# Patient Record
Sex: FEMALE | Race: OTHER | HISPANIC OR LATINO | Employment: UNEMPLOYED | URBAN - METROPOLITAN AREA
[De-identification: names, ages, dates, MRNs, and addresses within clinical notes are randomized per-mention and may not be internally consistent; named-entity substitution may affect disease eponyms.]

---

## 2017-01-23 ENCOUNTER — HOSPITAL ENCOUNTER (OUTPATIENT)
Dept: RADIOLOGY | Facility: MEDICAL CENTER | Age: 52
End: 2017-01-23
Attending: FAMILY MEDICINE
Payer: COMMERCIAL

## 2017-01-23 DIAGNOSIS — Q38.3: ICD-10-CM

## 2017-01-23 DIAGNOSIS — R51.9 NONINTRACTABLE HEADACHE, UNSPECIFIED CHRONICITY PATTERN, UNSPECIFIED HEADACHE TYPE: ICD-10-CM

## 2017-01-23 DIAGNOSIS — R43.9 DISTURBANCES OF SENSATION OF SMELL AND TASTE: ICD-10-CM

## 2017-01-23 PROCEDURE — 70553 MRI BRAIN STEM W/O & W/DYE: CPT

## 2017-01-23 PROCEDURE — A9579 GAD-BASE MR CONTRAST NOS,1ML: HCPCS | Performed by: FAMILY MEDICINE

## 2017-01-23 PROCEDURE — 700117 HCHG RX CONTRAST REV CODE 255: Performed by: FAMILY MEDICINE

## 2017-01-23 RX ADMIN — GADODIAMIDE 20 ML: 287 INJECTION INTRAVENOUS at 14:08

## 2017-02-15 ENCOUNTER — APPOINTMENT (OUTPATIENT)
Dept: RADIOLOGY | Facility: MEDICAL CENTER | Age: 52
End: 2017-02-15
Attending: ANESTHESIOLOGY
Payer: COMMERCIAL

## 2017-02-15 DIAGNOSIS — M25.441 SWELLING OF JOINT OF RIGHT HAND: ICD-10-CM

## 2017-02-15 PROCEDURE — 72148 MRI LUMBAR SPINE W/O DYE: CPT

## 2017-02-15 PROCEDURE — 72141 MRI NECK SPINE W/O DYE: CPT

## 2017-05-08 ENCOUNTER — APPOINTMENT (OUTPATIENT)
Dept: RADIOLOGY | Facility: MEDICAL CENTER | Age: 52
End: 2017-05-08
Attending: EMERGENCY MEDICINE
Payer: COMMERCIAL

## 2017-05-08 ENCOUNTER — HOSPITAL ENCOUNTER (EMERGENCY)
Facility: MEDICAL CENTER | Age: 52
End: 2017-05-08
Attending: EMERGENCY MEDICINE
Payer: COMMERCIAL

## 2017-05-08 VITALS
WEIGHT: 253.53 LBS | HEIGHT: 61 IN | HEART RATE: 64 BPM | DIASTOLIC BLOOD PRESSURE: 71 MMHG | OXYGEN SATURATION: 93 % | RESPIRATION RATE: 16 BRPM | BODY MASS INDEX: 47.87 KG/M2 | SYSTOLIC BLOOD PRESSURE: 133 MMHG | TEMPERATURE: 98.4 F

## 2017-05-08 DIAGNOSIS — M25.551 HIP PAIN, CHRONIC, RIGHT: ICD-10-CM

## 2017-05-08 DIAGNOSIS — M16.11 OSTEOARTHRITIS OF RIGHT HIP, UNSPECIFIED OSTEOARTHRITIS TYPE: ICD-10-CM

## 2017-05-08 DIAGNOSIS — G89.29 HIP PAIN, CHRONIC, RIGHT: ICD-10-CM

## 2017-05-08 PROCEDURE — 700111 HCHG RX REV CODE 636 W/ 250 OVERRIDE (IP): Performed by: EMERGENCY MEDICINE

## 2017-05-08 PROCEDURE — 73502 X-RAY EXAM HIP UNI 2-3 VIEWS: CPT | Mod: RT

## 2017-05-08 PROCEDURE — 96372 THER/PROPH/DIAG INJ SC/IM: CPT

## 2017-05-08 PROCEDURE — 99284 EMERGENCY DEPT VISIT MOD MDM: CPT

## 2017-05-08 RX ORDER — KETOROLAC TROMETHAMINE 30 MG/ML
30 INJECTION, SOLUTION INTRAMUSCULAR; INTRAVENOUS ONCE
Status: COMPLETED | OUTPATIENT
Start: 2017-05-08 | End: 2017-05-08

## 2017-05-08 RX ORDER — ONDANSETRON 2 MG/ML
4 INJECTION INTRAMUSCULAR; INTRAVENOUS ONCE
Status: COMPLETED | OUTPATIENT
Start: 2017-05-08 | End: 2017-05-08

## 2017-05-08 RX ADMIN — KETOROLAC TROMETHAMINE 30 MG: 30 INJECTION, SOLUTION INTRAMUSCULAR at 17:00

## 2017-05-08 RX ADMIN — HYDROMORPHONE HYDROCHLORIDE 1 MG: 1 INJECTION, SOLUTION INTRAMUSCULAR; INTRAVENOUS; SUBCUTANEOUS at 16:00

## 2017-05-08 RX ADMIN — ONDANSETRON 4 MG: 2 INJECTION INTRAMUSCULAR; INTRAVENOUS at 16:00

## 2017-05-08 ASSESSMENT — LIFESTYLE VARIABLES: DO YOU DRINK ALCOHOL: NO

## 2017-05-08 NOTE — ED PROVIDER NOTES
"ED Provider Note    CHIEF COMPLAINT  Chief Complaint   Patient presents with   • Hip Pain     had hip xray one month ago.  right       HPI  Smita Faith is a 51 y.o. female who presents for evaluation of hip pain. She's been having right hip pain for months. The patient sees pain management and is on chronic narcotic therapy and has a narcotic prescription score of 501. She is being seen by a spine doctor and they did x-rays one month ago and told her that she probably needed to see a hip surgeon and have an MRI done. The patient denies any specific trauma. She states the pain has gotten worse primarily today. She's had no fevers. She has no numbness or motor weakness.    REVIEW OF SYSTEMS  See HPI for further details. All other systems are negative.     PAST MEDICAL HISTORY  Past Medical History   Diagnosis Date   • Bipolar 2 disorder ([facility])    • Depression    • Anxiety disorder    • Pneumonia    • Bronchitis    • Backpain      Lumbar chronic   • Unspecified disorder of thyroid [id number]     no longer an issue   • Alcoholism    • Heart burn    • Indigestion    • Arthritis      back, hands, arm   • Gynecological disorder      mass   • Bowel habit changes      constipation, irreg diarrhea   • ASTHMA      States trigered by allergy reaction, PRN inhaler        FAMILY HISTORY  No family history on file.    SOCIAL HISTORY  Social History     Social History   • Marital Status:      Spouse Name: N/A   • Number of Children: N/A   • Years of Education: N/A     Social History Main Topics   • Smoking status: Former Smoker -- 0.25 packs/day for 4 years     Types: Cigarettes     Quit date: 01/01/2015   • Smokeless tobacco: Never Used      Comment: 2009   • Alcohol Use: No      Comment: smoked off and on.   • Drug Use: No      Comment: marijuana, crank, \"did everything.\" last used 04/2015   • Sexual Activity: Not on file     Other Topics Concern   • Not on file     Social History Narrative       SURGICAL " "HISTORY  Past Surgical History   Procedure Laterality Date   • Gastric bypass laparoscopic     • Other abdominal surgery       gastric bypass 5/07,lap jenniffer 1988   • Tubal ligation     • Pr removal of tonsils,<13 y/o     • Pr cholecystostomy,percut     • Lumbar fusion anterior  11/10/2010     Performed by CHERYL LORD at SURGERY [facility]   • Breast biopsy       Right- benign   • Pr reduction of large breast  2010   • Hysterectomy robotic xi N/A 4/7/2016     Procedure: HYSTERECTOMY ROBOTIC XI ;  Surgeon: Song Goodson M.D.;  Location: SURGERY [facility];  Service:        CURRENT MEDICATIONS  Home Medications     **Home medications have not yet been reviewed for this encounter**          ALLERGIES  Allergies   Allergen Reactions   • Ciprofloxacin Anaphylaxis     SOB   • Codeine      Upset stomach    • Erythromycin Vomiting   • Nsaids      Does not take due to gastric bypass   • Other Food Anaphylaxis     pj pj       PHYSICAL EXAM  VITAL SIGNS: /71 mmHg  Pulse 73  Temp(Src) 36.9 °C (98.4 °F)  Resp 16  Ht 1.549 m (5' 1\")  Wt 115 kg (253 lb 8.5 oz)  BMI 47.93 kg/m2  SpO2 96%    Constitutional: Well developed, Well nourished, Non-toxic appearance.   HENT: Normocephalic, Atraumatic.   Cardiovascular: Normal heart rate.   Thorax & Lungs: No respiratory distress.  Skin: Warm, Dry.   Musculoskeletal: Right lower extremity shows no obvious deformity. Distally she is neurovascularly intact. She does have discomfort with range of motion of the hip.  Neurologic: Awake alert .    RADIOLOGY/PROCEDURES  DX-HIP-COMPLETE - UNILATERAL 2+ RIGHT   Final Result      Moderate to severe right hip osteoarthritis without evidence of fracture or dislocation.            COURSE & MEDICAL DECISION MAKING  Pertinent Labs & Imaging studies reviewed. (See chart for details)  Is a 51-year-old here for evaluation of right hip pain. She has chronic hip pain. She is followed by pain management and is on narcotics daily. She " denies any trauma. X-ray of the right hip shows severe right hip osteoarthritis without evidence of fracture or dislocation. The patient is treated with Dilaudid here with some improvement. I discussed results of the x-ray with her. I've explained to her that she needs orthopedic follow-up. She is requesting a shot of Toradol which she will be given. She has pain medications at home. I will refer her to Dr. Hanna of orthopedics for follow-up. She should contact his office tomorrow to make arrangements. She is given a discharge instruction sheet on osteoarthritis.    FINAL IMPRESSION  1. Chronic right hip pain  2. Severe right hip osteoarthritis  3.         Electronically signed by: Jossue Bear, 5/8/2017 3:49 PM

## 2017-05-08 NOTE — ED AVS SNAPSHOT
Cybits Access Code: Activation code not generated  Current Cybits Status: Active    [employer]  A secure, online tool to manage your health information     [employer]’s Cybits® is a secure, online tool that connects you to your personalized health information from the privacy of your home -- day or night - making it very easy for you to manage your healthcare. Once the activation process is completed, you can even access your medical information using the Cybits adal, which is available for free in the Apple Adal store or Google Play store.     Cybits provides the following levels of access (as shown below):   My Chart Features   [facility] Primary Care Doctor [facility]  Specialists [facility]  Urgent  Care Non-[facility]  Primary Care  Doctor   Email your healthcare team securely and privately 24/7 X X X X   Manage appointments: schedule your next appointment; view details of past/upcoming appointments X      Request prescription refills. X      View recent personal medical records, including lab and immunizations X X X X   View health record, including health history, allergies, medications X X X X   Read reports about your outpatient visits, procedures, consult and ER notes X X X X   See your discharge summary, which is a recap of your hospital and/or ER visit that includes your diagnosis, lab results, and care plan. X X       How to register for Cybits:  1. Go to  [email or web address].  2. Click on the Sign Up Now box, which takes you to the New Member Sign Up page. You will need to provide the following information:  a. Enter your Cybits Access Code exactly as it appears at the top of this page. (You will not need to use this code after you’ve completed the sign-up process. If you do not sign up before the expiration date, you must request a new code.)   b. Enter your date of birth.   c. Enter your home email address.   d. Click Submit, and follow the next screen’s instructions.  3. Create a Cybits ID. This will  be your [employer] login ID and cannot be changed, so think of one that is secure and easy to remember.  4. Create a [employer] password. You can change your password at any time.  5. Enter your Password Reset Question and Answer. This can be used at a later time if you forget your password.   6. Enter your e-mail address. This allows you to receive e-mail notifications when new information is available in [employer].  7. Click Sign Up. You can now view your health information.    For assistance activating your [employer] account, call [phone]

## 2017-05-08 NOTE — ED AVS SNAPSHOT
Home Care Instructions                                                                                                                Smita Faith   MRN: [id number]    Department:  [facility], Emergency Dept   Date of Visit:  5/8/2017            [facility], Emergency Dept    [address]    [place] [address]    Phone:  [phone]      You were seen by     Jossue Bear M.D.      Your Diagnosis Was     Hip pain, chronic, right     M25.551, G89.29       These are the medications you received during your hospitalization from 05/08/2017 1448 to 05/08/2017 1724     Date/Time Order Dose Route Action    05/08/2017 1600 HYDROmorphone (DILAUDID) injection 1 mg 1 mg Intramuscular Given    05/08/2017 1600 ondansetron (ZOFRAN) syringe/vial injection 4 mg 4 mg Intramuscular Given    05/08/2017 1700 ketorolac (TORADOL) 30 mg injection 30 mg Intramuscular Given      Follow-up Information     1. Schedule an appointment as soon as possible for a visit with Vijay Hanna M.D..    Specialty:  Orthopaedics    Contact information    [address]  [address]  [place] [facility] [address] [phone]        Medication Information     Review all of your home medications and newly ordered medications with your primary doctor and/or pharmacist as soon as possible. Follow medication instructions as directed by your doctor and/or pharmacist.     Please keep your complete medication list with you and share with your physician. Update the information when medications are discontinued, doses are changed, or new medications (including over-the-counter products) are added; and carry medication information at all times in the event of emergency situations.               Medication List      ASK your doctor about these medications        Instructions    Morning Afternoon Evening Bedtime    ALBUTEROL INH        Inhale  by mouth.                        Calcium 7463-6220 MG-UNIT Chew        Take 1 Each  by mouth every day.   Dose:  1 Each                        cetirizine 10 MG Tabs   Commonly known as:  ZYRTEC        Take 10 mg by mouth every day.   Dose:  10 mg                        COQ-10 PO        Take  by mouth every day.                        gabapentin 300 MG Caps   Commonly known as:  NEURONTIN        Take 300 mg by mouth 3 times a day.   Dose:  300 mg                        MULTIVITAMIN & MINERAL PO        Take 1 Tab by mouth every day.   Dose:  1 Tab                        ondansetron 4 MG Tabs tablet   Commonly known as:  ZOFRAN        Take 1 Tab by mouth every four hours as needed for Nausea/Vomiting.   Dose:  4 mg                        * oxycodone-acetaminophen 5-325 MG Tabs   Commonly known as:  PERCOCET        Take 1-2 Tabs by mouth every four hours as needed.   Dose:  1-2 Tab                        * oxycodone-acetaminophen 7.5-325 MG per tablet   Commonly known as:  PERCOCET        Take 1-2 Tabs by mouth every 6 hours as needed (pain).   Dose:  1-2 Tab                        sertraline 100 MG Tabs   Commonly known as:  ZOLOFT        Take 100 mg by mouth every day.   Dose:  100 mg                        Vitamin D 1000 UNIT Caps        Take 1,000-2,000 Units by mouth every day.   Dose:  9349-9353 Units                        * Notice:  This list has 2 medication(s) that are the same as other medications prescribed for you. Read the directions carefully, and ask your doctor or other care provider to review them with you.            Procedures and tests performed during your visit     DX-HIP-COMPLETE - UNILATERAL 2+ RIGHT        Discharge Instructions       Arthritis, Nonspecific  Arthritis is inflammation of a joint. This usually means pain, redness, warmth or swelling are present. One or more joints may be involved. There are a number of types of arthritis. Your caregiver may not be able to tell what type of arthritis you have right away.  CAUSES   The most common cause of arthritis is the wear  and tear on the joint (osteoarthritis). This causes damage to the cartilage, which can break down over time. The knees, hips, back and neck are most often affected by this type of arthritis.  Other types of arthritis and common causes of joint pain include:  · Sprains and other injuries near the joint. Sometimes minor sprains and injuries cause pain and swelling that develop hours later.  · Rheumatoid arthritis. This affects hands, feet and knees. It usually affects both sides of your body at the same time. It is often associated with chronic ailments, fever, weight loss and general weakness.  · Crystal arthritis. Gout and pseudo gout can cause occasional acute severe pain, redness and swelling in the foot, ankle, or knee.  · Infectious arthritis. Bacteria can get into a joint through a break in overlying skin. This can cause infection of the joint. Bacteria and viruses can also spread through the blood and affect your joints.  · Drug, infectious and allergy reactions. Sometimes joints can become mildly painful and slightly swollen with these types of illnesses.  SYMPTOMS   · Pain is the main symptom.  · Your joint or joints can also be red, swollen and warm or hot to the touch.  · You may have a fever with certain types of arthritis, or even feel overall ill.  · The joint with arthritis will hurt with movement. Stiffness is present with some types of arthritis.  DIAGNOSIS   Your caregiver will suspect arthritis based on your description of your symptoms and on your exam. Testing may be needed to find the type of arthritis:  · Blood and sometimes urine tests.  · X-ray tests and sometimes CT or MRI scans.  · Removal of fluid from the joint (arthrocentesis) is done to check for bacteria, crystals or other causes. Your caregiver (or a specialist) will numb the area over the joint with a local anesthetic, and use a needle to remove joint fluid for examination. This procedure is only minimally uncomfortable.  · Even with  these tests, your caregiver may not be able to tell what kind of arthritis you have. Consultation with a specialist (rheumatologist) may be helpful.  TREATMENT   Your caregiver will discuss with you treatment specific to your type of arthritis. If the specific type cannot be determined, then the following general recommendations may apply.  Treatment of severe joint pain includes:  · Rest.  · Elevation.  · Anti-inflammatory medication (for example, ibuprofen) may be prescribed. Avoiding activities that cause increased pain.  · Only take over-the-counter or prescription medicines for pain and discomfort as recommended by your caregiver.  · Cold packs over an inflamed joint may be used for 10 to 15 minutes every hour. Hot packs sometimes feel better, but do not use overnight. Do not use hot packs if you are diabetic without your caregiver's permission.  · A cortisone shot into arthritic joints may help reduce pain and swelling.  · Any acute arthritis that gets worse over the next 1 to 2 days needs to be looked at to be sure there is no joint infection.  Long-term arthritis treatment involves modifying activities and lifestyle to reduce joint stress jarring. This can include weight loss. Also, exercise is needed to nourish the joint cartilage and remove waste. This helps keep the muscles around the joint strong.  HOME CARE INSTRUCTIONS   · Do not take aspirin to relieve pain if gout is suspected. This elevates uric acid levels.  · Only take over-the-counter or prescription medicines for pain, discomfort or fever as directed by your caregiver.  · Rest the joint as much as possible.  · If your joint is swollen, keep it elevated.  · Use crutches if the painful joint is in your leg.  · Drinking plenty of fluids may help for certain types of arthritis.  · Follow your caregiver's dietary instructions.  · Try low-impact exercise such as:  ¨ Swimming.  ¨ Water aerobics.  ¨ Biking.  ¨ Walking.  · Morning stiffness is often  relieved by a warm shower.  · Put your joints through regular range-of-motion.  SEEK MEDICAL CARE IF:   · You do not feel better in 24 hours or are getting worse.  · You have side effects to medications, or are not getting better with treatment.  SEEK IMMEDIATE MEDICAL CARE IF:   · You have a fever.  · You develop severe joint pain, swelling or redness.  · Many joints are involved and become painful and swollen.  · There is severe back pain and/or leg weakness.  · You have loss of bowel or bladder control.     This information is not intended to replace advice given to you by your health care provider. Make sure you discuss any questions you have with your health care provider.     Document Released: 01/25/2006 Document Revised: 01/08/2016 Document Reviewed: 03/14/2016  [employer] Interactive Patient Education ©2016 [employer]            Patient Information     Patient Information    Following emergency treatment: all patient requiring follow-up care must return either to a private physician or a clinic if your condition worsens before you are able to obtain further medical attention, please return to the emergency room.     Billing Information    At [facility], we work to make the billing process streamlined for our patients.  Our Representatives are here to answer any questions you may have regarding your hospital bill.  If you have insurance coverage and have supplied your insurance information to us, we will submit a claim to your insurer on your behalf.  Should you have any questions regarding your bill, we can be reached online or by phone as follows:  Online: You are able pay your bills online or live chat with our representatives about any billing questions you may have. We are here to help Monday - Friday from 8:00am to 7:30pm and 9:00am - 12:00pm on Saturdays.  Please visit [email or web address]  for more information.   Phone:  [phone] or [phone]    Please  note that your emergency physician, surgeon, pathologist, radiologist, anesthesiologist, and other specialists are not employed by [facility] and will therefore bill separately for their services.  Please contact them directly for any questions concerning their bills at the numbers below:     Emergency Physician Services:  [phone]  [facility]:  [phone]  [facility]:  [phone]  [facility]:  [phone]    1. Your final bill may vary from the amount quoted upon discharge if all procedures are not complete at that time, or if your doctor has additional procedures of which we are not aware. You will receive an additional bill if you return to the Emergency Department at [facility] for suture removal regardless of the facility of which the sutures were placed.     2. Please arrange for settlement of this account at the emergency registration.    3. All self-pay accounts are due in full at the time of treatment.  If you are unable to meet this obligation then payment is expected within 4-5 days.     4. If you have had radiology studies (CT, X-ray, Ultrasound, MRI), you have received a preliminary result during your emergency department visit. Please contact the radiology department [phone] to receive a copy of your final result. Please discuss the Final result with your primary physician or with the follow up physician provided.     Crisis Hotline:  [place] Crisis Hotline:  2-554-RMDJTGV or 1-694.513.8214  [place] Crisis Hotline:    1-643.610.1821 or 336-321-9973         ED Discharge Follow Up Questions    1. In order to provide you with very good care, we would like to follow up with a phone call in the next few days.  May we have your permission to contact you?     YES /  NO    2. What is the best phone number to call you? (       )_____-__________    3. What is the best time to call you?      Morning  /  Afternoon  /  Evening                    Patient Signature:  ____________________________________________________________    Date:  ____________________________________________________________

## 2017-05-08 NOTE — ED AVS SNAPSHOT
5/8/2017    Smita Faith  [address]  [place] [address]    Dear Smita Mejia:    Critical [facility] hospital wants to ensure your discharge home is safe and you or your loved ones have had all of your questions answered regarding your care after you leave the hospital.    Below is a list of resources and contact information should you have any questions regarding your hospital stay, follow-up instructions, or active medical symptoms.    Questions or Concerns Regarding… Contact   Medical Questions Related to Your Discharge  (7 days a week, 8am-5pm) Contact a Nurse Care Coordinator   [phone]   Medical Questions Not Related to Your Discharge  (24 hours a day / 7 days a week)  Contact the Nurse Health Line   [phone]    Medications or Discharge Instructions Refer to your discharge packet   or contact your [facility] Primary Care Provider   [phone]   Follow-up Appointment(s) Schedule your appointment via [employer]   or contact Scheduling [phone]   Billing Review your statement via [employer]  or contact Billing [phone]   Medical Records Review your records via [employer]   or contact Medical Records [phone]     You may receive a telephone call within two days of discharge. This call is to make certain you understand your discharge instructions and have the opportunity to have any questions answered. You can also easily access your medical information, test results and upcoming appointments via the [employer] free online health management tool. You can learn more and sign up at [employer]. For assistance setting up your [employer] account, please call [phone].    Once again, we want to ensure your discharge home is safe and that you have a clear understanding of any next steps in your care. If you have any questions or concerns, please do not hesitate to contact us, we are here for you. Thank you for choosing [facility] for your healthcare needs.    Sincerely,    Your [facility] Healthcare Team

## 2017-05-08 NOTE — DISCHARGE INSTRUCTIONS
Arthritis, Nonspecific  Arthritis is inflammation of a joint. This usually means pain, redness, warmth or swelling are present. One or more joints may be involved. There are a number of types of arthritis. Your caregiver may not be able to tell what type of arthritis you have right away.  CAUSES   The most common cause of arthritis is the wear and tear on the joint (osteoarthritis). This causes damage to the cartilage, which can break down over time. The knees, hips, back and neck are most often affected by this type of arthritis.  Other types of arthritis and common causes of joint pain include:  · Sprains and other injuries near the joint. Sometimes minor sprains and injuries cause pain and swelling that develop hours later.  · Rheumatoid arthritis. This affects hands, feet and knees. It usually affects both sides of your body at the same time. It is often associated with chronic ailments, fever, weight loss and general weakness.  · Crystal arthritis. Gout and pseudo gout can cause occasional acute severe pain, redness and swelling in the foot, ankle, or knee.  · Infectious arthritis. Bacteria can get into a joint through a break in overlying skin. This can cause infection of the joint. Bacteria and viruses can also spread through the blood and affect your joints.  · Drug, infectious and allergy reactions. Sometimes joints can become mildly painful and slightly swollen with these types of illnesses.  SYMPTOMS   · Pain is the main symptom.  · Your joint or joints can also be red, swollen and warm or hot to the touch.  · You may have a fever with certain types of arthritis, or even feel overall ill.  · The joint with arthritis will hurt with movement. Stiffness is present with some types of arthritis.  DIAGNOSIS   Your caregiver will suspect arthritis based on your description of your symptoms and on your exam. Testing may be needed to find the type of arthritis:  · Blood and sometimes urine tests.  · X-ray tests  and sometimes CT or MRI scans.  · Removal of fluid from the joint (arthrocentesis) is done to check for bacteria, crystals or other causes. Your caregiver (or a specialist) will numb the area over the joint with a local anesthetic, and use a needle to remove joint fluid for examination. This procedure is only minimally uncomfortable.  · Even with these tests, your caregiver may not be able to tell what kind of arthritis you have. Consultation with a specialist (rheumatologist) may be helpful.  TREATMENT   Your caregiver will discuss with you treatment specific to your type of arthritis. If the specific type cannot be determined, then the following general recommendations may apply.  Treatment of severe joint pain includes:  · Rest.  · Elevation.  · Anti-inflammatory medication (for example, ibuprofen) may be prescribed. Avoiding activities that cause increased pain.  · Only take over-the-counter or prescription medicines for pain and discomfort as recommended by your caregiver.  · Cold packs over an inflamed joint may be used for 10 to 15 minutes every hour. Hot packs sometimes feel better, but do not use overnight. Do not use hot packs if you are diabetic without your caregiver's permission.  · A cortisone shot into arthritic joints may help reduce pain and swelling.  · Any acute arthritis that gets worse over the next 1 to 2 days needs to be looked at to be sure there is no joint infection.  Long-term arthritis treatment involves modifying activities and lifestyle to reduce joint stress jarring. This can include weight loss. Also, exercise is needed to nourish the joint cartilage and remove waste. This helps keep the muscles around the joint strong.  HOME CARE INSTRUCTIONS   · Do not take aspirin to relieve pain if gout is suspected. This elevates uric acid levels.  · Only take over-the-counter or prescription medicines for pain, discomfort or fever as directed by your caregiver.  · Rest the joint as much as  possible.  · If your joint is swollen, keep it elevated.  · Use crutches if the painful joint is in your leg.  · Drinking plenty of fluids may help for certain types of arthritis.  · Follow your caregiver's dietary instructions.  · Try low-impact exercise such as:  ¨ Swimming.  ¨ Water aerobics.  ¨ Biking.  ¨ Walking.  · Morning stiffness is often relieved by a warm shower.  · Put your joints through regular range-of-motion.  SEEK MEDICAL CARE IF:   · You do not feel better in 24 hours or are getting worse.  · You have side effects to medications, or are not getting better with treatment.  SEEK IMMEDIATE MEDICAL CARE IF:   · You have a fever.  · You develop severe joint pain, swelling or redness.  · Many joints are involved and become painful and swollen.  · There is severe back pain and/or leg weakness.  · You have loss of bowel or bladder control.     This information is not intended to replace advice given to you by your health care provider. Make sure you discuss any questions you have with your health care provider.     Document Released: 01/25/2006 Document Revised: 01/08/2016 Document Reviewed: 03/14/2016  [employer] Interactive Patient Education ©2016 [employer]

## 2017-05-08 NOTE — ED NOTES
52 y/o female ambulate to triage   Chief Complaint   Patient presents with   • Hip Pain     had hip xray one month ago.  right     Pt states she was told the bone in her hip is thin, and needs an MRI.  Pt states no relief with home pain meds

## 2017-05-08 NOTE — ED NOTES
Pt to [facility] via w/c.  Agree with triage note.  Pt reports initial xray completed by spine doctors.  Pt reports pain from right knee to hip.  Pt has been taking percocet 10 at home with no relief.  ERP to see.

## 2017-05-09 ENCOUNTER — HOSPITAL ENCOUNTER (EMERGENCY)
Facility: MEDICAL CENTER | Age: 52
End: 2017-05-09
Attending: EMERGENCY MEDICINE
Payer: COMMERCIAL

## 2017-05-09 VITALS
RESPIRATION RATE: 16 BRPM | BODY MASS INDEX: 48.35 KG/M2 | DIASTOLIC BLOOD PRESSURE: 73 MMHG | HEART RATE: 61 BPM | SYSTOLIC BLOOD PRESSURE: 113 MMHG | TEMPERATURE: 98.4 F | OXYGEN SATURATION: 94 % | WEIGHT: 255.73 LBS

## 2017-05-09 DIAGNOSIS — M25.512 ACUTE PAIN OF LEFT SHOULDER: ICD-10-CM

## 2017-05-09 DIAGNOSIS — F11.90 OPIATE USE: ICD-10-CM

## 2017-05-09 DIAGNOSIS — G89.29 OTHER CHRONIC PAIN: ICD-10-CM

## 2017-05-09 DIAGNOSIS — R51.9 NONINTRACTABLE EPISODIC HEADACHE, UNSPECIFIED HEADACHE TYPE: ICD-10-CM

## 2017-05-09 DIAGNOSIS — M54.2 NECK PAIN ON LEFT SIDE: ICD-10-CM

## 2017-05-09 PROCEDURE — 700111 HCHG RX REV CODE 636 W/ 250 OVERRIDE (IP): Performed by: EMERGENCY MEDICINE

## 2017-05-09 PROCEDURE — 700102 HCHG RX REV CODE 250 W/ 637 OVERRIDE(OP): Performed by: EMERGENCY MEDICINE

## 2017-05-09 PROCEDURE — 99284 EMERGENCY DEPT VISIT MOD MDM: CPT

## 2017-05-09 PROCEDURE — A9270 NON-COVERED ITEM OR SERVICE: HCPCS | Performed by: EMERGENCY MEDICINE

## 2017-05-09 PROCEDURE — 96374 THER/PROPH/DIAG INJ IV PUSH: CPT

## 2017-05-09 PROCEDURE — 96375 TX/PRO/DX INJ NEW DRUG ADDON: CPT

## 2017-05-09 RX ORDER — DIPHENHYDRAMINE HYDROCHLORIDE 50 MG/ML
25 INJECTION INTRAMUSCULAR; INTRAVENOUS ONCE
Status: COMPLETED | OUTPATIENT
Start: 2017-05-09 | End: 2017-05-09

## 2017-05-09 RX ORDER — METOCLOPRAMIDE HYDROCHLORIDE 5 MG/ML
10 INJECTION INTRAMUSCULAR; INTRAVENOUS ONCE
Status: COMPLETED | OUTPATIENT
Start: 2017-05-09 | End: 2017-05-09

## 2017-05-09 RX ORDER — KETOROLAC TROMETHAMINE 30 MG/ML
15 INJECTION, SOLUTION INTRAMUSCULAR; INTRAVENOUS ONCE
Status: COMPLETED | OUTPATIENT
Start: 2017-05-09 | End: 2017-05-09

## 2017-05-09 RX ORDER — ACETAMINOPHEN 325 MG/1
650 TABLET ORAL ONCE
Status: COMPLETED | OUTPATIENT
Start: 2017-05-09 | End: 2017-05-09

## 2017-05-09 RX ORDER — METHOCARBAMOL 500 MG/1
1000 TABLET, FILM COATED ORAL 3 TIMES DAILY PRN
Qty: 20 TAB | Refills: 0 | Status: SHIPPED | OUTPATIENT
Start: 2017-05-09 | End: 2017-08-03

## 2017-05-09 RX ADMIN — DIPHENHYDRAMINE HYDROCHLORIDE 25 MG: 50 INJECTION, SOLUTION INTRAMUSCULAR; INTRAVENOUS at 19:47

## 2017-05-09 RX ADMIN — KETOROLAC TROMETHAMINE 15 MG: 30 INJECTION, SOLUTION INTRAMUSCULAR at 19:46

## 2017-05-09 RX ADMIN — ACETAMINOPHEN 650 MG: 325 TABLET, FILM COATED ORAL at 20:39

## 2017-05-09 RX ADMIN — METOCLOPRAMIDE 10 MG: 5 INJECTION, SOLUTION INTRAMUSCULAR; INTRAVENOUS at 19:46

## 2017-05-09 ASSESSMENT — LIFESTYLE VARIABLES: DO YOU DRINK ALCOHOL: NO

## 2017-05-09 ASSESSMENT — PAIN SCALES - GENERAL: PAINLEVEL_OUTOF10: 8

## 2017-05-09 NOTE — ED AVS SNAPSHOT
Baydin Access Code: Activation code not generated  Current Baydin Status: Active    Message[employer]er[employer]  A secure, online tool to manage your health information     [employer]’s Baydin® is a secure, online tool that connects you to your personalized health information from the privacy of your home -- day or night - making it very easy for you to manage your healthcare. Once the activation process is completed, you can even access your medical information using the Baydin adal, which is available for free in the Apple Adal store or Google Play store.     Baydin provides the following levels of access (as shown below):   My Chart Features   [facility] Primary Care Doctor [facility]  Specialists [facility]  Urgent  Care Non-[facility]  Primary Care  Doctor   Email your healthcare team securely and privately 24/7 X X X X   Manage appointments: schedule your next appointment; view details of past/upcoming appointments X      Request prescription refills. X      View recent personal medical records, including lab and immunizations X X X X   View health record, including health history, allergies, medications X X X X   Read reports about your outpatient visits, procedures, consult and ER notes X X X X   See your discharge summary, which is a recap of your hospital and/or ER visit that includes your diagnosis, lab results, and care plan. X X       How to register for Baydin:  1. Go to  [email or web address].  2. Click on the Sign Up Now box, which takes you to the New Member Sign Up page. You will need to provide the following information:  a. Enter your Baydin Access Code exactly as it appears at the top of this page. (You will not need to use this code after you’ve completed the sign-up process. If you do not sign up before the expiration date, you must request a new code.)   b. Enter your date of birth.   c. Enter your home email address.   d. Click Submit, and follow the next screen’s instructions.  3. Create a Baydin ID. This will  be your [employer] login ID and cannot be changed, so think of one that is secure and easy to remember.  4. Create a Staaff password. You can change your password at any time.  5. Enter your Password Reset Question and Answer. This can be used at a later time if you forget your password.   6. Enter your e-mail address. This allows you to receive e-mail notifications when new information is available in Staaff.  7. Click Sign Up. You can now view your health information.    For assistance activating your Sta[employer] account, call [phone]

## 2017-05-09 NOTE — ED AVS SNAPSHOT
5/9/2017    Smita Faith  [address]  [place] [address]    Dear Smita Mejia:    [facility] wants to ensure your discharge home is safe and you or your loved ones have had all of your questions answered regarding your care after you leave the hospital.    Below is a list of resources and contact information should you have any questions regarding your hospital stay, follow-up instructions, or active medical symptoms.    Questions or Concerns Regarding… Contact   Medical Questions Related to Your Discharge  (7 days a week, 8am-5pm) Contact a Nurse Care Coordinator   [phone]   Medical Questions Not Related to Your Discharge  (24 hours a day / 7 days a week)  Contact the Nurse Health Line   [phone]    Medications or Discharge Instructions Refer to your discharge packet   or contact your [facility] Primary Care Provider   [phone]   Follow-up Appointment(s) Schedule your appointment via SensibleSelf   or contact Scheduling [phone]   Billing Review your statement via SensibleSelf  or contact Billing [phone]   Medical Records Review your records via SensibleSelf   or contact Medical Records [phone]     You may receive a telephone call within two days of discharge. This call is to make certain you understand your discharge instructions and have the opportunity to have any questions answered. You can also easily access your medical information, test results and upcoming appointments via the SensibleSelf free online health management tool. You can learn more and sign up at [employer]/SensibleSelf. For assistance setting up your SensibleSelf account, please call [phone].    Once again, we want to ensure your discharge home is safe and that you have a clear understanding of any next steps in your care. If you have any questions or concerns, please do not hesitate to contact us, we are here for you. Thank you for choosing [facility] for your healthcare needs.    Sincerely,    Your [facility] Healthcare Team

## 2017-05-09 NOTE — ED AVS SNAPSHOT
Home Care Instructions                                                                                                                Smita Faith   MRN: [id number]    Department:  [facility], Emergency Dept   Date of Visit:  5/9/2017            [facility], Emergency Dept    [address] [facility] [address]    Phone:  [phone]      You were seen by     Cheyenne Quevedo M.D.      Your Diagnosis Was     Nonintractable episodic headache, unspecified headache type     R51       These are the medications you received during your hospitalization from 05/09/2017 1806 to 05/09/2017 2128     Date/Time Order Dose Route Action    05/09/2017 1946 metoclopramide (REGLAN) injection 10 mg 10 mg Intravenous Given    05/09/2017 1947 diphenhydrAMINE (BENADRYL) injection 25 mg 25 mg Intravenous Given    05/09/2017 1946 ketorolac (TORADOL) injection 15 mg Intravenous Given    05/09/2017 2039 acetaminophen (TYLENOL) tablet 650 mg 650 mg Oral Given      Follow-up Information     1. Follow up with [facility]. Go in 1 day.    Why:  For complete recheck    Contact information    [address] [place] [address] [phone]        2. Go to [facility], Emergency Dept.    Specialty:  Emergency Medicine    Why:  If symptoms worsen or do not continue to imrpove, and are not improved in 12-24 hours.    Contact information    [address] [place] [id number] [phone]      Medication Information     Review all of your home medications and newly ordered medications with your primary doctor and/or pharmacist as soon as possible. Follow medication instructions as directed by your doctor and/or pharmacist.     Please keep your complete medication list with you and share with your physician. Update the information when medications are discontinued, doses are changed, or new medications (including over-the-counter products) are added; and carry  medication information at all times in the event of emergency situations.               Medication List      START taking these medications        Instructions    Morning Afternoon Evening Bedtime    methocarbamol 500 MG Tabs   Commonly known as:  ROBAXIN        Take 2 Tabs by mouth 3 times a day as needed.   Dose:  1000 mg                          ASK your doctor about these medications        Instructions    Morning Afternoon Evening Bedtime    ALBUTEROL INH        Inhale  by mouth.                        Calcium 8558-2273 MG-UNIT Chew        Take 1 Each by mouth every day.   Dose:  1 Each                        cetirizine 10 MG Tabs   Commonly known as:  ZYRTEC        Take 10 mg by mouth every day.   Dose:  10 mg                        COQ-10 PO        Take  by mouth every day.                        gabapentin 300 MG Caps   Commonly known as:  NEURONTIN        Take 300 mg by mouth 3 times a day.   Dose:  300 mg                        MULTIVITAMIN & MINERAL PO        Take 1 Tab by mouth every day.   Dose:  1 Tab                        ondansetron 4 MG Tabs tablet   Commonly known as:  ZOFRAN        Take 1 Tab by mouth every four hours as needed for Nausea/Vomiting.   Dose:  4 mg                        * oxycodone-acetaminophen 5-325 MG Tabs   Commonly known as:  PERCOCET        Take 1-2 Tabs by mouth every four hours as needed.   Dose:  1-2 Tab                        * oxycodone-acetaminophen 7.5-325 MG per tablet   Commonly known as:  PERCOCET        Take 1-2 Tabs by mouth every 6 hours as needed (pain).   Dose:  1-2 Tab                        sertraline 100 MG Tabs   Commonly known as:  ZOLOFT        Take 100 mg by mouth every day.   Dose:  100 mg                        Vitamin D 1000 UNIT Caps        Take 1,000-2,000 Units by mouth every day.   Dose:  2408-7025 Units                        * Notice:  This list has 2 medication(s) that are the same as other medications prescribed for you. Read the directions  "carefully, and ask your doctor or other care provider to review them with you.         Where to Get Your Medications      You can get these medications from any pharmacy     Bring a paper prescription for each of these medications    - methocarbamol 500 MG Tabs              Discharge Instructions       Please call your primary care physician tomorrow for complete recheck. Return to the emergency department if your neck pain or shoulder pain are not improving, if you develop any weakness to the left side, any chest pain, shortness of breath, or any new or worsening symptoms.    Headaches, Frequently Asked Questions  MIGRAINE HEADACHES  Q: What is migraine? What causes it? How can I treat it?  A: Generally, migraine headaches begin as a dull ache. Then they develop into a constant, throbbing, and pulsating pain. You may experience pain at the temples. You may experience pain at the front or back of one or both sides of the head. The pain is usually accompanied by a combination of:  · Nausea.   · Vomiting.   · Sensitivity to light and noise.   Some people (about 15%) experience an aura (see below) before an attack. The cause of migraine is believed to be chemical reactions in the brain. Treatment for migraine may include over-the-counter or prescription medications. It may also include self-help techniques. These include relaxation training and biofeedback.   Q: What is an aura?  A: About 15% of people with migraine get an \"aura\". This is a sign of neurological symptoms that occur before a migraine headache. You may see wavy or jagged lines, dots, or flashing lights. You might experience tunnel vision or blind spots in one or both eyes. The aura can include visual or auditory hallucinations (something imagined). It may include disruptions in smell (such as strange odors), taste or touch. Other symptoms include:  · Numbness.   · A \"pins and needles\" sensation.   · Difficulty in recalling or speaking the correct word.   " "  These neurological events may last as long as 60 minutes. These symptoms will fade as the headache begins.  Q: What is a trigger?  A: Certain physical or environmental factors can lead to or \"trigger\" a migraine. These include:  · Foods.   · Hormonal changes.   · Weather.   · Stress.   It is important to remember that triggers are different for everyone. To help prevent migraine attacks, you need to figure out which triggers affect you. Keep a headache diary. This is a good way to track triggers. The diary will help you talk to your healthcare professional about your condition.  Q: Does weather affect migraines?  A: Bright sunshine, hot, humid conditions, and drastic changes in barometric pressure may lead to, or \"trigger,\" a migraine attack in some people. But studies have shown that weather does not act as a trigger for everyone with migraines.  Q: What is the link between migraine and hormones?  A: Hormones start and regulate many of your body's functions. Hormones keep your body in balance within a constantly changing environment. The levels of hormones in your body are unbalanced at times. Examples are during menstruation, pregnancy, or menopause. That can lead to a migraine attack. In fact, about three quarters of all women with migraine report that their attacks are related to the menstrual cycle.   Q: Is there an increased risk of stroke for migraine sufferers?  A: The likelihood of a migraine attack causing a stroke is very remote. That is not to say that migraine sufferers cannot have a stroke associated with their migraines. In persons under age 40, the most common associated factor for stroke is migraine headache. But over the course of a person's normal life span, the occurrence of migraine headache may actually be associated with a reduced risk of dying from cerebrovascular disease due to stroke.   Q: What are acute medications for migraine?  A: Acute medications are used to treat the pain of the " "headache after it has started. Examples over-the-counter medications, NSAIDs, ergots, and triptans.   Q: What are the triptans?  A: Triptans are the newest class of abortive medications. They are specifically targeted to treat migraine. Triptans are vasoconstrictors. They moderate some chemical reactions in the brain. The triptans work on receptors in your brain. Triptans help to restore the balance of a neurotransmitter called serotonin. Fluctuations in levels of serotonin are thought to be a main cause of migraine.   Q: Are over-the-counter medications for migraine effective?  A: Over-the-counter, or \"OTC,\" medications may be effective in relieving mild to moderate pain and associated symptoms of migraine. But you should see your caregiver before beginning any treatment regimen for migraine.   Q: What are preventive medications for migraine?  A: Preventive medications for migraine are sometimes referred to as \"prophylactic\" treatments. They are used to reduce the frequency, severity, and length of migraine attacks. Examples of preventive medications include antiepileptic medications, antidepressants, beta-blockers, calcium channel blockers, and NSAIDs (nonsteroidal anti-inflammatory drugs).  Q: Why are anticonvulsants used to treat migraine?  A: During the past few years, there has been an increased interest in antiepileptic drugs for the prevention of migraine. They are sometimes referred to as \"anticonvulsants\". Both epilepsy and migraine may be caused by similar reactions in the brain.   Q: Why are antidepressants used to treat migraine?  A: Antidepressants are typically used to treat people with depression. They may reduce migraine frequency by regulating chemical levels, such as serotonin, in the brain.   Q: What alternative therapies are used to treat migraine?  A: The term \"alternative therapies\" is often used to describe treatments considered outside the scope of conventional Western medicine. Examples of " "alternative therapy include acupuncture, acupressure, and yoga. Another common alternative treatment is herbal therapy. Some herbs are believed to relieve headache pain. Always discuss alternative therapies with your caregiver before proceeding. Some herbal products contain arsenic and other toxins.  TENSION HEADACHES  Q: What is a tension-type headache? What causes it? How can I treat it?  A: Tension-type headaches occur randomly. They are often the result of temporary stress, anxiety, fatigue, or anger. Symptoms include soreness in your temples, a tightening band-like sensation around your head (a \"vice-like\" ache). Symptoms can also include a pulling feeling, pressure sensations, and chantel head and neck muscles. The headache begins in your forehead, temples, or the back of your head and neck. Treatment for tension-type headache may include over-the-counter or prescription medications. Treatment may also include self-help techniques such as relaxation training and biofeedback.  CLUSTER HEADACHES  Q: What is a cluster headache? What causes it? How can I treat it?  A: Cluster headache gets its name because the attacks come in groups. The pain arrives with little, if any, warning. It is usually on one side of the head. A tearing or bloodshot eye and a runny nose on the same side of the headache may also accompany the pain. Cluster headaches are believed to be caused by chemical reactions in the brain. They have been described as the most severe and intense of any headache type. Treatment for cluster headache includes prescription medication and oxygen.  SINUS HEADACHES  Q: What is a sinus headache? What causes it? How can I treat it?  A: When a cavity in the bones of the face and skull (a sinus) becomes inflamed, the inflammation will cause localized pain. This condition is usually the result of an allergic reaction, a tumor, or an infection. If your headache is caused by a sinus blockage, such as an infection, " "you will probably have a fever. An x-ray will confirm a sinus blockage. Your caregiver's treatment might include antibiotics for the infection, as well as antihistamines or decongestants.   REBOUND HEADACHES  Q: What is a rebound headache? What causes it? How can I treat it?  A: A pattern of taking acute headache medications too often can lead to a condition known as \"rebound headache.\" A pattern of taking too much headache medication includes taking it more than 2 days per week or in excessive amounts. That means more than the label or a caregiver advises. With rebound headaches, your medications not only stop relieving pain, they actually begin to cause headaches. Doctors treat rebound headache by tapering the medication that is being overused. Sometimes your caregiver will gradually substitute a different type of treatment or medication. Stopping may be a challenge. Regularly overusing a medication increases the potential for serious side effects. Consult a caregiver if you regularly use headache medications more than 2 days per week or more than the label advises.  ADDITIONAL QUESTIONS AND ANSWERS  Q: What is biofeedback?  A: Biofeedback is a self-help treatment. Biofeedback uses special equipment to monitor your body's involuntary physical responses. Biofeedback monitors:  · Breathing.   · Pulse.   · Heart rate.   · Temperature.   · Muscle tension.   · Brain activity.   Biofeedback helps you refine and perfect your relaxation exercises. You learn to control the physical responses that are related to stress. Once the technique has been mastered, you do not need the equipment any more.  Q: Are headaches hereditary?  A: Four out of five (80%) of people that suffer report a family history of migraine. Scientists are not sure if this is genetic or a family predisposition. Despite the uncertainty, a child has a 50% chance of having migraine if one parent suffers. The child has a 75% chance if both parents suffer.   "   Q: Can children get headaches?  A: By the time they reach high school, most young people have experienced some type of headache. Many safe and effective approaches or medications can prevent a headache from occurring or stop it after it has begun.   Q: What type of doctor should I see to diagnose and treat my headache?  A: Start with your primary caregiver. Discuss his or her experience and approach to headaches. Discuss methods of classification, diagnosis, and treatment. Your caregiver may decide to recommend you to a headache specialist, depending upon your symptoms or other physical conditions. Having diabetes, allergies, etc., may require a more comprehensive and inclusive approach to your headache. The [employer] will provide, upon request, a list of NHF physician members in your state.  Document Released: 03/09/2005 Document Revised: 03/11/2013 Document Reviewed: 08/17/2009  [employer]® Patient Information ©2013 [employer].          Patient Information     Patient Information    Following emergency treatment: all patient requiring follow-up care must return either to a private physician or a clinic if your condition worsens before you are able to obtain further medical attention, please return to the emergency room.     Billing Information    At [facility], we work to make the billing process streamlined for our patients.  Our Representatives are here to answer any questions you may have regarding your hospital bill.  If you have insurance coverage and have supplied your insurance information to us, we will submit a claim to your insurer on your behalf.  Should you have any questions regarding your bill, we can be reached online or by phone as follows:  Online: You are able pay your bills online or live chat with our representatives about any billing questions you may have. We are here to help Monday - Friday from 8:00am to 7:30pm and 9:00am - 12:00pm on Saturdays.  Please visit  [email or web address]  for more information.   Phone:  [phone] or [phone]    Please note that your emergency physician, surgeon, pathologist, radiologist, anesthesiologist, and other specialists are not employed by [facility] and will therefore bill separately for their services.  Please contact them directly for any questions concerning their bills at the numbers below:     Emergency Physician Services:  [phone]  [facility]:  [phone]  [facility]:  [phone]  [facility]:  [phone]    1. Your final bill may vary from the amount quoted upon discharge if all procedures are not complete at that time, or if your doctor has additional procedures of which we are not aware. You will receive an additional bill if you return to the Emergency Department at [facility] for suture removal regardless of the facility of which the sutures were placed.     2. Please arrange for settlement of this account at the emergency registration.    3. All self-pay accounts are due in full at the time of treatment.  If you are unable to meet this obligation then payment is expected within 4-5 days.     4. If you have had radiology studies (CT, X-ray, Ultrasound, MRI), you have received a preliminary result during your emergency department visit. Please contact the radiology department [phone] to receive a copy of your final result. Please discuss the Final result with your primary physician or with the follow up physician provided.     Crisis Hotline:  [place] Crisis Hotline:  7-828-GWJZCLJ or 1-498.252.9862  [place] Crisis Hotline:    1-824.796.4459 or 334-914-3198         ED Discharge Follow Up Questions    1. In order to provide you with very good care, we would like to follow up with a phone call in the next few days.  May we have your permission to contact you?     YES /  NO    2. What is the best phone number to call  you? (       )_____-__________    3. What is the best time to call you?      Morning  /  Afternoon  /  Evening                   Patient Signature:  ____________________________________________________________    Date:  ____________________________________________________________

## 2017-05-09 NOTE — ED NOTES
Discharge instructions given.  All questions answered.  Pt to follow-up with Ortho.  Pt verbalized understanding.  All belongings with pt.  Pt ambulated to lobby.

## 2017-05-10 NOTE — DISCHARGE INSTRUCTIONS
"Please call your primary care physician tomorrow for complete recheck. Return to the emergency department if your neck pain or shoulder pain are not improving, if you develop any weakness to the left side, any chest pain, shortness of breath, or any new or worsening symptoms.    Headaches, Frequently Asked Questions  MIGRAINE HEADACHES  Q: What is migraine? What causes it? How can I treat it?  A: Generally, migraine headaches begin as a dull ache. Then they develop into a constant, throbbing, and pulsating pain. You may experience pain at the temples. You may experience pain at the front or back of one or both sides of the head. The pain is usually accompanied by a combination of:  · Nausea.   · Vomiting.   · Sensitivity to light and noise.   Some people (about 15%) experience an aura (see below) before an attack. The cause of migraine is believed to be chemical reactions in the brain. Treatment for migraine may include over-the-counter or prescription medications. It may also include self-help techniques. These include relaxation training and biofeedback.   Q: What is an aura?  A: About 15% of people with migraine get an \"aura\". This is a sign of neurological symptoms that occur before a migraine headache. You may see wavy or jagged lines, dots, or flashing lights. You might experience tunnel vision or blind spots in one or both eyes. The aura can include visual or auditory hallucinations (something imagined). It may include disruptions in smell (such as strange odors), taste or touch. Other symptoms include:  · Numbness.   · A \"pins and needles\" sensation.   · Difficulty in recalling or speaking the correct word.   These neurological events may last as long as 60 minutes. These symptoms will fade as the headache begins.  Q: What is a trigger?  A: Certain physical or environmental factors can lead to or \"trigger\" a migraine. These include:  · Foods.   · Hormonal changes.   · Weather.   · Stress.   It is important " "to remember that triggers are different for everyone. To help prevent migraine attacks, you need to figure out which triggers affect you. Keep a headache diary. This is a good way to track triggers. The diary will help you talk to your healthcare professional about your condition.  Q: Does weather affect migraines?  A: Bright sunshine, hot, humid conditions, and drastic changes in barometric pressure may lead to, or \"trigger,\" a migraine attack in some people. But studies have shown that weather does not act as a trigger for everyone with migraines.  Q: What is the link between migraine and hormones?  A: Hormones start and regulate many of your body's functions. Hormones keep your body in balance within a constantly changing environment. The levels of hormones in your body are unbalanced at times. Examples are during menstruation, pregnancy, or menopause. That can lead to a migraine attack. In fact, about three quarters of all women with migraine report that their attacks are related to the menstrual cycle.   Q: Is there an increased risk of stroke for migraine sufferers?  A: The likelihood of a migraine attack causing a stroke is very remote. That is not to say that migraine sufferers cannot have a stroke associated with their migraines. In persons under age 40, the most common associated factor for stroke is migraine headache. But over the course of a person's normal life span, the occurrence of migraine headache may actually be associated with a reduced risk of dying from cerebrovascular disease due to stroke.   Q: What are acute medications for migraine?  A: Acute medications are used to treat the pain of the headache after it has started. Examples over-the-counter medications, NSAIDs, ergots, and triptans.   Q: What are the triptans?  A: Triptans are the newest class of abortive medications. They are specifically targeted to treat migraine. Triptans are vasoconstrictors. They moderate some chemical reactions in " "the brain. The triptans work on receptors in your brain. Triptans help to restore the balance of a neurotransmitter called serotonin. Fluctuations in levels of serotonin are thought to be a main cause of migraine.   Q: Are over-the-counter medications for migraine effective?  A: Over-the-counter, or \"OTC,\" medications may be effective in relieving mild to moderate pain and associated symptoms of migraine. But you should see your caregiver before beginning any treatment regimen for migraine.   Q: What are preventive medications for migraine?  A: Preventive medications for migraine are sometimes referred to as \"prophylactic\" treatments. They are used to reduce the frequency, severity, and length of migraine attacks. Examples of preventive medications include antiepileptic medications, antidepressants, beta-blockers, calcium channel blockers, and NSAIDs (nonsteroidal anti-inflammatory drugs).  Q: Why are anticonvulsants used to treat migraine?  A: During the past few years, there has been an increased interest in antiepileptic drugs for the prevention of migraine. They are sometimes referred to as \"anticonvulsants\". Both epilepsy and migraine may be caused by similar reactions in the brain.   Q: Why are antidepressants used to treat migraine?  A: Antidepressants are typically used to treat people with depression. They may reduce migraine frequency by regulating chemical levels, such as serotonin, in the brain.   Q: What alternative therapies are used to treat migraine?  A: The term \"alternative therapies\" is often used to describe treatments considered outside the scope of conventional Western medicine. Examples of alternative therapy include acupuncture, acupressure, and yoga. Another common alternative treatment is herbal therapy. Some herbs are believed to relieve headache pain. Always discuss alternative therapies with your caregiver before proceeding. Some herbal products contain arsenic and other toxins.  TENSION " "HEADACHES  Q: What is a tension-type headache? What causes it? How can I treat it?  A: Tension-type headaches occur randomly. They are often the result of temporary stress, anxiety, fatigue, or anger. Symptoms include soreness in your temples, a tightening band-like sensation around your head (a \"vice-like\" ache). Symptoms can also include a pulling feeling, pressure sensations, and chantel head and neck muscles. The headache begins in your forehead, temples, or the back of your head and neck. Treatment for tension-type headache may include over-the-counter or prescription medications. Treatment may also include self-help techniques such as relaxation training and biofeedback.  CLUSTER HEADACHES  Q: What is a cluster headache? What causes it? How can I treat it?  A: Cluster headache gets its name because the attacks come in groups. The pain arrives with little, if any, warning. It is usually on one side of the head. A tearing or bloodshot eye and a runny nose on the same side of the headache may also accompany the pain. Cluster headaches are believed to be caused by chemical reactions in the brain. They have been described as the most severe and intense of any headache type. Treatment for cluster headache includes prescription medication and oxygen.  SINUS HEADACHES  Q: What is a sinus headache? What causes it? How can I treat it?  A: When a cavity in the bones of the face and skull (a sinus) becomes inflamed, the inflammation will cause localized pain. This condition is usually the result of an allergic reaction, a tumor, or an infection. If your headache is caused by a sinus blockage, such as an infection, you will probably have a fever. An x-ray will confirm a sinus blockage. Your caregiver's treatment might include antibiotics for the infection, as well as antihistamines or decongestants.   REBOUND HEADACHES  Q: What is a rebound headache? What causes it? How can I treat it?  A: A pattern of taking acute " "headache medications too often can lead to a condition known as \"rebound headache.\" A pattern of taking too much headache medication includes taking it more than 2 days per week or in excessive amounts. That means more than the label or a caregiver advises. With rebound headaches, your medications not only stop relieving pain, they actually begin to cause headaches. Doctors treat rebound headache by tapering the medication that is being overused. Sometimes your caregiver will gradually substitute a different type of treatment or medication. Stopping may be a challenge. Regularly overusing a medication increases the potential for serious side effects. Consult a caregiver if you regularly use headache medications more than 2 days per week or more than the label advises.  ADDITIONAL QUESTIONS AND ANSWERS  Q: What is biofeedback?  A: Biofeedback is a self-help treatment. Biofeedback uses special equipment to monitor your body's involuntary physical responses. Biofeedback monitors:  · Breathing.   · Pulse.   · Heart rate.   · Temperature.   · Muscle tension.   · Brain activity.   Biofeedback helps you refine and perfect your relaxation exercises. You learn to control the physical responses that are related to stress. Once the technique has been mastered, you do not need the equipment any more.  Q: Are headaches hereditary?  A: Four out of five (80%) of people that suffer report a family history of migraine. Scientists are not sure if this is genetic or a family predisposition. Despite the uncertainty, a child has a 50% chance of having migraine if one parent suffers. The child has a 75% chance if both parents suffer.   Q: Can children get headaches?  A: By the time they reach high school, most young people have experienced some type of headache. Many safe and effective approaches or medications can prevent a headache from occurring or stop it after it has begun.   Q: What type of doctor should I see to diagnose and treat " my headache?  A: Start with your primary caregiver. Discuss his or her experience and approach to headaches. Discuss methods of classification, diagnosis, and treatment. Your caregiver may decide to recommend you to a headache specialist, depending upon your symptoms or other physical conditions. Having diabetes, allergies, etc., may require a more comprehensive and inclusive approach to your headache. The [employer] will provide, upon request, a list of NHF physician members in your state.  Document Released: 03/09/2005 Document Revised: 03/11/2013 Document Reviewed: 08/17/2009  [employer]® Patient Information ©2013 [employer], [employer].

## 2017-05-10 NOTE — ED PROVIDER NOTES
ED Provider Note    Chief Complaint:   Neck Pain    HPI:  Smita Faith is a 51 y.o. female who presents with left sided neck pain with associated headache and a sensation of numbness and tingling to the patient. Symptom onset was at 4:00 this morning, approximately 15 hours ago. Initially was described as mild pain and aching localized to the left neck. This progressed to left-sided headache and left-sided shoulder pain with an associated sensation of tingling. Headache was gradual onset, patient has had headaches of similar severity in the past. Patient states that he she has gotten tingling sensations throughout her face and her tongue several times previously, but they usually resolve spontaneously and today symptoms have persisted.     Pain has gotten progressively worse, now localized to the left neck and left posterior shoulder, it is reproduced with neck movement and left shoulder movement. Patient denies any associated motor weakness or motor deficit, states she has had no difficulty picking up objects though arm range of motion is limited by pain, has not noticed any slurred speech, has had no left lower extremity weakness, numbness nor tingling.     She has had no associated nausea or vomiting, denies lightheadedness, she does endorse chronic hip pain that is unchanged today. She is not on any anticoagulation or antiplatelet medications. She denies any rashes or lesions, no abdominal pain, no shortness of breath, no chest pain.    Review of Systems:  See HPI for pertinent positives and negatives.    Past Medical History:   has a past medical history of Bipolar 2 disorder (CMS-HCC); Depression; Anxiety disorder; Pneumonia; Bronchitis; Backpain; Unspecified disorder of thyroid (9708-0346); Alcoholism (CMS-HCC); Heart burn; Indigestion; Arthritis; Gynecological disorder; Bowel habit changes; and ASTHMA.    Social History:  Social History     Social History Main Topics   • Smoking status: Former Smoker --  "0.25 packs/day for 4 years     Types: Cigarettes     Quit date: 01/01/2015   • Smokeless tobacco: Never Used      Comment: 2009   • Alcohol Use: No      Comment: smoked off and on.   • Drug Use: No      Comment: marijuana, crank, \"did everything.\" last used 04/2015   • Sexual Activity: Not on file       Surgical History:   has past surgical history that includes gastric bypass laparoscopic; other abdominal surgery; tubal ligation; removal of tonsils,<11 y/o; cholecystostomy,percut; lumbar fusion anterior (11/10/2010); breast biopsy; reduction of large breast (2010); and hysterectomy robotic xi (N/A, 4/7/2016).    Current Medications:  Home Medications     Reviewed by Cheyenne Snyder R.N. (Registered Nurse) on 05/09/17 at 2445  Med List Status: Complete    Medication Last Dose Status    ALBUTEROL INH  Active    Calcium 9308-5774 MG-UNIT CHEW 4/5/2016 Active    cetirizine (ZYRTEC) 10 MG Tab 4/6/2016 Active    Cholecalciferol (VITAMIN D) 1000 UNIT Cap 4/6/2016 Active    Coenzyme Q10 (COQ-10 PO) 4/6/2016 Active    gabapentin (NEURONTIN) 300 MG Cap 5/9/2017 Active    Multiple Vitamins-Minerals (MULTIVITAMIN & MINERAL PO) 4/5/2016 Active    ondansetron (ZOFRAN) 4 MG Tab tablet  Active    oxycodone-acetaminophen (PERCOCET) 5-325 MG Tab 4/7/2016 Active    oxycodone-acetaminophen (PERCOCET) 7.5-325 MG per tablet  Active    sertraline (ZOLOFT) 100 MG Tab 5/9/2017 Active                Allergies:  Allergies   Allergen Reactions   • Ciprofloxacin Anaphylaxis     SOB   • Codeine      Upset stomach    • Erythromycin Vomiting   • Nsaids      Does not take due to gastric bypass   • Other Food Anaphylaxis     pj oliva       Physical Exam:  Vital Signs: /72 mmHg  Pulse 73  Temp(Src) 36.9 °C (98.4 °F) (Temporal)  Resp 18  Wt 116 kg (255 lb 11.7 oz)  SpO2 97%  LMP 03/02/2016  Constitutional: Alert, no acute distress  HENT: Normocephalic, atraumatic, moist mucus membranes  Eyes: Pupils equal and reactive, normal " conjunctiva, non-icteric  Neck: Tender to palpation along left paraspinous musculature, no point midline bony tenderness, nontender to palpation immediately overlying IJ/carotid vessels, no overlying skin changes  Cardiovascular: Normal peripheral perfusion, 2+ radial pulses bilaterally  Pulmonary: No respiratory distress, normal work of breathing  Skin: Warm, dry, no rashes or lesions  Back: No pain with active range of motion, muscular tenderness to palpation overlying left scapular area and left trapezius that entirely reproduces pain  Musculoskeletal: Left upper extremity abduction partially reproduces pain  Neurologic: CN II-XII intact, speech normal, muscle strength 5/5 in all four extremities with left biceps strength limited by pain, normal  strength bilaterally, sensation grossly intact, normal finger-to-nose, 2+ brachial tendon reflexes on left.  Psychiatric: Normal and appropriate mood and affect    Medical record review for continuity of care: Patient seen and evaluated in this emergency department yesterday, 5/8/17, for evaluation of hip pain. Complaint was right hip pain for several months. She is followed by pain management is on narcotics daily. Pain was treated with Dilaudid in the emergency department with some improvement. Plain film demonstrates moderate to severe right hip osteoarthritis without evidence of fracture or dislocation. She did request a Toradol injection which was given prior to discharge. Referred to Dr. Hanna of orthopedics for follow-up. Instructed to contact his office this morning for follow-up.    Discharge summary reviewed, patient did undergo L5-S1 anterior lumbar fusion performed 11/10/10 by Dr. Herrera and Dr. Polanco.      MDM:  Patient presents with left-sided headache neck and back pain. She does have a normal neurologic exam with no motor weakness, normal  strength, normal reflexes, doubt CVA. Headache is gradual onset, not most severe patient has ever had,  doubt subarachnoid. No fevers, no evidence of infectious etiology. Pain is entirely reproduced with movement and palpation, no chest pain, no shortness of breath, doubt cardiac etiology.    Patient treated in the emergency department with Benadryl, Reglan and Toradol. She reports only taking gabapentin and oxycodone prior to arrival, rebound headache is in my differential. On reassessment she states that her headache is improved, still has mild neck pain though this is improved as well. Neck pain was treated with Tylenol with significant improvement.    Plan at this time is for discharge home. Patient instructed to follow-up with her primary care physician for complete recheck in the morning. She is discharged on Robaxin for pain. She is also instructed to take Tylenol with this. She cannot take chronic NSAIDs due to history of gastric bypass. If her pain is not improving, or if she develops any new or worsening symptoms including chest pain, shortness of breath, worsening shoulder pain, any weakness in the upper or lower extremity, facial droop or slurred speech she will return immediately to the emergency department.    8:19 PM Recheck: Patient re-evaluated at beside. Patient reports that her headache has improved but still complains of mild neck pain. I will treat with 650mg of Tylenol for her pain.     8:54 PM Recheck: Patient re-evaluated at beside. Patient reports her neck pain has improved and she would like to go home.     The patient will return for new or worsening symptoms and is stable at the time of discharge.        DISPOSITION:  Patient will be discharged home in stable condition.    FOLLOW UP:  [address] [address]  [phone]  Go in 1 day  For complete recheck    [facility], Emergency Dept  [address] [id number] [phone]  Go to  If symptoms worsen or do not continue to imrpove, and are not improved in 12-24  hours.      OUTPATIENT MEDICATIONS:  New Prescriptions    METHOCARBAMOL (ROBAXIN) 500 MG TAB    Take 2 Tabs by mouth 3 times a day as needed.           Electronically signed by: Cheyenne Quevedo, 5/9/2017 7:20 PM

## 2017-05-10 NOTE — ED NOTES
"52 y/o female ambulatory to triage with c/o left sided headache, neck and arm pain. Pt states the pain radiates up to her head and face from her neck and down her left arm. Pt states she feels \"tingly\" on the left side of her face and in her left arm. Pt states she has a history of migraine headaches many years ago and has a known neck problem that she needs to see a surgeon for. Symptoms began at aprox 4am today.  "

## 2017-05-10 NOTE — ED NOTES
All lines and monitors d/c'd. Discharge paperwork and medications reviewed. Pt states she understands and had no questions. Pt encouraged to follow-up with PCP and come back to ER with worsening symptoms. Pt verbalizes understanding. Pt ambulated out of ED with steady gait.

## 2017-07-04 ENCOUNTER — HOSPITAL ENCOUNTER (EMERGENCY)
Facility: MEDICAL CENTER | Age: 52
End: 2017-07-04
Attending: EMERGENCY MEDICINE
Payer: COMMERCIAL

## 2017-07-04 VITALS
TEMPERATURE: 98.2 F | BODY MASS INDEX: 48.28 KG/M2 | HEIGHT: 61 IN | SYSTOLIC BLOOD PRESSURE: 122 MMHG | HEART RATE: 84 BPM | OXYGEN SATURATION: 96 % | DIASTOLIC BLOOD PRESSURE: 70 MMHG | RESPIRATION RATE: 16 BRPM | WEIGHT: 255.73 LBS

## 2017-07-04 DIAGNOSIS — F31.81 BIPOLAR 2 DISORDER (HCC): ICD-10-CM

## 2017-07-04 DIAGNOSIS — G89.4 CHRONIC PAIN SYNDROME: ICD-10-CM

## 2017-07-04 PROCEDURE — 99283 EMERGENCY DEPT VISIT LOW MDM: CPT

## 2017-07-04 PROCEDURE — 700111 HCHG RX REV CODE 636 W/ 250 OVERRIDE (IP): Performed by: EMERGENCY MEDICINE

## 2017-07-04 PROCEDURE — 96372 THER/PROPH/DIAG INJ SC/IM: CPT

## 2017-07-04 RX ORDER — KETOROLAC TROMETHAMINE 30 MG/ML
60 INJECTION, SOLUTION INTRAMUSCULAR; INTRAVENOUS ONCE
Status: COMPLETED | OUTPATIENT
Start: 2017-07-04 | End: 2017-07-04

## 2017-07-04 RX ADMIN — KETOROLAC TROMETHAMINE 60 MG: 30 INJECTION, SOLUTION INTRAMUSCULAR at 10:20

## 2017-07-04 ASSESSMENT — PAIN SCALES - GENERAL: PAINLEVEL_OUTOF10: 8

## 2017-07-04 NOTE — ED AVS SNAPSHOT
Home Care Instructions                                                                                                                Smita Faith   MRN: [id number]    Department:  [facility], Emergency Dept   Date of Visit:  7/4/2017            [facility], Emergency Dept    [address]    [place] [address]    Phone:  [phone]      You were seen by     Kunal Escobar M.D.      Your Diagnosis Was     Chronic pain syndrome     G89.4       These are the medications you received during your hospitalization from 07/04/2017 0901 to 07/04/2017 1025     Date/Time Order Dose Route Action    07/04/2017 1020 ketorolac (TORADOL) injection 60 mg 60 mg Intramuscular Given      Follow-up Information     1. Follow up with Dallas Bolivar PA-C.    Specialty:  Family Medicine    Contact information    [address]  [place] [address] [phone]        Medication Information     Review all of your home medications and newly ordered medications with your primary doctor and/or pharmacist as soon as possible. Follow medication instructions as directed by your doctor and/or pharmacist.     Please keep your complete medication list with you and share with your physician. Update the information when medications are discontinued, doses are changed, or new medications (including over-the-counter products) are added; and carry medication information at all times in the event of emergency situations.               Medication List      ASK your doctor about these medications        Instructions    Morning Afternoon Evening Bedtime    ALBUTEROL INH        Inhale  by mouth.                        Calcium 7623-1226 MG-UNIT Chew        Take 1 Each by mouth every day.   Dose:  1 Each                        cetirizine 10 MG Tabs   Commonly known as:  ZYRTEC        Take 10 mg by mouth every day.   Dose:  10 mg                        COQ-10 PO        Take  by mouth every day.                           gabapentin 300 MG Caps   Commonly known as:  NEURONTIN        Take 300 mg by mouth 3 times a day.   Dose:  300 mg                        methocarbamol 500 MG Tabs   Commonly known as:  ROBAXIN        Take 2 Tabs by mouth 3 times a day as needed.   Dose:  1000 mg                        MULTIVITAMIN & MINERAL PO        Take 1 Tab by mouth every day.   Dose:  1 Tab                        ondansetron 4 MG Tabs tablet   Commonly known as:  ZOFRAN        Take 1 Tab by mouth every four hours as needed for Nausea/Vomiting.   Dose:  4 mg                        * oxycodone-acetaminophen 5-325 MG Tabs   Commonly known as:  PERCOCET        Take 1-2 Tabs by mouth every four hours as needed.   Dose:  1-2 Tab                        * oxycodone-acetaminophen 7.5-325 MG per tablet   Commonly known as:  PERCOCET        Take 1-2 Tabs by mouth every 6 hours as needed (pain).   Dose:  1-2 Tab                        sertraline 100 MG Tabs   Commonly known as:  ZOLOFT        Take 100 mg by mouth every day.   Dose:  100 mg                        Vitamin D 1000 UNIT Caps        Take 1,000-2,000 Units by mouth every day.   Dose:  6629-2699 Units                        * Notice:  This list has 2 medication(s) that are the same as other medications prescribed for you. Read the directions carefully, and ask your doctor or other care provider to review them with you.              Discharge Instructions       Chronic Pain  Chronic pain can be defined as pain that is off and on and lasts for 3-6 months or longer. Many things cause chronic pain, which can make it difficult to make a diagnosis. There are many treatment options available for chronic pain. However, finding a treatment that works well for you may require trying various approaches until the right one is found. Many people benefit from a combination of two or more types of treatment to control their pain.  SYMPTOMS   Chronic pain can occur anywhere in the body and can range from mild  to very severe. Some types of chronic pain include:  · Headache.  · Low back pain.  · Cancer pain.  · Arthritis pain.  · Neurogenic pain. This is pain resulting from damage to nerves.   People with chronic pain may also have other symptoms such as:  · Depression.  · Anger.  · Insomnia.  · Anxiety.  DIAGNOSIS   Your health care provider will help diagnose your condition over time. In many cases, the initial focus will be on excluding possible conditions that could be causing the pain. Depending on your symptoms, your health care provider may order tests to diagnose your condition. Some of these tests may include:   · Blood tests.    · CT scan.    · MRI.    · X-rays.    · Ultrasounds.    · Nerve conduction studies.    You may need to see a specialist.   TREATMENT   Finding treatment that works well may take time. You may be referred to a pain specialist. He or she may prescribe medicine or therapies, such as:   · Mindful meditation or yoga.  · Shots (injections) of numbing or pain-relieving medicines into the spine or area of pain.  · Local electrical stimulation.  · Acupuncture.    · Massage therapy.    · Aroma, color, light, or sound therapy.    · Biofeedback.    · Working with a physical therapist to keep from getting stiff.    · Regular, gentle exercise.    · Cognitive or behavioral therapy.    · Group support.    Sometimes, surgery may be recommended.   HOME CARE INSTRUCTIONS   · Take all medicines as directed by your health care provider.    · Lessen stress in your life by relaxing and doing things such as listening to calming music.    · Exercise or be active as directed by your health care provider.    · Eat a healthy diet and include things such as vegetables, fruits, fish, and lean meats in your diet.    · Keep all follow-up appointments with your health care provider.    · Attend a support group with others suffering from chronic pain.  SEEK MEDICAL CARE IF:   · Your pain gets worse.    · You develop a new  pain that was not there before.    · You cannot tolerate medicines given to you by your health care provider.    · You have new symptoms since your last visit with your health care provider.    SEEK IMMEDIATE MEDICAL CARE IF:   · You feel weak.    · You have decreased sensation or numbness.    · You lose control of bowel or bladder function.    · Your pain suddenly gets much worse.    · You develop shaking.  · You develop chills.  · You develop confusion.  · You develop chest pain.  · You develop shortness of breath.    MAKE SURE YOU:  · Understand these instructions.  · Will watch your condition.  · Will get help right away if you are not doing well or get worse.     This information is not intended to replace advice given to you by your health care provider. Make sure you discuss any questions you have with your health care provider.     Document Released: 09/09/2003 Document Revised: 08/20/2014 Document Reviewed: 06/13/2014  [employer] Interactive Patient Education ©2016 [employer]            Patient Information     Patient Information    Following emergency treatment: all patient requiring follow-up care must return either to a private physician or a clinic if your condition worsens before you are able to obtain further medical attention, please return to the emergency room.     Billing Information    At [facility], we work to make the billing process streamlined for our patients.  Our Representatives are here to answer any questions you may have regarding your hospital bill.  If you have insurance coverage and have supplied your insurance information to us, we will submit a claim to your insurer on your behalf.  Should you have any questions regarding your bill, we can be reached online or by phone as follows:  Online: You are able pay your bills online or live chat with our representatives about any billing questions you may have. We are here to help Monday - Friday from 8:00am to 7:30pm and 9:00am -  12:00pm on Saturdays.  Please visit [email or web address]  for more information.   Phone:  [phone] or [phone]    Please note that your emergency physician, surgeon, pathologist, radiologist, anesthesiologist, and other specialists are not employed by [facility] and will therefore bill separately for their services.  Please contact them directly for any questions concerning their bills at the numbers below:     Emergency Physician Services:  [phone]  [facility]:  [phone]  [facility]:  [phone]  [facility]:  [phone]    1. Your final bill may vary from the amount quoted upon discharge if all procedures are not complete at that time, or if your doctor has additional procedures of which we are not aware. You will receive an additional bill if you return to the Emergency Department at [facility] for suture removal regardless of the facility of which the sutures were placed.     2. Please arrange for settlement of this account at the emergency registration.    3. All self-pay accounts are due in full at the time of treatment.  If you are unable to meet this obligation then payment is expected within 4-5 days.     4. If you have had radiology studies (CT, X-ray, Ultrasound, MRI), you have received a preliminary result during your emergency department visit. Please contact the radiology department [phone] to receive a copy of your final result. Please discuss the Final result with your primary physician or with the follow up physician provided.     Crisis Hotline:  [place] Crisis Hotline:  9-277-YDBCWWP or 1-701.610.6432  [place] Crisis Hotline:    1-421.260.5148 or 272-749-2794         ED Discharge Follow Up Questions    1. In order to provide you with very good care, we would like to follow up with a phone call in the next few days.  May we have your permission to contact you?     YES /  NO    2. What is  the best phone number to call you? (       )_____-__________    3. What is the best time to call you?      Morning  /  Afternoon  /  Evening                   Patient Signature:  ____________________________________________________________    Date:  ____________________________________________________________

## 2017-07-04 NOTE — ED AVS SNAPSHOT
7/4/2017    Smita Faith  [address]  [place] [address]    Dear Smita Mejia:    [facility] wants to ensure your discharge home is safe and you or your loved ones have had all of your questions answered regarding your care after you leave the hospital.    Below is a list of resources and contact information should you have any questions regarding your hospital stay, follow-up instructions, or active medical symptoms.    Questions or Concerns Regarding… Contact   Medical Questions Related to Your Discharge  (7 days a week, 8am-5pm) Contact a Nurse Care Coordinator   [phone]   Medical Questions Not Related to Your Discharge  (24 hours a day / 7 days a week)  Contact the Nurse Health Line   [phone]    Medications or Discharge Instructions Refer to your discharge packet   or contact your [facility] Primary Care Provider   [phone]   Follow-up Appointment(s) Schedule your appointment via [employer]   or contact Scheduling [phone]   Billing Review your statement via [employer]  or contact Billing [phone]   Medical Records Review your records via [employer]   or contact Medical Records [phone]     You may receive a telephone call within two days of discharge. This call is to make certain you understand your discharge instructions and have the opportunity to have any questions answered. You can also easily access your medical information, test results and upcoming appointments via the [employer] free online health management tool. You can learn more and sign up at [employer]. For assistance setting up your [employer] account, please call [phone].    Once again, we want to ensure your discharge home is safe and that you have a clear understanding of any next steps in your care. If you have any questions or concerns, please do not hesitate to contact us, we are here for you. Thank you for choosing [facility] for your healthcare needs.    Sincerely,    Your [facility] Healthcare Team

## 2017-07-04 NOTE — ED NOTES
Agree with triage note.  Pt states neck pain worse after physical therapy and states she is out of her pain medication, oxycodone.  Will continue to monitor. Chart up for ERP.

## 2017-07-04 NOTE — DISCHARGE INSTRUCTIONS
Chronic Pain  Chronic pain can be defined as pain that is off and on and lasts for 3-6 months or longer. Many things cause chronic pain, which can make it difficult to make a diagnosis. There are many treatment options available for chronic pain. However, finding a treatment that works well for you may require trying various approaches until the right one is found. Many people benefit from a combination of two or more types of treatment to control their pain.  SYMPTOMS   Chronic pain can occur anywhere in the body and can range from mild to very severe. Some types of chronic pain include:  · Headache.  · Low back pain.  · Cancer pain.  · Arthritis pain.  · Neurogenic pain. This is pain resulting from damage to nerves.   People with chronic pain may also have other symptoms such as:  · Depression.  · Anger.  · Insomnia.  · Anxiety.  DIAGNOSIS   Your health care provider will help diagnose your condition over time. In many cases, the initial focus will be on excluding possible conditions that could be causing the pain. Depending on your symptoms, your health care provider may order tests to diagnose your condition. Some of these tests may include:   · Blood tests.    · CT scan.    · MRI.    · X-rays.    · Ultrasounds.    · Nerve conduction studies.    You may need to see a specialist.   TREATMENT   Finding treatment that works well may take time. You may be referred to a pain specialist. He or she may prescribe medicine or therapies, such as:   · Mindful meditation or yoga.  · Shots (injections) of numbing or pain-relieving medicines into the spine or area of pain.  · Local electrical stimulation.  · Acupuncture.    · Massage therapy.    · Aroma, color, light, or sound therapy.    · Biofeedback.    · Working with a physical therapist to keep from getting stiff.    · Regular, gentle exercise.    · Cognitive or behavioral therapy.    · Group support.    Sometimes, surgery may be recommended.   HOME CARE INSTRUCTIONS    · Take all medicines as directed by your health care provider.    · Lessen stress in your life by relaxing and doing things such as listening to calming music.    · Exercise or be active as directed by your health care provider.    · Eat a healthy diet and include things such as vegetables, fruits, fish, and lean meats in your diet.    · Keep all follow-up appointments with your health care provider.    · Attend a support group with others suffering from chronic pain.  SEEK MEDICAL CARE IF:   · Your pain gets worse.    · You develop a new pain that was not there before.    · You cannot tolerate medicines given to you by your health care provider.    · You have new symptoms since your last visit with your health care provider.    SEEK IMMEDIATE MEDICAL CARE IF:   · You feel weak.    · You have decreased sensation or numbness.    · You lose control of bowel or bladder function.    · Your pain suddenly gets much worse.    · You develop shaking.  · You develop chills.  · You develop confusion.  · You develop chest pain.  · You develop shortness of breath.    MAKE SURE YOU:  · Understand these instructions.  · Will watch your condition.  · Will get help right away if you are not doing well or get worse.     This information is not intended to replace advice given to you by your health care provider. Make sure you discuss any questions you have with your health care provider.     Document Released: 09/09/2003 Document Revised: 08/20/2014 Document Reviewed: 06/13/2014  [employer] Interactive Patient Education ©2016 [employer]

## 2017-07-04 NOTE — ED NOTES
".  Chief Complaint   Patient presents with   • Neck Pain     8/10, hx of chronic neck pain, supposed to be having cervical spine surgery, worsening neck pain since PT last week, taking Oxycondone for pain at home, ran out   • Tingling     To bilateral arms     ./70 mmHg  Pulse 84  Temp(Src) 36.8 °C (98.2 °F)  Resp 16  Ht 1.549 m (5' 0.98\")  Wt 116 kg (255 lb 11.7 oz)  BMI 48.35 kg/m2  SpO2 96%  LMP 03/02/2016    Ambulatory to triage with above complaints, educated on triage process, placed in lobby, told to inform staff of any changes in condition.    "

## 2017-07-04 NOTE — ED NOTES
Discharge instructions provided to pt.  Pt states understanding.  Pt states all questions have been answered.  Copy of discharge provided to pt.  Signed copy in chart.  Prescriptions provided to pt, copy in chart. Pt states that all personal belongings are in possession. Pt escorted off unit without incident.

## 2017-07-04 NOTE — ED AVS SNAPSHOT
Brozengo Access Code: Activation code not generated  Current Brozengo Status: Active    [employer]  A secure, online tool to manage your health information     [employer]’s Brozengo® is a secure, online tool that connects you to your personalized health information from the privacy of your home -- day or night - making it very easy for you to manage your healthcare. Once the activation process is completed, you can even access your medical information using the Brozengo adal, which is available for free in the Apple Adal store or Google Play store.     Brozengo provides the following levels of access (as shown below):   My Chart Features   [facility] Primary Care Doctor [facility]  Specialists [facility]  Urgent  Care Non-[facility]  Primary Care  Doctor   Email your healthcare team securely and privately 24/7 X X X X   Manage appointments: schedule your next appointment; view details of past/upcoming appointments X      Request prescription refills. X      View recent personal medical records, including lab and immunizations X X X X   View health record, including health history, allergies, medications X X X X   Read reports about your outpatient visits, procedures, consult and ER notes X X X X   See your discharge summary, which is a recap of your hospital and/or ER visit that includes your diagnosis, lab results, and care plan. X X       How to register for Brozengo:  1. Go to  [email or web address].  2. Click on the Sign Up Now box, which takes you to the New Member Sign Up page. You will need to provide the following information:  a. Enter your Brozengo Access Code exactly as it appears at the top of this page. (You will not need to use this code after you’ve completed the sign-up process. If you do not sign up before the expiration date, you must request a new code.)   b. Enter your date of birth.   c. Enter your home email address.   d. Click Submit, and follow the next screen’s instructions.  3. Create a Brozengo ID. This will  be your [employer] login ID and cannot be changed, so think of one that is secure and easy to remember.  4. Create a [employer] password. You can change your password at any time.  5. Enter your Password Reset Question and Answer. This can be used at a later time if you forget your password.   6. Enter your e-mail address. This allows you to receive e-mail notifications when new information is available in [employer].  7. Click Sign Up. You can now view your health information.    For assistance activating your [employer] account, call [phone]

## 2017-07-04 NOTE — ED PROVIDER NOTES
"  CHIEF COMPLAINT  Chief Complaint   Patient presents with   • Neck Pain     8/10, hx of chronic neck pain, supposed to be having cervical spine surgery, worsening neck pain since PT last week, taking Oxycondone for pain at home, ran out   • Tingling     To bilateral arms       [facility] in [place]  Smita Faith is a 51 y.o. female who presents with complaints of bilateral neck pain, R>L, with associated numbness tingling to bilateral UE. She reports a history of chronic neck pain and states she has had recent imaging of her neck that showed \"significant stenosis and bulging of discs.\" She is reportedly scheduled for a c-spine surgery in the next month or so. Currently, pain is an 8/10 and is aggravated with movement. Pain was well managed up until 1 week ago after patient had physical therapy, since then she states her pain has significantly worsened. The patient also states she ran out of her pain medication 3 days ago and states she has been increasing her dosing frequency due to worsening pain. Patient also reports headache beginning along occiput and radiating to the top of her head. She denies vision changes, slurred speech, facial droop and/or unilateral neurological signs or symptoms.     REVIEW OF SYSTEMS  See HPI for further details. All other systems are negative.     PAST MEDICAL HISTORY  Past Medical History   Diagnosis Date   • Bipolar 2 disorder (CMS-Former[facility])    • Depression    • Anxiety disorder    • Pneumonia    • Bronchitis    • Backpain      Lumbar chronic   • Unspecified disorder of thyroid [id number]     no longer an issue   • Alcoholism (CMS-Formerly [facility])    • Heart burn    • Indigestion    • Arthritis      back, hands, arm   • Gynecological disorder      mass   • Bowel habit changes      constipation, irreg diarrhea   • ASTHMA      States trigered by allergy reaction, PRN inhaler        FAMILY HISTORY  No family history on file.    SOCIAL HISTORY  Social History   Substance Use Topics   • Smoking status: Former Smoker -- " "0.25 packs/day for 4 years     Types: Cigarettes     Quit date: 01/01/2015   • Smokeless tobacco: Never Used      Comment: 2009   • Alcohol Use: No      Comment: smoked off and on.       SURGICAL HISTORY  Past Surgical History   Procedure Laterality Date   • Gastric bypass laparoscopic     • Other abdominal surgery       gastric bypass 5/07,lap jenniffer 1988   • Tubal ligation     • Pr removal of tonsils,<13 y/o     • Pr cholecystostomy,percut     • Lumbar fusion anterior  11/10/2010     Performed by CHERYL LORD at SURGERY [facility]   • Breast biopsy       Right- benign   • Pr reduction of large breast  2010   • Hysterectomy robotic xi N/A 4/7/2016     Procedure: HYSTERECTOMY ROBOTIC XI ;  Surgeon: Song Goodson M.D.;  Location: SURGERY [facility];  Service:        ALLERGIES  Allergies   Allergen Reactions   • Ciprofloxacin Anaphylaxis     SOB   • Codeine      Upset stomach    • Erythromycin Vomiting   • Nsaids      Does not take due to gastric bypass   • Other Food Anaphylaxis     pj pj       PHYSICAL EXAM  VITAL SIGNS: /70 mmHg  Pulse 84  Temp(Src) 36.8 °C (98.2 °F)  Resp 16  Ht 1.549 m (5' 0.98\")  Wt 116 kg (255 lb 11.7 oz)  BMI 48.35 kg/m2  SpO2 96%  LMP 03/02/2016  Constitutional: Well developed, Well nourished, No acute distress, Non-toxic appearance.   HENT:  Atraumatic, Normocephalic. Bilateral external ears normal, moist mucus membranes, pharynx normal,   Eyes: No discharge, no scleral icterus.   Neck: no JVD, tender to palpation along occiput   Cardiovascular: Normal heart rate, Normal rhythm.  Symmetric peripheral pulses.   Thorax & Lungs: No respiratory distress, No wheezing, No rales, No rhonchi, No chest tenderness.   Abdomen: Bowel sounds normal, soft, non-distended, nontender, no masses.  Skin: No rash.   Back: No tenderness, No CVA tenderness.   Extremities: No clubbing, cyanosis, edema, no Homans or cords   Neurologic: Grossly normal cranial nerves, symmetric motor and " sensory. Good , biceps, triceps, deltoid. Steady gait.  Psychiatric: Anxious          COURSE & MEDICAL DECISION MAKING  Pertinent Labs & Imaging studies reviewed. (See chart for details)  Patient presents to the ER with chronic neck pain. She's been taking extra oxycodone. She is not due for refill until this Friday. She has no acute symptoms. She's not had trauma. She is neurologically intact. She doesn't have a fever. There is no suggestion of acute spinal cord impingement requiring surgery or an infectious process. I discussed with her the fact that she will need to receive her opiate pain medications from her provider. She understood that she had been taking her pain pills and appropriately, but stated her pain was that bad that she needed extra. She will need to discuss this with her pain provider. She was given Toradol IM in the emergency department. She will call her pain management provider tomorrow to discuss. She will should return to the ER for any acute symptoms or concern.    FINAL IMPRESSION  1. Chronic pain syndrome  2. Opiate dependence  3. Chronic neck pain       This dictation was created using voice recognition software. The accuracy of the dictation is limited to the abilities of the software. I expect there may be some errors of grammar and possibly content. The nursing notes were reviewed and certain aspects of this information were incorporated into this note.    Electronically signed by: Antoinette Cannon, 7/4/2017 9:57 AM

## 2017-08-03 ENCOUNTER — HOSPITAL ENCOUNTER (INPATIENT)
Facility: MEDICAL CENTER | Age: 52
LOS: 8 days | DRG: 392 | End: 2017-08-13
Attending: EMERGENCY MEDICINE | Admitting: INTERNAL MEDICINE
Payer: COMMERCIAL

## 2017-08-03 ENCOUNTER — APPOINTMENT (OUTPATIENT)
Dept: RADIOLOGY | Facility: MEDICAL CENTER | Age: 52
DRG: 392 | End: 2017-08-03
Payer: COMMERCIAL

## 2017-08-03 ENCOUNTER — APPOINTMENT (OUTPATIENT)
Dept: RADIOLOGY | Facility: MEDICAL CENTER | Age: 52
DRG: 392 | End: 2017-08-03
Attending: EMERGENCY MEDICINE
Payer: COMMERCIAL

## 2017-08-03 ENCOUNTER — RESOLUTE PROFESSIONAL BILLING HOSPITAL PROF FEE (OUTPATIENT)
Dept: HOSPITALIST | Facility: MEDICAL CENTER | Age: 52
End: 2017-08-03
Payer: COMMERCIAL

## 2017-08-03 DIAGNOSIS — R22.1 NECK SWELLING: ICD-10-CM

## 2017-08-03 PROBLEM — E03.9 HYPOTHYROID: Status: ACTIVE | Noted: 2017-08-03

## 2017-08-03 PROBLEM — R13.10 DYSPHAGIA: Status: ACTIVE | Noted: 2017-08-03

## 2017-08-03 PROBLEM — Z98.1 STATUS POST CERVICAL SPINAL FUSION: Status: ACTIVE | Noted: 2017-08-03

## 2017-08-03 LAB
ALBUMIN SERPL BCP-MCNC: 3.9 G/DL (ref 3.2–4.9)
ALBUMIN/GLOB SERPL: 1.1 G/DL
ALP SERPL-CCNC: 102 U/L (ref 30–99)
ALT SERPL-CCNC: 11 U/L (ref 2–50)
ANION GAP SERPL CALC-SCNC: 10 MMOL/L (ref 0–11.9)
ANISOCYTOSIS BLD QL SMEAR: ABNORMAL
AST SERPL-CCNC: 13 U/L (ref 12–45)
BASOPHILS # BLD AUTO: 0.4 % (ref 0–1.8)
BASOPHILS # BLD: 0.05 K/UL (ref 0–0.12)
BILIRUB SERPL-MCNC: 0.4 MG/DL (ref 0.1–1.5)
BNP SERPL-MCNC: 67 PG/ML (ref 0–100)
BUN SERPL-MCNC: 12 MG/DL (ref 8–22)
CALCIUM SERPL-MCNC: 9.5 MG/DL (ref 8.5–10.5)
CHLORIDE SERPL-SCNC: 104 MMOL/L (ref 96–112)
CO2 SERPL-SCNC: 23 MMOL/L (ref 20–33)
COMMENT 1642: NORMAL
CREAT SERPL-MCNC: 0.58 MG/DL (ref 0.5–1.4)
EKG IMPRESSION: NORMAL
EOSINOPHIL # BLD AUTO: 0.01 K/UL (ref 0–0.51)
EOSINOPHIL NFR BLD: 0.1 % (ref 0–6.9)
ERYTHROCYTE [DISTWIDTH] IN BLOOD BY AUTOMATED COUNT: 46.2 FL (ref 35.9–50)
GFR SERPL CREATININE-BSD FRML MDRD: >60 ML/MIN/1.73 M 2
GLOBULIN SER CALC-MCNC: 3.7 G/DL (ref 1.9–3.5)
GLUCOSE SERPL-MCNC: 92 MG/DL (ref 65–99)
HCT VFR BLD AUTO: 33.8 % (ref 37–47)
HGB BLD-MCNC: 9.9 G/DL (ref 12–16)
HYPOCHROMIA BLD QL SMEAR: ABNORMAL
IMM GRANULOCYTES # BLD AUTO: 0.04 K/UL (ref 0–0.11)
IMM GRANULOCYTES NFR BLD AUTO: 0.3 % (ref 0–0.9)
LYMPHOCYTES # BLD AUTO: 2.91 K/UL (ref 1–4.8)
LYMPHOCYTES NFR BLD: 21.9 % (ref 22–41)
MCH RBC QN AUTO: 21.5 PG (ref 27–33)
MCHC RBC AUTO-ENTMCNC: 29.3 G/DL (ref 33.6–35)
MCV RBC AUTO: 73.3 FL (ref 81.4–97.8)
MICROCYTES BLD QL SMEAR: ABNORMAL
MONOCYTES # BLD AUTO: 1.06 K/UL (ref 0–0.85)
MONOCYTES NFR BLD AUTO: 8 % (ref 0–13.4)
MORPHOLOGY BLD-IMP: NORMAL
NEUTROPHILS # BLD AUTO: 9.21 K/UL (ref 2–7.15)
NEUTROPHILS NFR BLD: 69.3 % (ref 44–72)
NRBC # BLD AUTO: 0 K/UL
NRBC BLD AUTO-RTO: 0 /100 WBC
OVALOCYTES BLD QL SMEAR: NORMAL
PLATELET # BLD AUTO: 538 K/UL (ref 164–446)
PLATELET BLD QL SMEAR: NORMAL
PMV BLD AUTO: 9.1 FL (ref 9–12.9)
POIKILOCYTOSIS BLD QL SMEAR: NORMAL
POTASSIUM SERPL-SCNC: 3.3 MMOL/L (ref 3.6–5.5)
PROT SERPL-MCNC: 7.6 G/DL (ref 6–8.2)
RBC # BLD AUTO: 4.61 M/UL (ref 4.2–5.4)
RBC BLD AUTO: PRESENT
SCHISTOCYTES BLD QL SMEAR: NORMAL
SODIUM SERPL-SCNC: 137 MMOL/L (ref 135–145)
WBC # BLD AUTO: 13.3 K/UL (ref 4.8–10.8)

## 2017-08-03 PROCEDURE — G0378 HOSPITAL OBSERVATION PER HR: HCPCS

## 2017-08-03 PROCEDURE — 700111 HCHG RX REV CODE 636 W/ 250 OVERRIDE (IP): Performed by: EMERGENCY MEDICINE

## 2017-08-03 PROCEDURE — 700117 HCHG RX CONTRAST REV CODE 255: Performed by: EMERGENCY MEDICINE

## 2017-08-03 PROCEDURE — 87040 BLOOD CULTURE FOR BACTERIA: CPT | Mod: 91

## 2017-08-03 PROCEDURE — 71010 DX-CHEST-LIMITED (1 VIEW): CPT

## 2017-08-03 PROCEDURE — 99220 PR INITIAL OBSERVATION CARE,LEVL III: CPT | Performed by: INTERNAL MEDICINE

## 2017-08-03 PROCEDURE — 96361 HYDRATE IV INFUSION ADD-ON: CPT

## 2017-08-03 PROCEDURE — 70491 CT SOFT TISSUE NECK W/DYE: CPT

## 2017-08-03 PROCEDURE — 93005 ELECTROCARDIOGRAM TRACING: CPT

## 2017-08-03 PROCEDURE — 85025 COMPLETE CBC W/AUTO DIFF WBC: CPT

## 2017-08-03 PROCEDURE — 96376 TX/PRO/DX INJ SAME DRUG ADON: CPT

## 2017-08-03 PROCEDURE — 700111 HCHG RX REV CODE 636 W/ 250 OVERRIDE (IP): Performed by: INTERNAL MEDICINE

## 2017-08-03 PROCEDURE — 96374 THER/PROPH/DIAG INJ IV PUSH: CPT

## 2017-08-03 PROCEDURE — 96375 TX/PRO/DX INJ NEW DRUG ADDON: CPT

## 2017-08-03 PROCEDURE — 83880 ASSAY OF NATRIURETIC PEPTIDE: CPT

## 2017-08-03 PROCEDURE — 94760 N-INVAS EAR/PLS OXIMETRY 1: CPT

## 2017-08-03 PROCEDURE — 700105 HCHG RX REV CODE 258: Performed by: EMERGENCY MEDICINE

## 2017-08-03 PROCEDURE — 99285 EMERGENCY DEPT VISIT HI MDM: CPT

## 2017-08-03 PROCEDURE — 80053 COMPREHEN METABOLIC PANEL: CPT

## 2017-08-03 RX ORDER — PROMETHAZINE HYDROCHLORIDE 25 MG/1
12.5-25 TABLET ORAL EVERY 4 HOURS PRN
Status: DISCONTINUED | OUTPATIENT
Start: 2017-08-03 | End: 2017-08-13 | Stop reason: HOSPADM

## 2017-08-03 RX ORDER — ALBUTEROL SULFATE 90 UG/1
2 AEROSOL, METERED RESPIRATORY (INHALATION) EVERY 6 HOURS PRN
Status: DISCONTINUED | OUTPATIENT
Start: 2017-08-03 | End: 2017-08-13 | Stop reason: HOSPADM

## 2017-08-03 RX ORDER — DEXTROSE MONOHYDRATE 25 G/50ML
25 INJECTION, SOLUTION INTRAVENOUS
Status: DISCONTINUED | OUTPATIENT
Start: 2017-08-03 | End: 2017-08-13 | Stop reason: HOSPADM

## 2017-08-03 RX ORDER — LEVOTHYROXINE SODIUM 0.03 MG/1
25 TABLET ORAL
COMMUNITY
End: 2017-08-03

## 2017-08-03 RX ORDER — BISACODYL 10 MG
10 SUPPOSITORY, RECTAL RECTAL
Status: DISCONTINUED | OUTPATIENT
Start: 2017-08-03 | End: 2017-08-13 | Stop reason: HOSPADM

## 2017-08-03 RX ORDER — IPRATROPIUM BROMIDE AND ALBUTEROL SULFATE 2.5; .5 MG/3ML; MG/3ML
3 SOLUTION RESPIRATORY (INHALATION)
Status: DISCONTINUED | OUTPATIENT
Start: 2017-08-03 | End: 2017-08-13 | Stop reason: HOSPADM

## 2017-08-03 RX ORDER — ONDANSETRON 2 MG/ML
4 INJECTION INTRAMUSCULAR; INTRAVENOUS EVERY 4 HOURS PRN
Status: DISCONTINUED | OUTPATIENT
Start: 2017-08-03 | End: 2017-08-13 | Stop reason: HOSPADM

## 2017-08-03 RX ORDER — METHOCARBAMOL 750 MG/1
750 TABLET, FILM COATED ORAL 4 TIMES DAILY
COMMUNITY
End: 2018-06-11

## 2017-08-03 RX ORDER — OXYCODONE HYDROCHLORIDE 5 MG/1
5 TABLET ORAL
Status: DISCONTINUED | OUTPATIENT
Start: 2017-08-03 | End: 2017-08-13 | Stop reason: HOSPADM

## 2017-08-03 RX ORDER — DEXAMETHASONE SODIUM PHOSPHATE 4 MG/ML
4 INJECTION, SOLUTION INTRA-ARTICULAR; INTRALESIONAL; INTRAMUSCULAR; INTRAVENOUS; SOFT TISSUE EVERY 6 HOURS
Status: DISCONTINUED | OUTPATIENT
Start: 2017-08-04 | End: 2017-08-10

## 2017-08-03 RX ORDER — SODIUM CHLORIDE AND POTASSIUM CHLORIDE 150; 900 MG/100ML; MG/100ML
INJECTION, SOLUTION INTRAVENOUS CONTINUOUS
Status: DISCONTINUED | OUTPATIENT
Start: 2017-08-03 | End: 2017-08-10

## 2017-08-03 RX ORDER — AMOXICILLIN 250 MG
2 CAPSULE ORAL 2 TIMES DAILY
Status: DISCONTINUED | OUTPATIENT
Start: 2017-08-03 | End: 2017-08-13 | Stop reason: HOSPADM

## 2017-08-03 RX ORDER — MORPHINE SULFATE 4 MG/ML
4 INJECTION, SOLUTION INTRAMUSCULAR; INTRAVENOUS
Status: DISCONTINUED | OUTPATIENT
Start: 2017-08-03 | End: 2017-08-13 | Stop reason: HOSPADM

## 2017-08-03 RX ORDER — ALBUTEROL SULFATE 90 UG/1
2 AEROSOL, METERED RESPIRATORY (INHALATION) EVERY 6 HOURS PRN
COMMUNITY
End: 2024-02-15

## 2017-08-03 RX ORDER — OXYCODONE HYDROCHLORIDE 10 MG/1
10 TABLET ORAL
Status: DISCONTINUED | OUTPATIENT
Start: 2017-08-03 | End: 2017-08-13 | Stop reason: HOSPADM

## 2017-08-03 RX ORDER — OXYCODONE HYDROCHLORIDE 20 MG/1
20 TABLET ORAL EVERY 6 HOURS PRN
COMMUNITY
End: 2018-06-11

## 2017-08-03 RX ORDER — LEVOTHYROXINE SODIUM 0.1 MG/1
100 TABLET ORAL
COMMUNITY
End: 2017-08-03

## 2017-08-03 RX ORDER — ONDANSETRON 4 MG/1
4 TABLET, ORALLY DISINTEGRATING ORAL EVERY 4 HOURS PRN
Status: DISCONTINUED | OUTPATIENT
Start: 2017-08-03 | End: 2017-08-13 | Stop reason: HOSPADM

## 2017-08-03 RX ORDER — DEXAMETHASONE SODIUM PHOSPHATE 4 MG/ML
12 INJECTION, SOLUTION INTRA-ARTICULAR; INTRALESIONAL; INTRAMUSCULAR; INTRAVENOUS; SOFT TISSUE ONCE
Status: COMPLETED | OUTPATIENT
Start: 2017-08-03 | End: 2017-08-03

## 2017-08-03 RX ORDER — POLYETHYLENE GLYCOL 3350 17 G/17G
1 POWDER, FOR SOLUTION ORAL
Status: DISCONTINUED | OUTPATIENT
Start: 2017-08-03 | End: 2017-08-13 | Stop reason: HOSPADM

## 2017-08-03 RX ORDER — SODIUM CHLORIDE 9 MG/ML
INJECTION, SOLUTION INTRAVENOUS ONCE
Status: COMPLETED | OUTPATIENT
Start: 2017-08-03 | End: 2017-08-03

## 2017-08-03 RX ORDER — PROMETHAZINE HYDROCHLORIDE 25 MG/1
12.5-25 SUPPOSITORY RECTAL EVERY 4 HOURS PRN
Status: DISCONTINUED | OUTPATIENT
Start: 2017-08-03 | End: 2017-08-13 | Stop reason: HOSPADM

## 2017-08-03 RX ORDER — MORPHINE SULFATE 10 MG/ML
10 INJECTION, SOLUTION INTRAMUSCULAR; INTRAVENOUS ONCE
Status: DISCONTINUED | OUTPATIENT
Start: 2017-08-03 | End: 2017-08-03

## 2017-08-03 RX ORDER — ONDANSETRON 2 MG/ML
4 INJECTION INTRAMUSCULAR; INTRAVENOUS ONCE
Status: COMPLETED | OUTPATIENT
Start: 2017-08-03 | End: 2017-08-03

## 2017-08-03 RX ORDER — MORPHINE SULFATE 4 MG/ML
4 INJECTION, SOLUTION INTRAMUSCULAR; INTRAVENOUS ONCE
Status: COMPLETED | OUTPATIENT
Start: 2017-08-03 | End: 2017-08-03

## 2017-08-03 RX ADMIN — DEXAMETHASONE SODIUM PHOSPHATE 12 MG: 4 INJECTION, SOLUTION INTRAMUSCULAR; INTRAVENOUS at 20:11

## 2017-08-03 RX ADMIN — ONDANSETRON 4 MG: 2 INJECTION INTRAMUSCULAR; INTRAVENOUS at 18:18

## 2017-08-03 RX ADMIN — MORPHINE SULFATE 4 MG: 4 INJECTION INTRAVENOUS at 18:24

## 2017-08-03 RX ADMIN — MORPHINE SULFATE 4 MG: 4 INJECTION INTRAVENOUS at 22:17

## 2017-08-03 RX ADMIN — SODIUM CHLORIDE: 9 INJECTION, SOLUTION INTRAVENOUS at 18:18

## 2017-08-03 RX ADMIN — IOHEXOL 100 ML: 350 INJECTION, SOLUTION INTRAVENOUS at 19:14

## 2017-08-03 ASSESSMENT — COPD QUESTIONNAIRES
DO YOU EVER COUGH UP ANY MUCUS OR PHLEGM?: NO/ONLY WITH OCCASIONAL COLDS OR INFECTIONS
HAVE YOU SMOKED AT LEAST 100 CIGARETTES IN YOUR ENTIRE LIFE: YES
COPD SCREENING SCORE: 3
DURING THE PAST 4 WEEKS HOW MUCH DID YOU FEEL SHORT OF BREATH: NONE/LITTLE OF THE TIME

## 2017-08-03 ASSESSMENT — ENCOUNTER SYMPTOMS
WHEEZING: 0
NECK PAIN: 1
BLURRED VISION: 0
CHILLS: 0
STRIDOR: 0
FEVER: 0
SHORTNESS OF BREATH: 0
HEADACHES: 0

## 2017-08-03 ASSESSMENT — LIFESTYLE VARIABLES
EVER_SMOKED: YES
DO YOU DRINK ALCOHOL: NO

## 2017-08-03 ASSESSMENT — PAIN SCALES - GENERAL
PAINLEVEL_OUTOF10: 9
PAINLEVEL_OUTOF10: 9

## 2017-08-03 NOTE — ED NOTES
Chief Complaint   Patient presents with   • Difficulty Breathing   • Difficulty Swallowing   • Post-Op Complications     yesterday anterior neck fusion 3-5     Pt reports CP and above in triage. Pt having difficulty managing oral secretions. Pt using emesis bag to spit thick mucus out. Pt speaking in full sentences with [place] J c-collar in place. EKG completed. Pt took 20 mg yaw PTA and was able to swallow medication. Blood pressure 139/78, pulse 87, temperature 37 °C (98.6 °F), resp. rate 14, height 1.524 m (5'), weight 113.399 kg (250 lb), last menstrual period 03/02/2016, SpO2 96 %.

## 2017-08-03 NOTE — IP AVS SNAPSHOT
[employer] Access Code: Activation code not generated  Current [employer] Status: Active    BlueKai[employer]  A secure, online tool to manage your health information     [employer]’s [employer]® is a secure, online tool that connects you to your personalized health information from the privacy of your home -- day or night - making it very easy for you to manage your healthcare. Once the activation process is completed, you can even access your medical information using the [employer] adal, which is available for free in the Apple Adal store or Google Play store.     [employer] provides the following levels of access (as shown below):   My Chart Features   [facility] Primary Care Doctor [facility]  Specialists [facility]  Urgent  Care Non-[facility]  Primary Care  Doctor   Email your healthcare team securely and privately 24/7 X X X X   Manage appointments: schedule your next appointment; view details of past/upcoming appointments X      Request prescription refills. X      View recent personal medical records, including lab and immunizations X X X X   View health record, including health history, allergies, medications X X X X   Read reports about your outpatient visits, procedures, consult and ER notes X X X X   See your discharge summary, which is a recap of your hospital and/or ER visit that includes your diagnosis, lab results, and care plan. X X       How to register for [employer]:  1. Go to  [email or web address].  2. Click on the Sign Up Now box, which takes you to the New Member Sign Up page. You will need to provide the following information:  a. Enter your [employer] Access Code exactly as it appears at the top of this page. (You will not need to use this code after you’ve completed the sign-up process. If you do not sign up before the expiration date, you must request a new code.)   b. Enter your date of birth.   c. Enter your home email address.   d. Click Submit, and follow the next screen’s instructions.  3. Create a [employer] ID. This will  be your [email or web address] login ID and cannot be changed, so think of one that is secure and easy to remember.  4. Create a [email or web address] password. You can change your password at any time.  5. Enter your Password Reset Question and Answer. This can be used at a later time if you forget your password.   6. Enter your e-mail address. This allows you to receive e-mail notifications when new information is available in [email or web address].  7. Click Sign Up. You can now view your health information.    For assistance activating your [email or web address] account, call [phone]

## 2017-08-03 NOTE — IP AVS SNAPSHOT
Home Care Instructions                                                                                                                  Name:Smita Faith  Medical Record Number:[id number]  CSN: [id number]    YOB: 1965   Age: 51 y.o.  Sex: female  HT:1.524 m (5') WT: 114.9 kg (253 lb 4.9 oz)          Admit Date: 8/3/2017     Discharge Date:   Today's Date: 8/13/2017  Attending Doctor:  Kyleigh Tafoya D.O.                  Allergies:  Bloodless; Ciprofloxacin; Codeine; Erythromycin; Nsaids; and Other food            Discharge Instructions       Dysphagia  Swallowing problems (dysphagia) occur when solids and liquids seem to stick in your throat on the way down to your stomach, or the food takes longer to get to the stomach. Other symptoms include regurgitating food, noises coming from the throat, chest discomfort with swallowing, and a feeling of fullness or the feeling of something being stuck in your throat when swallowing. When blockage in your throat is complete, it may be associated with drooling.  CAUSES   Problems with swallowing may occur because of problems with the muscles. The food cannot be propelled in the usual manner into your stomach. You may have ulcers, scar tissue, or inflammation in the tube down which food travels from your mouth to your stomach (esophagus), which blocks food from passing normally into the stomach. Causes of inflammation include:  · Acid reflux from your stomach into your esophagus.  · Infection.  · Radiation treatment for cancer.  · Medicines taken without enough fluids to wash them down into your stomach.  You may have nerve problems that prevent signals from being sent to the muscles of your esophagus to contract and move your food down to your stomach. Globus pharyngeus is a relatively common problem in which there is a sense of an obstruction or difficulty in swallowing, without any physical abnormalities of the swallowing passages being found. This problem  usually improves over time with reassurance and testing to rule out other causes.  DIAGNOSIS  Dysphagia can be diagnosed and its cause can be determined by tests in which you swallow a white substance that helps illuminate the inside of your throat (contrast medium) while X-rays are taken. Sometimes a flexible telescope that is inserted down your throat (endoscopy) to look at your esophagus and stomach is used.  TREATMENT   · If the dysphagia is caused by acid reflux or infection, medicines may be used.  · If the dysphagia is caused by problems with your swallowing muscles, swallowing therapy may be used to help you strengthen your swallowing muscles.  · If the dysphagia is caused by a blockage or mass, procedures to remove the blockage may be done.  HOME CARE INSTRUCTIONS  · Try to eat soft food that is easier to swallow and check your weight on a daily basis to be sure that it is not decreasing.  · Be sure to drink liquids when sitting upright (not lying down).  SEEK MEDICAL CARE IF:  · You are losing weight because you are unable to swallow.  · You are coughing when you drink liquids (aspiration).  · You are coughing up partially digested food.  SEEK IMMEDIATE MEDICAL CARE IF:  · You are unable to swallow your own saliva .  · You are having shortness of breath or a fever, or both.  · You have a hoarse voice along with difficulty swallowing.  MAKE SURE YOU:  · Understand these instructions.  · Will watch your condition.  · Will get help right away if you are not doing well or get worse.     This information is not intended to replace advice given to you by your health care provider. Make sure you discuss any questions you have with your health care provider.     Document Released: 12/15/2001 Document Revised: 01/08/2016 Document Reviewed: 06/06/2014  [employer] Interactive Patient Education ©2016 [employer]      Discharge Instructions    Discharged to home by {TRANSPORTATION:345637} with {WIITH:797943}.  Discharged via {DISCHARGED VIA:723437}, hospital escort: {HOSPITAL ESCORT:662696}.  Special equipment needed: {SPECIAL EQUIPMENT:919411}    Be sure to schedule a follow-up appointment with your primary care doctor or any specialists as instructed.     Discharge Plan:   Influenza Vaccine Indication: Indicated: Not available from distributor/    I understand that a diet low in cholesterol, fat, and sodium is recommended for good health. Unless I have been given specific instructions below for another diet, I accept this instruction as my diet prescription.   Other diet: nectar thick    Special Instructions: None    · Is patient discharged on Warfarin / Coumadin?   No     · Is patient Post Blood Transfusion?  No    Depression / Suicide Risk    As you are discharged from this [facility] facility, it is important to learn how to keep safe from harming yourself.    Recognize the warning signs:  · Abrupt changes in personality, positive or negative- including increase in energy   · Giving away possessions  · Change in eating patterns- significant weight changes-  positive or negative  · Change in sleeping patterns- unable to sleep or sleeping all the time   · Unwillingness or inability to communicate  · Depression  · Unusual sadness, discouragement and loneliness  · Talk of wanting to die  · Neglect of personal appearance   · Rebelliousness- reckless behavior  · Withdrawal from people/activities they love  · Confusion- inability to concentrate     If you or a loved one observes any of these behaviors or has concerns about self-harm, here's what you can do:  · Talk about it- your feelings and reasons for harming yourself  · Remove any means that you might use to hurt yourself (examples: pills, rope, extension cords, firearm)  · Get professional help from the community (Mental Health, Substance Abuse, psychological counseling)  · Do not be alone:Call your Safe Contact- someone whom you trust who will be there  for you.  · Call your local CRISIS HOTLINE 109-9627 or 365-919-3165  · Call your local Children's Mobile Crisis Response Team [place] (813) 946-2750 or www.[employer]ogas  · Call the toll free National Suicide Prevention Hotlines   · National Suicide Prevention Lifeline 153-492-SOUV (9871)  · National Hope Line Network 800-SUICIDE (923-1598)        Follow-up Information     1. Follow up with [facility], Emergency Dept.    Specialty:  Emergency Medicine    Why:  If symptoms worsen    Contact information    [address] [facility] [address] [phone]        2. Follow up with Dallas Bolivar PA-C In 1 week.    Specialty:  Family Medicine    Contact information    [address]  [facility] [address] [phone]          3. Schedule an appointment as soon as possible for a visit with Micha Herrera M.D..    Specialty:  Neurosurgery    Contact information    [address]  [facility] [address] [phone]           Discharge Medication Instructions:    Below are the medications your physician expects you to take upon discharge:    Review all your home medications and newly ordered medications with your doctor and/or pharmacist. Follow medication instructions as directed by your doctor and/or pharmacist.    Please keep your medication list with you and share with your physician.               Medication List      START taking these medications        Instructions    Morning Afternoon Evening Bedtime    * dexamethasone 0.5 MG Tabs   Commonly known as:  DECADRON        Take 8 Tabs by mouth every day for 3 days.   Dose:  4 mg                        * dexamethasone 0.5 MG Tabs   Start taking on:  8/16/2017   Commonly known as:  DECADRON        Take 4 Tabs by mouth every day at 6 PM for 5 days.   Dose:  2 mg                        * Notice:  This list has 2 medication(s) that are the same as other medications prescribed for you. Read the directions carefully, and ask your doctor or  other care provider to review them with you.      CONTINUE taking these medications        Instructions    Morning Afternoon Evening Bedtime    albuterol 108 (90 BASE) MCG/ACT Aers inhalation aerosol        Inhale 2 Puffs by mouth every 6 hours as needed for Shortness of Breath.   Dose:  2 Puff                        Calcium 0494-8189 MG-UNIT Chew        Take 1 Each by mouth every day.   Dose:  1 Each                        COQ-10 PO        Take 1 Tab by mouth every day.   Dose:  1 Tab                        gabapentin 300 MG Caps   Commonly known as:  NEURONTIN        Take 300 mg by mouth 3 times a day.   Dose:  300 mg                        levothyroxine 25 MCG Tabs   Last time this was given:  25 mcg on 8/13/2017  5:03 AM   Commonly known as:  SYNTHROID        Take 25 mcg by mouth Every morning on an empty stomach.   Dose:  25 mcg                        methocarbamol 750 MG Tabs   Commonly known as:  ROBAXIN        Take 750 mg by mouth 4 times a day.   Dose:  750 mg                        MULTIVITAMIN & MINERAL PO        Take 1 Tab by mouth every day.   Dose:  1 Tab                        Oxycodone HCl 20 MG Tabs   Last time this was given:  10 mg on 8/13/2017  8:31 AM        Take 20 mg by mouth every 6 hours as needed.   Dose:  20 mg                        sertraline 100 MG Tabs   Commonly known as:  ZOLOFT        Take 150 mg by mouth every day.   Dose:  150 mg                        Vitamin D 1000 UNIT Caps        Take 1,000 Units by mouth every day.   Dose:  1000 Units                             Where to Get Your Medications      These medications were sent to [id number] [place] [facility] - [address]  [address] [facility], [place] [address]     Phone:  [phone]    - dexamethasone 0.5 MG Tabs  - dexamethasone 0.5 MG Tabs            Orders for after discharge     REFERRAL TO HOME HEALTH    Complete by:  As directed    Home health will create and establish a plan of care   Speech therapy for  swallow evaluation. Currently, on nectar thick diet and can be advanced once clear by Speech.             Instructions           Diet / Nutrition:    Follow any diet instructions given to you by your doctor or the dietician, including how much salt (sodium) you are allowed each day.    If you are overweight, talk to your doctor about a weight reduction plan.    Activity:    Remain physically active following your doctor's instructions about exercise and activity.    Rest often.     Any time you become even a little tired or short of breath, SIT DOWN and rest.    Worsening Symptoms:    Report any of the following signs and symptoms to the doctor's office immediately:    *Pain of jaw, arm, or neck  *Chest pain not relieved by medication                               *Dizziness or loss of consciousness  *Difficulty breathing even when at rest   *More tired than usual                                       *Bleeding drainage or swelling of surgical site  *Swelling of feet, ankles, legs or stomach                 *Fever (>100ºF)  *Pink or blood tinged sputum  *Weight gain (3lbs/day or 5lbs /week)           *Shock from internal defibrillator (if applicable)  *Palpitations or irregular heartbeats                *Cool and/or numb extremities    Stroke Awareness    Common Risk Factors for Stroke include:    Age  Atrial Fibrillation  Carotid Artery Stenosis  Diabetes Mellitus  Excessive alcohol consumption  High blood pressure  Overweight   Physical inactivity  Smoking    Warning signs and symptoms of a stroke include:    *Sudden numbness or weakness of the face, arm or leg (especially on one side of the body).  *Sudden confusion, trouble speaking or understanding.  *Sudden trouble seeing in one or both eyes.  *Sudden trouble walking, dizziness, loss of balance or coordination.Sudden severe headache with no known cause.    It is very important to get treatment quickly when a stroke occurs. If you experience any of the above  warning signs, call 291 immediately.                   Disclaimer         Quit Smoking / Tobacco Use:    I understand the use of any tobacco products increases my chance of suffering from future heart disease or stroke and could cause other illnesses which may shorten my life. Quitting the use of tobacco products is the single most important thing I can do to improve my health. For further information on smoking / tobacco cessation call a Toll Free Quit Line at 1-460.950.8848 (*[employer]) or 1-623.158.5734 (American Lung Association) or you can access the web based program at www.lungusa.org.    [place] Tobacco Users Help Line:  (217) 311-6711       Toll Free: 1-549.218.7804  Quit Tobacco Program [facility] (147)984-8381    Crisis Hotline:    [employer] Crisis Hotline:  1-384-MALRJGY or 1-484.577.5039    [place] Crisis Hotline:    1-912.355.2739 or 361-327-2707    Discharge Survey:   Thank you for choosing [facility]. We hope we did everything we could to make your hospital stay a pleasant one. You may be receiving a phone survey and we would appreciate your time and participation in answering the questions. Your input is very valuable to us in our efforts to improve our service to our patients and their families.        My signature on this form indicates that:    1. I have reviewed and understand the above information.  2. My questions regarding this information have been answered to my satisfaction.  3. I have formulated a plan with my discharge nurse to obtain my prescribed medications for home.                  Disclaimer         __________________________________                     __________       ________                       Patient Signature                                                 Date                    Time

## 2017-08-03 NOTE — ED NOTES
Pt placed in lobby with male friend to wait for room assignment. Family member instructed to alert staff of any changes or concerns during lobby wait.

## 2017-08-03 NOTE — IP AVS SNAPSHOT
8/13/2017    Smita Faith  [address]  [place] [address]    Dear Smita Mejia:    [facility] wants to ensure your discharge home is safe and you or your loved ones have had all of your questions answered regarding your care after you leave the hospital.    Below is a list of resources and contact information should you have any questions regarding your hospital stay, follow-up instructions, or active medical symptoms.    Questions or Concerns Regarding… Contact   Medical Questions Related to Your Discharge  (7 days a week, 8am-5pm) Contact a Nurse Care Coordinator   [phone]   Medical Questions Not Related to Your Discharge  (24 hours a day / 7 days a week)  Contact the Nurse Health Line   [phone]    Medications or Discharge Instructions Refer to your discharge packet   or contact your [facility] Primary Care Provider   [phone]   Follow-up Appointment(s) Schedule your appointment via Risk I/O   or contact Scheduling [phone]   Billing Review your statement via Risk I/O  or contact Billing [phone]   Medical Records Review your records via Risk I/O   or contact Medical Records [phone]     You may receive a telephone call within two days of discharge. This call is to make certain you understand your discharge instructions and have the opportunity to have any questions answered. You can also easily access your medical information, test results and upcoming appointments via the Risk I/O free online health management tool. You can learn more and sign up at Foruforever/Risk I/O. For assistance setting up your Risk I/O account, please call [phone].    Once again, we want to ensure your discharge home is safe and that you have a clear understanding of any next steps in your care. If you have any questions or concerns, please do not hesitate to contact us, we are here for you. Thank you for choosing [facility] for your healthcare needs.    Sincerely,    Your [facility] Healthcare Team

## 2017-08-03 NOTE — IP AVS SNAPSHOT
" <p align=\"LEFT\"><IMG SRC=\"//[email or web address]$/[email or web address]\" alt=\"Image\" WIDTH=\"50%\" HEIGHT=\"200\" BORDER=\"\"></p>                   Name:Smita Faith  Medical Record Number:[id number]  CSN: [id number]    YOB: 1965   Age: 51 y.o.  Sex: female  HT:1.524 m (5') WT: 114.9 kg (253 lb 4.9 oz)          Admit Date: 8/3/2017     Discharge Date:   Today's Date: 8/13/2017  Attending Doctor:  Kyleigh Tafoya D.O.                  Allergies:  Bloodless; Ciprofloxacin; Codeine; Erythromycin; Nsaids; and Other food          Follow-up Information     1. Follow up with [facility], Emergency Dept.    Specialty:  Emergency Medicine    Why:  If symptoms worsen    Contact information    [address] [facility] [address] [phone]        2. Follow up with Dallas Bolivar PA-C In 1 week.    Specialty:  Family Medicine    Contact information    [address] [address] [phone]          3. Schedule an appointment as soon as possible for a visit with Micha Herrera M.D..    Specialty:  Neurosurgery    Contact information    [address]  [facility] [address] [phone]           Medication List      Take these Medications        Instructions    albuterol 108 (90 BASE) MCG/ACT Aers inhalation aerosol    Inhale 2 Puffs by mouth every 6 hours as needed for Shortness of Breath.   Dose:  2 Puff       Calcium 3734-9740 MG-UNIT Chew    Take 1 Each by mouth every day.   Dose:  1 Each       COQ-10 PO    Take 1 Tab by mouth every day.   Dose:  1 Tab       * dexamethasone 0.5 MG Tabs   Commonly known as:  DECADRON    Take 8 Tabs by mouth every day for 3 days.   Dose:  4 mg       * dexamethasone 0.5 MG Tabs   Start taking on:  8/16/2017   Commonly known as:  DECADRON    Take 4 Tabs by mouth every day at 6 PM for 5 days.   Dose:  2 mg       gabapentin 300 MG Caps   Commonly known as:  NEURONTIN    Take 300 mg by mouth 3 times a day.   Dose:  300 mg       levothyroxine 25 MCG Tabs   Commonly " known as:  SYNTHROID    Take 25 mcg by mouth Every morning on an empty stomach.   Dose:  25 mcg       methocarbamol 750 MG Tabs   Commonly known as:  ROBAXIN    Take 750 mg by mouth 4 times a day.   Dose:  750 mg       MULTIVITAMIN & MINERAL PO    Take 1 Tab by mouth every day.   Dose:  1 Tab       Oxycodone HCl 20 MG Tabs    Take 20 mg by mouth every 6 hours as needed.   Dose:  20 mg       sertraline 100 MG Tabs   Commonly known as:  ZOLOFT    Take 150 mg by mouth every day.   Dose:  150 mg       Vitamin D 1000 UNIT Caps    Take 1,000 Units by mouth every day.   Dose:  1000 Units       * Notice:  This list has 2 medication(s) that are the same as other medications prescribed for you. Read the directions carefully, and ask your doctor or other care provider to review them with you.

## 2017-08-04 PROBLEM — E87.6 HYPOKALEMIA: Status: ACTIVE | Noted: 2017-08-04

## 2017-08-04 LAB
ANION GAP SERPL CALC-SCNC: 9 MMOL/L (ref 0–11.9)
ANISOCYTOSIS BLD QL SMEAR: ABNORMAL
BASOPHILS # BLD AUTO: 0.1 % (ref 0–1.8)
BASOPHILS # BLD: 0.01 K/UL (ref 0–0.12)
BUN SERPL-MCNC: 12 MG/DL (ref 8–22)
CALCIUM SERPL-MCNC: 9 MG/DL (ref 8.5–10.5)
CHLORIDE SERPL-SCNC: 107 MMOL/L (ref 96–112)
CO2 SERPL-SCNC: 23 MMOL/L (ref 20–33)
COMMENT 1642: NORMAL
CREAT SERPL-MCNC: 0.42 MG/DL (ref 0.5–1.4)
EOSINOPHIL # BLD AUTO: 0 K/UL (ref 0–0.51)
EOSINOPHIL NFR BLD: 0 % (ref 0–6.9)
ERYTHROCYTE [DISTWIDTH] IN BLOOD BY AUTOMATED COUNT: 47.9 FL (ref 35.9–50)
GFR SERPL CREATININE-BSD FRML MDRD: >60 ML/MIN/1.73 M 2
GLUCOSE SERPL-MCNC: 110 MG/DL (ref 65–99)
HCT VFR BLD AUTO: 29.4 % (ref 37–47)
HGB BLD-MCNC: 8.7 G/DL (ref 12–16)
HYPOCHROMIA BLD QL SMEAR: ABNORMAL
IMM GRANULOCYTES # BLD AUTO: 0.05 K/UL (ref 0–0.11)
IMM GRANULOCYTES NFR BLD AUTO: 0.4 % (ref 0–0.9)
LYMPHOCYTES # BLD AUTO: 0.95 K/UL (ref 1–4.8)
LYMPHOCYTES NFR BLD: 7.8 % (ref 22–41)
MCH RBC QN AUTO: 22.2 PG (ref 27–33)
MCHC RBC AUTO-ENTMCNC: 29.6 G/DL (ref 33.6–35)
MCV RBC AUTO: 75 FL (ref 81.4–97.8)
MICROCYTES BLD QL SMEAR: ABNORMAL
MONOCYTES # BLD AUTO: 0.67 K/UL (ref 0–0.85)
MONOCYTES NFR BLD AUTO: 5.5 % (ref 0–13.4)
MORPHOLOGY BLD-IMP: NORMAL
NEUTROPHILS # BLD AUTO: 10.49 K/UL (ref 2–7.15)
NEUTROPHILS NFR BLD: 86.2 % (ref 44–72)
NRBC # BLD AUTO: 0 K/UL
NRBC BLD AUTO-RTO: 0 /100 WBC
OVALOCYTES BLD QL SMEAR: NORMAL
PLATELET # BLD AUTO: 449 K/UL (ref 164–446)
PLATELET BLD QL SMEAR: NORMAL
PMV BLD AUTO: 9 FL (ref 9–12.9)
POIKILOCYTOSIS BLD QL SMEAR: NORMAL
POTASSIUM SERPL-SCNC: 3.7 MMOL/L (ref 3.6–5.5)
RBC # BLD AUTO: 3.92 M/UL (ref 4.2–5.4)
RBC BLD AUTO: PRESENT
SODIUM SERPL-SCNC: 139 MMOL/L (ref 135–145)
TOXIC GRANULES BLD QL SMEAR: SLIGHT
WBC # BLD AUTO: 12.2 K/UL (ref 4.8–10.8)

## 2017-08-04 PROCEDURE — G0378 HOSPITAL OBSERVATION PER HR: HCPCS

## 2017-08-04 PROCEDURE — 97161 PT EVAL LOW COMPLEX 20 MIN: CPT

## 2017-08-04 PROCEDURE — 700101 HCHG RX REV CODE 250: Performed by: INTERNAL MEDICINE

## 2017-08-04 PROCEDURE — 85025 COMPLETE CBC W/AUTO DIFF WBC: CPT

## 2017-08-04 PROCEDURE — G8979 MOBILITY GOAL STATUS: HCPCS | Mod: CI

## 2017-08-04 PROCEDURE — G8997 SWALLOW GOAL STATUS: HCPCS | Mod: CI

## 2017-08-04 PROCEDURE — 80048 BASIC METABOLIC PNL TOTAL CA: CPT

## 2017-08-04 PROCEDURE — G8980 MOBILITY D/C STATUS: HCPCS | Mod: CI

## 2017-08-04 PROCEDURE — G8978 MOBILITY CURRENT STATUS: HCPCS | Mod: CI

## 2017-08-04 PROCEDURE — 36415 COLL VENOUS BLD VENIPUNCTURE: CPT

## 2017-08-04 PROCEDURE — 92610 EVALUATE SWALLOWING FUNCTION: CPT

## 2017-08-04 PROCEDURE — 700111 HCHG RX REV CODE 636 W/ 250 OVERRIDE (IP): Performed by: INTERNAL MEDICINE

## 2017-08-04 PROCEDURE — G8996 SWALLOW CURRENT STATUS: HCPCS | Mod: CL

## 2017-08-04 PROCEDURE — 99226 PR SUBSEQUENT OBSERVATION CARE,LEVEL III: CPT | Performed by: HOSPITALIST

## 2017-08-04 RX ORDER — LEVOTHYROXINE SODIUM 0.03 MG/1
25 TABLET ORAL
Status: DISCONTINUED | OUTPATIENT
Start: 2017-08-04 | End: 2017-08-13 | Stop reason: HOSPADM

## 2017-08-04 RX ORDER — LEVOTHYROXINE SODIUM 0.03 MG/1
25 TABLET ORAL
Status: ON HOLD | COMMUNITY
End: 2017-08-25

## 2017-08-04 RX ORDER — OMEPRAZOLE 20 MG/1
20 CAPSULE, DELAYED RELEASE ORAL DAILY
Status: DISCONTINUED | OUTPATIENT
Start: 2017-08-04 | End: 2017-08-08

## 2017-08-04 RX ADMIN — DEXAMETHASONE SODIUM PHOSPHATE 4 MG: 4 INJECTION, SOLUTION INTRAMUSCULAR; INTRAVENOUS at 00:09

## 2017-08-04 RX ADMIN — MORPHINE SULFATE 4 MG: 4 INJECTION INTRAVENOUS at 02:11

## 2017-08-04 RX ADMIN — DEXAMETHASONE SODIUM PHOSPHATE 4 MG: 4 INJECTION, SOLUTION INTRAMUSCULAR; INTRAVENOUS at 12:27

## 2017-08-04 RX ADMIN — MORPHINE SULFATE 4 MG: 4 INJECTION INTRAVENOUS at 08:43

## 2017-08-04 RX ADMIN — MORPHINE SULFATE 4 MG: 4 INJECTION INTRAVENOUS at 12:27

## 2017-08-04 RX ADMIN — POTASSIUM CHLORIDE AND SODIUM CHLORIDE: 900; 150 INJECTION, SOLUTION INTRAVENOUS at 00:09

## 2017-08-04 RX ADMIN — DEXAMETHASONE SODIUM PHOSPHATE 4 MG: 4 INJECTION, SOLUTION INTRAMUSCULAR; INTRAVENOUS at 05:33

## 2017-08-04 RX ADMIN — POTASSIUM CHLORIDE AND SODIUM CHLORIDE: 900; 150 INJECTION, SOLUTION INTRAVENOUS at 12:28

## 2017-08-04 RX ADMIN — MORPHINE SULFATE 4 MG: 4 INJECTION INTRAVENOUS at 16:47

## 2017-08-04 RX ADMIN — DEXAMETHASONE SODIUM PHOSPHATE 4 MG: 4 INJECTION, SOLUTION INTRAMUSCULAR; INTRAVENOUS at 16:54

## 2017-08-04 RX ADMIN — MORPHINE SULFATE 4 MG: 4 INJECTION INTRAVENOUS at 22:09

## 2017-08-04 ASSESSMENT — ENCOUNTER SYMPTOMS
DIZZINESS: 0
COUGH: 0
CONSTITUTIONAL NEGATIVE: 1
ABDOMINAL PAIN: 0
HEADACHES: 0
HEARTBURN: 0
NERVOUS/ANXIOUS: 0
NEUROLOGICAL NEGATIVE: 1
DIAPHORESIS: 0
DIARRHEA: 0
PALPITATIONS: 0
LOSS OF CONSCIOUSNESS: 0
PSYCHIATRIC NEGATIVE: 1
VOMITING: 0
GASTROINTESTINAL NEGATIVE: 1
CARDIOVASCULAR NEGATIVE: 1
NAUSEA: 0
NECK PAIN: 1
CONSTIPATION: 0
FEVER: 0
BRUISES/BLEEDS EASILY: 0
WHEEZING: 0
DOUBLE VISION: 0
CHILLS: 0
BLOOD IN STOOL: 0
SEIZURES: 0
RESPIRATORY NEGATIVE: 1
FOCAL WEAKNESS: 0
EYES NEGATIVE: 1
HEMOPTYSIS: 0

## 2017-08-04 ASSESSMENT — PATIENT HEALTH QUESTIONNAIRE - PHQ9
7. TROUBLE CONCENTRATING ON THINGS, SUCH AS READING THE NEWSPAPER OR WATCHING TELEVISION: SEVERAL DAYS
4. FEELING TIRED OR HAVING LITTLE ENERGY: SEVERAL DAYS
2. FEELING DOWN, DEPRESSED, IRRITABLE, OR HOPELESS: SEVERAL DAYS
4. FEELING TIRED OR HAVING LITTLE ENERGY: SEVERAL DAYS
5. POOR APPETITE OR OVEREATING: SEVERAL DAYS
9. THOUGHTS THAT YOU WOULD BE BETTER OFF DEAD, OR OF HURTING YOURSELF: SEVERAL DAYS
7. TROUBLE CONCENTRATING ON THINGS, SUCH AS READING THE NEWSPAPER OR WATCHING TELEVISION: SEVERAL DAYS
6. FEELING BAD ABOUT YOURSELF - OR THAT YOU ARE A FAILURE OR HAVE LET YOURSELF OR YOUR FAMILY DOWN: SEVERAL DAYS
8. MOVING OR SPEAKING SO SLOWLY THAT OTHER PEOPLE COULD HAVE NOTICED. OR THE OPPOSITE, BEING SO FIGETY OR RESTLESS THAT YOU HAVE BEEN MOVING AROUND A LOT MORE THAN USUAL: NOT AT ALL
SUM OF ALL RESPONSES TO PHQ QUESTIONS 1-9: 8
SUM OF ALL RESPONSES TO PHQ9 QUESTIONS 1 AND 2: 2
3. TROUBLE FALLING OR STAYING ASLEEP OR SLEEPING TOO MUCH: SEVERAL DAYS
3. TROUBLE FALLING OR STAYING ASLEEP OR SLEEPING TOO MUCH: SEVERAL DAYS
SUM OF ALL RESPONSES TO PHQ QUESTIONS 1-9: 8
1. LITTLE INTEREST OR PLEASURE IN DOING THINGS: SEVERAL DAYS
1. LITTLE INTEREST OR PLEASURE IN DOING THINGS: SEVERAL DAYS
9. THOUGHTS THAT YOU WOULD BE BETTER OFF DEAD, OR OF HURTING YOURSELF: SEVERAL DAYS
SUM OF ALL RESPONSES TO PHQ9 QUESTIONS 1 AND 2: 2
8. MOVING OR SPEAKING SO SLOWLY THAT OTHER PEOPLE COULD HAVE NOTICED. OR THE OPPOSITE, BEING SO FIGETY OR RESTLESS THAT YOU HAVE BEEN MOVING AROUND A LOT MORE THAN USUAL: NOT AT ALL
5. POOR APPETITE OR OVEREATING: SEVERAL DAYS
6. FEELING BAD ABOUT YOURSELF - OR THAT YOU ARE A FAILURE OR HAVE LET YOURSELF OR YOUR FAMILY DOWN: SEVERAL DAYS
2. FEELING DOWN, DEPRESSED, IRRITABLE, OR HOPELESS: SEVERAL DAYS

## 2017-08-04 ASSESSMENT — COGNITIVE AND FUNCTIONAL STATUS - GENERAL
SUGGESTED CMS G CODE MODIFIER MOBILITY: CJ
TURNING FROM BACK TO SIDE WHILE IN FLAT BAD: A LITTLE
MOBILITY SCORE: 22
CLIMB 3 TO 5 STEPS WITH RAILING: A LITTLE

## 2017-08-04 ASSESSMENT — PAIN SCALES - GENERAL
PAINLEVEL_OUTOF10: 2
PAINLEVEL_OUTOF10: 3
PAINLEVEL_OUTOF10: 8
PAINLEVEL_OUTOF10: 6
PAINLEVEL_OUTOF10: 8
PAINLEVEL_OUTOF10: 1
PAINLEVEL_OUTOF10: 2
PAINLEVEL_OUTOF10: 8
PAINLEVEL_OUTOF10: 5

## 2017-08-04 ASSESSMENT — GAIT ASSESSMENTS
DISTANCE (FEET): 350
GAIT LEVEL OF ASSIST: STAND BY ASSIST
DEVIATION: OTHER (COMMENT)

## 2017-08-04 ASSESSMENT — COPD QUESTIONNAIRES
COPD SCREENING SCORE: 3
HAVE YOU SMOKED AT LEAST 100 CIGARETTES IN YOUR ENTIRE LIFE: YES
DO YOU EVER COUGH UP ANY MUCUS OR PHLEGM?: NO/ONLY WITH OCCASIONAL COLDS OR INFECTIONS
DURING THE PAST 4 WEEKS HOW MUCH DID YOU FEEL SHORT OF BREATH: NONE/LITTLE OF THE TIME

## 2017-08-04 ASSESSMENT — LIFESTYLE VARIABLES: DO YOU DRINK ALCOHOL: NO

## 2017-08-04 NOTE — CARE PLAN
Problem: Communication  Goal: The ability to communicate needs accurately and effectively will improve  Outcome: PROGRESSING AS EXPECTED  Patient able to communicate needs effectively and uses call light appropriately. RN hourly rounding in place.     Problem: Safety  Goal: Will remain free from injury  Outcome: PROGRESSING AS EXPECTED  Patient remains free from injury. Patient uses call light when necessary. Nursing staff assists with ambulation. Patient educated on injury prevention.

## 2017-08-04 NOTE — ED NOTES
To room, agree w triage note, patient provided w meghanuer suction ,is in rigid collar w anterior c spine  dry gauze dressing under tegaderm,  suctioning  small amt of white secretions, o2 sat 97% on ra

## 2017-08-04 NOTE — ASSESSMENT & PLAN NOTE
Neck swelling and neck pain have improved.  Remains in a cervical collar.  Remains on decadron for post-op swelling. Tapering the steroids.  Continue with pain management.  Continue with physical therapy and occupational therapy.  Continue with Speech therapy.

## 2017-08-04 NOTE — PROGRESS NOTES
Speech therapy indicates the patient is to remain strict NPO.  She will be assessed again tomorrow.

## 2017-08-04 NOTE — PROGRESS NOTES
Paged hospitalist regarding patient request for bloodless program. Sammi Moreira, APRN, returned page. Harish notified.

## 2017-08-04 NOTE — PROGRESS NOTES
[facility] Hospitalist Progress Note    Date of Service: 8/4/2017    Chief Complaint  51 y.o. female admitted 8/3/2017 with inability to swallow.    Interval Problem Update  Mrs. Faith is a 51-year-old female who recently had cervical surgery on 8/2/2017. The patient's surgery was performed with Dr. Herrera. After being discharged home the patient suddenly developed worsening neck swelling neck discomfort and then she was unable to swallow at all. Patient thus was admitted to the neurology floor for evaluation. Speech was able to evaluate the patient today and they are's recommending strict nothing by mouth with core tract placement. Patient this point we'll continue on IV Decadron and has been implemented with tube feed protocol.    Consultants/Specialty  Neurosurgery    Disposition  To be determined        Review of Systems   Constitutional: Negative.  Negative for fever, chills and diaphoresis.   Eyes: Negative.  Negative for double vision.   Respiratory: Negative.  Negative for cough, hemoptysis and wheezing.    Cardiovascular: Negative.  Negative for chest pain, palpitations and leg swelling.   Gastrointestinal: Negative.  Negative for heartburn, nausea, vomiting, abdominal pain, diarrhea, constipation and blood in stool.        Patient reports difficulty with swallowing and she says she is unable to swallow anything. Prior to surgery she was swallowing with normal consistency.   Genitourinary: Negative.  Negative for urgency, frequency and hematuria.   Musculoskeletal: Positive for neck pain. Negative for joint pain.   Skin: Negative.  Negative for itching and rash.   Neurological: Negative.  Negative for dizziness, focal weakness, seizures, loss of consciousness and headaches.   Endo/Heme/Allergies: Negative.  Does not bruise/bleed easily.   Psychiatric/Behavioral: Negative.  Negative for suicidal ideas. The patient is not nervous/anxious.       Physical Exam  Laboratory/Imaging   Hemodynamics  Temp (24hrs),  Av.3 °C (97.4 °F), Min:36 °C (96.8 °F), Max:37 °C (98.6 °F)   Temperature: 36 °C (96.8 °F)  Pulse  Av.3  Min: 56  Max: 89 Heart Rate (Monitored): 81  Blood Pressure: 115/55 mmHg, NIBP: 131/73 mmHg      Respiratory      Respiration: 18, Pulse Oximetry: 98 %, O2 Daily Delivery Respiratory : Room Air with O2 Available     Work Of Breathing / Effort: Mild  RUL Breath Sounds: Clear, RML Breath Sounds: Clear, RLL Breath Sounds: Clear, BRIAN Breath Sounds: Clear, LLL Breath Sounds: Clear    Fluids    Intake/Output Summary (Last 24 hours) at 17 1406  Last data filed at 17 0600   Gross per 24 hour   Intake    700 ml   Output      0 ml   Net    700 ml       Nutrition  Orders Placed This Encounter   Procedures   • DIET NPO     Standing Status: Standing      Number of Occurrences: 1      Standing Expiration Date:      Order Specific Question:  Restrict to:     Answer:  Strict [1]     Physical Exam   Constitutional: She is oriented to person, place, and time. She appears well-developed and well-nourished. Cervical collar in place.   HENT:   Head: Normocephalic and atraumatic.   Right Ear: External ear normal.   Left Ear: External ear normal.   Nose: Nose normal.   Mouth/Throat: Oropharynx is clear and moist.   Eyes: Conjunctivae and EOM are normal. Pupils are equal, round, and reactive to light.   Neck: Normal range of motion. Neck supple. No JVD present. No thyromegaly present.   Cardiovascular: Normal rate and regular rhythm.    No murmur heard.  Pulmonary/Chest: She has no wheezes. She has no rales. She exhibits no tenderness.   Abdominal: She exhibits no distension and no mass. There is no tenderness. There is no rebound and no guarding.   Genitourinary:   Gloria catheter   Musculoskeletal: Normal range of motion. She exhibits no edema or tenderness.   Lymphadenopathy:     She has no cervical adenopathy.   Neurological: She is alert and oriented to person, place, and time. She has normal reflexes. She  displays no atrophy. No cranial nerve deficit or sensory deficit. She exhibits normal muscle tone. Coordination and gait normal. GCS eye subscore is 4. GCS verbal subscore is 5. GCS motor subscore is 6.   Skin: Skin is warm and dry. No rash noted. No erythema.   Psychiatric: Her behavior is normal. Judgment and thought content normal. Her mood appears anxious.   Nursing note and vitals reviewed.      Recent Labs      08/03/17   1626   WBC  13.3*   RBC  4.61   HEMOGLOBIN  9.9*   HEMATOCRIT  33.8*   MCV  73.3*   MCH  21.5*   MCHC  29.3*   RDW  46.2   PLATELETCT  538*   MPV  9.1     Recent Labs      08/03/17   1626   SODIUM  137   POTASSIUM  3.3*   CHLORIDE  104   CO2  23   GLUCOSE  92   BUN  12   CREATININE  0.58   CALCIUM  9.5         Recent Labs      08/03/17   1626   BNPBTYPENAT  67              Assessment/Plan     Status post cervical spinal fusion (present on admission)  Assessment & Plan  Continue with pain management. Patient is currently in a cervical collar. This will be in place until neurosurgery recommends.  Patient will be of value by physical therapy and occupational therapy.    Dysphagia (present on admission)  Assessment & Plan  Severe, speech has seen the patient and the patient at this point is completely unable to swallow thus the recommendation is to keep her nothing by mouth and place a core tract. I'm going to start her on tube feeds.  Speech will continue to follow her and work with her on a regular basis.  Neurosurgery was contacted.    Hypothyroid (present on admission)  Assessment & Plan  -supportive measures with synthroid supplementation at 25 mcg per day, no change  -latest TSH is unknown thus laboratory draw will be requested.     Hypokalemia (present on admission)  Assessment & Plan  Potassium on admission is 3.3 patient was placed on potassium supplementation. We will get repeat a.m. level.      EKG reviewed, Radiology images reviewed, Labs reviewed and Medications  reviewed            Ulcer prophylaxis: Yes    Assessed for rehab: Patient was assess for and/or received rehabilitation services during this hospitalization         patient neuro specialist for assistance in doing things inability to

## 2017-08-04 NOTE — PROGRESS NOTES
Progress Note               Author: Adriana Hays Date & Time created: 2017  10:28 AM     Interval History:  Patient s/p C3-6 ACDF  with Dr. Herrera  Developed dysphagia, dysphonia, and difficulty breathing/clearing secretions; presented to ED  CT neck demonstrated no significant seroma/hematoma, diffuse prevertebral soft tissue swelling  Started on IV decadron in ER  Reports some improvement today in clearing secretions    Review of Systems:  ROS    Physical Exam:  Physical Exam   Neurological:   Anterior cervical incision c/d/i  No palpable seroma/hematoma  Motor 5/5 except right tri 4+/5  Sensation intact  Difficulty clearing secretions during conversation  Dysphonia       Labs:        Invalid input(s): [id number]  Recent Labs      17   1626   BNPBTYPENAT  67     Recent Labs      17   1626   SODIUM  137   POTASSIUM  3.3*   CHLORIDE  104   CO2  23   BUN  12   CREATININE  0.58   CALCIUM  9.5     Recent Labs      17   1626   ALTSGPT  11   ASTSGOT  13   ALKPHOSPHAT  102*   TBILIRUBIN  0.4   GLUCOSE  92     Recent Labs      17   1626   RBC  4.61   HEMOGLOBIN  9.9*   HEMATOCRIT  33.8*   PLATELETCT  538*     Recent Labs      17   1626   WBC  13.3*   NEUTSPOLYS  69.30   LYMPHOCYTES  21.90*   MONOCYTES  8.00   EOSINOPHILS  0.10   BASOPHILS  0.40   ASTSGOT  13   ALTSGPT  11   ALKPHOSPHAT  102*   TBILIRUBIN  0.4     Hemodynamics:  Temp (24hrs), Av.4 °C (97.5 °F), Min:36 °C (96.8 °F), Max:37 °C (98.6 °F)  Temperature: 36 °C (96.8 °F)  Pulse  Av.5  Min: 56  Max: 89Heart Rate (Monitored): 81  Blood Pressure: 132/73 mmHg, NIBP: 131/73 mmHg     Respiratory:    Respiration: 18, Pulse Oximetry: 99 %, O2 Daily Delivery Respiratory : Room Air with O2 Available     Work Of Breathing / Effort: Mild  RUL Breath Sounds: Clear, RML Breath Sounds: Clear, RLL Breath Sounds: Clear, BRIAN Breath Sounds: Clear, LLL Breath Sounds: Clear  Fluids:    Intake/Output Summary (Last 24 hours) at  08/04/17 1028  Last data filed at 08/04/17 0600   Gross per 24 hour   Intake    700 ml   Output      0 ml   Net    700 ml     Weight: 115.9 kg (255 lb 8.2 oz)  GI/Nutrition:  Orders Placed This Encounter   Procedures   • DIET NPO     Standing Status: Standing      Number of Occurrences: 1      Standing Expiration Date:      Order Specific Question:  Restrict to:     Answer:  Strict [1]     Medical Decision Making, by Problem:  Active Hospital Problems    Diagnosis   • Hypokalemia [E87.6]   • Status post cervical spinal fusion [Z98.1]   • Dysphagia [R13.10]   • Hypothyroid [E03.9]       Plan:  Continuous pulse oximetry  SLP eval and treat  Continue IV decadron  GI prophy  PT/OT  Will follow  Appreciate hospitalist care    Core Measures

## 2017-08-04 NOTE — H&P
Hospital Medicine History and Physical    Date of Service  8/3/2017    Chief Complaint  Chief Complaint   Patient presents with   • Difficulty Breathing   • Difficulty Swallowing   • Post-Op Complications     yesterday anterior neck fusion 3-5       History of Presenting Illness  51 y.o. female who presented 8/3/2017 with difficulty swallowing that started this afternoon. Patient went and anterior neck fusion of C3 C5 yesterday by Dr. Herrera and was discharged home in stable condition. Today at 1 PM the patient suddenly noted difficulty swallowing and felt chest heaviness with difficulty breathing. Patient reports increased secretions and pain with coughing. She continues to complain of neck pain after her surgery which she has been taking oxycodone. Of note the patient took Keflex and Robaxin today. She states that she has tolerated Keflex in the past without any allergic reactions. In the ER the patient's vitals were stable, there is no evidence of stridor on examination and a CT neck revealed diffuse prevertebral soft tissue swelling, without focal fluid collection or hematoma and patent airways. Neurosurgery Dr. Chavez is consulted by the ER physician and recommended IV Solu-Medrol and admission for clinical monitoring.    Primary Care Physician  Dallas Bolivar PA-C    Consultants  Neurosurgery     Code Status  Full Code    Review of Systems  Review of Systems   Constitutional: Negative for fever and chills.   HENT:        Hoarseness  Dysphagia   Eyes: Negative for blurred vision.   Respiratory: Negative for shortness of breath, wheezing and stridor.    Cardiovascular: Positive for chest pain.   Musculoskeletal: Positive for neck pain.   Neurological: Negative for headaches.   All other systems reviewed and are negative.         Past Medical History  Past Medical History   Diagnosis Date   • Bipolar 2 disorder (CMS-HCC)    • Depression    • Anxiety disorder    • Pneumonia    • Bronchitis    •  Backpain      Lumbar chronic   • Unspecified disorder of thyroid 3227-2211     no longer an issue   • Alcoholism (CMS-[facility])    • Heart burn    • Indigestion    • Arthritis      back, hands, arm   • Gynecological disorder      mass   • Bowel habit changes      constipation, irreg diarrhea   • ASTHMA      States trigered by allergy reaction, PRN inhaler        Surgical History  Past Surgical History   Procedure Laterality Date   • Gastric bypass laparoscopic     • Other abdominal surgery       gastric bypass 5/07,lap jenniffer 1988   • Tubal ligation     • Pr removal of tonsils,<11 y/o     • Pr cholecystostomy,percut     • Lumbar fusion anterior  11/10/2010     Performed by CHERYL LORD at SURGERY [facility]   • Breast biopsy       Right- benign   • Pr reduction of large breast  2010   • Hysterectomy robotic xi N/A 4/7/2016     Procedure: HYSTERECTOMY ROBOTIC XI ;  Surgeon: Song Goodson M.D.;  Location: SURGERY [facility];  Service:        Medications  No current facility-administered medications on file prior to encounter.     Current Outpatient Prescriptions on File Prior to Encounter   Medication Sig Dispense Refill   • sertraline (ZOLOFT) 100 MG Tab Take 150 mg by mouth every day.     • gabapentin (NEURONTIN) 300 MG Cap Take 300 mg by mouth 3 times a day.     • Coenzyme Q10 (COQ-10 PO) Take 1 Tab by mouth every day.     • Cholecalciferol (VITAMIN D) 1000 UNIT Cap Take 1,000 Units by mouth every day.     • Multiple Vitamins-Minerals (MULTIVITAMIN & MINERAL PO) Take 1 Tab by mouth every day.     • Calcium 4702-9490 MG-UNIT CHEW Take 1 Each by mouth every day.         Family History  History reviewed. No pertinent family history.    Social History  Social History   Substance Use Topics   • Smoking status: Former Smoker -- 0.25 packs/day for 4 years     Types: Cigarettes     Quit date: 01/01/2015   • Smokeless tobacco: Never Used      Comment: 2009   • Alcohol Use: No      Comment: smoked off and on.        Allergies  Allergies   Allergen Reactions   • Ciprofloxacin Anaphylaxis     SOB   • Codeine      Upset stomach    • Erythromycin Vomiting   • Nsaids      Does not take due to gastric bypass   • Other Food Anaphylaxis     pj oliva        Physical Exam  Laboratory   Hemodynamics  Temp (24hrs), Av °C (98.6 °F), Min:37 °C (98.6 °F), Max:37 °C (98.6 °F)   Temperature: 37 °C (98.6 °F)  Pulse  Av.4  Min: 79  Max: 87 Heart Rate (Monitored): 84  Blood Pressure: 139/78 mmHg, NIBP: 146/61 mmHg      Respiratory      Respiration: 14, Pulse Oximetry: 95 %             Physical Exam   Constitutional: She is oriented to person, place, and time. She appears well-developed and well-nourished. No distress.   HENT:   Head: Normocephalic and atraumatic.   Mouth/Throat: Oropharynx is clear and moist.   Mallampati Class III   Eyes: Conjunctivae are normal.   Neck:   Anterior neck dressing in place which appears dry and intact   Cardiovascular: Normal rate and regular rhythm.    Pulmonary/Chest: Effort normal and breath sounds normal. No stridor. No respiratory distress. She has no wheezes. She has no rales.   Abdominal: Soft. Bowel sounds are normal. She exhibits no distension. There is no tenderness. There is no rebound.   Musculoskeletal: Normal range of motion. She exhibits no edema or tenderness.   Neurological: She is alert and oriented to person, place, and time. No cranial nerve deficit. Coordination normal.   Skin: Skin is warm and dry.   Psychiatric: She has a normal mood and affect. Her behavior is normal.   Nursing note and vitals reviewed.      Recent Labs      17   1626   WBC  13.3*   RBC  4.61   HEMOGLOBIN  9.9*   HEMATOCRIT  33.8*   MCV  73.3*   MCH  21.5*   MCHC  29.3*   RDW  46.2   PLATELETCT  538*   MPV  9.1     Recent Labs      17   1626   SODIUM  137   POTASSIUM  3.3*   CHLORIDE  104   CO2  23   GLUCOSE  92   BUN  12   CREATININE  0.58   CALCIUM  9.5     Recent Labs      17   1626    ALTSGPT  11   ASTSGOT  13   ALKPHOSPHAT  102*   TBILIRUBIN  0.4   GLUCOSE  92         Recent Labs      08/03/17   1626   BNPBTYPENAT  67         No results found for: TROPONINI    Imaging  CT-SOFT TISSUE NECK WITH   Final Result      1.  Postoperative change from multilevel cervical discectomy and anterior fusion from C3 through C6.   2.  Diffuse prevertebral soft tissue swelling, without focal fluid collection or hematoma.   3.  Soft tissue edema and gas tracking in the RIGHT neck consistent with recent surgery.   4.  No apparent narrowing of the airway.      DX-CHEST-LIMITED (1 VIEW)   Final Result      Minimal linear atelectasis in each lower lobe. No lobar consolidation or pleural effusion.           Assessment/Plan     I anticipate this patient is appropriate for observation status at this time.    Status post cervical spinal fusion (present on admission)  Assessment & Plan  Patient is not in respiratory distress, there is no evidence of stridor and CT neck reveals patent airways  We will continue with every 4 hours monitoring of her vitals and respiratory status  We'll continue with IV Decadron  Neurosurgery has been consulted  We will provide pain control with oxycodone and morphine    Dysphagia (present on admission)  Assessment & Plan  Likely secondary to recent surgery  We'll get SLP evaluation in the morning    Hypokalemia (present on admission)  Assessment & Plan  Replaced per IV, follow BMP          VTE prophylaxis: SCD.

## 2017-08-04 NOTE — DIETARY
Nutrition Support Assessment - Female    Smita Faith is a 51 y.o. female with admitting DX of Dysphagia, Status post cervical spinal fusion    Pertinent History: bipolar 2 disorder, depression, anxiety disorder, pneumonia, bronchitis, back pain, unspecified disorder of thyroid, alcoholism, heart burn, indigestion, arthritis, gynecological disorder mass, asthma  Allergies: Bloodless; Ciprofloxacin; Codeine; Erythromycin; Nsaids; and Other food    Height: 152.4 cm (5')  Weight: 115.9 kg (255 lb 8.2 oz)  Ideal Body Weight: 45.36 kg (100 lb)  Percent Ideal Body Weight: 255.5  Body mass index is 49.9 kg/(m^2).    Pertinent Labs 8/3: K 3.3  Pertinent Medications: decadron, prilosec, pericolace, morphine (prn)  Pertinent Fluids: 0.9% NaCl with KCl 20 mEq infusion @ 100 ml/hr  Surgery / Procedures: None noted in chart  Skin: surgical incision cervical anterior   Last BM: 17     Estimated Needs: REE per MSJ x 1 = 1698 kcal/day  Total Calories / day: 1600 - 2000 (Calories / k - 17)  Total Grams Protein / day: 91 - 113+ (Grams Protein / kg IBW: 2 - 2.5+) (Grams Protein / k.8 - 1)  Total Fluids ml / day: 2902.6 ml        Assessment / Evaluation:   Consult received for TF assessment. Per SLP note  - recommendations NPO. Estimated needs based on non-critically ill obese patients BMI >30.  Replete with Fiber appropriate to meet pt's estimated protein needs    Plan / Recommendation:   Start Replete with Fiber @ 25 ml/hr and advance per protocol to 80 ml/hr (goal rate) to provide 1920 kcal (17 kcal/kg), 120 grams protein (1 gm/kg, 2.6 gm/kg IBW), and 1613 ml free water per day.   Fluids per MD.   PO diet when safe/appropriate approved by SLP and MD. BREWSTER following.

## 2017-08-04 NOTE — THERAPY
"Physical Therapy Evaluation completed.   Bed Mobility:  Supine to Sit: Stand by Assist (VC for log rolling)  Transfers: Sit to Stand: Supervised  Gait: Level Of Assist: Stand by Assist with No Equipment Needed       Plan of Care: Patient with no further skilled PT needs in the acute care setting at this time  Discharge Recommendations: Equipment: No Equipment Needed. Post-acute therapy: see below.    Pt presented to PT s/p C3-5 anterior discectomy and fusion 08/02/17 at outpatient surgery center, admit 2' to problems with swallowing. Pt ambulated ~350ft without AD with SBA, distance limited 2' to R hip pain (baseline). Pt appears close to functional mobility baseline. Pt reported she has a 4WW and FWW, agreeable to use walker until return to PLOF. Pt reported she is planning on returning to home following medical d/c, pt reported no concerns with returning home. Pt may benefit from outpatient PT for R hip pain. No further acute PT needs.    See \"Rehab Therapy-Acute\" Patient Summary Report for complete documentation.     "

## 2017-08-04 NOTE — ED PROVIDER NOTES
ED Provider Note    Scribed for Luli Epperson M.D. by Raghav Palmer. 8/3/2017  6:02 PM    Primary care provider: Dallas Bolivar PA-C  Means of arrival: Walk-in  History obtained from: Patient  History limited by: None    CHIEF COMPLAINT  Chief Complaint   Patient presents with   • Difficulty Breathing   • Difficulty Swallowing   • Post-Op Complications     yesterday anterior neck fusion 3-5       HPI  Smita Faith is a 51 y.o. female who presents to the Emergency Department complaining of dysphagia onset this afternoon. The patient reports associated sore throat, voice changes, and difficulty breathing. Per her , she was fine this afternoon. Patient took her antibiotic today and believes this may have caused her symptoms. She denies blood production in her throat or extremity weakness or numbness. She had an anterior neck fusion C3-5 yesterday. Dr. Herrera performed the surgery.    REVIEW OF SYSTEMS  HEENT:  Positive for dysphagia and sore throat  EYES: no discharge, redness, or vision changes  CARDIAC: no chest pain, no palpitations    PULMONARY: Positive for difficulty breathing  GI: no vomiting, diarrhea, or abdominal pain   : no dysuria, back pain, or hematuria   Neuro: no weakness, numbness, aphasia, or headache  Musculoskeletal: no swelling, deformity, pain, or joint swelling  Endocrine: no fevers, sweating, or weight loss   SKIN: no rash, erythema, or contusions     See history of present illness. All other systems are negative    PAST MEDICAL HISTORY   has a past medical history of Bipolar 2 disorder (CMS-HCC); Depression; Anxiety disorder; Pneumonia; Bronchitis; Backpain; Unspecified disorder of thyroid (3239-0392); Alcoholism (CMS-HCC); Heart burn; Indigestion; Arthritis; Gynecological disorder; Bowel habit changes; and ASTHMA.    SURGICAL HISTORY   has past surgical history that includes gastric bypass laparoscopic; other abdominal surgery; tubal ligation; removal of tonsils,<11 y/o;  "cholecystostomy,percut; lumbar fusion anterior (11/10/2010); breast biopsy; reduction of large breast (2010); and hysterectomy robotic xi (N/A, 4/7/2016).    SOCIAL HISTORY  Social History   Substance Use Topics   • Smoking status: Former Smoker -- 0.25 packs/day for 4 years     Types: Cigarettes     Quit date: 01/01/2015   • Smokeless tobacco: Never Used      Comment: 2009   • Alcohol Use: No      Comment: smoked off and on.      History   Drug Use No     Comment: marijuana, crank, \"did everything.\" last used 04/2015       FAMILY HISTORY  History reviewed. No pertinent family history.    CURRENT MEDICATIONS  Home Medications     Reviewed by Manda Sosa (Pharmacy Tech) on 08/03/17 at 2047  Med List Status: Complete    Medication Last Dose Status    albuterol 108 (90 BASE) MCG/ACT Aero Soln inhalation aerosol 8/1/2017 Active    Calcium 6661-6442 MG-UNIT CHEW 8/1/2017 Active    Cholecalciferol (VITAMIN D) 1000 UNIT Cap 8/1/2017 Active    Coenzyme Q10 (COQ-10 PO) 8/3/2017 Active    gabapentin (NEURONTIN) 300 MG Cap 8/3/2017 Active    levothyroxine (SYNTHROID) 100 MCG Tab 8/3/2017 Active    methocarbamol (ROBAXIN) 750 MG Tab 8/3/2017 Active    Multiple Vitamins-Minerals (MULTIVITAMIN & MINERAL PO) 8/1/2017 Active    Oxycodone HCl 20 MG Tab 8/3/2017 Active    sertraline (ZOLOFT) 100 MG Tab 8/3/2017 Active                ALLERGIES  Allergies   Allergen Reactions   • Ciprofloxacin Anaphylaxis     SOB   • Codeine      Upset stomach    • Erythromycin Vomiting   • Nsaids      Does not take due to gastric bypass   • Other Food Anaphylaxis     [facility]       PHYSICAL EXAM  VITAL SIGNS: /78 mmHg  Pulse 87  Temp(Src) 37 °C (98.6 °F)  Resp 14  Ht 1.524 m (5')  Wt 113.399 kg (250 lb)  BMI 48.82 kg/m2  SpO2 96%  LMP 03/02/2016    Constitutional: Well developed, Well nourished, No acute distress, Non-toxic appearance.   HEENT: Normocephalic, Atraumatic,  external ears normal, pharynx pink,  Mucous  Membranes " moist, No rhinorrhea or mucosal edema, Speaks quietly no hoarse voice  Eyes: PERRL, EOMI, Conjunctiva normal, No discharge.   Neck: Cervical collar in place, Dressings on anterior aspect of neck, No tenderness, Supple, No stridor.   Lymphatic: No lymphadenopathy    Cardiovascular: Regular Rate and Rhythm, No murmurs,  rubs, or gallops.   Thorax & Lungs: Lungs clear to auscultation bilaterally, No respiratory distress, No wheezes, rhales or rhonchi, No chest wall tenderness.   Abdomen: Bowel sounds normal, Soft, non tender, non distended,  No pulsatile masses., no rebound guarding or peritoneal signs.   Skin: Warm, Dry, No erythema, No rash,   Back:  No CVA tenderness,  No spinal tenderness, bony crepitance, step offs, or instability.   Neurologic: Alert & oriented x 3, Normal motor function, Normal sensory function, No focal deficits noted. Normal reflexes. Normal Cranial Nerves.  Extremities: Equal, intact distal pulses, No cyanosis, clubbing or edema,  No tenderness.   Musculoskeletal: Good range of motion in all major joints. No tenderness to palpation or major deformities noted.     DIAGNOSTIC STUDIES / PROCEDURES    LABS  Results for orders placed or performed during the hospital encounter of 08/03/17   BTYPE NATRIURETIC PEPTIDE   Result Value Ref Range    B Natriuretic Peptide 67 0 - 100 pg/mL   CBC WITH DIFFERENTIAL   Result Value Ref Range    WBC 13.3 (H) 4.8 - 10.8 K/uL    RBC 4.61 4.20 - 5.40 M/uL    Hemoglobin 9.9 (L) 12.0 - 16.0 g/dL    Hematocrit 33.8 (L) 37.0 - 47.0 %    MCV 73.3 (L) 81.4 - 97.8 fL    MCH 21.5 (L) 27.0 - 33.0 pg    MCHC 29.3 (L) 33.6 - 35.0 g/dL    RDW 46.2 35.9 - 50.0 fL    Platelet Count 538 (H) 164 - 446 K/uL    MPV 9.1 9.0 - 12.9 fL    Neutrophils-Polys 69.30 44.00 - 72.00 %    Lymphocytes 21.90 (L) 22.00 - 41.00 %    Monocytes 8.00 0.00 - 13.40 %    Eosinophils 0.10 0.00 - 6.90 %    Basophils 0.40 0.00 - 1.80 %    Immature Granulocytes 0.30 0.00 - 0.90 %    Nucleated RBC 0.00 /100  WBC    Lymphs (Absolute) 2.91 1.00 - 4.80 K/uL    Monos (Absolute) 1.06 (H) 0.00 - 0.85 K/uL    Eos (Absolute) 0.01 0.00 - 0.51 K/uL    Baso (Absolute) 0.05 0.00 - 0.12 K/uL    Immature Granulocytes (abs) 0.04 0.00 - 0.11 K/uL    NRBC (Absolute) 0.00 K/uL    Neutrophils (Absolute) 9.21 (H) 2.00 - 7.15 K/uL    Hypochromia 1+     Anisocytosis 1+     Microcytosis 1+    COMP METABOLIC PANEL   Result Value Ref Range    Sodium 137 135 - 145 mmol/L    Potassium 3.3 (L) 3.6 - 5.5 mmol/L    Chloride 104 96 - 112 mmol/L    Co2 23 20 - 33 mmol/L    Anion Gap 10.0 0.0 - 11.9    Glucose 92 65 - 99 mg/dL    Bun 12 8 - 22 mg/dL    Creatinine 0.58 0.50 - 1.40 mg/dL    Calcium 9.5 8.5 - 10.5 mg/dL    AST(SGOT) 13 12 - 45 U/L    ALT(SGPT) 11 2 - 50 U/L    Alkaline Phosphatase 102 (H) 30 - 99 U/L    Total Bilirubin 0.4 0.1 - 1.5 mg/dL    Albumin 3.9 3.2 - 4.9 g/dL    Total Protein 7.6 6.0 - 8.2 g/dL    Globulin 3.7 (H) 1.9 - 3.5 g/dL    A-G Ratio 1.1 g/dL   ESTIMATED GFR   Result Value Ref Range    GFR If African American >60 >60 mL/min/1.73 m 2    GFR If Non African American >60 >60 mL/min/1.73 m 2   MORPHOLOGY   Result Value Ref Range    RBC Morphology Present     Poikilocytosis 1+     Ovalocytes 1+     Schistocytes 1+    PERIPHERAL SMEAR REVIEW   Result Value Ref Range    Peripheral Smear Review see below    PLATELET ESTIMATE   Result Value Ref Range    Plt Estimation Increased    DIFFERENTIAL COMMENT   Result Value Ref Range    Comments-Diff see below    EKG (NOW)   Result Value Ref Range    Report       [facility] Emergency Dept.    Test Date:  2017  Pt Name:    TASH COLLIER              Department: ER  MRN:        [id number]                      Room:  Gender:     F                            Technician: [id number]  :        1965                   Requested By:ER TRIAGE PROTOCOL  Order #:    [id number]                    Reading MD:    Measurements  Intervals                                 Axis  Rate:       75                           P:          48  DC:         160                          QRS:        31  QRSD:       78                           T:          41  QT:         400  QTc:        447    Interpretive Statements  SINUS RHYTHM  EARLY PRECORDIAL R/S TRANSITION  BORDERLINE T ABNORMALITIES, ANT-LAT LEADS  Compared to ECG 04/05/2016 09:37:42  T-wave abnormality now present       All labs reviewed by me.    EKG  12 lead EKG; Interpreted by emergency department physician    Rhythm: Normal Sinus Rhythm   Rate: 75  Axis: Normal  R Wave: Normal R wave progression  Normal ST-T segments  ST Segments: No ST segment elevation   T waves: Normal  Q waves: None    Clinical Impression: No acute changes    RADIOLOGY  CT-SOFT TISSUE NECK WITH   Final Result      1.  Postoperative change from multilevel cervical discectomy and anterior fusion from C3 through C6.   2.  Diffuse prevertebral soft tissue swelling, without focal fluid collection or hematoma.   3.  Soft tissue edema and gas tracking in the RIGHT neck consistent with recent surgery.   4.  No apparent narrowing of the airway.      DX-CHEST-LIMITED (1 VIEW)   Final Result      Minimal linear atelectasis in each lower lobe. No lobar consolidation or pleural effusion.        The radiologist's interpretation of all radiological studies have been reviewed by me.    COURSE & MEDICAL DECISION MAKING  Nursing notes, VS, PMSFHx reviewed in chart.    6:02 PM - Patient seen and examined at bedside. Patient will be treated with morphine injection 10 mg, Zofran injection 4 mg, and NS infusion because she stated she was having difficulty swallowing and I would like to treat for dehydration. Ordered CT soft tissue neck with contrast, DX chest, estimated GFR, morphology, peripheral smear review, platelet estimate, differential comment, BNP, blood culture, CBC with differential, CMP, and EKG to evaluate her symptoms. The differential diagnoses include but are not  limited to: Swelling and bleeding at surgical site vs hematoma vs medication reaction    7:42 PM Paged Dr. Hrerera, Neurosurgery.    7:57 PM I discussed the patient's case and the above findings with Dr. Chavez (Neurosurgery) who would like me to order decadron 12 mg IV and admit to the hospitalist. He will follow up with the patient.    7:59 PM I discussed the patient's case and the above findings with Dr. Tobar (Hospitalist) who agrees to transfer care of the patient at this time.    8:03 PM I informed the patient of her admittance. She verbalizes understanding and agreement.      DISPOSITION:  Patient will be admitted to Dr. Tobar, Hospitalist, in guarded condition.    FINAL IMPRESSION  1. Neck swelling          Raghav IBARRA (Scribe), am scribing for, and in the presence of, Luli Epperson M.D..    Electronically signed by: Raghav Palmer (Scribe), 8/3/2017    Luli IBARRA M.D. personally performed the services described in this documentation, as scribed by Raghav Palmer in my presence, and it is both accurate and complete.    The note accurately reflects work and decisions made by me.  Luli Epperson  8/3/2017  8:49 PM

## 2017-08-04 NOTE — ASSESSMENT & PLAN NOTE
Swelling resolving  Passed swallow eval  Started on nectar thick diet and tolerating  ST exercises

## 2017-08-04 NOTE — THERAPY
"  Speech Language Therapy Clinical Swallow Evaluation completed.  Functional Status: Pt with frequent to constant oral suction for white thinnish secretions. Pt sitting up in a chair and requesting ice pack for her incision site. CNA re-filled ice pack and this was placed near her incision. Pt with fair intensity of voice. Pt stating her voice is improved from prior day. Pt commenting that her throat \"hurts when I talk to much.\" Pt stating difficulty eating yesterday with creme of wheat. Pt stating \"it was getting caught half way\" with pt pointing to her mid throat area. Pt stating pain at \"8\" with 10 designated very painful with swallowing. Cold oral swab with small amount of ice water applied to tongue. Pt with one swallow triggered. Facial grimace and with immediate use of oral suction. Pt stating \"it is hard for me to swallow.\" No further oral boluses attempted related to pt currently having difficulty managing her secretions with frequent throat clearing and suction. Pt educ regarding general anatomy and with risks for aspirating. SLP collaborated with nursing regarding STRICT NPO. S/s of severe dysphagia. Call placed to Dr. Herrera's office. Recheck Saturday as appropriate.   Recommendations - Diet:  NPO                          Strategies: to be determined                          Medication Administration:  No oral  Plan of Care: Will benefit from Speech Therapy 4 times per week  Post-Acute Therapy: to be determined depending on pt's progress. ThanksDk    See \"Rehab Therapy-Acute\" Patient Summary Report for complete documentation.   "

## 2017-08-04 NOTE — DIETARY
NUTRITION SERVICES: BMI - Pt with BMI >40 (=49.9). Weight loss counseling not appropriate in acute care setting. RECOMMEND - Referral to outpatient nutrition services for weight management after D/C.

## 2017-08-04 NOTE — ASSESSMENT & PLAN NOTE
-supportive measures with synthroid supplementation at 25 mcg per day, no change  -latest TSH is 0.670. Continue with low dose Synthroid supplementation.  -Give the Synthroid through the core tract

## 2017-08-04 NOTE — PROGRESS NOTES
Patient arrived safely from ED by transport, no S/S of distress, AA&Ox4. Denies SOB, chest pain, dizziness. Bed alarm on, call light within reach,  pt calls appropriately and does not get out of bed. Bed in lowest position, bed locked, RN and CNA numbers provided, no further needs at this time. No changes from EPIC. Hourly rounding in place.

## 2017-08-05 LAB — TSH SERPL DL<=0.005 MIU/L-ACNC: 0.67 UIU/ML (ref 0.3–3.7)

## 2017-08-05 PROCEDURE — 36415 COLL VENOUS BLD VENIPUNCTURE: CPT

## 2017-08-05 PROCEDURE — A9270 NON-COVERED ITEM OR SERVICE: HCPCS | Performed by: HOSPITALIST

## 2017-08-05 PROCEDURE — A9270 NON-COVERED ITEM OR SERVICE: HCPCS | Performed by: INTERNAL MEDICINE

## 2017-08-05 PROCEDURE — G8988 SELF CARE GOAL STATUS: HCPCS | Mod: CI

## 2017-08-05 PROCEDURE — 700102 HCHG RX REV CODE 250 W/ 637 OVERRIDE(OP): Performed by: INTERNAL MEDICINE

## 2017-08-05 PROCEDURE — 770001 HCHG ROOM/CARE - MED/SURG/GYN PRIV*

## 2017-08-05 PROCEDURE — G8989 SELF CARE D/C STATUS: HCPCS | Mod: CI

## 2017-08-05 PROCEDURE — 84443 ASSAY THYROID STIM HORMONE: CPT

## 2017-08-05 PROCEDURE — 700102 HCHG RX REV CODE 250 W/ 637 OVERRIDE(OP): Performed by: HOSPITALIST

## 2017-08-05 PROCEDURE — 92526 ORAL FUNCTION THERAPY: CPT

## 2017-08-05 PROCEDURE — G8987 SELF CARE CURRENT STATUS: HCPCS | Mod: CI

## 2017-08-05 PROCEDURE — 97165 OT EVAL LOW COMPLEX 30 MIN: CPT

## 2017-08-05 PROCEDURE — 700111 HCHG RX REV CODE 636 W/ 250 OVERRIDE (IP): Performed by: INTERNAL MEDICINE

## 2017-08-05 PROCEDURE — 700101 HCHG RX REV CODE 250: Performed by: INTERNAL MEDICINE

## 2017-08-05 PROCEDURE — 99232 SBSQ HOSP IP/OBS MODERATE 35: CPT | Performed by: HOSPITALIST

## 2017-08-05 RX ADMIN — MORPHINE SULFATE 4 MG: 4 INJECTION INTRAVENOUS at 03:54

## 2017-08-05 RX ADMIN — MORPHINE SULFATE 4 MG: 4 INJECTION INTRAVENOUS at 08:37

## 2017-08-05 RX ADMIN — LEVOTHYROXINE SODIUM 25 MCG: 25 TABLET ORAL at 07:00

## 2017-08-05 RX ADMIN — OXYCODONE HYDROCHLORIDE 10 MG: 10 TABLET ORAL at 12:23

## 2017-08-05 RX ADMIN — DEXAMETHASONE SODIUM PHOSPHATE 4 MG: 4 INJECTION, SOLUTION INTRAMUSCULAR; INTRAVENOUS at 12:04

## 2017-08-05 RX ADMIN — DEXAMETHASONE SODIUM PHOSPHATE 4 MG: 4 INJECTION, SOLUTION INTRAMUSCULAR; INTRAVENOUS at 18:25

## 2017-08-05 RX ADMIN — OXYCODONE HYDROCHLORIDE 10 MG: 10 TABLET ORAL at 18:34

## 2017-08-05 RX ADMIN — POTASSIUM CHLORIDE AND SODIUM CHLORIDE: 900; 150 INJECTION, SOLUTION INTRAVENOUS at 05:24

## 2017-08-05 RX ADMIN — MORPHINE SULFATE 4 MG: 4 INJECTION INTRAVENOUS at 23:06

## 2017-08-05 RX ADMIN — OXYCODONE HYDROCHLORIDE 10 MG: 10 TABLET ORAL at 15:28

## 2017-08-05 RX ADMIN — OXYCODONE HYDROCHLORIDE 10 MG: 10 TABLET ORAL at 21:20

## 2017-08-05 RX ADMIN — DEXAMETHASONE SODIUM PHOSPHATE 4 MG: 4 INJECTION, SOLUTION INTRAMUSCULAR; INTRAVENOUS at 00:08

## 2017-08-05 RX ADMIN — STANDARDIZED SENNA CONCENTRATE AND DOCUSATE SODIUM 2 TABLET: 8.6; 5 TABLET, FILM COATED ORAL at 21:20

## 2017-08-05 RX ADMIN — ONDANSETRON 4 MG: 2 INJECTION INTRAMUSCULAR; INTRAVENOUS at 05:25

## 2017-08-05 RX ADMIN — POTASSIUM CHLORIDE AND SODIUM CHLORIDE: 900; 150 INJECTION, SOLUTION INTRAVENOUS at 15:04

## 2017-08-05 RX ADMIN — DEXAMETHASONE SODIUM PHOSPHATE 4 MG: 4 INJECTION, SOLUTION INTRAMUSCULAR; INTRAVENOUS at 05:25

## 2017-08-05 ASSESSMENT — COGNITIVE AND FUNCTIONAL STATUS - GENERAL
DRESSING REGULAR LOWER BODY CLOTHING: A LITTLE
SUGGESTED CMS G CODE MODIFIER DAILY ACTIVITY: CJ
DAILY ACTIVITIY SCORE: 22
HELP NEEDED FOR BATHING: A LITTLE

## 2017-08-05 ASSESSMENT — ENCOUNTER SYMPTOMS
BLOOD IN STOOL: 0
MYALGIAS: 0
FALLS: 0
PALPITATIONS: 0
GASTROINTESTINAL NEGATIVE: 1
DIARRHEA: 0
DIAPHORESIS: 0
FLANK PAIN: 0
COUGH: 0
HEARTBURN: 0
ABDOMINAL PAIN: 0
CLAUDICATION: 0
DEPRESSION: 0
TREMORS: 0
NECK PAIN: 1
WEAKNESS: 0
BRUISES/BLEEDS EASILY: 0
SORE THROAT: 0
EYE PAIN: 0
TINGLING: 0
DIZZINESS: 0
NAUSEA: 0
EYES NEGATIVE: 1
CONSTIPATION: 0
SORE THROAT: 1
CARDIOVASCULAR NEGATIVE: 1
SPUTUM PRODUCTION: 0
VOMITING: 0
PHOTOPHOBIA: 0
PSYCHIATRIC NEGATIVE: 1
ORTHOPNEA: 0
BACK PAIN: 0
SHORTNESS OF BREATH: 0
CONSTITUTIONAL NEGATIVE: 1
STRIDOR: 0
NEUROLOGICAL NEGATIVE: 1

## 2017-08-05 ASSESSMENT — PAIN SCALES - GENERAL
PAINLEVEL_OUTOF10: 5
PAINLEVEL_OUTOF10: 8
PAINLEVEL_OUTOF10: 7
PAINLEVEL_OUTOF10: 5
PAINLEVEL_OUTOF10: 7
PAINLEVEL_OUTOF10: 5
PAINLEVEL_OUTOF10: 9
PAINLEVEL_OUTOF10: 9
PAINLEVEL_OUTOF10: 5

## 2017-08-05 ASSESSMENT — ACTIVITIES OF DAILY LIVING (ADL): TOILETING: INDEPENDENT

## 2017-08-05 ASSESSMENT — LIFESTYLE VARIABLES: SUBSTANCE_ABUSE: 0

## 2017-08-05 NOTE — PROGRESS NOTES
Patient at this point is staying more in into midnights as the patient is unable to take anything by mouth all medications are being given IV speech has worked with her and recommended nothing by mouth status today they are allowing her to get small snacks but she will not be able to advance her diet for another 2-3 days. I have converted her from observation to inpatient admission as the patient will need to be here for several days.

## 2017-08-05 NOTE — PROGRESS NOTES
Patient resting quietly in bed, no S/S of distress, AA&Ox4. Denies SOB, chest pain, dizziness. Call light within reach,  pt calls appropriately, but may sometimes get out of bed without letting staff know. Bed in lowest position, bed locked, RN and CNA numbers provided, no further needs at this time. No changes from EPIC. Hourly rounding in place.

## 2017-08-05 NOTE — PROGRESS NOTES
Received report and assumed pt care at 0715.  A&Ox4 in bed.  No s/s of pain/discomfort.  Refused bed alarm.  Personal belongings and call light within reach.

## 2017-08-05 NOTE — THERAPY
"Speech Language Therapy dysphagia treatment completed.     Functional Status:  Pt was AAOx4, and able to manage secretions using a throat clear/swallow strategy once sitting up in bed at 90 degrees.  Pt reports feeling better, with only minimal pain with swallowing.  PO trials of ice chips resulted in delayed throat clearing in 2/6 trials, however pt's voice remained clear.  Approximately 2 oz of Nectars and 1 oz of puree was consumed without any overt s/sx of aspiration using a double swallow strategy.  Pt reported intermittent globus sensation with nectars and puree, however improved as compared to yesterday.  Subsequent trials of nectars and puree resulted in intermittent throat clearing, which can be concerning for possible penetration or aspiration on residue, however pt's voice again remained clear.  Pt remains at high risk for aspiration on residue, and is unlikely to be able to consume an entire meal at this time.  However, she appears at the level to start small nectar thick full liquid snacks (one item per meal).  She continues to decline cortrak and furthermore has good awareness of her deficits and verbalized good understanding of s/sx of aspiration, risks of aspiration and SLP recs.  MD aware of SLP recs and is in agreement.       Recommendations: Nectar Thick Full Liquid Snacks (one item only per tray), OK for up to 5 SINGLE ice chips per hour     Plan of Care: Will benefit from Speech Therapy 3 times per week    Post-Acute Therapy: Discharge to home with outpatient or home health for additional skilled therapy services.    See \"Rehab Therapy-Acute\" Patient Summary Report for complete documentation.     "

## 2017-08-05 NOTE — CARE PLAN
Problem: Communication  Goal: The ability to communicate needs accurately and effectively will improve  Outcome: PROGRESSING AS EXPECTED  Patient able to communicate needs effectively and uses call light appropriately. RN hourly rounding in place.     Problem: Safety  Goal: Will remain free from injury  Outcome: PROGRESSING AS EXPECTED  Patient remains free from injury. Patient uses call light when necessary. Patient educated on injury prevention.

## 2017-08-05 NOTE — PROGRESS NOTES
Progress Note               Author: Gabo Bardales Date & Time created: 2017  11:11 AM     Interval History:  Patient s/p C3-6 ACDF  with Dr. Herrera  Doing much better today.  Tolerating full liquid diet now.  Voice fine.  Pain controlled.    Review of Systems:  Review of Systems   HENT: Positive for sore throat.    Musculoskeletal: Positive for neck pain.       Physical Exam:  Physical Exam   Neurological:   Anterior cervical incision c/d/i  No palpable seroma/hematoma  Motor 5/5 except right tri 4+/5  Sensation intact       Labs:        Invalid input(s): [id number]  Recent Labs      17   162   BNPBTYPENAT  67     Recent Labs      17   16217   1607   SODIUM  137  139   POTASSIUM  3.3*  3.7   CHLORIDE  104  107   CO2  23  23   BUN  12  12   CREATININE  0.58  0.42*   CALCIUM  9.5  9.0     Recent Labs      17   16217   1607   ALTSGPT  11   --    ASTSGOT  13   --    ALKPHOSPHAT  102*   --    TBILIRUBIN  0.4   --    GLUCOSE  92  110*     Recent Labs      17   16217   1607   RBC  4.61  3.92*   HEMOGLOBIN  9.9*  8.7*   HEMATOCRIT  33.8*  29.4*   PLATELETCT  538*  449*     Recent Labs      17   1607   WBC  13.3*  12.2*   NEUTSPOLYS  69.30  86.20*   LYMPHOCYTES  21.90*  7.80*   MONOCYTES  8.00  5.50   EOSINOPHILS  0.10  0.00   BASOPHILS  0.40  0.10   ASTSGOT  13   --    ALTSGPT  11   --    ALKPHOSPHAT  102*   --    TBILIRUBIN  0.4   --      Hemodynamics:  Temp (24hrs), Av.3 °C (97.4 °F), Min:36 °C (96.8 °F), Max:36.6 °C (97.9 °F)  Temperature: 36.1 °C (96.9 °F)  Pulse  Av.2  Min: 56  Max: 89   Blood Pressure: 129/77 mmHg     Respiratory:    Respiration: 18, Pulse Oximetry: 94 %        RUL Breath Sounds: Clear, RML Breath Sounds: Clear, RLL Breath Sounds: Clear, BRIAN Breath Sounds: Clear, LLL Breath Sounds: Clear  Fluids:    Intake/Output Summary (Last 24 hours) at 17 1028  Last data filed at 17 0600   Gross per 24 hour    Intake    700 ml   Output      0 ml   Net    700 ml        GI/Nutrition:  Orders Placed This Encounter   Procedures   • DIET ORDER     Standing Status: Standing      Number of Occurrences: 1      Standing Expiration Date:      Order Specific Question:  Diet:     Answer:  Full Liquid [11]     Order Specific Question:  Consistency/Fluid modifications:     Answer:  Nectar Thick [2]     Order Specific Question:  Miscellaneous modifications:     Answer:  SLP - One Item Per Meal [23]     Medical Decision Making, by Problem:  Active Hospital Problems    Diagnosis   • Hypokalemia [E87.6]   • Status post cervical spinal fusion [Z98.1]   • Dysphagia [R13.10]   • Hypothyroid [E03.9]       Plan:  Continue speech therapy for diet recommendations  Continue steroids.  May be ready for home tomorrow or next depending on diet tolerance.   PT/OT  Will follow  Appreciate hospitalist care    Core Measures

## 2017-08-05 NOTE — PROGRESS NOTES
[facility] Hospitalist Progress Note    Date of Service: 8/5/2017    Chief Complaint  51 y.o. female admitted 8/3/2017 with inability to swallow.    Interval Problem Update  Mrs. Faith is a 51-year-old female who recently had cervical surgery on 8/2/2017. The patient's surgery was performed with Dr. Herrera. Yesterday swallow evaluation revealed that the patient was unable to swallow anything. Patient was kept nothing by mouth overnight. The plan was to place a cor tract. Patient however refused. Though she has not eaten anything in the past 24 hours. Patient feels better this morning and feels her neck swelling has improved. She remains in a cervical collar. Speech will need to reevaluate her at this morning to see if she is able to swallow. She has no stridor on exam and her pain has improved. Patient is otherwise up and ambulating going to the bathroom and moving about independently.    Consultants/Specialty  Neurosurgery    Disposition  To be determined        Review of Systems   Constitutional: Negative.  Negative for diaphoresis.   HENT: Negative for congestion and sore throat.    Eyes: Negative.  Negative for photophobia and pain.   Respiratory: Negative for cough, sputum production, shortness of breath and stridor.    Cardiovascular: Negative.  Negative for chest pain, palpitations, orthopnea and claudication.   Gastrointestinal: Negative.  Negative for heartburn, nausea, vomiting, abdominal pain, diarrhea, constipation, blood in stool and melena.        Patient reports difficulty with swallowing and she says she is unable to swallow anything. Prior to surgery she was swallowing with normal consistency.   Genitourinary: Negative.  Negative for dysuria and flank pain.   Musculoskeletal: Positive for neck pain. Negative for myalgias, back pain and falls.   Skin: Negative.    Neurological: Negative.  Negative for dizziness, tingling, tremors and weakness.   Endo/Heme/Allergies: Negative.  Does not bruise/bleed easily.    Psychiatric/Behavioral: Negative.  Negative for depression, suicidal ideas and substance abuse.      Physical Exam  Laboratory/Imaging   Hemodynamics  Temp (24hrs), Av.3 °C (97.4 °F), Min:36 °C (96.8 °F), Max:36.6 °C (97.9 °F)   Temperature: 36.6 °C (97.8 °F)  Pulse  Av  Min: 56  Max: 89    Blood Pressure: 134/64 mmHg      Respiratory      Respiration: 16, Pulse Oximetry: 95 %     Work Of Breathing / Effort: Mild  RUL Breath Sounds: Clear, RML Breath Sounds: Clear, RLL Breath Sounds: Clear, BRIAN Breath Sounds: Clear, LLL Breath Sounds: Clear    Fluids    Intake/Output Summary (Last 24 hours) at 17 0716  Last data filed at 17 0600   Gross per 24 hour   Intake   1050 ml   Output      0 ml   Net   1050 ml       Nutrition  Orders Placed This Encounter   Procedures   • DIET NPO     Standing Status: Standing      Number of Occurrences: 1      Standing Expiration Date:      Order Specific Question:  Restrict to:     Answer:  Strict [1]     Physical Exam   Constitutional: She is oriented to person, place, and time. She appears well-developed and well-nourished. No distress. Cervical collar in place.   HENT:   Head: Normocephalic and atraumatic.   Right Ear: External ear normal.   Left Ear: External ear normal.   Eyes: Conjunctivae and EOM are normal. Pupils are equal, round, and reactive to light. Right eye exhibits no discharge. Left eye exhibits no discharge.   Neck: Normal range of motion. Neck supple. No tracheal deviation present.   Cardiovascular: Normal rate and regular rhythm.  Exam reveals no gallop and no friction rub.    Pulmonary/Chest: No stridor. No respiratory distress. She has no wheezes. She has no rales.   Abdominal: She exhibits no distension. There is no tenderness. There is no rebound.   Genitourinary:   Gloria catheter   Musculoskeletal: Normal range of motion.   Neurological: She is alert and oriented to person, place, and time. She has normal reflexes. She displays no atrophy and  normal reflexes. No sensory deficit. She exhibits normal muscle tone. Gait normal. GCS eye subscore is 4. GCS verbal subscore is 5. GCS motor subscore is 6.   Skin: Skin is warm and dry. No rash noted. She is not diaphoretic. No erythema. No pallor.   Psychiatric: Her behavior is normal. Judgment and thought content normal. Her mood appears anxious.   Nursing note and vitals reviewed.      Recent Labs      08/03/17   1626  08/04/17   1607   WBC  13.3*  12.2*   RBC  4.61  3.92*   HEMOGLOBIN  9.9*  8.7*   HEMATOCRIT  33.8*  29.4*   MCV  73.3*  75.0*   MCH  21.5*  22.2*   MCHC  29.3*  29.6*   RDW  46.2  47.9   PLATELETCT  538*  449*   MPV  9.1  9.0     Recent Labs      08/03/17   1626  08/04/17   1607   SODIUM  137  139   POTASSIUM  3.3*  3.7   CHLORIDE  104  107   CO2  23  23   GLUCOSE  92  110*   BUN  12  12   CREATININE  0.58  0.42*   CALCIUM  9.5  9.0         Recent Labs      08/03/17   1626   BNPBTYPENAT  67              Assessment/Plan     Status post cervical spinal fusion (present on admission)  Assessment & Plan  This morning patient remains in a cervical collar.  She has no stridor.  Patient has improved with her snack swelling.  She has less pain and feels a lot better.  She will need reevaluation and continued pain management as well as IV Decadron.    Dysphagia (present on admission)  Assessment & Plan  Patient will be continued to be followed by speech therapy. So far she has been nothing by mouth, she has refused a core track. Patient at this point has not been eating for over 24 hours. Patient will need reevaluation into her swallowing to ensure that she is eating.    Hypothyroid (present on admission)  Assessment & Plan  -supportive measures with synthroid supplementation at 25 mcg per day, no change  -latest TSH is 0.670. Continue with low dose Synthroid supplementation.      Hypokalemia (present on admission)  Assessment & Plan  Initial potassium was low at 3.3 this has been supplemented and  corrected she is up to 3.7.        EKG reviewed, Radiology images reviewed, Labs reviewed and Medications reviewed            Ulcer prophylaxis: Yes    Assessed for rehab: Patient was assess for and/or received rehabilitation services during this hospitalization         patient neuro specialist for assistance in doing things inability to

## 2017-08-05 NOTE — CARE PLAN
Problem: Venous Thromboembolism (VTW)/Deep Vein Thrombosis (DVT) Prevention:  Goal: Patient will participate in Venous Thrombosis (VTE)/Deep Vein Thrombosis (DVT)Prevention Measures  Intervention: Ensure patient wears graduated elastic stockings (IDA hose) and/or SCDs, if ordered, when in bed or chair (Remove at least once per shift for skin check)  Education regarding DVTs and prevention.  Ambulation in halls.      Problem: Mobility  Goal: Risk for activity intolerance will decrease  Intervention: Encourage patient to increase activity level in collaboration with Interdisciplinary Team  Encourage ambulating in the halls as much as tolerated.

## 2017-08-05 NOTE — THERAPY
"Occupational Therapy Evaluation completed.   Functional Status:  Pt s/p C3-6 ACDF 8/2 outpatient bases and developed swallowing difficulties post op. Pt educated and trained on spinal precautions and c-collar management during ADLs; able to demonstrate back adequately. Also, was educated on importance of oob bed mobility and positioning during self-feeding tasks; stated understanding. Pt does not present the need for further acute skilled services at this time and appears to be near her baseline functionally.  Plan of Care: Patient with no further skilled OT needs in the acute care setting at this time  Discharge Recommendations:  Equipment: No Equipment Needed. Post-acute therapy Discharge to home with outpatient or home health for additional skilled therapy services.    See \"Rehab Therapy-Acute\" Patient Summary Report for complete documentation.    "

## 2017-08-06 LAB
ERYTHROCYTE [DISTWIDTH] IN BLOOD BY AUTOMATED COUNT: 46 FL (ref 35.9–50)
HCT VFR BLD AUTO: 32.5 % (ref 37–47)
HGB BLD-MCNC: 9.8 G/DL (ref 12–16)
MCH RBC QN AUTO: 22.3 PG (ref 27–33)
MCHC RBC AUTO-ENTMCNC: 30.2 G/DL (ref 33.6–35)
MCV RBC AUTO: 74 FL (ref 81.4–97.8)
PLATELET # BLD AUTO: 567 K/UL (ref 164–446)
PMV BLD AUTO: 9.4 FL (ref 9–12.9)
RBC # BLD AUTO: 4.39 M/UL (ref 4.2–5.4)
WBC # BLD AUTO: 9.4 K/UL (ref 4.8–10.8)

## 2017-08-06 PROCEDURE — A9270 NON-COVERED ITEM OR SERVICE: HCPCS | Performed by: PHYSICIAN ASSISTANT

## 2017-08-06 PROCEDURE — 700102 HCHG RX REV CODE 250 W/ 637 OVERRIDE(OP): Performed by: PHYSICIAN ASSISTANT

## 2017-08-06 PROCEDURE — 700111 HCHG RX REV CODE 636 W/ 250 OVERRIDE (IP): Performed by: INTERNAL MEDICINE

## 2017-08-06 PROCEDURE — 700102 HCHG RX REV CODE 250 W/ 637 OVERRIDE(OP): Performed by: INTERNAL MEDICINE

## 2017-08-06 PROCEDURE — 770001 HCHG ROOM/CARE - MED/SURG/GYN PRIV*

## 2017-08-06 PROCEDURE — 99232 SBSQ HOSP IP/OBS MODERATE 35: CPT | Performed by: HOSPITALIST

## 2017-08-06 PROCEDURE — 700102 HCHG RX REV CODE 250 W/ 637 OVERRIDE(OP): Performed by: HOSPITALIST

## 2017-08-06 PROCEDURE — 36415 COLL VENOUS BLD VENIPUNCTURE: CPT

## 2017-08-06 PROCEDURE — 85027 COMPLETE CBC AUTOMATED: CPT

## 2017-08-06 PROCEDURE — A9270 NON-COVERED ITEM OR SERVICE: HCPCS | Performed by: INTERNAL MEDICINE

## 2017-08-06 PROCEDURE — 700101 HCHG RX REV CODE 250: Performed by: INTERNAL MEDICINE

## 2017-08-06 PROCEDURE — A9270 NON-COVERED ITEM OR SERVICE: HCPCS | Performed by: HOSPITALIST

## 2017-08-06 RX ADMIN — POTASSIUM CHLORIDE AND SODIUM CHLORIDE: 900; 150 INJECTION, SOLUTION INTRAVENOUS at 21:35

## 2017-08-06 RX ADMIN — OXYCODONE HYDROCHLORIDE 10 MG: 10 TABLET ORAL at 16:25

## 2017-08-06 RX ADMIN — OXYCODONE HYDROCHLORIDE 10 MG: 10 TABLET ORAL at 05:27

## 2017-08-06 RX ADMIN — STANDARDIZED SENNA CONCENTRATE AND DOCUSATE SODIUM 2 TABLET: 8.6; 5 TABLET, FILM COATED ORAL at 08:11

## 2017-08-06 RX ADMIN — OXYCODONE HYDROCHLORIDE 10 MG: 10 TABLET ORAL at 22:00

## 2017-08-06 RX ADMIN — OXYCODONE HYDROCHLORIDE 10 MG: 10 TABLET ORAL at 11:28

## 2017-08-06 RX ADMIN — OXYCODONE HYDROCHLORIDE 10 MG: 10 TABLET ORAL at 08:25

## 2017-08-06 RX ADMIN — OMEPRAZOLE 20 MG: 20 CAPSULE, DELAYED RELEASE ORAL at 08:11

## 2017-08-06 RX ADMIN — LEVOTHYROXINE SODIUM 25 MCG: 25 TABLET ORAL at 05:27

## 2017-08-06 RX ADMIN — DEXAMETHASONE SODIUM PHOSPHATE 4 MG: 4 INJECTION, SOLUTION INTRAMUSCULAR; INTRAVENOUS at 00:08

## 2017-08-06 RX ADMIN — DEXAMETHASONE SODIUM PHOSPHATE 4 MG: 4 INJECTION, SOLUTION INTRAMUSCULAR; INTRAVENOUS at 11:11

## 2017-08-06 RX ADMIN — POTASSIUM CHLORIDE AND SODIUM CHLORIDE: 900; 150 INJECTION, SOLUTION INTRAVENOUS at 01:59

## 2017-08-06 RX ADMIN — STANDARDIZED SENNA CONCENTRATE AND DOCUSATE SODIUM 2 TABLET: 8.6; 5 TABLET, FILM COATED ORAL at 19:55

## 2017-08-06 RX ADMIN — DEXAMETHASONE SODIUM PHOSPHATE 4 MG: 4 INJECTION, SOLUTION INTRAMUSCULAR; INTRAVENOUS at 05:28

## 2017-08-06 RX ADMIN — OXYCODONE HYDROCHLORIDE 10 MG: 10 TABLET ORAL at 01:59

## 2017-08-06 RX ADMIN — DEXAMETHASONE SODIUM PHOSPHATE 4 MG: 4 INJECTION, SOLUTION INTRAMUSCULAR; INTRAVENOUS at 17:41

## 2017-08-06 RX ADMIN — MORPHINE SULFATE 4 MG: 4 INJECTION INTRAVENOUS at 19:55

## 2017-08-06 ASSESSMENT — ENCOUNTER SYMPTOMS
CONSTITUTIONAL NEGATIVE: 1
BRUISES/BLEEDS EASILY: 0
NEUROLOGICAL NEGATIVE: 1
GASTROINTESTINAL NEGATIVE: 1
SEIZURES: 0
WEIGHT LOSS: 0
ABDOMINAL PAIN: 0
EYES NEGATIVE: 1
CHILLS: 0
CARDIOVASCULAR NEGATIVE: 1
POLYDIPSIA: 0
BACK PAIN: 0
FOCAL WEAKNESS: 0
HEMOPTYSIS: 0
EYE REDNESS: 0
SORE THROAT: 1
VOMITING: 0
INSOMNIA: 0
SPUTUM PRODUCTION: 0
STRIDOR: 0
BLURRED VISION: 0
DEPRESSION: 0
DOUBLE VISION: 0
FEVER: 0
NECK PAIN: 1
NAUSEA: 0
FALLS: 0
SHORTNESS OF BREATH: 0
PSYCHIATRIC NEGATIVE: 1
PALPITATIONS: 0

## 2017-08-06 ASSESSMENT — LIFESTYLE VARIABLES: SUBSTANCE_ABUSE: 0

## 2017-08-06 ASSESSMENT — PAIN SCALES - GENERAL
PAINLEVEL_OUTOF10: 7
PAINLEVEL_OUTOF10: 7
PAINLEVEL_OUTOF10: 8
PAINLEVEL_OUTOF10: 6
PAINLEVEL_OUTOF10: 8
PAINLEVEL_OUTOF10: 9
PAINLEVEL_OUTOF10: 6
PAINLEVEL_OUTOF10: 7
PAINLEVEL_OUTOF10: 7
PAINLEVEL_OUTOF10: 8
PAINLEVEL_OUTOF10: 6

## 2017-08-06 NOTE — PROGRESS NOTES
Patient resting quietly in bed, no S/S of distress, AA&Ox4. Denies SOB, chest pain, dizziness. Bed alarm on, call light within reach,  pt calls appropriately and does not get out of bed. Bed in lowest position, bed locked, RN and CNA numbers provided, no further needs at this time. No changes from EPIC. Hourly rounding in place.

## 2017-08-06 NOTE — CARE PLAN
Problem: Pain Management  Goal: Pain level will decrease to patient’s comfort goal  C/o pain to R hip and neck, see MAR, pt states pain controlled at this time, will continue to monitor.    Problem: Discharge Barriers/Planning  Goal: Patient’s continuum of care needs will be met  Pending speech eval tomorrow

## 2017-08-06 NOTE — PROGRESS NOTES
Progress Note               Author: Gabo Bardales Date & Time created: 2017  9:54 AM     Interval History:  Patient s/p C3-6 ACDF  with Dr. Herrera  Doing much better today.  Advancing diet and tolerating fine.  Voice fine.  Pain controlled.    Review of Systems:  Review of Systems   HENT: Positive for sore throat.    Musculoskeletal: Positive for neck pain.       Physical Exam:  Physical Exam   Neurological:   Anterior cervical incision c/d/i  No palpable seroma/hematoma  Motor 5/5 except right tri 4+/5  Sensation intact       Labs:        Invalid input(s): [id number]  Recent Labs      17   162   BNPBTYPENAT  67     Recent Labs      17   16217   1607   SODIUM  137  139   POTASSIUM  3.3*  3.7   CHLORIDE  104  107   CO2  23  23   BUN  12  12   CREATININE  0.58  0.42*   CALCIUM  9.5  9.0     Recent Labs      17   16217   1607   ALTSGPT  11   --    ASTSGOT  13   --    ALKPHOSPHAT  102*   --    TBILIRUBIN  0.4   --    GLUCOSE  92  110*     Recent Labs      17   16217   1607   RBC  4.61  3.92*   HEMOGLOBIN  9.9*  8.7*   HEMATOCRIT  33.8*  29.4*   PLATELETCT  538*  449*     Recent Labs      17   1607   WBC  13.3*  12.2*   NEUTSPOLYS  69.30  86.20*   LYMPHOCYTES  21.90*  7.80*   MONOCYTES  8.00  5.50   EOSINOPHILS  0.10  0.00   BASOPHILS  0.40  0.10   ASTSGOT  13   --    ALTSGPT  11   --    ALKPHOSPHAT  102*   --    TBILIRUBIN  0.4   --      Hemodynamics:  Temp (24hrs), Av.4 °C (97.5 °F), Min:36.3 °C (97.3 °F), Max:36.4 °C (97.6 °F)  Temperature: 36.4 °C (97.6 °F)  Pulse  Av.8  Min: 54  Max: 89   Blood Pressure: 124/56 mmHg     Respiratory:    Respiration: 16, Pulse Oximetry: 97 %     Work Of Breathing / Effort: Mild  RUL Breath Sounds: Clear, RML Breath Sounds: Clear, RLL Breath Sounds: Clear, BRIAN Breath Sounds: Clear, LLL Breath Sounds: Clear  Fluids:    Intake/Output Summary (Last 24 hours) at 17 1028  Last data filed at  08/04/17 0600   Gross per 24 hour   Intake    700 ml   Output      0 ml   Net    700 ml     Weight: 114.9 kg (253 lb 4.9 oz)  GI/Nutrition:  Orders Placed This Encounter   Procedures   • DIET ORDER     Standing Status: Standing      Number of Occurrences: 1      Standing Expiration Date:      Order Specific Question:  Diet:     Answer:  Full Liquid [11]      Comments:  one time full meal per Dr. Cruz     Order Specific Question:  Consistency/Fluid modifications:     Answer:  Nectar Thick [2]     Order Specific Question:  Miscellaneous modifications:     Answer:  SLP - One Item Per Meal [23]     Medical Decision Making, by Problem:  Active Hospital Problems    Diagnosis   • Hypokalemia [E87.6]   • Status post cervical spinal fusion [Z98.1]   • Dysphagia [R13.10]   • Hypothyroid [E03.9]       Plan:  Continue speech therapy for diet recommendations  Ok to start tapering steroids, may need Medrol Dosepak for home.  Ok for shower today, does not need to wear brace while showering.  May be ready for home tomorrow depending on diet tolerance.   PT/OT   Will follow  Appreciate hospitalist care    Core Measures

## 2017-08-06 NOTE — PROGRESS NOTES
REcd report, assume care. Pt A&O*4, c/o pain, see MAR. VSS. Assessment complete. C spine collar in place, dressing to anterior neck CDI. All needs met. No family at bedside. Fall precautions in place, bed alarm refused despite education, call light in reach, white board updated, hourly rounding in place. Will continue to monitor.

## 2017-08-06 NOTE — PROGRESS NOTES
[facility]    Date of Service: 2017    Chief Complaint  51 y.o. female admitted 8/3/2017 with inability to swallow.    Interval Problem Update  Mrs. Faith is a 51-year-old female who recently had cervical surgery on 2017. The patient's surgery was performed with Dr. Herrera. I spoke with speech therapy regarding patient's swallowing, she is making good improvements, today she can be advanced to a full tray with liquid diet with nectar's consistency. The patient at this point we will be reevaluate by speech again tomorrow. We can at this point taper her Decadron repeat a.m. labs continue with pain management.    Consultants/Specialty  Neurosurgery    Disposition  To be determined        Review of Systems   Constitutional: Negative.  Negative for fever, chills and weight loss.   HENT: Negative for congestion and tinnitus.    Eyes: Negative.  Negative for blurred vision, double vision and redness.   Respiratory: Negative for hemoptysis, sputum production, shortness of breath and stridor.    Cardiovascular: Negative.  Negative for chest pain, palpitations and leg swelling.   Gastrointestinal: Negative.  Negative for nausea, vomiting and abdominal pain.        Reports improved swallowing.   Genitourinary: Negative.  Negative for dysuria, urgency and frequency.   Musculoskeletal: Positive for neck pain. Negative for back pain, joint pain and falls.   Skin: Negative.  Negative for itching and rash.   Neurological: Negative.  Negative for focal weakness and seizures.   Endo/Heme/Allergies: Negative.  Negative for environmental allergies and polydipsia. Does not bruise/bleed easily.   Psychiatric/Behavioral: Negative.  Negative for depression, suicidal ideas and substance abuse. The patient does not have insomnia.       Physical Exam  Laboratory/Imaging   Hemodynamics  Temp (24hrs), Av.4 °C (97.5 °F), Min:36.3 °C (97.3 °F), Max:36.4 °C (97.6 °F)   Temperature: 36.4 °C (97.6 °F)  Pulse  Avg:  73.8  Min: 54  Max: 89    Blood Pressure: 124/56 mmHg      Respiratory      Respiration: 16, Pulse Oximetry: 97 %     Work Of Breathing / Effort: Mild  RUL Breath Sounds: Clear, RML Breath Sounds: Clear, RLL Breath Sounds: Clear, BRIAN Breath Sounds: Clear, LLL Breath Sounds: Clear    Fluids    Intake/Output Summary (Last 24 hours) at 08/06/17 0908  Last data filed at 08/06/17 0600   Gross per 24 hour   Intake   2300 ml   Output      0 ml   Net   2300 ml       Nutrition  Orders Placed This Encounter   Procedures   • DIET ORDER     Standing Status: Standing      Number of Occurrences: 1      Standing Expiration Date:      Order Specific Question:  Diet:     Answer:  Full Liquid [11]      Comments:  one time full meal per Dr. Cruz     Order Specific Question:  Consistency/Fluid modifications:     Answer:  Nectar Thick [2]     Order Specific Question:  Miscellaneous modifications:     Answer:  SLP - One Item Per Meal [23]     Physical Exam   Constitutional: She is oriented to person, place, and time. She appears well-developed and well-nourished. No distress. Cervical collar in place.   HENT:   Head: Normocephalic and atraumatic.   Right Ear: External ear normal.   Left Ear: External ear normal.   Nose: Nose normal.   Mouth/Throat: Oropharynx is clear and moist.   Eyes: Conjunctivae and EOM are normal. Pupils are equal, round, and reactive to light.   Neck: Normal range of motion. Neck supple. No JVD present.   Cardiovascular: Normal rate and regular rhythm.  Exam reveals no gallop and no friction rub.    Pulmonary/Chest: Effort normal. No stridor. No respiratory distress. She has no wheezes.   Abdominal: Soft. Bowel sounds are normal. She exhibits no distension. There is no tenderness.   Genitourinary:   Gloria catheter   Musculoskeletal: Normal range of motion. She exhibits no edema or tenderness.   Lymphadenopathy:     She has no cervical adenopathy.   Neurological: She is alert and oriented to person, place, and  time. She has normal reflexes. She displays no atrophy. No cranial nerve deficit or sensory deficit. Coordination and gait normal. GCS eye subscore is 4. GCS verbal subscore is 5. GCS motor subscore is 6.   Skin: Skin is warm and dry. No rash noted. She is not diaphoretic. No erythema. No pallor.   Psychiatric: Her behavior is normal. Judgment and thought content normal. Her mood appears anxious.   Nursing note and vitals reviewed.      Recent Labs      08/03/17   1626  08/04/17   1607   WBC  13.3*  12.2*   RBC  4.61  3.92*   HEMOGLOBIN  9.9*  8.7*   HEMATOCRIT  33.8*  29.4*   MCV  73.3*  75.0*   MCH  21.5*  22.2*   MCHC  29.3*  29.6*   RDW  46.2  47.9   PLATELETCT  538*  449*   MPV  9.1  9.0     Recent Labs      08/03/17   1626  08/04/17   1607   SODIUM  137  139   POTASSIUM  3.3*  3.7   CHLORIDE  104  107   CO2  23  23   GLUCOSE  92  110*   BUN  12  12   CREATININE  0.58  0.42*   CALCIUM  9.5  9.0         Recent Labs      08/03/17   1626   BNPBTYPENAT  67              Assessment/Plan     Status post cervical spinal fusion (present on admission)  Assessment & Plan  Patient remains in a cervical collar. Patient has improved pain and swelling. On examination of her oral cavity at this point she is a Mallampati score 2   Patient at this point will be advanced on her diet, she is continued on pain management on steroid management the steroids at this point can be weaned.  She cannot be discharged until 1st swallowing has returned back to relatively baseline and can be managed as an outpatient.    Dysphagia (present on admission)  Assessment & Plan  Advancing to a full meal instead of just snacks. She is currently on a liquid diet with nectar thick consistency.    Hypothyroid (present on admission)  Assessment & Plan  -supportive measures with synthroid supplementation at 25 mcg per day, no change  -latest TSH is 0.670. Continue with low dose Synthroid supplementation.  -She does have signs of being overweight but she  is not lethargic, she does not have dry skin there are no other signs indicating hypothyroidism.      Hypokalemia (present on admission)  Assessment & Plan  Corrected with supplementation, repeat a.m. labs.        EKG reviewed, Radiology images reviewed, Labs reviewed and Medications reviewed            Ulcer prophylaxis: Yes    Assessed for rehab: Patient was assess for and/or received rehabilitation services during this hospitalization         patient neuro specialist for assistance in doing things inability to

## 2017-08-06 NOTE — CARE PLAN
Problem: Pain Management  Goal: Pain level will decrease to patient’s comfort goal  Outcome: PROGRESSING SLOWER THAN EXPECTED  Patient continues to complain of severe pain despite attempt at pain management with medication. Pain assessments q4hr in place.     Problem: Safety  Goal: Will remain free from injury  Outcome: PROGRESSING AS EXPECTED  Patient remains free from injury. Patient uses call light when necessary. Nursing staff assists with ambulation. Patient educated on injury prevention.

## 2017-08-07 ENCOUNTER — APPOINTMENT (OUTPATIENT)
Dept: RADIOLOGY | Facility: MEDICAL CENTER | Age: 52
DRG: 392 | End: 2017-08-07
Attending: HOSPITALIST
Payer: COMMERCIAL

## 2017-08-07 LAB
ANION GAP SERPL CALC-SCNC: 7 MMOL/L (ref 0–11.9)
BUN SERPL-MCNC: 12 MG/DL (ref 8–22)
CALCIUM SERPL-MCNC: 9.2 MG/DL (ref 8.5–10.5)
CHLORIDE SERPL-SCNC: 109 MMOL/L (ref 96–112)
CO2 SERPL-SCNC: 23 MMOL/L (ref 20–33)
CREAT SERPL-MCNC: 0.48 MG/DL (ref 0.5–1.4)
CRP SERPL HS-MCNC: 1.29 MG/DL (ref 0–0.75)
GFR SERPL CREATININE-BSD FRML MDRD: >60 ML/MIN/1.73 M 2
GLUCOSE SERPL-MCNC: 106 MG/DL (ref 65–99)
POTASSIUM SERPL-SCNC: 4 MMOL/L (ref 3.6–5.5)
PREALB SERPL-MCNC: 18 MG/DL (ref 18–38)
SODIUM SERPL-SCNC: 139 MMOL/L (ref 135–145)

## 2017-08-07 PROCEDURE — A9270 NON-COVERED ITEM OR SERVICE: HCPCS | Performed by: HOSPITALIST

## 2017-08-07 PROCEDURE — 302136 NUTRITION PUMP: Performed by: HOSPITALIST

## 2017-08-07 PROCEDURE — 86140 C-REACTIVE PROTEIN: CPT

## 2017-08-07 PROCEDURE — 92612 ENDOSCOPY SWALLOW (FEES) VID: CPT

## 2017-08-07 PROCEDURE — 80048 BASIC METABOLIC PNL TOTAL CA: CPT

## 2017-08-07 PROCEDURE — 700101 HCHG RX REV CODE 250: Performed by: INTERNAL MEDICINE

## 2017-08-07 PROCEDURE — G8997 SWALLOW GOAL STATUS: HCPCS | Mod: CJ

## 2017-08-07 PROCEDURE — 92526 ORAL FUNCTION THERAPY: CPT

## 2017-08-07 PROCEDURE — 770001 HCHG ROOM/CARE - MED/SURG/GYN PRIV*

## 2017-08-07 PROCEDURE — 36415 COLL VENOUS BLD VENIPUNCTURE: CPT

## 2017-08-07 PROCEDURE — G8996 SWALLOW CURRENT STATUS: HCPCS | Mod: CM

## 2017-08-07 PROCEDURE — 700102 HCHG RX REV CODE 250 W/ 637 OVERRIDE(OP): Performed by: INTERNAL MEDICINE

## 2017-08-07 PROCEDURE — 99233 SBSQ HOSP IP/OBS HIGH 50: CPT | Performed by: HOSPITALIST

## 2017-08-07 PROCEDURE — 700102 HCHG RX REV CODE 250 W/ 637 OVERRIDE(OP): Performed by: PHYSICIAN ASSISTANT

## 2017-08-07 PROCEDURE — 700102 HCHG RX REV CODE 250 W/ 637 OVERRIDE(OP): Performed by: HOSPITALIST

## 2017-08-07 PROCEDURE — 700111 HCHG RX REV CODE 636 W/ 250 OVERRIDE (IP): Performed by: INTERNAL MEDICINE

## 2017-08-07 PROCEDURE — A9270 NON-COVERED ITEM OR SERVICE: HCPCS | Performed by: INTERNAL MEDICINE

## 2017-08-07 PROCEDURE — A9270 NON-COVERED ITEM OR SERVICE: HCPCS | Performed by: PHYSICIAN ASSISTANT

## 2017-08-07 PROCEDURE — 84134 ASSAY OF PREALBUMIN: CPT

## 2017-08-07 RX ADMIN — DEXAMETHASONE SODIUM PHOSPHATE 4 MG: 4 INJECTION, SOLUTION INTRAMUSCULAR; INTRAVENOUS at 17:08

## 2017-08-07 RX ADMIN — DEXAMETHASONE SODIUM PHOSPHATE 4 MG: 4 INJECTION, SOLUTION INTRAMUSCULAR; INTRAVENOUS at 00:10

## 2017-08-07 RX ADMIN — STANDARDIZED SENNA CONCENTRATE AND DOCUSATE SODIUM 2 TABLET: 8.6; 5 TABLET, FILM COATED ORAL at 08:34

## 2017-08-07 RX ADMIN — DEXAMETHASONE SODIUM PHOSPHATE 4 MG: 4 INJECTION, SOLUTION INTRAMUSCULAR; INTRAVENOUS at 23:42

## 2017-08-07 RX ADMIN — DEXAMETHASONE SODIUM PHOSPHATE 4 MG: 4 INJECTION, SOLUTION INTRAMUSCULAR; INTRAVENOUS at 12:50

## 2017-08-07 RX ADMIN — OXYCODONE HYDROCHLORIDE 10 MG: 10 TABLET ORAL at 05:01

## 2017-08-07 RX ADMIN — POTASSIUM CHLORIDE AND SODIUM CHLORIDE: 900; 150 INJECTION, SOLUTION INTRAVENOUS at 17:55

## 2017-08-07 RX ADMIN — OXYCODONE HYDROCHLORIDE 10 MG: 10 TABLET ORAL at 01:18

## 2017-08-07 RX ADMIN — OXYCODONE HYDROCHLORIDE 10 MG: 10 TABLET ORAL at 13:05

## 2017-08-07 RX ADMIN — MORPHINE SULFATE 4 MG: 4 INJECTION INTRAVENOUS at 20:47

## 2017-08-07 RX ADMIN — OXYCODONE HYDROCHLORIDE 10 MG: 10 TABLET ORAL at 08:34

## 2017-08-07 RX ADMIN — MORPHINE SULFATE 4 MG: 4 INJECTION INTRAVENOUS at 14:40

## 2017-08-07 RX ADMIN — OMEPRAZOLE 20 MG: 20 CAPSULE, DELAYED RELEASE ORAL at 08:34

## 2017-08-07 RX ADMIN — OXYCODONE HYDROCHLORIDE 10 MG: 10 TABLET ORAL at 23:44

## 2017-08-07 RX ADMIN — LEVOTHYROXINE SODIUM 25 MCG: 25 TABLET ORAL at 05:01

## 2017-08-07 RX ADMIN — DEXAMETHASONE SODIUM PHOSPHATE 4 MG: 4 INJECTION, SOLUTION INTRAMUSCULAR; INTRAVENOUS at 05:01

## 2017-08-07 RX ADMIN — MORPHINE SULFATE 4 MG: 4 INJECTION INTRAVENOUS at 17:48

## 2017-08-07 ASSESSMENT — ENCOUNTER SYMPTOMS
TINGLING: 0
SENSORY CHANGE: 0
DIAPHORESIS: 0
BRUISES/BLEEDS EASILY: 0
POLYDIPSIA: 0
PSYCHIATRIC NEGATIVE: 1
CHILLS: 0
COUGH: 0
EYE DISCHARGE: 0
SPEECH CHANGE: 0
HEARTBURN: 0
FLANK PAIN: 0
TREMORS: 0
CONSTITUTIONAL NEGATIVE: 1
NECK PAIN: 1
EYES NEGATIVE: 1
SORE THROAT: 0
DIARRHEA: 0
FEVER: 0
ORTHOPNEA: 0
WHEEZING: 0
NEUROLOGICAL NEGATIVE: 1
DIZZINESS: 0
MEMORY LOSS: 0
STRIDOR: 0
CONSTIPATION: 0
CARDIOVASCULAR NEGATIVE: 1
HEADACHES: 0
DEPRESSION: 0
CLAUDICATION: 0
WEAKNESS: 0
GASTROINTESTINAL NEGATIVE: 1
EYE PAIN: 0
MYALGIAS: 0
BLURRED VISION: 0

## 2017-08-07 ASSESSMENT — PAIN SCALES - GENERAL
PAINLEVEL_OUTOF10: 5
PAINLEVEL_OUTOF10: 5
PAINLEVEL_OUTOF10: 6
PAINLEVEL_OUTOF10: 8
PAINLEVEL_OUTOF10: 8
PAINLEVEL_OUTOF10: 4
PAINLEVEL_OUTOF10: 6
PAINLEVEL_OUTOF10: 5
PAINLEVEL_OUTOF10: 5

## 2017-08-07 NOTE — PROGRESS NOTES
[facility] Hospitalist Progress Note    Date of Service: 8/7/2017    Chief Complaint  51 y.o. female admitted 8/3/2017 with inability to swallow.    Interval Problem Update  Mrs. Faith is a 51-year-old female who recently had cervical surgery on 8/2/2017. Postoperatively the patient has developed swelling and tenderness in her throat. It progressed to the point where the patient was unable to swallow liquids or food. Patient was admitted and evaluated and treated with steroids. Her neck swelling and vocal cord dysfunction has improved, however today she did have an FEES study. She unfortunately failed this examination and at this point she is to be NPO, we will continue at this point with placement of a core tract. Start tube feeds and continue with steroid management to the point where her swelling resolves. For right now continue with physical therapy occupational therapy and pain management.    Consultants/Specialty  Neurosurgery    Disposition  To be determined        Review of Systems   Constitutional: Negative.  Negative for fever and diaphoresis.   HENT: Negative for sore throat.    Eyes: Negative.  Negative for blurred vision, pain and discharge.   Respiratory: Negative for cough and wheezing.    Cardiovascular: Negative.  Negative for chest pain, orthopnea and claudication.   Gastrointestinal: Negative.  Negative for heartburn, diarrhea and constipation.   Genitourinary: Negative.  Negative for dysuria, hematuria and flank pain.   Musculoskeletal: Positive for neck pain. Negative for myalgias.   Skin: Negative.  Negative for itching and rash.   Neurological: Negative.  Negative for dizziness, tingling, tremors, weakness and headaches.   Endo/Heme/Allergies: Negative.  Negative for environmental allergies and polydipsia. Does not bruise/bleed easily.   Psychiatric/Behavioral: Negative.  Negative for depression, suicidal ideas and memory loss.      Physical Exam  Laboratory/Imaging   Hemodynamics  Temp (24hrs),  Av.4 °C (97.6 °F), Min:36.2 °C (97.1 °F), Max:36.9 °C (98.4 °F)   Temperature: 36.4 °C (97.6 °F)  Pulse  Av.2  Min: 54  Max: 89    Blood Pressure: 131/72 mmHg      Respiratory      Respiration: 16, Pulse Oximetry: 98 %     Work Of Breathing / Effort: Mild  RUL Breath Sounds: Clear, RML Breath Sounds: Clear, RLL Breath Sounds: Diminished, BRIAN Breath Sounds: Clear, LLL Breath Sounds: Diminished    Fluids    Intake/Output Summary (Last 24 hours) at 17 1528  Last data filed at 17 0400   Gross per 24 hour   Intake   1560 ml   Output      0 ml   Net   1560 ml       Nutrition  Orders Placed This Encounter   Procedures   • DIET NPO     Standing Status: Standing      Number of Occurrences: 1      Standing Expiration Date:      Order Specific Question:  Restrict to:     Answer:  Strict [1]     Physical Exam   Constitutional: She is oriented to person, place, and time. She appears well-developed and well-nourished. Cervical collar in place.   HENT:   Head: Normocephalic and atraumatic.   Right Ear: External ear normal.   Left Ear: External ear normal.   Mouth/Throat: No oropharyngeal exudate.   Right side of the oropharynx remains swollen and inflamed   Eyes: Conjunctivae and EOM are normal. Pupils are equal, round, and reactive to light. Right eye exhibits no discharge. Left eye exhibits no discharge.   Neck: Normal range of motion. Neck supple. No JVD present.   Cardiovascular: Normal rate and regular rhythm.  Exam reveals no gallop and no friction rub.    Pulmonary/Chest: Effort normal. No stridor. No respiratory distress. She has no rales. She exhibits no tenderness.   Abdominal: Soft. Bowel sounds are normal. She exhibits no distension. There is no guarding.   Genitourinary:   Gloria catheter   Musculoskeletal: Normal range of motion.   Lymphadenopathy:     She has no cervical adenopathy.   Neurological: She is alert and oriented to person, place, and time. She has normal reflexes. She displays no  atrophy and normal reflexes. No sensory deficit. She exhibits normal muscle tone. Gait normal. GCS eye subscore is 4. GCS verbal subscore is 5. GCS motor subscore is 6.   Skin: Skin is warm and dry. No erythema.   Psychiatric: Her behavior is normal. Judgment and thought content normal. Her mood appears anxious.   Nursing note and vitals reviewed.      Recent Labs      08/04/17   1607  08/06/17   0942   WBC  12.2*  9.4   RBC  3.92*  4.39   HEMOGLOBIN  8.7*  9.8*   HEMATOCRIT  29.4*  32.5*   MCV  75.0*  74.0*   MCH  22.2*  22.3*   MCHC  29.6*  30.2*   RDW  47.9  46.0   PLATELETCT  449*  567*   MPV  9.0  9.4     Recent Labs      08/04/17   1607  08/07/17   0242   SODIUM  139  139   POTASSIUM  3.7  4.0   CHLORIDE  107  109   CO2  23  23   GLUCOSE  110*  106*   BUN  12  12   CREATININE  0.42*  0.48*   CALCIUM  9.0  9.2                      Assessment/Plan     Status post cervical spinal fusion (present on admission)  Assessment & Plan  Neck swelling and neck pain have improved.  Remains in a cervical collar  Continue with pain management  Continue with physical therapy and occupational therapy.    Dysphagia (present on admission)  Assessment & Plan  Patient at this point has to go backwards with her treatment, she has failed the fees and she is aspirating all consistencies.  I have discussed this with the patient in detail she agrees at this point to a core tract. We will start at this point to feeds continue at this point speech therapy daily.    Hypothyroid (present on admission)  Assessment & Plan  -supportive measures with synthroid supplementation at 25 mcg per day, no change  -latest TSH is 0.670. Continue with low dose Synthroid supplementation.  -Give the Synthroid through the core tract    Hypokalemia (present on admission)  Assessment & Plan  Corrected with supplementation, repeat a.m. labs.        EKG reviewed, Radiology images reviewed, Labs reviewed and Medications reviewed            Ulcer prophylaxis:  Yes    Assessed for rehab: Patient was assess for and/or received rehabilitation services during this hospitalization         patient neuro specialist for assistance in doing things inability to

## 2017-08-07 NOTE — CARE PLAN
Problem: Communication  Goal: The ability to communicate needs accurately and effectively will improve  Patient communicates needs appropriately to nursing staff. Patient calls appropriately.    Problem: Mobility  Goal: Risk for activity intolerance will decrease  Patient ambulates xSBA with steady gait. Non skid socks on.

## 2017-08-07 NOTE — DIETARY
Nutrition Services: Update  Pt was on a full liquid, nectar thick diet with 1 item per tray. Pt states she was testing foods and she is hungry. No cortrak in place. Per SLP Recommendations:  Pt is unsafe for any PO by mouth at this time. Anticipate swallow function to improve once swelling subsides. Pt and MD to discuss cortrak placement. Spoke with RN. RN states will be placing cortrak.   Current TF order of Replete with Fiber @ 80 ml/hr remains appropriate. TF provides, providing 1920 kcals, 120 grams protein, 1613 mL free water per day.     PLAN/RECOMMEND -   When cortrak placed and verified start Replete with Fiber @ 25 ml/hr and advance per protocol to 80 ml/hr (goal rate)  Fluids per MD.   Diet per SLP and MD.     RD following

## 2017-08-07 NOTE — PROGRESS NOTES
Cortrak Placement    Tube Team verified patient name and medical record number prior to tube placement.  Cortrak tube (43 inches, 10 Slovenian) placed at 70 cm in right nare.  Per Cortrak picture, tube appears to be in the stomach.  Nursing Instructions: Awaiting KUB to confirm placement before use for medications or feeding. Once placement confirmed, flush tube with 30 ml of water, and then remove and save stylet, in patient medication drawer.

## 2017-08-07 NOTE — PROGRESS NOTES
BS report received. Pt resitng in bed, alert and oriented x4. Pt c/o hip and neck pain 9/10, medicated per MAR. C-spine collar in place, dressing intact. Pt denies nausea, vomiting or SOB at this time. Pt tolerating well current diet. Pt on RA, sating >90%. PIV running at 100mL/hr. Pt ambulates xSBA with steady gait. POC discussed, verbalized understanding. Bed low and locked. Call light and belonging within reach and pt calls appropriately. Fall precaution in effect. Hourly rounding in place.

## 2017-08-07 NOTE — THERAPY
"Speech Language Therapy dysphagia treatment completed.   Functional Status:  Pt still frequent throat clearing, coughing and c/o globus pharyngeus with trials of ntl and purees concerning for pen/asp.    Recommendations: Pt needs a FEES to r/o aspiration and determine if safe for oral diet. Will complete asap today as pt pending DC.     Plan of Care: Will benefit from Speech Therapy 3 times per week  Post-Acute Therapy: Discharge to a transitional care facility for continued skilled therapy services.    See \"Rehab Therapy-Acute\" Patient Summary Report for complete documentation.     "

## 2017-08-07 NOTE — PROGRESS NOTES
Progress Note               Author: Malcom Gay Date & Time created: 2017  8:05 AM     Interval History:  POD#5 ACDF C3-6. Returned with transient dysphagia with Normal soft tissue swelling on scan  Feeling better.  Swallowing seems to be improved and voice is improving  Minimal neck and right shoulder pain  Patient notes arm pain is better.  Eating breakfast currently and notes no difficulty.     Home today if cleared by Speech Thearapy    Review of Systems:  Review of Systems   Constitutional: Negative for fever and chills.   HENT: Negative for sore throat.    Respiratory: Negative for cough and stridor.    Musculoskeletal: Positive for neck pain.   Neurological: Negative for tingling, sensory change and speech change.       Physical Exam:  Physical Exam    Labs:        Invalid input(s): [id number]      Recent Labs      17   0242   SODIUM  139  139   POTASSIUM  3.7  4.0   CHLORIDE  107  109   CO2  23  23   BUN  12  12   CREATININE  0.42*  0.48*   CALCIUM  9.0  9.2     Recent Labs      17   0242   PREALBUMIN   --   18.0   GLUCOSE  110*  106*     Recent Labs      08/04/17   1607  17   0942   RBC  3.92*  4.39   HEMOGLOBIN  8.7*  9.8*   HEMATOCRIT  29.4*  32.5*   PLATELETCT  449*  567*     Recent Labs      08/04/17   1607  17   0942   WBC  12.2*  9.4   NEUTSPOLYS  86.20*   --    LYMPHOCYTES  7.80*   --    MONOCYTES  5.50   --    EOSINOPHILS  0.00   --    BASOPHILS  0.10   --      Hemodynamics:  Temp (24hrs), Av.4 °C (97.6 °F), Min:36.2 °C (97.1 °F), Max:36.9 °C (98.4 °F)  Temperature: 36.2 °C (97.1 °F)  Pulse  Av.1  Min: 54  Max: 89   Blood Pressure: 158/70 mmHg     Respiratory:    Respiration: 18, Pulse Oximetry: 94 %     Work Of Breathing / Effort: Mild  RUL Breath Sounds: Clear, RML Breath Sounds: Clear, RLL Breath Sounds: Diminished, BRIAN Breath Sounds: Clear, LLL Breath Sounds: Diminished  Fluids:    Intake/Output Summary (Last 24  hours) at 08/07/17 0805  Last data filed at 08/07/17 0400   Gross per 24 hour   Intake   1560 ml   Output      0 ml   Net   1560 ml        GI/Nutrition:  Orders Placed This Encounter   Procedures   • DIET ORDER     Standing Status: Standing      Number of Occurrences: 1      Standing Expiration Date:      Order Specific Question:  Diet:     Answer:  Full Liquid [11]      Comments:  one time full meal per Dr. Cruz     Order Specific Question:  Consistency/Fluid modifications:     Answer:  Nectar Thick [2]     Order Specific Question:  Miscellaneous modifications:     Answer:  SLP - One Item Per Meal [23]     Medical Decision Making, by Problem:  Active Hospital Problems    Diagnosis   • Hypokalemia [E87.6]   • Status post cervical spinal fusion [Z98.1]   • Dysphagia [R13.10]   • Hypothyroid [E03.9]       Plan:  Home today with oral steroids if cleared by speech therapy  F/u with [facility] in 10 days   Appreciate IM care   Will continue to follow     Labs reviewed, Medications reviewed and Radiology images reviewed                    Speech therapy indicates the patient is to remain strict NPO.  She will be assessed again tomorrow.

## 2017-08-07 NOTE — PROGRESS NOTES
Bed control on and bed in locked in position. Bed alarm NOT on, patient educated regarding safety risk and continues to refuse. Call light and personal belongings within reach. Hourly rounding in place.

## 2017-08-07 NOTE — THERAPY
"  Speech Language Therapy FEES completed.  Functional Status: Pt presenting with severe pharyngeal-esophageal stage dysphagia. Bilateral pharyngeal swelling noted but R significantly more swollen than L with large bulging of right posterior lateral pharyngeal wall. She had moderate to severe amounts of pharyngeal residue R>L which would not clear with pt triggering 8-10 spontaneous swallows per bolus. Question if swelling impairing UES ability to open. The residue in the R pyriform sinus would continually spillover to her vocal folds with pt continually having to clear throat and cough however she had significant difficulty even clearing residue from laryngeal vestibule as it was seen bobbing up and down. This led to consistent trace aspiration with all textures (ice, ntl, thins, purees) as her vocal folds with all were continually recoated in blue.    Recommendations:  Pt is unsafe for any PO by mouth at this time. Anticipate swallow function to improve once swelling subsides. Pt and MD to discuss cortrak placement.  Plan of Care: Will benefit from Speech Therapy 3 times per week  Post-Acute Therapy: Discharge to home with outpatient or home health for additional skilled therapy services.    See \"Rehab Therapy-Acute\" Patient Summary Report for complete documentation.   "

## 2017-08-08 LAB
BACTERIA BLD CULT: NORMAL
BACTERIA BLD CULT: NORMAL
SIGNIFICANT IND 70042: NORMAL
SIGNIFICANT IND 70042: NORMAL
SITE SITE: NORMAL
SITE SITE: NORMAL
SOURCE SOURCE: NORMAL
SOURCE SOURCE: NORMAL

## 2017-08-08 PROCEDURE — 700102 HCHG RX REV CODE 250 W/ 637 OVERRIDE(OP): Performed by: PHYSICIAN ASSISTANT

## 2017-08-08 PROCEDURE — 700102 HCHG RX REV CODE 250 W/ 637 OVERRIDE(OP): Performed by: INTERNAL MEDICINE

## 2017-08-08 PROCEDURE — 700101 HCHG RX REV CODE 250: Performed by: INTERNAL MEDICINE

## 2017-08-08 PROCEDURE — A9270 NON-COVERED ITEM OR SERVICE: HCPCS | Performed by: INTERNAL MEDICINE

## 2017-08-08 PROCEDURE — 770001 HCHG ROOM/CARE - MED/SURG/GYN PRIV*

## 2017-08-08 PROCEDURE — 700102 HCHG RX REV CODE 250 W/ 637 OVERRIDE(OP): Performed by: HOSPITALIST

## 2017-08-08 PROCEDURE — 92526 ORAL FUNCTION THERAPY: CPT

## 2017-08-08 PROCEDURE — A9270 NON-COVERED ITEM OR SERVICE: HCPCS | Performed by: HOSPITALIST

## 2017-08-08 PROCEDURE — A9270 NON-COVERED ITEM OR SERVICE: HCPCS | Performed by: PHYSICIAN ASSISTANT

## 2017-08-08 PROCEDURE — 700111 HCHG RX REV CODE 636 W/ 250 OVERRIDE (IP): Performed by: INTERNAL MEDICINE

## 2017-08-08 PROCEDURE — 99232 SBSQ HOSP IP/OBS MODERATE 35: CPT | Performed by: INTERNAL MEDICINE

## 2017-08-08 RX ADMIN — STANDARDIZED SENNA CONCENTRATE AND DOCUSATE SODIUM 2 TABLET: 8.6; 5 TABLET, FILM COATED ORAL at 20:07

## 2017-08-08 RX ADMIN — DEXAMETHASONE SODIUM PHOSPHATE 4 MG: 4 INJECTION, SOLUTION INTRAMUSCULAR; INTRAVENOUS at 06:10

## 2017-08-08 RX ADMIN — DEXAMETHASONE SODIUM PHOSPHATE 4 MG: 4 INJECTION, SOLUTION INTRAMUSCULAR; INTRAVENOUS at 17:12

## 2017-08-08 RX ADMIN — OXYCODONE HYDROCHLORIDE 10 MG: 10 TABLET ORAL at 10:05

## 2017-08-08 RX ADMIN — POTASSIUM CHLORIDE AND SODIUM CHLORIDE: 900; 150 INJECTION, SOLUTION INTRAVENOUS at 05:04

## 2017-08-08 RX ADMIN — POTASSIUM CHLORIDE AND SODIUM CHLORIDE: 900; 150 INJECTION, SOLUTION INTRAVENOUS at 15:21

## 2017-08-08 RX ADMIN — LEVOTHYROXINE SODIUM 25 MCG: 25 TABLET ORAL at 06:16

## 2017-08-08 RX ADMIN — MORPHINE SULFATE 4 MG: 4 INJECTION INTRAVENOUS at 20:07

## 2017-08-08 RX ADMIN — STANDARDIZED SENNA CONCENTRATE AND DOCUSATE SODIUM 2 TABLET: 8.6; 5 TABLET, FILM COATED ORAL at 10:50

## 2017-08-08 RX ADMIN — OXYCODONE HYDROCHLORIDE 10 MG: 10 TABLET ORAL at 13:12

## 2017-08-08 RX ADMIN — MORPHINE SULFATE 4 MG: 4 INJECTION INTRAVENOUS at 15:20

## 2017-08-08 RX ADMIN — DEXAMETHASONE SODIUM PHOSPHATE 4 MG: 4 INJECTION, SOLUTION INTRAMUSCULAR; INTRAVENOUS at 23:57

## 2017-08-08 RX ADMIN — OXYCODONE HYDROCHLORIDE 10 MG: 10 TABLET ORAL at 23:57

## 2017-08-08 RX ADMIN — MORPHINE SULFATE 4 MG: 4 INJECTION INTRAVENOUS at 10:12

## 2017-08-08 RX ADMIN — OMEPRAZOLE 20 MG: 20 CAPSULE, DELAYED RELEASE ORAL at 12:39

## 2017-08-08 RX ADMIN — DEXAMETHASONE SODIUM PHOSPHATE 4 MG: 4 INJECTION, SOLUTION INTRAMUSCULAR; INTRAVENOUS at 12:40

## 2017-08-08 RX ADMIN — OXYCODONE HYDROCHLORIDE 10 MG: 10 TABLET ORAL at 17:12

## 2017-08-08 ASSESSMENT — ENCOUNTER SYMPTOMS
NECK PAIN: 1
WEAKNESS: 0
HEARTBURN: 0
WHEEZING: 0
CONSTITUTIONAL NEGATIVE: 1
DIAPHORESIS: 0
PSYCHIATRIC NEGATIVE: 1
SORE THROAT: 0
MEMORY LOSS: 0
MYALGIAS: 0
GASTROINTESTINAL NEGATIVE: 1
SENSORY CHANGE: 0
DEPRESSION: 0
NEUROLOGICAL NEGATIVE: 1
FLANK PAIN: 0
DIZZINESS: 0
CARDIOVASCULAR NEGATIVE: 1
FEVER: 0
CLAUDICATION: 0
TINGLING: 0
COUGH: 0
HEADACHES: 0
DIARRHEA: 0
FOCAL WEAKNESS: 0
EYE DISCHARGE: 0
ORTHOPNEA: 0
EYE PAIN: 0
BRUISES/BLEEDS EASILY: 0
ROS GI COMMENTS: DYSPHAGIA.
BLURRED VISION: 0
TREMORS: 0
CONSTIPATION: 0
POLYDIPSIA: 0
EYES NEGATIVE: 1

## 2017-08-08 ASSESSMENT — PAIN SCALES - GENERAL
PAINLEVEL_OUTOF10: 7
PAINLEVEL_OUTOF10: 8
PAINLEVEL_OUTOF10: 6
PAINLEVEL_OUTOF10: 6
PAINLEVEL_OUTOF10: 2
PAINLEVEL_OUTOF10: 6
PAINLEVEL_OUTOF10: 9

## 2017-08-08 NOTE — THERAPY
"Speech Language Therapy dysphagia treatment completed.   Functional Status: AOx4. Pt trained in dysphagia exercises.    Recommendations: Cont NPO/TF. Pt to complete dysphagia exercises multiple times throughout the day.   Plan of Care: Will benefit from Speech Therapy 5 times per week  Post-Acute Therapy: Discharge to a transitional care facility for continued skilled therapy services.    See \"Rehab Therapy-Acute\" Patient Summary Report for complete documentation.     "

## 2017-08-08 NOTE — PROGRESS NOTES
Progress Note               Author: Konstantin Meyer Date & Time created: 2017  8:38 AM     Interval History:  Failed FEES yesterday. Core track placed.   No upper extremity symptoms    Review of Systems:  Review of Systems   Gastrointestinal:        Dysphagia.   Neurological: Negative for sensory change and focal weakness.       Physical Exam:  Physical Exam   Constitutional: She is oriented to person, place, and time.   Neck: Neck supple. No tracheal deviation present.   Musculoskeletal:   Motor 5/5   Neurological: She is alert and oriented to person, place, and time.   Sensory intact.        Labs:        Invalid input(s): [id number]      Recent Labs      17   0242   SODIUM  139   POTASSIUM  4.0   CHLORIDE  109   CO2  23   BUN  12   CREATININE  0.48*   CALCIUM  9.2     Recent Labs      17   0242   PREALBUMIN  18.0   GLUCOSE  106*     Recent Labs      17   0942   RBC  4.39   HEMOGLOBIN  9.8*   HEMATOCRIT  32.5*   PLATELETCT  567*     Recent Labs      17   0942   WBC  9.4     Hemodynamics:  Temp (24hrs), Av.1 °C (96.9 °F), Min:35.9 °C (96.6 °F), Max:36.2 °C (97.1 °F)  Temperature: 36.1 °C (97 °F)  Pulse  Av.9  Min: 50  Max: 89   Blood Pressure: 152/67 mmHg     Respiratory:    Respiration: 17, Pulse Oximetry: 96 %        RUL Breath Sounds: Clear, RML Breath Sounds: Clear, RLL Breath Sounds: Diminished, BRIAN Breath Sounds: Clear, LLL Breath Sounds: Diminished  Fluids:  No intake or output data in the 24 hours ending 17 0838     GI/Nutrition:  Orders Placed This Encounter   Procedures   • DIET NPO     Standing Status: Standing      Number of Occurrences: 1      Standing Expiration Date:      Order Specific Question:  Restrict to:     Answer:  Strict [1]     Medical Decision Making, by Problem:  Active Hospital Problems    Diagnosis   • Hypokalemia [E87.6]   • Status post cervical spinal fusion [Z98.1]   • Dysphagia [R13.10]   • Hypothyroid [E03.9]       Plan:  Core tra  per medicine service.  No neurosurgical interventions planned.   Would be OK for home at any time from neurosurgery standpoint.   Follow up with Rob in two weeks. Contact neurosurgery prn    Core Measures

## 2017-08-09 LAB
ALBUMIN SERPL BCP-MCNC: 3.3 G/DL (ref 3.2–4.9)
BUN SERPL-MCNC: 15 MG/DL (ref 8–22)
CALCIUM SERPL-MCNC: 9.1 MG/DL (ref 8.5–10.5)
CHLORIDE SERPL-SCNC: 104 MMOL/L (ref 96–112)
CO2 SERPL-SCNC: 27 MMOL/L (ref 20–33)
CREAT SERPL-MCNC: 0.48 MG/DL (ref 0.5–1.4)
GFR SERPL CREATININE-BSD FRML MDRD: >60 ML/MIN/1.73 M 2
GLUCOSE SERPL-MCNC: 122 MG/DL (ref 65–99)
PHOSPHATE SERPL-MCNC: 3.2 MG/DL (ref 2.5–4.5)
POTASSIUM SERPL-SCNC: 4.1 MMOL/L (ref 3.6–5.5)
SODIUM SERPL-SCNC: 138 MMOL/L (ref 135–145)

## 2017-08-09 PROCEDURE — 700102 HCHG RX REV CODE 250 W/ 637 OVERRIDE(OP): Performed by: HOSPITALIST

## 2017-08-09 PROCEDURE — A9270 NON-COVERED ITEM OR SERVICE: HCPCS | Performed by: HOSPITALIST

## 2017-08-09 PROCEDURE — 700102 HCHG RX REV CODE 250 W/ 637 OVERRIDE(OP): Performed by: PHYSICIAN ASSISTANT

## 2017-08-09 PROCEDURE — 770001 HCHG ROOM/CARE - MED/SURG/GYN PRIV*

## 2017-08-09 PROCEDURE — 80069 RENAL FUNCTION PANEL: CPT

## 2017-08-09 PROCEDURE — 700111 HCHG RX REV CODE 636 W/ 250 OVERRIDE (IP): Performed by: INTERNAL MEDICINE

## 2017-08-09 PROCEDURE — 99232 SBSQ HOSP IP/OBS MODERATE 35: CPT | Performed by: INTERNAL MEDICINE

## 2017-08-09 PROCEDURE — 700102 HCHG RX REV CODE 250 W/ 637 OVERRIDE(OP): Performed by: INTERNAL MEDICINE

## 2017-08-09 PROCEDURE — A9270 NON-COVERED ITEM OR SERVICE: HCPCS | Performed by: INTERNAL MEDICINE

## 2017-08-09 PROCEDURE — 36415 COLL VENOUS BLD VENIPUNCTURE: CPT

## 2017-08-09 PROCEDURE — 92526 ORAL FUNCTION THERAPY: CPT

## 2017-08-09 PROCEDURE — A9270 NON-COVERED ITEM OR SERVICE: HCPCS | Performed by: PHYSICIAN ASSISTANT

## 2017-08-09 PROCEDURE — 700101 HCHG RX REV CODE 250: Performed by: INTERNAL MEDICINE

## 2017-08-09 RX ORDER — OMEPRAZOLE 20 MG/1
20 CAPSULE, DELAYED RELEASE ORAL DAILY
Status: DISCONTINUED | OUTPATIENT
Start: 2017-08-09 | End: 2017-08-09

## 2017-08-09 RX ADMIN — DEXAMETHASONE SODIUM PHOSPHATE 4 MG: 4 INJECTION, SOLUTION INTRAMUSCULAR; INTRAVENOUS at 17:26

## 2017-08-09 RX ADMIN — MORPHINE SULFATE 4 MG: 4 INJECTION INTRAVENOUS at 12:25

## 2017-08-09 RX ADMIN — OXYCODONE HYDROCHLORIDE 10 MG: 10 TABLET ORAL at 06:38

## 2017-08-09 RX ADMIN — ONDANSETRON 4 MG: 2 INJECTION INTRAMUSCULAR; INTRAVENOUS at 09:48

## 2017-08-09 RX ADMIN — DEXAMETHASONE SODIUM PHOSPHATE 4 MG: 4 INJECTION, SOLUTION INTRAMUSCULAR; INTRAVENOUS at 23:36

## 2017-08-09 RX ADMIN — MORPHINE SULFATE 4 MG: 4 INJECTION INTRAVENOUS at 23:36

## 2017-08-09 RX ADMIN — OXYCODONE HYDROCHLORIDE 10 MG: 10 TABLET ORAL at 22:23

## 2017-08-09 RX ADMIN — POTASSIUM CHLORIDE AND SODIUM CHLORIDE: 900; 150 INJECTION, SOLUTION INTRAVENOUS at 23:43

## 2017-08-09 RX ADMIN — OXYCODONE HYDROCHLORIDE 10 MG: 10 TABLET ORAL at 09:42

## 2017-08-09 RX ADMIN — MORPHINE SULFATE 4 MG: 4 INJECTION INTRAVENOUS at 03:40

## 2017-08-09 RX ADMIN — STANDARDIZED SENNA CONCENTRATE AND DOCUSATE SODIUM 2 TABLET: 8.6; 5 TABLET, FILM COATED ORAL at 09:48

## 2017-08-09 RX ADMIN — DEXAMETHASONE SODIUM PHOSPHATE 4 MG: 4 INJECTION, SOLUTION INTRAMUSCULAR; INTRAVENOUS at 06:38

## 2017-08-09 RX ADMIN — POTASSIUM CHLORIDE AND SODIUM CHLORIDE: 900; 150 INJECTION, SOLUTION INTRAVENOUS at 02:19

## 2017-08-09 RX ADMIN — LEVOTHYROXINE SODIUM 25 MCG: 25 TABLET ORAL at 06:38

## 2017-08-09 RX ADMIN — POTASSIUM CHLORIDE AND SODIUM CHLORIDE: 900; 150 INJECTION, SOLUTION INTRAVENOUS at 13:10

## 2017-08-09 RX ADMIN — ONDANSETRON 4 MG: 2 INJECTION INTRAMUSCULAR; INTRAVENOUS at 15:37

## 2017-08-09 RX ADMIN — OXYCODONE HYDROCHLORIDE 10 MG: 10 TABLET ORAL at 13:16

## 2017-08-09 RX ADMIN — DEXAMETHASONE SODIUM PHOSPHATE 4 MG: 4 INJECTION, SOLUTION INTRAMUSCULAR; INTRAVENOUS at 12:25

## 2017-08-09 RX ADMIN — MORPHINE SULFATE 4 MG: 4 INJECTION INTRAVENOUS at 20:04

## 2017-08-09 RX ADMIN — OMEPRAZOLE 20 MG: 20 CAPSULE, DELAYED RELEASE ORAL at 09:49

## 2017-08-09 RX ADMIN — MORPHINE SULFATE 4 MG: 4 INJECTION INTRAVENOUS at 15:37

## 2017-08-09 RX ADMIN — STANDARDIZED SENNA CONCENTRATE AND DOCUSATE SODIUM 2 TABLET: 8.6; 5 TABLET, FILM COATED ORAL at 20:04

## 2017-08-09 RX ADMIN — OXYCODONE HYDROCHLORIDE 10 MG: 10 TABLET ORAL at 17:26

## 2017-08-09 ASSESSMENT — PAIN SCALES - GENERAL
PAINLEVEL_OUTOF10: 6
PAINLEVEL_OUTOF10: 5
PAINLEVEL_OUTOF10: 7
PAINLEVEL_OUTOF10: 7
PAINLEVEL_OUTOF10: 8
PAINLEVEL_OUTOF10: 6
PAINLEVEL_OUTOF10: 6

## 2017-08-09 ASSESSMENT — ENCOUNTER SYMPTOMS
POLYDIPSIA: 0
FLANK PAIN: 0
COUGH: 0
BRUISES/BLEEDS EASILY: 0
DIZZINESS: 0
EYE DISCHARGE: 0
ABDOMINAL PAIN: 1
CONSTITUTIONAL NEGATIVE: 1
CARDIOVASCULAR NEGATIVE: 1
DIAPHORESIS: 0
HEADACHES: 0
NEUROLOGICAL NEGATIVE: 1
TREMORS: 0
WEAKNESS: 0
EYES NEGATIVE: 1
PSYCHIATRIC NEGATIVE: 1
FEVER: 0
WHEEZING: 0
CLAUDICATION: 0
SORE THROAT: 0
DEPRESSION: 0
BLURRED VISION: 0
MYALGIAS: 0
ORTHOPNEA: 0
EYE PAIN: 0
TINGLING: 0
DIARRHEA: 0
MEMORY LOSS: 0
HEARTBURN: 0
CONSTIPATION: 1
NECK PAIN: 1

## 2017-08-09 NOTE — THERAPY
"Speech Language Therapy dysphagia treatment completed.   Functional Status:  Dysphagia therapy completed this date. Patient alert and awake sitting up in chair. She completed lingual (base of tongue strengthening; ROM) and laryngeal/pharyngeal exercises (Naheed x10, Mendelsohn x10, falsetto x10) with 100% accuracy. Patient consumed single ice chips PO trials x 5 with no overt signs of aspiration. Laryngeal elevation complete but reduced upon palpation. Swallow reflex still delayed. Patient was able to clear bolus today with each swallow (no need to spit out or use Yankauer). Thermal tactile stimulation completed using swabs dipped in ice and thickened cranberry juice to elicit multiple swallows. Educated patient regarding daily completion of swallow exercises. Packet of exercise instructions left at bedside.   Recommendations: 1) NPO/prefeeding trials with SLP only 2) Recommend continue laryngeal and lingual exercises with prefeeding trials.  Plan of Care: Will benefit from Speech Therapy 5 times per week  Post-Acute Therapy: Discharge to a transitional care facility for continued skilled therapy services.    See \"Rehab Therapy-Acute\" Patient Summary Report for complete documentation.     "

## 2017-08-09 NOTE — CARE PLAN
Problem: Nutritional:  Goal: Nutrition support tolerated and meeting greater than 85% of estimated needs  Outcome: MET Date Met:  08/09/17  TF @ goal: Replete with Fiber @ 80 ml/hr.

## 2017-08-09 NOTE — PROGRESS NOTES
Pt is AAOx4.  Moves upper extremities 5/5 and lower extremities 4/5.  Denies numbness or tingling.  Up with standby assist, steady gait.  Pt complains of 8 pain.  Morphine and oxycodone given PRN with positive results.  Pt denies nausea/vomiting.  Tolerating tube feeding diet with rate increased from 25 mL/hr to 50 mL/hr.  Pt voiding without difficulty.  Surgical incision/dressing CDI.  POC discussed, pt verbalizes understanding.  Bed alarm refused.  Call light within reach, bed in lowest position.

## 2017-08-09 NOTE — PROGRESS NOTES
[facility] Hospitalist Progress Note    Date of Service: 2017    Chief Complaint  51 y.o. female admitted 8/3/2017 with inability to swallow.    Interval Problem Update  Mrs. Faith is a 51-year-old female who recently had cervical surgery on 2017. Postoperatively the patient has developed swelling and tenderness in her throat. It progressed to the point where the patient was unable to swallow liquids or food. Patient was admitted and evaluated and treated with steroids.   She continues to work with [facility] and failed her last swallow eval yesterday.  She is asking if she could have the test done with coffee. Cont to have tube feeds via NGT.    Consultants/Specialty  Neurosurgery  Speech therapy    Disposition  To be determined        Review of Systems   Constitutional: Negative.  Negative for fever and diaphoresis.   HENT: Negative for sore throat.    Eyes: Negative.  Negative for blurred vision, pain and discharge.   Respiratory: Negative for cough and wheezing.    Cardiovascular: Negative.  Negative for chest pain, orthopnea and claudication.   Gastrointestinal: Negative.  Negative for heartburn, diarrhea and constipation.   Genitourinary: Negative.  Negative for dysuria, hematuria and flank pain.   Musculoskeletal: Positive for neck pain. Negative for myalgias.   Skin: Negative.  Negative for itching and rash.   Neurological: Negative.  Negative for dizziness, tingling, tremors, weakness and headaches.   Endo/Heme/Allergies: Negative.  Negative for environmental allergies and polydipsia. Does not bruise/bleed easily.   Psychiatric/Behavioral: Negative.  Negative for depression, suicidal ideas and memory loss.      Physical Exam  Laboratory/Imaging   Hemodynamics  Temp (24hrs), Av.3 °C (97.3 °F), Min:35.9 °C (96.6 °F), Max:36.6 °C (97.8 °F)   Temperature: 36.6 °C (97.8 °F)  Pulse  Av.9  Min: 50  Max: 89    Blood Pressure: 115/77 mmHg      Respiratory      Respiration: 16, Pulse Oximetry: 97 %     Work Of  Breathing / Effort: Mild  RUL Breath Sounds: Clear, RML Breath Sounds: Clear, RLL Breath Sounds: Diminished, BRIAN Breath Sounds: Clear, LLL Breath Sounds: Diminished    Fluids    Intake/Output Summary (Last 24 hours) at 08/08/17 2146  Last data filed at 08/08/17 1900   Gross per 24 hour   Intake   2005 ml   Output      0 ml   Net   2005 ml       Nutrition  Orders Placed This Encounter   Procedures   • DIET NPO     Standing Status: Standing      Number of Occurrences: 1      Standing Expiration Date:      Order Specific Question:  Restrict to:     Answer:  Strict [1]     Physical Exam   Constitutional: She is oriented to person, place, and time. She appears well-developed and well-nourished. Cervical collar in place.   HENT:   Head: Normocephalic and atraumatic.   Right Ear: External ear normal.   Left Ear: External ear normal.   Mouth/Throat: No oropharyngeal exudate.   Right side of the oropharynx remains swollen and inflamed   Eyes: Conjunctivae and EOM are normal. Pupils are equal, round, and reactive to light. Right eye exhibits no discharge. Left eye exhibits no discharge.   Neck: Normal range of motion. Neck supple. No JVD present.   Cardiovascular: Normal rate and regular rhythm.  Exam reveals no gallop and no friction rub.    Pulmonary/Chest: Effort normal. No stridor. No respiratory distress. She has no rales. She exhibits no tenderness.   Abdominal: Soft. Bowel sounds are normal. She exhibits no distension. There is no guarding.   Genitourinary:   Gloria catheter   Musculoskeletal: Normal range of motion.   Lymphadenopathy:     She has no cervical adenopathy.   Neurological: She is alert and oriented to person, place, and time. She has normal reflexes. She displays no atrophy. No sensory deficit. She exhibits normal muscle tone. Gait normal. GCS eye subscore is 4. GCS verbal subscore is 5. GCS motor subscore is 6.   Skin: Skin is warm and dry. No erythema.   Psychiatric: Her behavior is normal. Judgment and  thought content normal. Her mood appears anxious.   Nursing note and vitals reviewed.      Recent Labs      08/06/17   0942   WBC  9.4   RBC  4.39   HEMOGLOBIN  9.8*   HEMATOCRIT  32.5*   MCV  74.0*   MCH  22.3*   MCHC  30.2*   RDW  46.0   PLATELETCT  567*   MPV  9.4     Recent Labs      08/07/17   0242   SODIUM  139   POTASSIUM  4.0   CHLORIDE  109   CO2  23   GLUCOSE  106*   BUN  12   CREATININE  0.48*   CALCIUM  9.2                      Assessment/Plan     Status post cervical spinal fusion (present on admission)  Assessment & Plan  Neck swelling and neck pain have improved.  Remains in a cervical collar.  Remains on decadron for post-op swelling.  Continue with pain management.  Continue with physical therapy and occupational therapy.    Dysphagia (present on admission)  Assessment & Plan  Patient at this point has to go backwards with her treatment, she has failed the fees and she is aspirating all consistencies.  Cont feeding with core tract.  ST following but she cont to fail.    Hypothyroid (present on admission)  Assessment & Plan  -supportive measures with synthroid supplementation at 25 mcg per day, no change  -latest TSH is 0.670. Continue with low dose Synthroid supplementation.  -Give the Synthroid through the core tract    Hypokalemia (present on admission)  Assessment & Plan  Latest level is 4.0 no additional therapy indicated at this time.  Monitor labs periodically.        EKG reviewed, Radiology images reviewed, Labs reviewed and Medications reviewed            Ulcer prophylaxis: Yes    Assessed for rehab: Patient was assess for and/or received rehabilitation services during this hospitalization

## 2017-08-10 PROBLEM — E87.6 HYPOKALEMIA: Status: RESOLVED | Noted: 2017-08-04 | Resolved: 2017-08-10

## 2017-08-10 PROCEDURE — 700102 HCHG RX REV CODE 250 W/ 637 OVERRIDE(OP): Performed by: INTERNAL MEDICINE

## 2017-08-10 PROCEDURE — 99232 SBSQ HOSP IP/OBS MODERATE 35: CPT | Performed by: INTERNAL MEDICINE

## 2017-08-10 PROCEDURE — 700102 HCHG RX REV CODE 250 W/ 637 OVERRIDE(OP): Performed by: PHYSICIAN ASSISTANT

## 2017-08-10 PROCEDURE — A9270 NON-COVERED ITEM OR SERVICE: HCPCS | Performed by: PHYSICIAN ASSISTANT

## 2017-08-10 PROCEDURE — A9270 NON-COVERED ITEM OR SERVICE: HCPCS | Performed by: HOSPITALIST

## 2017-08-10 PROCEDURE — 700101 HCHG RX REV CODE 250: Performed by: INTERNAL MEDICINE

## 2017-08-10 PROCEDURE — 700102 HCHG RX REV CODE 250 W/ 637 OVERRIDE(OP): Performed by: HOSPITALIST

## 2017-08-10 PROCEDURE — 700111 HCHG RX REV CODE 636 W/ 250 OVERRIDE (IP): Performed by: INTERNAL MEDICINE

## 2017-08-10 PROCEDURE — A9270 NON-COVERED ITEM OR SERVICE: HCPCS | Performed by: INTERNAL MEDICINE

## 2017-08-10 PROCEDURE — 770001 HCHG ROOM/CARE - MED/SURG/GYN PRIV*

## 2017-08-10 PROCEDURE — 92526 ORAL FUNCTION THERAPY: CPT

## 2017-08-10 RX ORDER — DEXAMETHASONE SODIUM PHOSPHATE 4 MG/ML
4 INJECTION, SOLUTION INTRA-ARTICULAR; INTRALESIONAL; INTRAMUSCULAR; INTRAVENOUS; SOFT TISSUE EVERY 12 HOURS
Status: DISCONTINUED | OUTPATIENT
Start: 2017-08-10 | End: 2017-08-13 | Stop reason: HOSPADM

## 2017-08-10 RX ADMIN — POLYETHYLENE GLYCOL 3350 1 PACKET: 17 POWDER, FOR SOLUTION ORAL at 15:18

## 2017-08-10 RX ADMIN — MORPHINE SULFATE 4 MG: 4 INJECTION INTRAVENOUS at 12:31

## 2017-08-10 RX ADMIN — OMEPRAZOLE 20 MG: 20 CAPSULE, DELAYED RELEASE ORAL at 07:25

## 2017-08-10 RX ADMIN — MAGNESIUM HYDROXIDE 30 ML: 400 SUSPENSION ORAL at 05:50

## 2017-08-10 RX ADMIN — OXYCODONE HYDROCHLORIDE 10 MG: 10 TABLET ORAL at 07:23

## 2017-08-10 RX ADMIN — OXYCODONE HYDROCHLORIDE 10 MG: 10 TABLET ORAL at 19:41

## 2017-08-10 RX ADMIN — DEXAMETHASONE SODIUM PHOSPHATE 4 MG: 4 INJECTION, SOLUTION INTRAMUSCULAR; INTRAVENOUS at 05:50

## 2017-08-10 RX ADMIN — OXYCODONE HYDROCHLORIDE 10 MG: 10 TABLET ORAL at 15:13

## 2017-08-10 RX ADMIN — STANDARDIZED SENNA CONCENTRATE AND DOCUSATE SODIUM 2 TABLET: 8.6; 5 TABLET, FILM COATED ORAL at 07:25

## 2017-08-10 RX ADMIN — DEXAMETHASONE SODIUM PHOSPHATE 4 MG: 4 INJECTION, SOLUTION INTRAMUSCULAR; INTRAVENOUS at 11:19

## 2017-08-10 RX ADMIN — MORPHINE SULFATE 4 MG: 4 INJECTION INTRAVENOUS at 22:33

## 2017-08-10 RX ADMIN — POTASSIUM CHLORIDE AND SODIUM CHLORIDE: 900; 150 INJECTION, SOLUTION INTRAVENOUS at 11:19

## 2017-08-10 RX ADMIN — LEVOTHYROXINE SODIUM 25 MCG: 25 TABLET ORAL at 05:50

## 2017-08-10 RX ADMIN — DEXAMETHASONE SODIUM PHOSPHATE 4 MG: 4 INJECTION, SOLUTION INTRAMUSCULAR; INTRAVENOUS at 19:41

## 2017-08-10 RX ADMIN — OXYCODONE HYDROCHLORIDE 10 MG: 10 TABLET ORAL at 10:26

## 2017-08-10 RX ADMIN — OXYCODONE HYDROCHLORIDE 10 MG: 10 TABLET ORAL at 03:34

## 2017-08-10 RX ADMIN — STANDARDIZED SENNA CONCENTRATE AND DOCUSATE SODIUM 2 TABLET: 8.6; 5 TABLET, FILM COATED ORAL at 19:41

## 2017-08-10 RX ADMIN — MORPHINE SULFATE 4 MG: 4 INJECTION INTRAVENOUS at 17:09

## 2017-08-10 RX ADMIN — MORPHINE SULFATE 4 MG: 4 INJECTION INTRAVENOUS at 05:50

## 2017-08-10 ASSESSMENT — PAIN SCALES - GENERAL
PAINLEVEL_OUTOF10: 7
PAINLEVEL_OUTOF10: 6
PAINLEVEL_OUTOF10: 7
PAINLEVEL_OUTOF10: 6
PAINLEVEL_OUTOF10: 8

## 2017-08-10 ASSESSMENT — ENCOUNTER SYMPTOMS
CLAUDICATION: 0
ORTHOPNEA: 0
EYES NEGATIVE: 1
FLANK PAIN: 0
EYE PAIN: 0
BLURRED VISION: 0
DIZZINESS: 0
CONSTITUTIONAL NEGATIVE: 1
TREMORS: 0
FEVER: 0
COUGH: 0
CARDIOVASCULAR NEGATIVE: 1
TINGLING: 0
WEAKNESS: 0
DEPRESSION: 0
CONSTIPATION: 1
SORE THROAT: 0
PSYCHIATRIC NEGATIVE: 1
NEUROLOGICAL NEGATIVE: 1
HEADACHES: 0
BRUISES/BLEEDS EASILY: 0
MYALGIAS: 0
WHEEZING: 0
POLYDIPSIA: 0
MEMORY LOSS: 0
ABDOMINAL PAIN: 1
DIAPHORESIS: 0
NECK PAIN: 1
HEARTBURN: 0
DIARRHEA: 0
EYE DISCHARGE: 0

## 2017-08-10 NOTE — PROGRESS NOTES
Progress Note               Author: Malcom Gay Date & Time created: 8/10/2017  7:58 AM     Interval History:  S/p ACDF with dysphagia   Pt has mild neck pain  Denies arm pain  Voice is nearly normal  Has NG      Review of Systems:  Review of Systems   Musculoskeletal: Positive for neck pain.       Physical Exam:  Physical Exam   Constitutional: She is oriented to person, place, and time.   Neurological: She is alert and oriented to person, place, and time.   Motor 5/5  Voice strong   Wound c/d/i        Labs:        Invalid input(s): [id number]      Recent Labs      17   0253   SODIUM  138   POTASSIUM  4.1   CHLORIDE  104   CO2  27   BUN  15   CREATININE  0.48*   PHOSPHORUS  3.2   CALCIUM  9.1     Recent Labs      17   0253   GLUCOSE  122*     No results for input(s): RBC, HEMOGLOBIN, HEMATOCRIT, PLATELETCT, PROTHROMBTM, APTT, INR, IRON, FERRITIN, TOTIRONBC in the last 72 hours.      Hemodynamics:  Temp (24hrs), Av.5 °C (97.7 °F), Min:36.2 °C (97.2 °F), Max:36.7 °C (98.1 °F)  Temperature: 36.6 °C (97.9 °F)  Pulse  Av.6  Min: 50  Max: 89   Blood Pressure: 116/72 mmHg     Respiratory:    Respiration: 16, Pulse Oximetry: 95 %     Work Of Breathing / Effort: Mild  RUL Breath Sounds: Clear, RML Breath Sounds: Clear, RLL Breath Sounds: Diminished, BRIAN Breath Sounds: Clear, LLL Breath Sounds: Diminished  Fluids:    Intake/Output Summary (Last 24 hours) at 08/10/17 0758  Last data filed at 08/10/17 0604   Gross per 24 hour   Intake   2920 ml   Output      0 ml   Net   2920 ml        GI/Nutrition:  Orders Placed This Encounter   Procedures   • DIET NPO     Standing Status: Standing      Number of Occurrences: 1      Standing Expiration Date:      Order Specific Question:  Restrict to:     Answer:  Strict [1]     Medical Decision Making, by Problem:  Active Hospital Problems    Diagnosis   • Hypokalemia [E87.6]   • Status post cervical spinal fusion [Z98.1]   • Dysphagia [R13.10]   • Hypothyroid  [E03.9]       Plan:  Possible repeat swallow eval today  Would be hopeful to get NG out and start advancing the diet so that we can get the patient home  Continue steroids  Try to decrease IV morphine use, spoke with patient regarding this       Medications reviewed and Labs reviewed

## 2017-08-10 NOTE — PROGRESS NOTES
BS report received. Pt resting in chair, alert and oriented x4. Family at bedside. Pt c/o neck,back and hip pain, medicated per MAR. TF running at 80ml/hr, pt tolerates well. Denies nausea, vomiting or SOB at this time. Pt reports abdominal discomfort r/t hasn't had a BM in past couple of days. Hypoactive BS and pt reports not much passing flatus. Encouraged pt to mobilize often. Pt verbalized understanding. Pt declines medication intervention for BM at time but she wants to try tomorrow morning. Pt on RA, sating >90%. PIV running at 100mL/hr. POC discussed, verbalized understanding. Pt refuses SCDs despite education on DVT/VTE education. Bed low and locked. Call light and belonging within reach and pt calls appropriately. Fall precaution in effect. Hourly rounding in place.

## 2017-08-10 NOTE — THERAPY
"Speech Language Therapy dysphagia treatment completed.   Functional Status:  Slow improvements in swelling and swallow function though still presenting with significant difficulty and s/sx of aspiration on PO trials at bedside.   Recommendations: Repeat FEES Friday morning to see if pt able to tolerate any PO safely.    Plan of Care: Will benefit from Speech Therapy 5 times per week  Post-Acute Therapy: Discharge to a transitional care facility for continued skilled therapy services.    See \"Rehab Therapy-Acute\" Patient Summary Report for complete documentation.     "

## 2017-08-10 NOTE — PROGRESS NOTES
Bed control on and bed in locked in position. Bed alarm NOT on, patient educated regarding safety risk and continues to refuse. Call light and personal belongings within reach. Non skid socks on. Hourly rounding in place.

## 2017-08-10 NOTE — CARE PLAN
Problem: Respiratory:  Goal: Respiratory status will improve  Patient on RA, sating >90%. Respiration even and non labored. Denies SOB. Lung sounds are clear to upper lobes and diminished lower lobes. Encouraged pt coughing, turning and deep breathing as well as ambulation. Pt verbalized understanding.     Problem: Mobility  Goal: Risk for activity intolerance will decrease  Patient ambulates X SBA with steady gait. Non skid socks on.

## 2017-08-10 NOTE — PROGRESS NOTES
Pt is AAOx4.  Moves upper extremities 5/5 and lower extremities 4/5.  Denies numbness or tingling.  Up with standby assist, steady gait.  Pt complains of 8/10 pain.  Oxycodone and morphine given PRN with positive results.  Pt denies nausea/vomiting.  Tolerating tube feeding diet at 80 mL/hr.  Pt voiding without difficulty.  Surgical incision CDI.  POC discussed, pt verbalizes understanding.  Bed alarm refused.  Call light within reach, bed in lowest position.

## 2017-08-10 NOTE — PROGRESS NOTES
Pt refused bed alarm despite education. Instructed pt to call for assistance before getting OOB. Pt verbalized understanding. Call light w/in reach.

## 2017-08-10 NOTE — PROGRESS NOTES
[facility] Hospitalist Progress Note    Date of Service: 2017    Chief Complaint  51 y.o. female admitted 8/3/2017 with inability to swallow.    Interval Problem Update  Mrs. Faith is a 51-year-old female who recently had cervical surgery on 2017. Postoperatively the patient has developed swelling and tenderness in her throat. It progressed to the point where the patient was unable to swallow liquids or food. Patient was admitted and evaluated and treated with steroids.     She appears depressed today and states she is disappointed that she is failing her swallow eval.  Also, states she is feeling discomfort in the abdomen and hasn't had a BM in past couple days.    Consultants/Specialty  Neurosurgery  Speech therapy    Disposition  To be determined        Review of Systems   Constitutional: Negative.  Negative for fever and diaphoresis.   HENT: Negative for sore throat.    Eyes: Negative.  Negative for blurred vision, pain and discharge.   Respiratory: Negative for cough and wheezing.    Cardiovascular: Negative.  Negative for chest pain, orthopnea and claudication.   Gastrointestinal: Positive for abdominal pain and constipation. Negative for heartburn and diarrhea.   Genitourinary: Negative.  Negative for dysuria, hematuria and flank pain.   Musculoskeletal: Positive for neck pain. Negative for myalgias.   Skin: Negative.  Negative for itching and rash.   Neurological: Negative.  Negative for dizziness, tingling, tremors, weakness and headaches.   Endo/Heme/Allergies: Negative.  Negative for environmental allergies and polydipsia. Does not bruise/bleed easily.   Psychiatric/Behavioral: Negative.  Negative for depression, suicidal ideas and memory loss.      Physical Exam  Laboratory/Imaging   Hemodynamics  Temp (24hrs), Av.5 °C (97.7 °F), Min:36.2 °C (97.2 °F), Max:36.7 °C (98.1 °F)   Temperature: 36.4 °C (97.6 °F)  Pulse  Av.7  Min: 50  Max: 89    Blood Pressure: 118/62 mmHg      Respiratory       Respiration: 16, Pulse Oximetry: 96 %     Work Of Breathing / Effort: Mild  RUL Breath Sounds: Clear, RML Breath Sounds: Clear, RLL Breath Sounds: Diminished, BRIAN Breath Sounds: Clear, LLL Breath Sounds: Diminished    Fluids    Intake/Output Summary (Last 24 hours) at 08/09/17 2105  Last data filed at 08/09/17 1800   Gross per 24 hour   Intake   3315 ml   Output      0 ml   Net   3315 ml       Nutrition  Orders Placed This Encounter   Procedures   • DIET NPO     Standing Status: Standing      Number of Occurrences: 1      Standing Expiration Date:      Order Specific Question:  Restrict to:     Answer:  Strict [1]     Physical Exam   Constitutional: She is oriented to person, place, and time. She appears well-developed and well-nourished. Cervical collar in place.   HENT:   Head: Normocephalic and atraumatic.   Right Ear: External ear normal.   Left Ear: External ear normal.   Mouth/Throat: No oropharyngeal exudate.   Right side of the oropharynx remains swollen and inflamed   Eyes: Conjunctivae and EOM are normal. Pupils are equal, round, and reactive to light. Right eye exhibits no discharge. Left eye exhibits no discharge.   Neck: Normal range of motion. Neck supple. No JVD present.   Cardiovascular: Normal rate and regular rhythm.  Exam reveals no gallop and no friction rub.    Pulmonary/Chest: Effort normal. No stridor. No respiratory distress. She has no rales. She exhibits no tenderness.   Abdominal: Soft. Bowel sounds are normal. She exhibits no distension. There is no guarding.   Genitourinary:   Gloria catheter   Musculoskeletal: Normal range of motion.   Lymphadenopathy:     She has no cervical adenopathy.   Neurological: She is alert and oriented to person, place, and time. She has normal reflexes. She displays no atrophy. No sensory deficit. She exhibits normal muscle tone. Gait normal. GCS eye subscore is 4. GCS verbal subscore is 5. GCS motor subscore is 6.   Skin: Skin is warm and dry. No erythema.    Psychiatric: Her behavior is normal. Judgment and thought content normal. Her mood appears anxious.   Nursing note and vitals reviewed.          Recent Labs      08/07/17   0242  08/09/17   0253   SODIUM  139  138   POTASSIUM  4.0  4.1   CHLORIDE  109  104   CO2  23  27   GLUCOSE  106*  122*   BUN  12  15   CREATININE  0.48*  0.48*   CALCIUM  9.2  9.1                      Assessment/Plan     Status post cervical spinal fusion (present on admission)  Assessment & Plan  Neck swelling and neck pain have improved.  Remains in a cervical collar.  Remains on decadron for post-op swelling.  Continue with pain management.  Continue with physical therapy and occupational therapy.    Dysphagia (present on admission)  Assessment & Plan  Patient at this point has to go backwards with her treatment, she has failed the fees and she is aspirating all consistencies.  Cont feeding with core tract.  ST following but she cont to fail.  If this persists she might require PEG placement.     Hypothyroid (present on admission)  Assessment & Plan  -supportive measures with synthroid supplementation at 25 mcg per day, no change  -latest TSH is 0.670. Continue with low dose Synthroid supplementation.  -Give the Synthroid through the core tract    Hypokalemia (present on admission)  Assessment & Plan  Latest level is 4.1 no additional therapy indicated at this time.  Monitor labs periodically.        EKG reviewed, Radiology images reviewed, Labs reviewed and Medications reviewed            Ulcer prophylaxis: Yes    Assessed for rehab: Patient was assess for and/or received rehabilitation services during this hospitalization

## 2017-08-10 NOTE — CARE PLAN
Problem: Pain Management  Goal: Pain level will decrease to patient’s comfort goal  Outcome: PROGRESSING AS EXPECTED  Discussed pain management POC, pt verbalized understanding.  Controlled pain with PRN medication and assessed pain Q2hr.  Encouraged frequent ambulation.    Problem: Safety  Goal: Will remain free from injury  Outcome: PROGRESSING AS EXPECTED  Reviewed the need to call for assistance with ambulation, pt verbalized understanding.  Collaborated with the CNA to help provide assistance to the pt.

## 2017-08-10 NOTE — PROGRESS NOTES
AAOX4, SMITH   Denies N/T  Up with SBA    Hard collar in place  NPO, cortrak in place w TF running at goal  Pt complaining of neck and R hip pain at  6/10   Medicated per mar   Voiding without difficulty, no BM since 8/7, ambulation encouraged, poc discussed w pt for possible laxative later today  Treaded socks on. Hourly rounding. Call light within reach.

## 2017-08-10 NOTE — PROGRESS NOTES
[facility] Hospitalist Progress Note    Date of Service: 8/10/2017    Chief Complaint  51 y.o. female admitted 8/3/2017 with inability to swallow.    Interval Problem Update  Mrs. Faith is a 51-year-old female who recently had cervical surgery on 2017. Postoperatively the patient has developed swelling and tenderness in her throat. It progressed to the point where the patient was unable to swallow liquids or food. Patient was admitted and evaluated and treated with steroids.     She is stating that she feels better today. No longer c/o abdominal pain. Feels the swellingi n the neck is improved.     Consultants/Specialty  Neurosurgery  Speech therapy    Disposition  To be determined        Review of Systems   Constitutional: Negative.  Negative for fever and diaphoresis.   HENT: Negative for sore throat.    Eyes: Negative.  Negative for blurred vision, pain and discharge.   Respiratory: Negative for cough and wheezing.    Cardiovascular: Negative.  Negative for chest pain, orthopnea and claudication.   Gastrointestinal: Positive for abdominal pain and constipation. Negative for heartburn and diarrhea.   Genitourinary: Negative.  Negative for dysuria, hematuria and flank pain.   Musculoskeletal: Positive for neck pain. Negative for myalgias.   Skin: Negative.  Negative for itching and rash.   Neurological: Negative.  Negative for dizziness, tingling, tremors, weakness and headaches.   Endo/Heme/Allergies: Negative.  Negative for environmental allergies and polydipsia. Does not bruise/bleed easily.   Psychiatric/Behavioral: Negative.  Negative for depression, suicidal ideas and memory loss.      Physical Exam  Laboratory/Imaging   Hemodynamics  Temp (24hrs), Av.5 °C (97.7 °F), Min:36.3 °C (97.3 °F), Max:36.7 °C (98.1 °F)   Temperature: 36.3 °C (97.3 °F)  Pulse  Av.8  Min: 50  Max: 89    Blood Pressure: 121/79 mmHg      Respiratory      Respiration: 18, Pulse Oximetry: 97 %     Work Of Breathing / Effort:  Mild  RUL Breath Sounds: Clear, RML Breath Sounds: Clear, RLL Breath Sounds: Diminished, BRIAN Breath Sounds: Clear, LLL Breath Sounds: Diminished    Fluids    Intake/Output Summary (Last 24 hours) at 08/10/17 1155  Last data filed at 08/10/17 0604   Gross per 24 hour   Intake   2790 ml   Output      0 ml   Net   2790 ml       Nutrition  Orders Placed This Encounter   Procedures   • DIET NPO     Standing Status: Standing      Number of Occurrences: 1      Standing Expiration Date:      Order Specific Question:  Restrict to:     Answer:  Strict [1]     Physical Exam   Constitutional: She is oriented to person, place, and time. She appears well-developed and well-nourished. Cervical collar in place.   HENT:   Head: Normocephalic and atraumatic.   Right Ear: External ear normal.   Left Ear: External ear normal.   Mouth/Throat: No oropharyngeal exudate.   Right side of the oropharynx swelling has resolved   Eyes: Conjunctivae and EOM are normal. Pupils are equal, round, and reactive to light. Right eye exhibits no discharge. Left eye exhibits no discharge.   Neck: Normal range of motion. Neck supple. No JVD present.   Cardiovascular: Normal rate and regular rhythm.  Exam reveals no gallop and no friction rub.    Pulmonary/Chest: Effort normal. No stridor. No respiratory distress. She has no rales. She exhibits no tenderness.   Abdominal: Soft. Bowel sounds are normal. She exhibits no distension. There is no guarding.   Musculoskeletal: Normal range of motion.   Lymphadenopathy:     She has no cervical adenopathy.   Neurological: She is alert and oriented to person, place, and time. She has normal reflexes. She displays no atrophy. No sensory deficit. She exhibits normal muscle tone. Gait normal. GCS eye subscore is 4. GCS verbal subscore is 5. GCS motor subscore is 6.   Aspen collar on   Skin: Skin is warm and dry. No erythema.   Psychiatric: Her behavior is normal. Judgment and thought content normal. Her mood appears  anxious.   Nursing note and vitals reviewed.          Recent Labs      08/09/17   0253   SODIUM  138   POTASSIUM  4.1   CHLORIDE  104   CO2  27   GLUCOSE  122*   BUN  15   CREATININE  0.48*   CALCIUM  9.1                      Assessment/Plan     Status post cervical spinal fusion (present on admission)  Assessment & Plan  Neck swelling and neck pain have improved.  Remains in a cervical collar.  Remains on decadron for post-op swelling. Tapering the dose to 4mg BID.  Continue with pain management.  Continue with physical therapy and occupational therapy.    Dysphagia (present on admission)  Assessment & Plan  Patient at this point has to go backwards with her treatment, she has failed the fees and she is aspirating all consistencies.  Cont feeding with core tract.  ST following but she cont to fail. Plan for visualization with scope tomorrow.  I will talk to Jatin ORTA.  If this persists she might require PEG placement.     Hypothyroid (present on admission)  Assessment & Plan  -supportive measures with synthroid supplementation at 25 mcg per day, no change  -latest TSH is 0.670. Continue with low dose Synthroid supplementation.  -Give the Synthroid through the core tract      EKG reviewed, Radiology images reviewed, Labs reviewed and Medications reviewed            Ulcer prophylaxis: Yes    Assessed for rehab: Patient was assess for and/or received rehabilitation services during this hospitalization

## 2017-08-11 PROCEDURE — 700111 HCHG RX REV CODE 636 W/ 250 OVERRIDE (IP): Performed by: INTERNAL MEDICINE

## 2017-08-11 PROCEDURE — 92612 ENDOSCOPY SWALLOW (FEES) VID: CPT

## 2017-08-11 PROCEDURE — 770001 HCHG ROOM/CARE - MED/SURG/GYN PRIV*

## 2017-08-11 PROCEDURE — G8997 SWALLOW GOAL STATUS: HCPCS | Mod: CI

## 2017-08-11 PROCEDURE — 99232 SBSQ HOSP IP/OBS MODERATE 35: CPT | Performed by: INTERNAL MEDICINE

## 2017-08-11 PROCEDURE — 700102 HCHG RX REV CODE 250 W/ 637 OVERRIDE(OP): Performed by: PHYSICIAN ASSISTANT

## 2017-08-11 PROCEDURE — 92526 ORAL FUNCTION THERAPY: CPT

## 2017-08-11 PROCEDURE — A9270 NON-COVERED ITEM OR SERVICE: HCPCS | Performed by: PHYSICIAN ASSISTANT

## 2017-08-11 PROCEDURE — 700102 HCHG RX REV CODE 250 W/ 637 OVERRIDE(OP): Performed by: INTERNAL MEDICINE

## 2017-08-11 PROCEDURE — 700102 HCHG RX REV CODE 250 W/ 637 OVERRIDE(OP): Performed by: HOSPITALIST

## 2017-08-11 PROCEDURE — A9270 NON-COVERED ITEM OR SERVICE: HCPCS | Performed by: HOSPITALIST

## 2017-08-11 PROCEDURE — A9270 NON-COVERED ITEM OR SERVICE: HCPCS | Performed by: INTERNAL MEDICINE

## 2017-08-11 PROCEDURE — G8996 SWALLOW CURRENT STATUS: HCPCS | Mod: CJ

## 2017-08-11 RX ADMIN — STANDARDIZED SENNA CONCENTRATE AND DOCUSATE SODIUM 2 TABLET: 8.6; 5 TABLET, FILM COATED ORAL at 19:39

## 2017-08-11 RX ADMIN — OXYCODONE HYDROCHLORIDE 10 MG: 10 TABLET ORAL at 15:27

## 2017-08-11 RX ADMIN — ONDANSETRON 4 MG: 2 INJECTION INTRAMUSCULAR; INTRAVENOUS at 16:38

## 2017-08-11 RX ADMIN — MORPHINE SULFATE 4 MG: 4 INJECTION INTRAVENOUS at 09:09

## 2017-08-11 RX ADMIN — OXYCODONE HYDROCHLORIDE 10 MG: 10 TABLET ORAL at 11:54

## 2017-08-11 RX ADMIN — MAGNESIUM HYDROXIDE 30 ML: 400 SUSPENSION ORAL at 06:13

## 2017-08-11 RX ADMIN — LEVOTHYROXINE SODIUM 25 MCG: 25 TABLET ORAL at 05:29

## 2017-08-11 RX ADMIN — DEXAMETHASONE SODIUM PHOSPHATE 4 MG: 4 INJECTION, SOLUTION INTRAMUSCULAR; INTRAVENOUS at 08:50

## 2017-08-11 RX ADMIN — OXYCODONE HYDROCHLORIDE 10 MG: 10 TABLET ORAL at 03:29

## 2017-08-11 RX ADMIN — OXYCODONE HYDROCHLORIDE 10 MG: 10 TABLET ORAL at 19:39

## 2017-08-11 RX ADMIN — DEXAMETHASONE SODIUM PHOSPHATE 4 MG: 4 INJECTION, SOLUTION INTRAMUSCULAR; INTRAVENOUS at 19:39

## 2017-08-11 RX ADMIN — OMEPRAZOLE 20 MG: 20 CAPSULE, DELAYED RELEASE ORAL at 08:50

## 2017-08-11 RX ADMIN — OXYCODONE HYDROCHLORIDE 10 MG: 10 TABLET ORAL at 06:47

## 2017-08-11 RX ADMIN — ONDANSETRON 4 MG: 4 TABLET, ORALLY DISINTEGRATING ORAL at 23:18

## 2017-08-11 RX ADMIN — STANDARDIZED SENNA CONCENTRATE AND DOCUSATE SODIUM 2 TABLET: 8.6; 5 TABLET, FILM COATED ORAL at 08:50

## 2017-08-11 RX ADMIN — OXYCODONE HYDROCHLORIDE 10 MG: 10 TABLET ORAL at 23:18

## 2017-08-11 RX ADMIN — MORPHINE SULFATE 4 MG: 4 INJECTION INTRAVENOUS at 16:34

## 2017-08-11 ASSESSMENT — PAIN SCALES - GENERAL
PAINLEVEL_OUTOF10: 8
PAINLEVEL_OUTOF10: 6
PAINLEVEL_OUTOF10: 0
PAINLEVEL_OUTOF10: 7
PAINLEVEL_OUTOF10: 8
PAINLEVEL_OUTOF10: 7
PAINLEVEL_OUTOF10: 6
PAINLEVEL_OUTOF10: 5
PAINLEVEL_OUTOF10: 7
PAINLEVEL_OUTOF10: 8
PAINLEVEL_OUTOF10: 6

## 2017-08-11 ASSESSMENT — ENCOUNTER SYMPTOMS
NECK PAIN: 1
EYE PAIN: 0
TREMORS: 0
CARDIOVASCULAR NEGATIVE: 1
SORE THROAT: 0
DIZZINESS: 0
TINGLING: 0
HEADACHES: 0
DEPRESSION: 0
BLURRED VISION: 0
ABDOMINAL PAIN: 0
HEARTBURN: 0
FLANK PAIN: 0
MEMORY LOSS: 0
CLAUDICATION: 0
WEAKNESS: 0
FEVER: 0
EYE DISCHARGE: 0
EYES NEGATIVE: 1
COUGH: 0
PSYCHIATRIC NEGATIVE: 1
WHEEZING: 0
CONSTITUTIONAL NEGATIVE: 1
CONSTIPATION: 0
BRUISES/BLEEDS EASILY: 0
DIAPHORESIS: 0
POLYDIPSIA: 0
NEUROLOGICAL NEGATIVE: 1
DIARRHEA: 0
ORTHOPNEA: 0
MYALGIAS: 0

## 2017-08-11 ASSESSMENT — LIFESTYLE VARIABLES: DO YOU DRINK ALCOHOL: NO

## 2017-08-11 NOTE — CARE PLAN
Problem: Communication  Goal: The ability to communicate needs accurately and effectively will improve  Patient communicates needs appropriately to nursing staff. Patient calls appropriately.    Problem: Safety  Goal: Will remain free from falls  Room/floor clear. Non skid socks on. Proper signs up. Bed low and locked. Call light and belonging within reach. Hourly rounding in place.

## 2017-08-11 NOTE — DIETARY
WEEKLY TF UPDATE - TF via [employer]: Replete with Fiber @ 80 ml/hr, providing 1920 kcals, 120 grams protein, 1613 mL free water per day. TF running @ goal. RN states pt is currently having heart burn. Pt had FEES done today and started on full liquid, nectar thick diet. No PO documented yet. TF is meeting estimated nutritional needs.     Pertinent Labs 8/9: Glucose 122, Creatinine 0.48  Pertinent Meds: decadron, synthroid, prilosec, pericolace, roxicodone (prn), morphine (prn), milk of magnesia (prn)  Fluids: 250 ml free water flushes Q4 hours  GI: Last BM 8/7  Wt 8/5: 114.9 kg via stand up scale  SKIN: surgical incision cervical anterior    PLAN/RECOMMEND - Continue current TF regimen with no changes. Fluids per MD. Encourage PO. Advance diet per SLP and MD.     RD following.

## 2017-08-11 NOTE — THERAPY
"  Speech Language Therapy FEES completed    Functional Status: Pt with significant improvement in swallow function compared to FEES Monday (severe pharyngeal-esophageal dysphagia on Monday, now with mild-moderate dysphagia). R posterior pharyngeal swelling significantly improved and R pyriform sinus now able to be visualized. Pt did have penetration with thin liquids but no ja aspiration noted throughout study. Upper esophageal sphincter appears much more functional. Functional swallow to start NTFL diet and ice chips prn.    Recommendations - Diet: Nectar Thick Full Liquid                          Strategies: Monitor during meals and Head of Bed at 90 Degrees, 3x swallow per bite, alternate solids and liquids.                           Medication Administration: Crush all Medications in Puree  Plan of Care: Will benefit from Speech Therapy 3 times per week  Post-Acute Therapy: Discharge to home with outpatient or home health for additional skilled therapy services.    See \"Rehab Therapy-Acute\" Patient Summary Report for complete documentation.   "

## 2017-08-11 NOTE — PROGRESS NOTES
BS report received. Pt resting in chair, alert and oriented x4. Pt c/o neck and hip pain, medicated per MAR. Surgical incision to neck intact with steri strips. C-spine collar in place. Denies nausea, vomiting or SOB at this time. Up with SBA. TF running at 80mL/hr, tolerates well. On RA, sating >90%. PIV SL.  POC discussed, verbalized understanding. Bed low and locked, bed alarm on. Call light and belonging within reach and demonstrated use of call light. Fall precaution in effect. Hourly rounding in place.

## 2017-08-11 NOTE — PROGRESS NOTES
Patient c/o indigestion medicated with due med and PRN pain medication with effective results. Speech to advance diet. Tolerating tube feedings well. Started today on full liquid nectar thick. Tolerating well. Stopped NGT feeding an D/C cortrak. Had to medicate for nausea after NGT d/c. Patient remains safe. refused bed alarm. Ambulating hallways. no BM this shift but states is passing flatus. Refused any other PRN laxatives. VSS. afebrile. Call bell within reach

## 2017-08-11 NOTE — PROGRESS NOTES
Pt refused suppository this morning but wanted to try another milk of brayden. Milk of brayden given. Encouraged pt to ambulate more often, pt verbalized understanding.

## 2017-08-12 PROBLEM — R13.10 DYSPHAGIA: Status: RESOLVED | Noted: 2017-08-03 | Resolved: 2017-08-12

## 2017-08-12 PROCEDURE — 700102 HCHG RX REV CODE 250 W/ 637 OVERRIDE(OP): Performed by: INTERNAL MEDICINE

## 2017-08-12 PROCEDURE — A9270 NON-COVERED ITEM OR SERVICE: HCPCS | Performed by: INTERNAL MEDICINE

## 2017-08-12 PROCEDURE — 770001 HCHG ROOM/CARE - MED/SURG/GYN PRIV*

## 2017-08-12 PROCEDURE — 700102 HCHG RX REV CODE 250 W/ 637 OVERRIDE(OP): Performed by: PHYSICIAN ASSISTANT

## 2017-08-12 PROCEDURE — 700102 HCHG RX REV CODE 250 W/ 637 OVERRIDE(OP): Performed by: HOSPITALIST

## 2017-08-12 PROCEDURE — A9270 NON-COVERED ITEM OR SERVICE: HCPCS | Performed by: HOSPITALIST

## 2017-08-12 PROCEDURE — A9270 NON-COVERED ITEM OR SERVICE: HCPCS | Performed by: PHYSICIAN ASSISTANT

## 2017-08-12 PROCEDURE — 700111 HCHG RX REV CODE 636 W/ 250 OVERRIDE (IP): Performed by: INTERNAL MEDICINE

## 2017-08-12 PROCEDURE — 99239 HOSP IP/OBS DSCHRG MGMT >30: CPT | Performed by: INTERNAL MEDICINE

## 2017-08-12 RX ORDER — DEXAMETHASONE 0.5 MG/1
4 TABLET ORAL DAILY
Qty: 24 TAB | Refills: 0 | Status: SHIPPED | OUTPATIENT
Start: 2017-08-12 | End: 2017-08-12

## 2017-08-12 RX ORDER — DEXAMETHASONE 0.5 MG/1
2 TABLET ORAL 2 TIMES DAILY
Qty: 40 TAB | Refills: 0 | Status: SHIPPED | OUTPATIENT
Start: 2017-08-17 | End: 2017-08-12

## 2017-08-12 RX ORDER — DEXAMETHASONE 0.5 MG/1
4 TABLET ORAL DAILY
Qty: 24 TAB | Refills: 0 | Status: SHIPPED | OUTPATIENT
Start: 2017-08-12 | End: 2017-08-15

## 2017-08-12 RX ORDER — DEXAMETHASONE 0.5 MG/1
2 TABLET ORAL DAILY
Qty: 20 TAB | Refills: 0 | Status: SHIPPED | OUTPATIENT
Start: 2017-08-16 | End: 2017-08-21

## 2017-08-12 RX ORDER — CYCLOBENZAPRINE HCL 10 MG
5 TABLET ORAL 3 TIMES DAILY PRN
Status: DISCONTINUED | OUTPATIENT
Start: 2017-08-12 | End: 2017-08-13 | Stop reason: HOSPADM

## 2017-08-12 RX ORDER — OMEPRAZOLE 20 MG/1
20 CAPSULE, DELAYED RELEASE ORAL DAILY
Status: DISCONTINUED | OUTPATIENT
Start: 2017-08-12 | End: 2017-08-13 | Stop reason: HOSPADM

## 2017-08-12 RX ADMIN — DEXAMETHASONE SODIUM PHOSPHATE 4 MG: 4 INJECTION, SOLUTION INTRAMUSCULAR; INTRAVENOUS at 19:18

## 2017-08-12 RX ADMIN — OXYCODONE HYDROCHLORIDE 10 MG: 10 TABLET ORAL at 06:10

## 2017-08-12 RX ADMIN — OXYCODONE HYDROCHLORIDE 10 MG: 10 TABLET ORAL at 12:49

## 2017-08-12 RX ADMIN — OXYCODONE HYDROCHLORIDE 10 MG: 10 TABLET ORAL at 09:21

## 2017-08-12 RX ADMIN — CYCLOBENZAPRINE 5 MG: 10 TABLET, FILM COATED ORAL at 19:18

## 2017-08-12 RX ADMIN — STANDARDIZED SENNA CONCENTRATE AND DOCUSATE SODIUM 2 TABLET: 8.6; 5 TABLET, FILM COATED ORAL at 19:18

## 2017-08-12 RX ADMIN — OMEPRAZOLE 20 MG: 20 CAPSULE, DELAYED RELEASE ORAL at 09:22

## 2017-08-12 RX ADMIN — MORPHINE SULFATE 4 MG: 4 INJECTION INTRAVENOUS at 10:36

## 2017-08-12 RX ADMIN — STANDARDIZED SENNA CONCENTRATE AND DOCUSATE SODIUM 2 TABLET: 8.6; 5 TABLET, FILM COATED ORAL at 09:22

## 2017-08-12 RX ADMIN — CYCLOBENZAPRINE 5 MG: 10 TABLET, FILM COATED ORAL at 12:09

## 2017-08-12 RX ADMIN — BISACODYL 10 MG: 10 SUPPOSITORY RECTAL at 15:49

## 2017-08-12 RX ADMIN — OXYCODONE HYDROCHLORIDE 10 MG: 10 TABLET ORAL at 19:18

## 2017-08-12 RX ADMIN — LEVOTHYROXINE SODIUM 25 MCG: 25 TABLET ORAL at 06:10

## 2017-08-12 RX ADMIN — DEXAMETHASONE SODIUM PHOSPHATE 4 MG: 4 INJECTION, SOLUTION INTRAMUSCULAR; INTRAVENOUS at 09:21

## 2017-08-12 RX ADMIN — OXYCODONE HYDROCHLORIDE 10 MG: 10 TABLET ORAL at 15:49

## 2017-08-12 RX ADMIN — MAGNESIUM HYDROXIDE 30 ML: 400 SUSPENSION ORAL at 07:13

## 2017-08-12 RX ADMIN — OXYCODONE HYDROCHLORIDE 10 MG: 10 TABLET ORAL at 03:10

## 2017-08-12 ASSESSMENT — ENCOUNTER SYMPTOMS
WEAKNESS: 0
ORTHOPNEA: 0
CONSTIPATION: 0
HEARTBURN: 0
DEPRESSION: 0
MEMORY LOSS: 0
DIZZINESS: 0
CARDIOVASCULAR NEGATIVE: 1
EYES NEGATIVE: 1
FEVER: 0
WHEEZING: 0
TINGLING: 0
SORE THROAT: 0
ABDOMINAL PAIN: 0
COUGH: 0
DIARRHEA: 0
NEUROLOGICAL NEGATIVE: 1
PSYCHIATRIC NEGATIVE: 1
CONSTITUTIONAL NEGATIVE: 1
BLURRED VISION: 0
NECK PAIN: 1
MYALGIAS: 0
HEADACHES: 0
TREMORS: 0

## 2017-08-12 ASSESSMENT — PAIN SCALES - GENERAL
PAINLEVEL_OUTOF10: 6
PAINLEVEL_OUTOF10: 8
PAINLEVEL_OUTOF10: 6
PAINLEVEL_OUTOF10: 7
PAINLEVEL_OUTOF10: 3
PAINLEVEL_OUTOF10: 8
PAINLEVEL_OUTOF10: 3
PAINLEVEL_OUTOF10: 7
PAINLEVEL_OUTOF10: 5
PAINLEVEL_OUTOF10: 3

## 2017-08-12 ASSESSMENT — LIFESTYLE VARIABLES: DO YOU DRINK ALCOHOL: NO

## 2017-08-12 NOTE — PROGRESS NOTES
Patient c/o pain to back of neck medicated with most relief. Needed to give IV pain medication for breakthrough once. MD aware. Ambulated hallways independently. Noted steady gait no need for assistive devices. Patient remains safe. Refused bed alarm aspen collar on at all times.no BM this shift. Patient did take suppository with no result.  VSS. Afebrile. Call bell within reach

## 2017-08-12 NOTE — PROGRESS NOTES
[facility] Hospitalist Progress Note    Date of Service: 2017    Chief Complaint  51 y.o. female admitted 8/3/2017 with inability to swallow.    Interval Problem Update  Mrs. Faith is a 51-year-old female who recently had cervical surgery on 2017. Postoperatively the patient has developed swelling and tenderness in her throat. It progressed to the point where the patient was unable to swallow liquids or food. Patient was admitted and evaluated and treated with steroids.     Tolerating the nectar thick diet. Experiencing spasm like pain in her neck.     Consultants/Specialty  Neurosurgery  Speech therapy    Disposition  Pending recommendations from  on further post discharge care/plan        Review of Systems   Constitutional: Negative.  Negative for fever.   HENT: Negative for sore throat.    Eyes: Negative.  Negative for blurred vision.   Respiratory: Negative for cough and wheezing.    Cardiovascular: Negative.  Negative for chest pain and orthopnea.   Gastrointestinal: Negative for heartburn, abdominal pain, diarrhea and constipation.   Genitourinary: Negative.  Negative for dysuria.   Musculoskeletal: Positive for neck pain. Negative for myalgias.        Neck spasms   Skin: Negative.  Negative for rash.   Neurological: Negative.  Negative for dizziness, tingling, tremors, weakness and headaches.   Endo/Heme/Allergies: Negative.    Psychiatric/Behavioral: Negative.  Negative for depression, suicidal ideas and memory loss.      Physical Exam  Laboratory/Imaging   Hemodynamics  Temp (24hrs), Av.3 °C (97.4 °F), Min:36 °C (96.8 °F), Max:36.6 °C (97.9 °F)   Temperature: 36.6 °C (97.9 °F)  Pulse  Av.9  Min: 50  Max: 89    Blood Pressure: 122/80 mmHg      Respiratory      Respiration: 18, Pulse Oximetry: 96 %     Work Of Breathing / Effort: Mild  RUL Breath Sounds: Clear, RML Breath Sounds: Clear, RLL Breath Sounds: Diminished, BRIAN Breath Sounds: Clear, LLL Breath Sounds:  Diminished    Fluids    Intake/Output Summary (Last 24 hours) at 08/12/17 1131  Last data filed at 08/12/17 0611   Gross per 24 hour   Intake   1040 ml   Output      0 ml   Net   1040 ml       Nutrition  Orders Placed This Encounter   Procedures   • DIET ORDER     Standing Status: Standing      Number of Occurrences: 1      Standing Expiration Date:      Order Specific Question:  Diet:     Answer:  Full Liquid [11]     Order Specific Question:  Consistency/Fluid modifications:     Answer:  Nectar Thick [2]     Physical Exam   Constitutional: She is oriented to person, place, and time. She appears well-developed and well-nourished. Cervical collar in place.   HENT:   Head: Normocephalic and atraumatic.   Right Ear: External ear normal.   Left Ear: External ear normal.   Mouth/Throat: No oropharyngeal exudate.   Eyes: Conjunctivae and EOM are normal. Pupils are equal, round, and reactive to light. Right eye exhibits no discharge. Left eye exhibits no discharge.   Neck: Normal range of motion. Neck supple. No JVD present.   Cardiovascular: Normal rate and regular rhythm.  Exam reveals no gallop and no friction rub.    Pulmonary/Chest: Effort normal. No stridor. No respiratory distress. She has no rales. She exhibits no tenderness.   Abdominal: Soft. Bowel sounds are normal. She exhibits no distension. There is no guarding.   Musculoskeletal: Normal range of motion.   Lymphadenopathy:     She has no cervical adenopathy.   Neurological: She is alert and oriented to person, place, and time. She has normal reflexes. She displays no atrophy. No sensory deficit. She exhibits normal muscle tone. Gait normal. GCS eye subscore is 4. GCS verbal subscore is 5. GCS motor subscore is 6.   Aspen collar on   Skin: Skin is warm and dry. No erythema.   Psychiatric: Her behavior is normal. Judgment and thought content normal. Her mood appears anxious.   Nursing note and vitals reviewed.                               Assessment/Plan      Status post cervical spinal fusion (present on admission)  Assessment & Plan  Neck swelling and neck pain have improved.  Remains in a cervical collar.  Remains on decadron for post-op swelling. Tapering the steroids.  Continue with pain management.  Continue with physical therapy and occupational therapy.  Continue with Speech therapy.     Dysphagia (present on admission)  Assessment & Plan  Swelling resolving  Passed swallow eval  Started on nectar thick diet and tolerating  ST exercises    Hypothyroid (present on admission)  Assessment & Plan  -supportive measures with synthroid supplementation at 25 mcg per day, no change  -latest TSH is 0.670. Continue with low dose Synthroid supplementation.  -Give the Synthroid through the core tract      EKG reviewed, Radiology images reviewed, Labs reviewed and Medications reviewed            Ulcer prophylaxis: Yes    Assessed for rehab: Patient was assess for and/or received rehabilitation services during this hospitalization

## 2017-08-12 NOTE — DISCHARGE SUMMARY
CHIEF COMPLAINT ON ADMISSION  Chief Complaint   Patient presents with   • Difficulty Breathing   • Difficulty Swallowing   • Post-Op Complications     yesterday anterior neck fusion 3-5       CODE STATUS  Full Code    HPI & HOSPITAL COURSE  Mrs. Faith is a 52 YO female with history of bipolar 2, depressions, anxiety, and bronchitis who recently had cervical surgery on 8/2/2017. She was send home safely post-op.  Postoperatively the patient developed swelling and tenderness in her throat and required to come back to the hospital. Her swelling progressed to the point where the patient was unable to swallow liquids or food. Her CT scan of the neck showed swelling of the right side of her neck. Patient was admitted for treatment with steroids. She was started on IV medication and a core tract was placed for tube feeds. Patient was evaluated by speech therapy and initially failing her swallow evaluation, however, after doing exercised with speech finally on 08/11/17 she passed her eval for nectar thick diet. She tolerated PO feeds and tube feeds as well as the core tract was discontinued.   Speech therapy cleared patient for discharge on nectar thick, patient was educated on how to thicken her diet.  Order for  for home speech eval was set up.     On the day of discharge patient was seen and examined by me. Patient is stable with stable vitals. Patient denies any symptoms of abdominal pain, nausea, vomiting, HA or dizziness. Patient ambulating without any difficulty. Patient eating, drinking, urinating, and having normal bowel movements. Patient agrees to go home.     Therefore, she is discharged in good and stable condition with close outpatient follow-up.    SPECIFIC OUTPATIENT FOLLOW-UP  -Follow up with primary care doctor as soon as able to make an appointment.   -Follow up with Dr. Herrera as soon as she is able to make apt.  -She requires to return to the ED if any new signs of swelling or if any signs of aspiration  and difficulty swallowing.   -Return to the Emergency Department if any of the symptoms worsens or any new symptoms.       DISCHARGE PROBLEM LIST  Active Problems:    Status post cervical spinal fusion POA: Yes    Hypothyroid POA: Yes  Resolved Problems:    Dysphagia POA: Yes    Hypokalemia POA: Yes      FOLLOW UP  No future appointments.  [facility], Emergency Dept  [address]  [place] [address]  [phone]    If symptoms worsen    aDllas Bolivar PA-C  [address] [facility] [address]  [phone]    In 1 week      Micha Herrera M.D.  [address]  [facility] [address]  [phone]    Schedule an appointment as soon as possible for a visit        MEDICATIONS ON DISCHARGE   Smita Faith   Home Medication Instructions JUSTINA:[id number]    Printed on:08/12/17 6391   Medication Information                      albuterol 108 (90 BASE) MCG/ACT Aero Soln inhalation aerosol  Inhale 2 Puffs by mouth every 6 hours as needed for Shortness of Breath.             Calcium 0719-4740 MG-UNIT CHEW  Take 1 Each by mouth every day.             Cholecalciferol (VITAMIN D) 1000 UNIT Cap  Take 1,000 Units by mouth every day.             Coenzyme Q10 (COQ-10 PO)  Take 1 Tab by mouth every day.             dexamethasone (DECADRON) 0.5 MG Tab  Take 8 Tabs by mouth every day for 3 days.             dexamethasone (DECADRON) 0.5 MG Tab  Take 4 Tabs by mouth 2 times a day for 5 days.             gabapentin (NEURONTIN) 300 MG Cap  Take 300 mg by mouth 3 times a day.             levothyroxine (SYNTHROID) 25 MCG Tab  Take 25 mcg by mouth Every morning on an empty stomach.             methocarbamol (ROBAXIN) 750 MG Tab  Take 750 mg by mouth 4 times a day.             Multiple Vitamins-Minerals (MULTIVITAMIN & MINERAL PO)  Take 1 Tab by mouth every day.             Oxycodone HCl 20 MG Tab  Take 20 mg by mouth every 6 hours as needed.             sertraline (ZOLOFT) 100 MG Tab  Take 150 mg by mouth every  day.                 DIET  Orders Placed This Encounter   Procedures   • DIET ORDER     Standing Status: Standing      Number of Occurrences: 1      Standing Expiration Date:      Order Specific Question:  Diet:     Answer:  Full Liquid [11]     Order Specific Question:  Consistency/Fluid modifications:     Answer:  Nectar Thick [2]       ACTIVITY  As tolerated.  Weight bearing as tolerated      CONSULTATIONS  Dr. Herrera-[facility]  Speech therapy.     PROCEDURES  None.    LABORATORY  Lab Results   Component Value Date/Time    SODIUM 138 08/09/2017 02:53 AM    POTASSIUM 4.1 08/09/2017 02:53 AM    CHLORIDE 104 08/09/2017 02:53 AM    CO2 27 08/09/2017 02:53 AM    GLUCOSE 122* 08/09/2017 02:53 AM    BUN 15 08/09/2017 02:53 AM    CREATININE 0.48* 08/09/2017 02:53 AM        Lab Results   Component Value Date/Time    WBC 9.4 08/06/2017 09:42 AM    HEMOGLOBIN 9.8* 08/06/2017 09:42 AM    HEMATOCRIT 32.5* 08/06/2017 09:42 AM    PLATELET COUNT 567* 08/06/2017 09:42 AM      IMAGING:  CT head and neck 8/3/17:    1.  Postoperative change from multilevel cervical discectomy and anterior fusion from C3 through C6.  2.  Diffuse prevertebral soft tissue swelling, without focal fluid collection or hematoma.  3.  Soft tissue edema and gas tracking in the RIGHT neck consistent with recent surgery.  4.  No apparent narrowing of the airway.    Total time of the discharge process exceeds 31 minutes     At the time of discharge, patient is alert and orientedx4. She has a reasonable understanding of diagnosis and plan of care. She demonstrates understanding by repeating the instructions back to me. All questions were answered.

## 2017-08-12 NOTE — PROGRESS NOTES
BS report received. Pt sitting up in chair, alert and oriented x4. Pt c/o neck pain, medicated per MAR. Pt currently on full liquid and nectar thick diet, tolerating well. Took pills without difficulty. Denies N/V. On RA, sating >90%. PIV SL Last BM 8/7/17, hypoactive BS. Refuses PRN laxative. Offered prune juice. Encouraged pt to ambulate, pt verbalized understanding. POC discussed. Bed low and locked. Non skid socks on. Refuses SCDs. Educated pt on risk of DVT, pt still refuses. Call light and belonging within reach and pt calls appropriately. Fall precaution in effect. Hourly rounding in place.

## 2017-08-12 NOTE — PROGRESS NOTES
[facility] Hospitalist Progress Note    Date of Service: 2017    Chief Complaint  51 y.o. female admitted 8/3/2017 with inability to swallow.    Interval Problem Update  Mrs. Faith is a 51-year-old female who recently had cervical surgery on 2017. Postoperatively the patient has developed swelling and tenderness in her throat. It progressed to the point where the patient was unable to swallow liquids or food. Patient was admitted and evaluated and treated with steroids.     Spoke to  after their evaluation with scope. The swelling has improved. She passed swallow eval and was put on nectar thick diet.  Patient eating and states she is feeling good but still somewhat diff to swallow.     Consultants/Specialty  Neurosurgery  Speech therapy    Disposition  To be determined        Review of Systems   Constitutional: Negative.  Negative for fever and diaphoresis.   HENT: Negative for sore throat.    Eyes: Negative.  Negative for blurred vision, pain and discharge.   Respiratory: Negative for cough and wheezing.    Cardiovascular: Negative.  Negative for chest pain, orthopnea and claudication.   Gastrointestinal: Negative for heartburn, abdominal pain, diarrhea and constipation.   Genitourinary: Negative.  Negative for dysuria, hematuria and flank pain.   Musculoskeletal: Positive for neck pain. Negative for myalgias.   Skin: Negative.  Negative for itching and rash.   Neurological: Negative.  Negative for dizziness, tingling, tremors, weakness and headaches.   Endo/Heme/Allergies: Negative.  Negative for environmental allergies and polydipsia. Does not bruise/bleed easily.   Psychiatric/Behavioral: Negative.  Negative for depression, suicidal ideas and memory loss.      Physical Exam  Laboratory/Imaging   Hemodynamics  Temp (24hrs), Av.3 °C (97.3 °F), Min:36.2 °C (97.1 °F), Max:36.4 °C (97.6 °F)   Temperature: 36.2 °C (97.1 °F)  Pulse  Av.7  Min: 50  Max: 89    Blood Pressure: 142/76 mmHg       Respiratory      Respiration: 18, Pulse Oximetry: 98 %     Work Of Breathing / Effort: Mild  RUL Breath Sounds: Clear, RML Breath Sounds: Clear, RLL Breath Sounds: Diminished, BRIAN Breath Sounds: Clear, LLL Breath Sounds: Diminished    Fluids    Intake/Output Summary (Last 24 hours) at 08/11/17 1735  Last data filed at 08/11/17 1430   Gross per 24 hour   Intake   2770 ml   Output      0 ml   Net   2770 ml       Nutrition  Orders Placed This Encounter   Procedures   • DIET ORDER     Standing Status: Standing      Number of Occurrences: 1      Standing Expiration Date:      Order Specific Question:  Diet:     Answer:  Full Liquid [11]     Order Specific Question:  Consistency/Fluid modifications:     Answer:  Nectar Thick [2]     Physical Exam   Constitutional: She is oriented to person, place, and time. She appears well-developed and well-nourished. Cervical collar in place.   HENT:   Head: Normocephalic and atraumatic.   Right Ear: External ear normal.   Left Ear: External ear normal.   Mouth/Throat: No oropharyngeal exudate.   Right side of the oropharynx swelling has resolved   Eyes: Conjunctivae and EOM are normal. Pupils are equal, round, and reactive to light. Right eye exhibits no discharge. Left eye exhibits no discharge.   Neck: Normal range of motion. Neck supple. No JVD present.   Cardiovascular: Normal rate and regular rhythm.  Exam reveals no gallop and no friction rub.    Pulmonary/Chest: Effort normal. No stridor. No respiratory distress. She has no rales. She exhibits no tenderness.   Abdominal: Soft. Bowel sounds are normal. She exhibits no distension. There is no guarding.   Musculoskeletal: Normal range of motion.   Lymphadenopathy:     She has no cervical adenopathy.   Neurological: She is alert and oriented to person, place, and time. She has normal reflexes. She displays no atrophy. No sensory deficit. She exhibits normal muscle tone. Gait normal. GCS eye subscore is 4. GCS verbal subscore is  5. GCS motor subscore is 6.   Aspen collar on   Skin: Skin is warm and dry. No erythema.   Psychiatric: Her behavior is normal. Judgment and thought content normal. Her mood appears anxious.   Nursing note and vitals reviewed.          Recent Labs      08/09/17   0253   SODIUM  138   POTASSIUM  4.1   CHLORIDE  104   CO2  27   GLUCOSE  122*   BUN  15   CREATININE  0.48*   CALCIUM  9.1                      Assessment/Plan     Status post cervical spinal fusion (present on admission)  Assessment & Plan  Neck swelling and neck pain have improved.  Remains in a cervical collar.  Remains on decadron for post-op swelling. Tapering the steroids.  Continue with pain management.  Continue with physical therapy and occupational therapy.    Dysphagia (present on admission)  Assessment & Plan  Swelling resolving  Passed swallow eval  Started on nectar thick diet  DC NGT  ST exercises    Hypothyroid (present on admission)  Assessment & Plan  -supportive measures with synthroid supplementation at 25 mcg per day, no change  -latest TSH is 0.670. Continue with low dose Synthroid supplementation.  -Give the Synthroid through the core tract      EKG reviewed, Radiology images reviewed, Labs reviewed and Medications reviewed            Ulcer prophylaxis: Yes    Assessed for rehab: Patient was assess for and/or received rehabilitation services during this hospitalization

## 2017-08-12 NOTE — FACE TO FACE
Face to Face Supporting Documentation - Home Health    The encounter with this patient was in whole or in part the primary reason for home health admission.    Date of encounter:   Patient:                    MRN:                       YOB: 2017  Smita Faith  [id number]  1965     Home health to see patient for:  Speech Language Pathology evaluation and treatment    Skilled need for:  Recent Deterioration of Health Status dysphagia and cervical soft tissue swelling    Skilled nursing interventions to include:  Line/Drain/Airway education and care    Homebound status evidenced by:  Needs the assistance of another person in order to leave the home. Leaving home requires a considerable and taxing effort. There is a normal inability to leave the home.    Community Physician to provide follow up care: Dallas Bolivar PA-C     Optional Interventions? No      I certify the face to face encounter for this home health care referral meets the CMS requirements and the encounter/clinical assessment with the patient was, in whole, or in part, for the medical condition(s) listed above, which is the primary reason for home health care. Based on my clinical findings: the service(s) are medically necessary, support the need for home health care, and the homebound criteria are met.  I certify that this patient has had a face to face encounter by myself.  Kyleigh Tafoya D.O. - NPI: [id number]

## 2017-08-13 VITALS
RESPIRATION RATE: 18 BRPM | TEMPERATURE: 97.6 F | OXYGEN SATURATION: 99 % | DIASTOLIC BLOOD PRESSURE: 63 MMHG | HEART RATE: 79 BPM | WEIGHT: 253.31 LBS | BODY MASS INDEX: 49.73 KG/M2 | HEIGHT: 60 IN | SYSTOLIC BLOOD PRESSURE: 133 MMHG

## 2017-08-13 PROCEDURE — A9270 NON-COVERED ITEM OR SERVICE: HCPCS | Performed by: HOSPITALIST

## 2017-08-13 PROCEDURE — 700111 HCHG RX REV CODE 636 W/ 250 OVERRIDE (IP): Performed by: INTERNAL MEDICINE

## 2017-08-13 PROCEDURE — 700102 HCHG RX REV CODE 250 W/ 637 OVERRIDE(OP): Performed by: HOSPITALIST

## 2017-08-13 PROCEDURE — A9270 NON-COVERED ITEM OR SERVICE: HCPCS | Performed by: INTERNAL MEDICINE

## 2017-08-13 PROCEDURE — 700102 HCHG RX REV CODE 250 W/ 637 OVERRIDE(OP): Performed by: PHYSICIAN ASSISTANT

## 2017-08-13 PROCEDURE — 700102 HCHG RX REV CODE 250 W/ 637 OVERRIDE(OP): Performed by: INTERNAL MEDICINE

## 2017-08-13 PROCEDURE — A9270 NON-COVERED ITEM OR SERVICE: HCPCS | Performed by: PHYSICIAN ASSISTANT

## 2017-08-13 RX ADMIN — OXYCODONE HYDROCHLORIDE 10 MG: 10 TABLET ORAL at 01:27

## 2017-08-13 RX ADMIN — LEVOTHYROXINE SODIUM 25 MCG: 25 TABLET ORAL at 05:03

## 2017-08-13 RX ADMIN — OXYCODONE HYDROCHLORIDE 10 MG: 10 TABLET ORAL at 08:31

## 2017-08-13 RX ADMIN — DEXAMETHASONE SODIUM PHOSPHATE 4 MG: 4 INJECTION, SOLUTION INTRAMUSCULAR; INTRAVENOUS at 08:32

## 2017-08-13 RX ADMIN — OXYCODONE HYDROCHLORIDE 10 MG: 10 TABLET ORAL at 05:07

## 2017-08-13 RX ADMIN — CYCLOBENZAPRINE 5 MG: 10 TABLET, FILM COATED ORAL at 07:27

## 2017-08-13 RX ADMIN — MORPHINE SULFATE 4 MG: 4 INJECTION INTRAVENOUS at 10:43

## 2017-08-13 RX ADMIN — STANDARDIZED SENNA CONCENTRATE AND DOCUSATE SODIUM 2 TABLET: 8.6; 5 TABLET, FILM COATED ORAL at 08:32

## 2017-08-13 RX ADMIN — CYCLOBENZAPRINE 5 MG: 10 TABLET, FILM COATED ORAL at 01:27

## 2017-08-13 RX ADMIN — OMEPRAZOLE 20 MG: 20 CAPSULE, DELAYED RELEASE ORAL at 08:31

## 2017-08-13 ASSESSMENT — PAIN SCALES - GENERAL
PAINLEVEL_OUTOF10: 6
PAINLEVEL_OUTOF10: 5
PAINLEVEL_OUTOF10: 5
PAINLEVEL_OUTOF10: 6
PAINLEVEL_OUTOF10: 6

## 2017-08-13 NOTE — PROGRESS NOTES
Report received from Emelyn CARSON.    Last documented VS: /62 mmHg  Pulse 67  Temp(Src) 36 °C (96.8 °F)  Resp 18  Ht 1.524 m (5')  Wt 114.9 kg (253 lb 4.9 oz)  BMI 49.47 kg/m2  SpO2 97%  LMP 03/02/2016  Breastfeeding? No      no signs of acute distress, patient appears to be in stable condition. Smitafrancisco Mejia Faith assessed after assuming care.       Mobility: Patient in chair at this time    Fall precautions in place. Hourly rounding in place and explained to Smita Mejia. Educated Smita Mejia on call light use as well as phone instructions to call RN or CNA directly. Possessions within patient's reach, fall precautions in place. Pressure ulcer prevention techniques in use, pressure points assessed and pressure relieved from vulnerable areas. Pillows uses copiously for support and positioning where applicable.    All prudent patient safety measures and applicable nursing interventions taken.    Smita Mejia educated on Pain, Position, Potty, Priorities and instructed to notify RN if anything additional can be done to make stay more comfortable including bathing options, linen change, and toiletries.    PMHx on file (click to expand)  Past Medical History   Diagnosis Date   • Bipolar 2 disorder (CMS-[facility])    • Depression    • Anxiety disorder    • Pneumonia    • Bronchitis    • Backpain      Lumbar chronic   • Unspecified disorder of thyroid [id number]     no longer an issue   • Alcoholism (CMS-HCC)    • Heart burn    • Indigestion    • Arthritis      back, hands, arm   • Gynecological disorder      mass   • Bowel habit changes      constipation, irreg diarrhea   • ASTHMA      States trigered by allergy reaction, PRN inhaler           Care plan:     Problem: Safety   Goal: Will remain free from falls   Outcome: PROGRESSING as EXPECTED   Smita Mejia educated about fall risk. Will remain free from injury or falls.  Appropriate side rails up. Bed in low position, call light and possions within  reach, bed alarm in use.  Hourly rounding in place.     Problem: Pain Management   Goal: Pain level will decrease to patient’s comfort goal   Outcome: PROGRESSING as EXPECTED   Following pain management plan, Smita Mejia reports pain on 0-10 scale     *addendum to follow below at end or throughout shift if significant events occurred: if blank n/a    OVERNIGHT SIGNIFICANT EVENTS:    n/a

## 2017-08-13 NOTE — DISCHARGE INSTRUCTIONS
Dysphagia  Swallowing problems (dysphagia) occur when solids and liquids seem to stick in your throat on the way down to your stomach, or the food takes longer to get to the stomach. Other symptoms include regurgitating food, noises coming from the throat, chest discomfort with swallowing, and a feeling of fullness or the feeling of something being stuck in your throat when swallowing. When blockage in your throat is complete, it may be associated with drooling.  CAUSES   Problems with swallowing may occur because of problems with the muscles. The food cannot be propelled in the usual manner into your stomach. You may have ulcers, scar tissue, or inflammation in the tube down which food travels from your mouth to your stomach (esophagus), which blocks food from passing normally into the stomach. Causes of inflammation include:  · Acid reflux from your stomach into your esophagus.  · Infection.  · Radiation treatment for cancer.  · Medicines taken without enough fluids to wash them down into your stomach.  You may have nerve problems that prevent signals from being sent to the muscles of your esophagus to contract and move your food down to your stomach. Globus pharyngeus is a relatively common problem in which there is a sense of an obstruction or difficulty in swallowing, without any physical abnormalities of the swallowing passages being found. This problem usually improves over time with reassurance and testing to rule out other causes.  DIAGNOSIS  Dysphagia can be diagnosed and its cause can be determined by tests in which you swallow a white substance that helps illuminate the inside of your throat (contrast medium) while X-rays are taken. Sometimes a flexible telescope that is inserted down your throat (endoscopy) to look at your esophagus and stomach is used.  TREATMENT   · If the dysphagia is caused by acid reflux or infection, medicines may be used.  · If the dysphagia is caused by problems with your  swallowing muscles, swallowing therapy may be used to help you strengthen your swallowing muscles.  · If the dysphagia is caused by a blockage or mass, procedures to remove the blockage may be done.  HOME CARE INSTRUCTIONS  · Try to eat soft food that is easier to swallow and check your weight on a daily basis to be sure that it is not decreasing.  · Be sure to drink liquids when sitting upright (not lying down).  SEEK MEDICAL CARE IF:  · You are losing weight because you are unable to swallow.  · You are coughing when you drink liquids (aspiration).  · You are coughing up partially digested food.  SEEK IMMEDIATE MEDICAL CARE IF:  · You are unable to swallow your own saliva .  · You are having shortness of breath or a fever, or both.  · You have a hoarse voice along with difficulty swallowing.  MAKE SURE YOU:  · Understand these instructions.  · Will watch your condition.  · Will get help right away if you are not doing well or get worse.     This information is not intended to replace advice given to you by your health care provider. Make sure you discuss any questions you have with your health care provider.     Document Released: 12/15/2001 Document Revised: 01/08/2016 Document Reviewed: 06/06/2014  [employer] Patient Education ©2016 [employer]      Discharge Instructions    Discharged to home by car with relative. Discharged via wheelchair, hospital escort: Yes.  Special equipment needed: C-Collar    Be sure to schedule a follow-up appointment with your primary care doctor or any specialists as instructed.     Discharge Plan:   Influenza Vaccine Indication: Indicated: Not available from distributor/    I understand that a diet low in cholesterol, fat, and sodium is recommended for good health. Unless I have been given specific instructions below for another diet, I accept this instruction as my diet prescription.   Other diet: nectar thick    Special Instructions: None    · Is patient  discharged on Warfarin / Coumadin?   No     · Is patient Post Blood Transfusion?  No    Depression / Suicide Risk    As you are discharged from this [facility] facility, it is important to learn how to keep safe from harming yourself.    Recognize the warning signs:  · Abrupt changes in personality, positive or negative- including increase in energy   · Giving away possessions  · Change in eating patterns- significant weight changes-  positive or negative  · Change in sleeping patterns- unable to sleep or sleeping all the time   · Unwillingness or inability to communicate  · Depression  · Unusual sadness, discouragement and loneliness  · Talk of wanting to die  · Neglect of personal appearance   · Rebelliousness- reckless behavior  · Withdrawal from people/activities they love  · Confusion- inability to concentrate     If you or a loved one observes any of these behaviors or has concerns about self-harm, here's what you can do:  · Talk about it- your feelings and reasons for harming yourself  · Remove any means that you might use to hurt yourself (examples: pills, rope, extension cords, firearm)  · Get professional help from the community (Mental Health, Substance Abuse, psychological counseling)  · Do not be alone:Call your Safe Contact- someone whom you trust who will be there for you.  · Call your local CRISIS HOTLINE 769-3960 or 969-746-6463  · Call your local Children's Mobile Crisis Response Team [place] (091) 977-3958 or www.[employer]  · Call the toll free National Suicide Prevention Hotlines   · National Suicide Prevention Lifeline 568-189-JVKD (3988)  · National Hope Line Network 800-SUICIDE (233-9960)

## 2017-08-13 NOTE — CARE PLAN
Problem: Pain Management  Goal: Pain level will decrease to patient’s comfort goal  Outcome: PROGRESSING AS EXPECTED  Discussed pain management POC, pt verbalizes understanding.  Administered PRN oxycodone and morphine with positive results.  Encouraged frequent ambulation, pt demonstrated understanding.    Problem: Respiratory:  Goal: Respiratory status will improve  Outcome: PROGRESSING AS EXPECTED  Educated pt on coughing and deep breathing, pt demonstrated understanding.  Encouraged frequent ambulation, pt demonstrated understanding.

## 2017-08-13 NOTE — PROGRESS NOTES
Pt is AAOx4.  Moves all extremities 5/5.  Denies numbness or tingling.  Up with standby assist, steady gait.  Pt complains of 6/10 pain.  Oxycodone given PRN with positive results.  Pt denies nausea/vomiting.  Tolerating nectar thick diet.  Pt voiding without difficulty.  Surgical incision/steri strips CDI.  POC discussed, pt verbalizes understanding.  Bed alarm refused.  Call light within reach, bed in lowest position.

## 2017-08-13 NOTE — DISCHARGE PLANNING
Medical SW    Referral: Sw advised by RN pt d/cing today and order for HH available.    Intervention: Sw met w/ pt at bedside for HH choice. She will choose [facility]. [facility] faxed to [facility]. [facility] received fax confirmation.     Plan: [facility] to assist w/ d/c planning as needed.

## 2017-08-13 NOTE — PROGRESS NOTES
Smita Faith is AO4 and refused bed alarm despite education. Instructed pt to call for assistance before getting OOB. Pt verbalized understanding. Call light w/in reach. Bed is in low position. Treaded slippers on patient. foresee-able fall precautions in place.

## 2017-08-14 NOTE — DISCHARGE PLANNING
Received order from [facility] [facility] for skilled nursing with HH. Faxed order to [facility] at [phone].

## 2017-08-14 NOTE — DISCHARGE PLANNING
Received call from Jaleesa with [facility], they will need skilled nursing added to the order for HH. Contacted [employer] via phone.

## 2017-08-22 ENCOUNTER — APPOINTMENT (OUTPATIENT)
Dept: RADIOLOGY | Facility: MEDICAL CENTER | Age: 52
DRG: 857 | End: 2017-08-22
Attending: EMERGENCY MEDICINE
Payer: COMMERCIAL

## 2017-08-22 ENCOUNTER — HOSPITAL ENCOUNTER (OUTPATIENT)
Dept: RADIOLOGY | Facility: MEDICAL CENTER | Age: 52
End: 2017-08-22

## 2017-08-22 ENCOUNTER — HOSPITAL ENCOUNTER (INPATIENT)
Facility: MEDICAL CENTER | Age: 52
LOS: 2 days | DRG: 857 | End: 2017-08-25
Attending: EMERGENCY MEDICINE | Admitting: FAMILY MEDICINE
Payer: COMMERCIAL

## 2017-08-22 DIAGNOSIS — E66.9 OBESITY (BMI 30-39.9): ICD-10-CM

## 2017-08-22 DIAGNOSIS — Z98.1 STATUS POST CERVICAL SPINAL FUSION: ICD-10-CM

## 2017-08-22 DIAGNOSIS — T81.40XA POST-OPERATIVE INFECTION: ICD-10-CM

## 2017-08-22 DIAGNOSIS — L03.221 CELLULITIS OF NECK: ICD-10-CM

## 2017-08-22 DIAGNOSIS — E03.9 HYPOTHYROIDISM, UNSPECIFIED TYPE: ICD-10-CM

## 2017-08-22 DIAGNOSIS — T81.31XA DEHISCENCE OF EXTERNAL SURGICAL WOUND: ICD-10-CM

## 2017-08-22 PROCEDURE — 99285 EMERGENCY DEPT VISIT HI MDM: CPT

## 2017-08-22 ASSESSMENT — PAIN SCALES - GENERAL: PAINLEVEL_OUTOF10: 6

## 2017-08-22 NOTE — IP AVS SNAPSHOT
8/25/2017    Smita Faith  [address]  [place] [address]    Dear Smita Mejia:    On license of [facility] wants to ensure your discharge home is safe and you or your loved ones have had all of your questions answered regarding your care after you leave the hospital.    Below is a list of resources and contact information should you have any questions regarding your hospital stay, follow-up instructions, or active medical symptoms.    Questions or Concerns Regarding… Contact   Medical Questions Related to Your Discharge  (7 days a week, 8am-5pm) Contact a Nurse Care Coordinator   [phone]   Medical Questions Not Related to Your Discharge  (24 hours a day / 7 days a week)  Contact the Nurse Health Line   [phone]    Medications or Discharge Instructions Refer to your discharge packet   or contact your [facility] Primary Care Provider   [phone]   Follow-up Appointment(s) Schedule your appointment via [employer]   or contact Scheduling [phone]   Billing Review your statement via [employer]  or contact Billing [phone]   Medical Records Review your records via [employer]   or contact Medical Records [phone]     You may receive a telephone call within two days of discharge. This call is to make certain you understand your discharge instructions and have the opportunity to have any questions answered. You can also easily access your medical information, test results and upcoming appointments via the [employer] free online health management tool. You can learn more and sign up at [employer]. For assistance setting up your [employer] account, please call [phone].    Once again, we want to ensure your discharge home is safe and that you have a clear understanding of any next steps in your care. If you have any questions or concerns, please do not hesitate to contact us, we are here for you. Thank you for choosing [facility] for your healthcare needs.    Sincerely,    Your [facility] Healthcare Team

## 2017-08-22 NOTE — IP AVS SNAPSHOT
" <p align=\"LEFT\"><IMG SRC=\"//[email or web address]\" alt=\"Image\" WIDTH=\"50%\" HEIGHT=\"200\" BORDER=\"\"></p>                   Name:Smita Faith  Medical Record Number:[id number]  CSN: [id number]    YOB: 1965   Age: 51 y.o.  Sex: female  HT:1.549 m (5' 1\") WT: 117.5 kg (259 lb 0.7 oz)          Admit Date: 8/22/2017     Discharge Date:   Today's Date: 8/25/2017  Attending Doctor:  Rigo Oakes M.D.                  Allergies:  Bloodless; Ciprofloxacin; Codeine; Erythromycin; Nsaids; and Other food          Follow-up Information     1. Follow up with [facility] .    Specialty:  Home Health Services    Contact information    [address] [phone]         Medication List      Take these Medications        Instructions    albuterol 108 (90 Base) MCG/ACT Aers inhalation aerosol    Inhale 2 Puffs by mouth every 6 hours as needed for Shortness of Breath.   Dose:  2 Puff       ascorbic acid 500 MG Tabs   Commonly known as:  ascorbic acid    Take 500 mg by mouth every day.   Dose:  500 mg       Calcium 4430-6673 MG-UNIT Chew    Take 1 Each by mouth every day.   Dose:  1 Each       cephALEXin 500 MG Caps   Commonly known as:  KEFLEX    Take 1 Cap by mouth 4 times a day for 7 days.   Dose:  500 mg       COQ-10 PO    Take 1 Tab by mouth every day.   Dose:  1 Tab       cyanocobalamin 100 MCG Tabs   Commonly known as:  VITAMIN B-12    Take 100 mcg by mouth every day.   Dose:  100 mcg       ferrous gluconate 324 (38 Fe) MG Tabs   Commonly known as:  FERGON    Take 324 mg by mouth every day.   Dose:  324 mg       gabapentin 300 MG Caps   Commonly known as:  NEURONTIN    Take 300 mg by mouth 3 times a day.   Dose:  300 mg       * levothyroxine 50 MCG Tabs   What changed:  Another medication with the same name was changed. Make sure you understand how and when to take each.   Commonly known as:  SYNTHROID    Take 50 mcg by mouth Every morning on an empty stomach.   Dose:  50 mcg  "       * levothyroxine 50 MCG Tabs   Start taking on:  8/26/2017   What changed:    - medication strength  - how much to take   Commonly known as:  SYNTHROID    Take 1 Tab by mouth Every morning on an empty stomach.   Dose:  50 mcg       methocarbamol 750 MG Tabs   Commonly known as:  ROBAXIN    Take 750 mg by mouth 4 times a day.   Dose:  750 mg       MULTIVITAMIN & MINERAL PO    Take 1 Tab by mouth every day.   Dose:  1 Tab       Oxycodone HCl 20 MG Tabs    Take 20 mg by mouth every 6 hours as needed.   Dose:  20 mg       sertraline 100 MG Tabs   Commonly known as:  ZOLOFT    Take 150 mg by mouth every day.   Dose:  150 mg       Vitamin D 1000 UNIT Caps    Take 1,000 Units by mouth every day.   Dose:  1000 Units       * Notice:  This list has 2 medication(s) that are the same as other medications prescribed for you. Read the directions carefully, and ask your doctor or other care provider to review them with you.

## 2017-08-22 NOTE — IP AVS SNAPSHOT
[employer] Access Code: Activation code not generated  Current Metropolist Status: Active    [employer]  A secure, online tool to manage your health information     [employer]’s Metropolist® is a secure, online tool that connects you to your personalized health information from the privacy of your home -- day or night - making it very easy for you to manage your healthcare. Once the activation process is completed, you can even access your medical information using the Metropolist adal, which is available for free in the Apple Adal store or Google Play store.     Metropolist provides the following levels of access (as shown below):   My Chart Features   [facility] Primary Care Doctor [facility]  Specialists [facility]  Urgent  Care Non-[facility]  Primary Care  Doctor   Email your healthcare team securely and privately 24/7 X X X X   Manage appointments: schedule your next appointment; view details of past/upcoming appointments X      Request prescription refills. X      View recent personal medical records, including lab and immunizations X X X X   View health record, including health history, allergies, medications X X X X   Read reports about your outpatient visits, procedures, consult and ER notes X X X X   See your discharge summary, which is a recap of your hospital and/or ER visit that includes your diagnosis, lab results, and care plan. X X       How to register for Metropolist:  1. Go to  [email or web address].  2. Click on the Sign Up Now box, which takes you to the New Member Sign Up page. You will need to provide the following information:  a. Enter your Metropolist Access Code exactly as it appears at the top of this page. (You will not need to use this code after you’ve completed the sign-up process. If you do not sign up before the expiration date, you must request a new code.)   b. Enter your date of birth.   c. Enter your home email address.   d. Click Submit, and follow the next screen’s instructions.  3. Create a Metropolist ID. This will  be your [employer] login ID and cannot be changed, so think of one that is secure and easy to remember.  4. Create a [employer] password. You can change your password at any time.  5. Enter your Password Reset Question and Answer. This can be used at a later time if you forget your password.   6. Enter your e-mail address. This allows you to receive e-mail notifications when new information is available in [employer].  7. Click Sign Up. You can now view your health information.    For assistance activating your [employer] account, call [phone]

## 2017-08-22 NOTE — IP AVS SNAPSHOT
" Home Care Instructions                                                                                                                  Name:Smita Faith  Medical Record Number:[id number]  CSN: [id number]    YOB: 1965   Age: 51 y.o.  Sex: female  HT:1.549 m (5' 1\") WT: 117.5 kg (259 lb 0.7 oz)          Admit Date: 8/22/2017     Discharge Date:   Today's Date: 8/25/2017  Attending Doctor:  Rigo Oakes M.D.                  Allergies:  Bloodless; Ciprofloxacin; Codeine; Erythromycin; Nsaids; and Other food            Discharge Instructions       Discharge Instructions    Discharged to home by car with relative. Discharged via wheelchair, hospital escort: Refused.  Special equipment needed: Not Applicable    Be sure to schedule a follow-up appointment with your primary care doctor or any specialists as instructed.     Discharge Plan:   Diet Plan: Discussed  Activity Level: Discussed  Confirmed Follow up Appointment: Appointment Scheduled  Confirmed Symptoms Management: Discussed  Medication Reconciliation Updated: Yes    I understand that a diet low in cholesterol, fat, and sodium is recommended for good health. Unless I have been given specific instructions below for another diet, I accept this instruction as my diet prescription.   Other diet: regular    Special Instructions: None    · Is patient discharged on Warfarin / Coumadin?   No     · Is patient Post Blood Transfusion?  No    Depression / Suicide Risk    As you are discharged from this [facility] facility, it is important to learn how to keep safe from harming yourself.    Recognize the warning signs:  · Abrupt changes in personality, positive or negative- including increase in energy   · Giving away possessions  · Change in eating patterns- significant weight changes-  positive or negative  · Change in sleeping patterns- unable to sleep or sleeping all the time   · Unwillingness or inability to communicate  · Depression  · Unusual sadness, " discouragement and loneliness  · Talk of wanting to die  · Neglect of personal appearance   · Rebelliousness- reckless behavior  · Withdrawal from people/activities they love  · Confusion- inability to concentrate     If you or a loved one observes any of these behaviors or has concerns about self-harm, here's what you can do:  · Talk about it- your feelings and reasons for harming yourself  · Remove any means that you might use to hurt yourself (examples: pills, rope, extension cords, firearm)  · Get professional help from the community (Mental Health, Substance Abuse, psychological counseling)  · Do not be alone:Call your Safe Contact- someone whom you trust who will be there for you.  · Call your local CRISIS HOTLINE 115-4496 or 172-424-2920  · Call your local Children's Mobile Crisis Response Team [place] (361) 523-9037 or www.Newsy  · Call the toll free National Suicide Prevention Hotlines   · National Suicide Prevention Lifeline 333-253-GKDG (3039)  · National Hope Line Network 800-SUICIDE (018-3656)        Follow-up Information     1. Follow up with [facility] .    Specialty:  Home Health Services    Contact information    [address]  [facility] [phone]         Discharge Medication Instructions:    Below are the medications your physician expects you to take upon discharge:    Review all your home medications and newly ordered medications with your doctor and/or pharmacist. Follow medication instructions as directed by your doctor and/or pharmacist.    Please keep your medication list with you and share with your physician.               Medication List      START taking these medications        Instructions    Morning Afternoon Evening Bedtime    cephALEXin 500 MG Caps   Commonly known as:  KEFLEX        Take 1 Cap by mouth 4 times a day for 7 days.   Dose:  500 mg                          CHANGE how you take these medications        Instructions    Morning  Afternoon Evening Bedtime    * levothyroxine 50 MCG Tabs   What changed:  Another medication with the same name was changed. Make sure you understand how and when to take each.   Last time this was given:  25 mcg on 8/25/2017  5:37 AM   Commonly known as:  SYNTHROID        Take 50 mcg by mouth Every morning on an empty stomach.   Dose:  50 mcg                        * levothyroxine 50 MCG Tabs   Start taking on:  8/26/2017   What changed:    - medication strength  - how much to take   Last time this was given:  25 mcg on 8/25/2017  5:37 AM   Commonly known as:  SYNTHROID        Take 1 Tab by mouth Every morning on an empty stomach.   Dose:  50 mcg                        * Notice:  This list has 2 medication(s) that are the same as other medications prescribed for you. Read the directions carefully, and ask your doctor or other care provider to review them with you.      CONTINUE taking these medications        Instructions    Morning Afternoon Evening Bedtime    albuterol 108 (90 Base) MCG/ACT Aers inhalation aerosol        Inhale 2 Puffs by mouth every 6 hours as needed for Shortness of Breath.   Dose:  2 Puff                        ascorbic acid 500 MG Tabs   Commonly known as:  ascorbic acid        Take 500 mg by mouth every day.   Dose:  500 mg                        Calcium 5948-9419 MG-UNIT Chew        Take 1 Each by mouth every day.   Dose:  1 Each                        COQ-10 PO        Take 1 Tab by mouth every day.   Dose:  1 Tab                        cyanocobalamin 100 MCG Tabs   Commonly known as:  VITAMIN B-12        Take 100 mcg by mouth every day.   Dose:  100 mcg                        ferrous gluconate 324 (38 Fe) MG Tabs   Commonly known as:  FERGON        Take 324 mg by mouth every day.   Dose:  324 mg                        gabapentin 300 MG Caps   Last time this was given:  300 mg on 8/25/2017  3:02 PM   Commonly known as:  NEURONTIN        Take 300 mg by mouth 3 times a day.   Dose:  300 mg                         methocarbamol 750 MG Tabs   Last time this was given:  750 mg on 8/25/2017  1:10 PM   Commonly known as:  ROBAXIN        Take 750 mg by mouth 4 times a day.   Dose:  750 mg                        MULTIVITAMIN & MINERAL PO        Take 1 Tab by mouth every day.   Dose:  1 Tab                        Oxycodone HCl 20 MG Tabs   Last time this was given:  20 mg on 8/25/2017 11:25 AM        Take 20 mg by mouth every 6 hours as needed.   Dose:  20 mg                        sertraline 100 MG Tabs   Last time this was given:  150 mg on 8/25/2017  9:07 AM   Commonly known as:  ZOLOFT        Take 150 mg by mouth every day.   Dose:  150 mg                        Vitamin D 1000 UNIT Caps        Take 1,000 Units by mouth every day.   Dose:  1000 Units                             Where to Get Your Medications      These medications were [facility] to [id number] [place] [facility] [address]  [address] [facility] [facility], [place] [address]     Phone:  [phone]    - cephALEXin 500 MG Caps  - levothyroxine 50 MCG Tabs            Orders for after discharge     REFERRAL TO HOME HEALTH    Complete by:  As directed    Home health will create and establish a plan of care             Instructions           Diet / Nutrition:    Follow any diet instructions given to you by your doctor or the dietician, including how much salt (sodium) you are allowed each day.    If you are overweight, talk to your doctor about a weight reduction plan.    Activity:    Remain physically active following your doctor's instructions about exercise and activity.    Rest often.     Any time you become even a little tired or short of breath, SIT DOWN and rest.    Worsening Symptoms:    Report any of the following signs and symptoms to the doctor's office immediately:    *Pain of jaw, arm, or neck  *Chest pain not relieved by medication                               *Dizziness or loss of consciousness  *Difficulty breathing even when at rest    *More tired than usual                                       *Bleeding drainage or swelling of surgical site  *Swelling of feet, ankles, legs or stomach                 *Fever (>100ºF)  *Pink or blood tinged sputum  *Weight gain (3lbs/day or 5lbs /week)           *Shock from internal defibrillator (if applicable)  *Palpitations or irregular heartbeats                *Cool and/or numb extremities    Stroke Awareness    Common Risk Factors for Stroke include:    Age  Atrial Fibrillation  Carotid Artery Stenosis  Diabetes Mellitus  Excessive alcohol consumption  High blood pressure  Overweight   Physical inactivity  Smoking    Warning signs and symptoms of a stroke include:    *Sudden numbness or weakness of the face, arm or leg (especially on one side of the body).  *Sudden confusion, trouble speaking or understanding.  *Sudden trouble seeing in one or both eyes.  *Sudden trouble walking, dizziness, loss of balance or coordination.Sudden severe headache with no known cause.    It is very important to get treatment quickly when a stroke occurs. If you experience any of the above warning signs, call 911 immediately.                   Disclaimer         Quit Smoking / Tobacco Use:    I understand the use of any tobacco products increases my chance of suffering from future heart disease or stroke and could cause other illnesses which may shorten my life. Quitting the use of tobacco products is the single most important thing I can do to improve my health. For further information on smoking / tobacco cessation call a Toll Free Quit Line at 1-911.965.7062 (*National Cancer [place]) or 1-518.582.2842 (American Lung Association) or you can access the web based program at www.lungusa.org.    [place] Tobacco Users Help Line:  (741) 247-7539       Toll Free: 1-615.972.3951  Quit Tobacco Program [facility] [phone]    Crisis Hotline:    [place] Crisis Hotline:  3-551-OHIHJUW or  1-439.448.7267    [place] Crisis Hotline:    1-460.833.6077 or 679-095-2176    Discharge Survey:   Thank you for choosing [facility]. We hope we did everything we could to make your hospital stay a pleasant one. You may be receiving a phone survey and we would appreciate your time and participation in answering the questions. Your input is very valuable to us in our efforts to improve our service to our patients and their families.        My signature on this form indicates that:    1. I have reviewed and understand the above information.  2. My questions regarding this information have been answered to my satisfaction.  3. I have formulated a plan with my discharge nurse to obtain my prescribed medications for home.                  Disclaimer         __________________________________                     __________       ________                       Patient Signature                                                 Date                    Time

## 2017-08-23 ENCOUNTER — RESOLUTE PROFESSIONAL BILLING HOSPITAL PROF FEE (OUTPATIENT)
Dept: HOSPITALIST | Facility: MEDICAL CENTER | Age: 52
End: 2017-08-23
Payer: COMMERCIAL

## 2017-08-23 PROBLEM — E66.9 OBESITY (BMI 30-39.9): Status: ACTIVE | Noted: 2017-08-23

## 2017-08-23 PROBLEM — T81.31XA DEHISCENCE OF EXTERNAL SURGICAL WOUND: Status: ACTIVE | Noted: 2017-08-23

## 2017-08-23 PROBLEM — L03.90 CELLULITIS: Status: ACTIVE | Noted: 2017-08-23

## 2017-08-23 LAB
ALBUMIN SERPL BCP-MCNC: 3.1 G/DL (ref 3.2–4.9)
ALBUMIN/GLOB SERPL: 1 G/DL
ALP SERPL-CCNC: 80 U/L (ref 30–99)
ALT SERPL-CCNC: 10 U/L (ref 2–50)
ANION GAP SERPL CALC-SCNC: 10 MMOL/L (ref 0–11.9)
ANISOCYTOSIS BLD QL SMEAR: ABNORMAL
AST SERPL-CCNC: 11 U/L (ref 12–45)
BASOPHILS # BLD AUTO: 0.8 % (ref 0–1.8)
BASOPHILS # BLD: 0.06 K/UL (ref 0–0.12)
BILIRUB SERPL-MCNC: 0.4 MG/DL (ref 0.1–1.5)
BUN SERPL-MCNC: 8 MG/DL (ref 8–22)
CALCIUM SERPL-MCNC: 8.6 MG/DL (ref 8.5–10.5)
CHLORIDE SERPL-SCNC: 107 MMOL/L (ref 96–112)
CO2 SERPL-SCNC: 22 MMOL/L (ref 20–33)
COMMENT 1642: NORMAL
CREAT SERPL-MCNC: 0.55 MG/DL (ref 0.5–1.4)
DACRYOCYTES BLD QL SMEAR: NORMAL
EOSINOPHIL # BLD AUTO: 0.14 K/UL (ref 0–0.51)
EOSINOPHIL NFR BLD: 1.8 % (ref 0–6.9)
ERYTHROCYTE [DISTWIDTH] IN BLOOD BY AUTOMATED COUNT: 52.9 FL (ref 35.9–50)
GFR SERPL CREATININE-BSD FRML MDRD: >60 ML/MIN/1.73 M 2
GIANT PLATELETS BLD QL SMEAR: NORMAL
GLOBULIN SER CALC-MCNC: 3.1 G/DL (ref 1.9–3.5)
GLUCOSE SERPL-MCNC: 111 MG/DL (ref 65–99)
GRAM STN SPEC: NORMAL
HCT VFR BLD AUTO: 28.9 % (ref 37–47)
HGB BLD-MCNC: 8.5 G/DL (ref 12–16)
IMM GRANULOCYTES # BLD AUTO: 0.02 K/UL (ref 0–0.11)
IMM GRANULOCYTES NFR BLD AUTO: 0.3 % (ref 0–0.9)
LYMPHOCYTES # BLD AUTO: 1.99 K/UL (ref 1–4.8)
LYMPHOCYTES NFR BLD: 25.4 % (ref 22–41)
MCH RBC QN AUTO: 22.4 PG (ref 27–33)
MCHC RBC AUTO-ENTMCNC: 29.4 G/DL (ref 33.6–35)
MCV RBC AUTO: 76.1 FL (ref 81.4–97.8)
MICROCYTES BLD QL SMEAR: ABNORMAL
MONOCYTES # BLD AUTO: 1 K/UL (ref 0–0.85)
MONOCYTES NFR BLD AUTO: 12.8 % (ref 0–13.4)
MORPHOLOGY BLD-IMP: NORMAL
NEUTROPHILS # BLD AUTO: 4.61 K/UL (ref 2–7.15)
NEUTROPHILS NFR BLD: 58.9 % (ref 44–72)
NRBC # BLD AUTO: 0 K/UL
NRBC BLD AUTO-RTO: 0 /100 WBC
OVALOCYTES BLD QL SMEAR: NORMAL
PLATELET # BLD AUTO: 370 K/UL (ref 164–446)
PLATELET BLD QL SMEAR: NORMAL
PMV BLD AUTO: 9 FL (ref 9–12.9)
POIKILOCYTOSIS BLD QL SMEAR: NORMAL
POLYCHROMASIA BLD QL SMEAR: NORMAL
POTASSIUM SERPL-SCNC: 3.9 MMOL/L (ref 3.6–5.5)
PROT SERPL-MCNC: 6.2 G/DL (ref 6–8.2)
RBC # BLD AUTO: 3.8 M/UL (ref 4.2–5.4)
RBC BLD AUTO: PRESENT
SCHISTOCYTES BLD QL SMEAR: NORMAL
SIGNIFICANT IND 70042: NORMAL
SITE SITE: NORMAL
SODIUM SERPL-SCNC: 139 MMOL/L (ref 135–145)
SOURCE SOURCE: NORMAL
TSH SERPL DL<=0.005 MIU/L-ACNC: 2.6 UIU/ML (ref 0.3–3.7)
VANCOMYCIN SERPL-MCNC: 11.9 UG/ML
WBC # BLD AUTO: 7.8 K/UL (ref 4.8–10.8)

## 2017-08-23 PROCEDURE — 87040 BLOOD CULTURE FOR BACTERIA: CPT

## 2017-08-23 PROCEDURE — 99223 1ST HOSP IP/OBS HIGH 75: CPT | Performed by: FAMILY MEDICINE

## 2017-08-23 PROCEDURE — 87186 SC STD MICRODIL/AGAR DIL: CPT

## 2017-08-23 PROCEDURE — 71010 DX-CHEST-LIMITED (1 VIEW): CPT

## 2017-08-23 PROCEDURE — A9270 NON-COVERED ITEM OR SERVICE: HCPCS | Performed by: FAMILY MEDICINE

## 2017-08-23 PROCEDURE — 87070 CULTURE OTHR SPECIMN AEROBIC: CPT

## 2017-08-23 PROCEDURE — 80202 ASSAY OF VANCOMYCIN: CPT

## 2017-08-23 PROCEDURE — 700105 HCHG RX REV CODE 258: Performed by: PHARMACIST

## 2017-08-23 PROCEDURE — 36415 COLL VENOUS BLD VENIPUNCTURE: CPT

## 2017-08-23 PROCEDURE — 770001 HCHG ROOM/CARE - MED/SURG/GYN PRIV*

## 2017-08-23 PROCEDURE — 700101 HCHG RX REV CODE 250: Performed by: FAMILY MEDICINE

## 2017-08-23 PROCEDURE — 84443 ASSAY THYROID STIM HORMONE: CPT

## 2017-08-23 PROCEDURE — 700111 HCHG RX REV CODE 636 W/ 250 OVERRIDE (IP)

## 2017-08-23 PROCEDURE — 700105 HCHG RX REV CODE 258: Performed by: FAMILY MEDICINE

## 2017-08-23 PROCEDURE — 700111 HCHG RX REV CODE 636 W/ 250 OVERRIDE (IP): Performed by: FAMILY MEDICINE

## 2017-08-23 PROCEDURE — 700102 HCHG RX REV CODE 250 W/ 637 OVERRIDE(OP): Performed by: FAMILY MEDICINE

## 2017-08-23 PROCEDURE — 80053 COMPREHEN METABOLIC PANEL: CPT

## 2017-08-23 PROCEDURE — 700111 HCHG RX REV CODE 636 W/ 250 OVERRIDE (IP): Performed by: PHARMACIST

## 2017-08-23 PROCEDURE — 87077 CULTURE AEROBIC IDENTIFY: CPT

## 2017-08-23 PROCEDURE — 302135 SEQUENTIAL COMPRESSION MACHINE: Performed by: NEUROLOGICAL SURGERY

## 2017-08-23 PROCEDURE — 87205 SMEAR GRAM STAIN: CPT

## 2017-08-23 PROCEDURE — 85025 COMPLETE CBC W/AUTO DIFF WBC: CPT

## 2017-08-23 PROCEDURE — 700105 HCHG RX REV CODE 258

## 2017-08-23 PROCEDURE — 96365 THER/PROPH/DIAG IV INF INIT: CPT

## 2017-08-23 RX ORDER — OXYCODONE HYDROCHLORIDE 10 MG/1
20 TABLET ORAL EVERY 6 HOURS PRN
Status: DISCONTINUED | OUTPATIENT
Start: 2017-08-23 | End: 2017-08-26 | Stop reason: HOSPADM

## 2017-08-23 RX ORDER — POLYETHYLENE GLYCOL 3350 17 G/17G
1 POWDER, FOR SOLUTION ORAL
Status: DISCONTINUED | OUTPATIENT
Start: 2017-08-23 | End: 2017-08-26 | Stop reason: HOSPADM

## 2017-08-23 RX ORDER — METHOCARBAMOL 750 MG/1
750 TABLET, FILM COATED ORAL 4 TIMES DAILY
Status: DISCONTINUED | OUTPATIENT
Start: 2017-08-23 | End: 2017-08-26 | Stop reason: HOSPADM

## 2017-08-23 RX ORDER — PROMETHAZINE HYDROCHLORIDE 25 MG/1
12.5-25 SUPPOSITORY RECTAL EVERY 4 HOURS PRN
Status: DISCONTINUED | OUTPATIENT
Start: 2017-08-23 | End: 2017-08-26 | Stop reason: HOSPADM

## 2017-08-23 RX ORDER — GABAPENTIN 300 MG/1
300 CAPSULE ORAL 3 TIMES DAILY
Status: DISCONTINUED | OUTPATIENT
Start: 2017-08-23 | End: 2017-08-26 | Stop reason: HOSPADM

## 2017-08-23 RX ORDER — MORPHINE SULFATE 4 MG/ML
2-4 INJECTION, SOLUTION INTRAMUSCULAR; INTRAVENOUS EVERY 4 HOURS PRN
Status: DISCONTINUED | OUTPATIENT
Start: 2017-08-23 | End: 2017-08-26 | Stop reason: HOSPADM

## 2017-08-23 RX ORDER — AMOXICILLIN 250 MG
2 CAPSULE ORAL 2 TIMES DAILY
Status: DISCONTINUED | OUTPATIENT
Start: 2017-08-23 | End: 2017-08-26 | Stop reason: HOSPADM

## 2017-08-23 RX ORDER — SODIUM CHLORIDE AND POTASSIUM CHLORIDE 150; 900 MG/100ML; MG/100ML
INJECTION, SOLUTION INTRAVENOUS CONTINUOUS
Status: DISCONTINUED | OUTPATIENT
Start: 2017-08-23 | End: 2017-08-26 | Stop reason: HOSPADM

## 2017-08-23 RX ORDER — ACETAMINOPHEN 325 MG/1
650 TABLET ORAL EVERY 6 HOURS PRN
Status: DISCONTINUED | OUTPATIENT
Start: 2017-08-23 | End: 2017-08-26 | Stop reason: HOSPADM

## 2017-08-23 RX ORDER — PROMETHAZINE HYDROCHLORIDE 25 MG/1
12.5-25 TABLET ORAL EVERY 4 HOURS PRN
Status: DISCONTINUED | OUTPATIENT
Start: 2017-08-23 | End: 2017-08-26 | Stop reason: HOSPADM

## 2017-08-23 RX ORDER — ALBUTEROL SULFATE 90 UG/1
2 AEROSOL, METERED RESPIRATORY (INHALATION) EVERY 6 HOURS PRN
Status: DISCONTINUED | OUTPATIENT
Start: 2017-08-23 | End: 2017-08-26 | Stop reason: HOSPADM

## 2017-08-23 RX ORDER — ONDANSETRON 2 MG/ML
4 INJECTION INTRAMUSCULAR; INTRAVENOUS EVERY 4 HOURS PRN
Status: DISCONTINUED | OUTPATIENT
Start: 2017-08-23 | End: 2017-08-26 | Stop reason: HOSPADM

## 2017-08-23 RX ORDER — ONDANSETRON 4 MG/1
4 TABLET, ORALLY DISINTEGRATING ORAL EVERY 4 HOURS PRN
Status: DISCONTINUED | OUTPATIENT
Start: 2017-08-23 | End: 2017-08-26 | Stop reason: HOSPADM

## 2017-08-23 RX ORDER — LEVOTHYROXINE SODIUM 0.03 MG/1
25 TABLET ORAL
Status: DISCONTINUED | OUTPATIENT
Start: 2017-08-23 | End: 2017-08-25

## 2017-08-23 RX ORDER — BISACODYL 10 MG
10 SUPPOSITORY, RECTAL RECTAL
Status: DISCONTINUED | OUTPATIENT
Start: 2017-08-23 | End: 2017-08-26 | Stop reason: HOSPADM

## 2017-08-23 RX ADMIN — MORPHINE SULFATE 4 MG: 4 INJECTION INTRAVENOUS at 07:51

## 2017-08-23 RX ADMIN — STANDARDIZED SENNA CONCENTRATE AND DOCUSATE SODIUM 2 TABLET: 8.6; 5 TABLET, FILM COATED ORAL at 19:49

## 2017-08-23 RX ADMIN — OXYCODONE HYDROCHLORIDE 20 MG: 10 TABLET ORAL at 08:13

## 2017-08-23 RX ADMIN — PIPERACILLIN SODIUM AND TAZOBACTAM SODIUM 3.38 G: 3; .375 INJECTION, POWDER, FOR SOLUTION INTRAVENOUS at 18:13

## 2017-08-23 RX ADMIN — SERTRALINE 150 MG: 50 TABLET, FILM COATED ORAL at 08:13

## 2017-08-23 RX ADMIN — OXYCODONE HYDROCHLORIDE 20 MG: 10 TABLET ORAL at 23:00

## 2017-08-23 RX ADMIN — LEVOTHYROXINE SODIUM 25 MCG: 25 TABLET ORAL at 05:30

## 2017-08-23 RX ADMIN — GABAPENTIN 300 MG: 300 CAPSULE ORAL at 08:13

## 2017-08-23 RX ADMIN — PIPERACILLIN SODIUM AND TAZOBACTAM SODIUM 3.38 G: 3; .375 INJECTION, POWDER, FOR SOLUTION INTRAVENOUS at 11:29

## 2017-08-23 RX ADMIN — METHOCARBAMOL 750 MG: 750 TABLET ORAL at 19:49

## 2017-08-23 RX ADMIN — METHOCARBAMOL 750 MG: 750 TABLET ORAL at 14:10

## 2017-08-23 RX ADMIN — GABAPENTIN 300 MG: 300 CAPSULE ORAL at 14:10

## 2017-08-23 RX ADMIN — POTASSIUM CHLORIDE AND SODIUM CHLORIDE: 900; 150 INJECTION, SOLUTION INTRAVENOUS at 05:18

## 2017-08-23 RX ADMIN — OXYCODONE HYDROCHLORIDE 20 MG: 10 TABLET ORAL at 14:10

## 2017-08-23 RX ADMIN — ONDANSETRON 4 MG: 2 INJECTION INTRAMUSCULAR; INTRAVENOUS at 11:28

## 2017-08-23 RX ADMIN — METHOCARBAMOL 750 MG: 750 TABLET ORAL at 18:39

## 2017-08-23 RX ADMIN — ACETAMINOPHEN 650 MG: 325 TABLET, FILM COATED ORAL at 08:13

## 2017-08-23 RX ADMIN — POTASSIUM CHLORIDE AND SODIUM CHLORIDE: 900; 150 INJECTION, SOLUTION INTRAVENOUS at 18:12

## 2017-08-23 RX ADMIN — VANCOMYCIN HYDROCHLORIDE 2000 MG: 100 INJECTION, POWDER, LYOPHILIZED, FOR SOLUTION INTRAVENOUS at 18:59

## 2017-08-23 RX ADMIN — ONDANSETRON 4 MG: 2 INJECTION INTRAMUSCULAR; INTRAVENOUS at 19:47

## 2017-08-23 RX ADMIN — OXYCODONE HYDROCHLORIDE 20 MG: 10 TABLET ORAL at 01:46

## 2017-08-23 RX ADMIN — METHOCARBAMOL 750 MG: 750 TABLET ORAL at 08:13

## 2017-08-23 RX ADMIN — MORPHINE SULFATE 2 MG: 4 INJECTION INTRAVENOUS at 19:49

## 2017-08-23 RX ADMIN — VANCOMYCIN HYDROCHLORIDE 2200 MG: 100 INJECTION, POWDER, LYOPHILIZED, FOR SOLUTION INTRAVENOUS at 01:11

## 2017-08-23 RX ADMIN — PIPERACILLIN SODIUM AND TAZOBACTAM SODIUM 3.38 G: 3; .375 INJECTION, POWDER, FOR SOLUTION INTRAVENOUS at 23:00

## 2017-08-23 RX ADMIN — GABAPENTIN 300 MG: 300 CAPSULE ORAL at 19:49

## 2017-08-23 RX ADMIN — PIPERACILLIN SODIUM AND TAZOBACTAM SODIUM 3.38 G: 3; .375 INJECTION, POWDER, FOR SOLUTION INTRAVENOUS at 05:14

## 2017-08-23 RX ADMIN — MORPHINE SULFATE 4 MG: 4 INJECTION INTRAVENOUS at 23:02

## 2017-08-23 RX ADMIN — STANDARDIZED SENNA CONCENTRATE AND DOCUSATE SODIUM 2 TABLET: 8.6; 5 TABLET, FILM COATED ORAL at 08:13

## 2017-08-23 ASSESSMENT — PATIENT HEALTH QUESTIONNAIRE - PHQ9
8. MOVING OR SPEAKING SO SLOWLY THAT OTHER PEOPLE COULD HAVE NOTICED. OR THE OPPOSITE, BEING SO FIGETY OR RESTLESS THAT YOU HAVE BEEN MOVING AROUND A LOT MORE THAN USUAL: SEVERAL DAYS
SUM OF ALL RESPONSES TO PHQ QUESTIONS 1-9: 8
3. TROUBLE FALLING OR STAYING ASLEEP OR SLEEPING TOO MUCH: SEVERAL DAYS
5. POOR APPETITE OR OVEREATING: SEVERAL DAYS
6. FEELING BAD ABOUT YOURSELF - OR THAT YOU ARE A FAILURE OR HAVE LET YOURSELF OR YOUR FAMILY DOWN: SEVERAL DAYS
2. FEELING DOWN, DEPRESSED, IRRITABLE, OR HOPELESS: SEVERAL DAYS
SUM OF ALL RESPONSES TO PHQ9 QUESTIONS 1 AND 2: 0
1. LITTLE INTEREST OR PLEASURE IN DOING THINGS: NOT AT ALL
4. FEELING TIRED OR HAVING LITTLE ENERGY: SEVERAL DAYS
SUM OF ALL RESPONSES TO PHQ9 QUESTIONS 1 AND 2: 1
2. FEELING DOWN, DEPRESSED, IRRITABLE, OR HOPELESS: NOT AT ALL
9. THOUGHTS THAT YOU WOULD BE BETTER OFF DEAD, OR OF HURTING YOURSELF: SEVERAL DAYS
7. TROUBLE CONCENTRATING ON THINGS, SUCH AS READING THE NEWSPAPER OR WATCHING TELEVISION: SEVERAL DAYS
SUM OF ALL RESPONSES TO PHQ QUESTIONS 1-9: 0
1. LITTLE INTEREST OR PLEASURE IN DOING THINGS: NOT AT ALL

## 2017-08-23 ASSESSMENT — PAIN SCALES - GENERAL
PAINLEVEL_OUTOF10: 6
PAINLEVEL_OUTOF10: 9
PAINLEVEL_OUTOF10: 6
PAINLEVEL_OUTOF10: 5
PAINLEVEL_OUTOF10: 9
PAINLEVEL_OUTOF10: 8
PAINLEVEL_OUTOF10: 7

## 2017-08-23 ASSESSMENT — LIFESTYLE VARIABLES: EVER_SMOKED: YES

## 2017-08-23 ASSESSMENT — ENCOUNTER SYMPTOMS
CHILLS: 0
NECK PAIN: 0
FEVER: 0

## 2017-08-23 NOTE — H&P
DOS: 8/23/2017    PATIENT ID:  NAME:  Smita Faith  MRN:               [id number]  YOB: 1965    PMD: Dallas Bolivar PA-C    CC: Neck pain and swelling    HPI: Smita Faith is a 51 y.o. female who presents with neck pain and swelling which started yesterday. Patient states she also had fever and chills. Patient had anterior cervical spine fusion 3 weeks ago, she was actually admitted about a week and a half ago secondary to dysphagia and odynophagia, and she was discharged on a nectar thick liquid diet. She did not have any problems until yesterday when she noted increasing redness and swelling on the anterior part of the neck. She went to an outside hospital CT scan was done which showed soft tissue swelling but no discrete abscess. Neurosurgery Dr. Herrera has been contacted by the ER physician.                REVIEW OF SYSTEMS  A full review of systems was completed and all pertinent positives and negatives are included in the HPI above by AMA/CMS criteria.    PAST MEDICAL HISTORY  Past Medical History   Diagnosis Date   • Bipolar 2 disorder (CMS-HCC)    • Depression    • Anxiety disorder    • Pneumonia    • Bronchitis    • Backpain      Lumbar chronic   • Unspecified disorder of thyroid [id number]     no longer an issue   • Alcoholism (CMS-HCC)    • Heart burn    • Indigestion    • Arthritis      back, hands, arm   • Gynecological disorder      mass   • Bowel habit changes      constipation, irreg diarrhea   • ASTHMA      States trigered by allergy reaction, PRN inhaler        PAST SURGICAL HISTORY  Past Surgical History   Procedure Laterality Date   • Gastric bypass laparoscopic     • Other abdominal surgery       gastric bypass 5/07,lap jenniffer 1988   • Tubal ligation     • Pr removal of tonsils,<11 y/o     • Pr cholecystostomy,percut     • Lumbar fusion anterior  11/10/2010     Performed by CHERYL HERRERA at SURGERY [facility] ORS   • Breast biopsy       Right- benign   • Pr reduction of  large breast  2010   • Hysterectomy robotic xi N/A 4/7/2016     Procedure: HYSTERECTOMY ROBOTIC XI ;  Surgeon: Song Goodson M.D.;  [facility]: SURGERY [facility];  Service:        FAMILY HISTORY  History reviewed. No pertinent family history.    SOCIAL HISTORY  Social History   Substance Use Topics   • Smoking status: Former Smoker -- 0.25 packs/day for 4 years     Types: Cigarettes     Quit date: 01/01/2015   • Smokeless tobacco: Never Used      Comment: 2009   • Alcohol Use: No      Comment: smoked off and on.       ALLERGIES  Allergies as of 08/22/2017 - Mehdi as Reviewed 08/22/2017   Allergen Reaction Noted   • Bloodless  11/01/2010   • Ciprofloxacin Anaphylaxis 05/20/2010   • Codeine  04/05/2016   • Erythromycin Vomiting 03/20/2010   • Nsaids  11/01/2010   • Other food Anaphylaxis 11/01/2010       OUTPATIENT MEDICATIONS     Medication List      ASK your doctor about these medications       Instructions    albuterol 108 (90 Base) MCG/ACT Aers inhalation aerosol    Inhale 2 Puffs by mouth every 6 hours as needed for Shortness of Breath.   Dose:  2 Puff       Calcium 6253-7425 MG-UNIT Chew    Take 1 Each by mouth every day.   Dose:  1 Each       COQ-10 PO    Take 1 Tab by mouth every day.   Dose:  1 Tab       gabapentin 300 MG Caps   Commonly known as:  NEURONTIN    Take 300 mg by mouth 3 times a day.   Dose:  300 mg       levothyroxine 25 MCG Tabs   Commonly known as:  SYNTHROID    Take 25 mcg by mouth Every morning on an empty stomach.   Dose:  25 mcg       methocarbamol 750 MG Tabs   Commonly known as:  ROBAXIN    Take 750 mg by mouth 4 times a day.   Dose:  750 mg       MULTIVITAMIN & MINERAL PO    Take 1 Tab by mouth every day.   Dose:  1 Tab       Oxycodone HCl 20 MG Tabs    Take 20 mg by mouth every 6 hours as needed.   Dose:  20 mg       sertraline 100 MG Tabs   Commonly known as:  ZOLOFT    Take 150 mg by mouth every day.   Dose:  150 mg       Vitamin D 1000 UNIT Caps    Take 1,000 Units by mouth every  "day.   Dose:  1000 Units             PHYSICAL EXAM:  Blood pressure 137/80, pulse 102, temperature 37.7 °C (99.9 °F), resp. rate 20, height 1.549 m (5' 1\"), weight 108.863 kg (240 lb), last menstrual period 03/02/2016.  Oxygen:      Gen: NAD, comfortable  HEENT: NCAT, PERRL, EOMI, Oropharynx moist and clear, erythema and swelling anterior neck with open wound with yellowish disharge  Chest: no accessory muscle use, CTAB  CV: RRR, S1 and S2 distinct, no murmur  GI: +BS, soft, NT, ND, no rebound/guarding, no hepatosplenomegaly  Extremities: Warm, well-perfused, no cyanosis/clubbing, no peripheral edema, distal pulses are intact  Neuro: AO x 3, CN II-XII grossly intact, MMT 5/5, no sensory deficits    LAB TESTS and IMAGES:   No results for input(s): WBC, RBC, HEMOGLOBIN, HEMATOCRIT, MCV, MCH, RDW, PLATELETCT, MPV, NEUTSPOLYS, LYMPHOCYTES, MONOCYTES, EOSINOPHILS, BASOPHILS, RBCMORPHOLO in the last 72 hours.          No results for input(s): SODIUM, POTASSIUM, CHLORIDE, CO2, BUN, CREATININE, CALCIUM, MAGNESIUM, PHOSPHORUS, ALBUMIN in the last 72 hours.  Estimated GFR/CRCL = CrCl cannot be calculated (Patient has no serum creatinine result on file.).  No results for input(s): GLUCOSE, POCGLUCOSE in the last 72 hours.  FREE T-4   Date/Time Value Ref Range Status   03/20/2010 04:35 AM 0.71 0.53 - 1.43 ng/dL Final               Invalid input(s): [id number]  VITAMIN B12 -TRUE COBALAMIN   Date/Time Value Ref Range Status   03/20/2010 04:35  211 - 911 pg/mL Final     FOLATE -FOLIC ACID   Date/Time Value Ref Range Status   03/20/2010 04:35 AM >20.00 >3.1 ng/mL Final     FERRITIN   Date/Time Value Ref Range Status   03/20/2010 04:35 AM 5.9* 10.0 - 291.0 ng/mL Final     IRON   Date/Time Value Ref Range Status   11/01/2010 02:18 * 40 - 170 ug/dL Final   03/20/2010 04:35 AM 19* 40 - 170 ug/dL Final     TOTAL IRON BINDING   Date/Time Value Ref Range Status   11/01/2010 02:18  250 - 450 ug/dL Final   03/20/2010 " 04:35  250 - 450 ug/dL Final     Ct-soft Tissue Neck With    8/3/2017  8/3/2017 6:42 PM HISTORY/REASON FOR EXAM:  Respiratory difficulty, difficulty swallowing, recent cervical fusion. TECHNIQUE/EXAM DESCRIPTION AND NUMBER OF VIEWS:  CT soft tissue neck with contrast. The study was performed on a helical multidetector CT scanner. Contiguous thin section helical images were obtained of the neck from the skull base through the thoracic inlet. 100 mL of Omnipaque 350 nonionic contrast was injected intravenously. Low dose optimization technique was utilized for this CT exam including automated exposure control and adjustment of the mA and/or kV according to patient size. COMPARISON: MR cervical spine 2/15/2017 FINDINGS: LEFT maxillary sinus polyp or retention cyst. The visualized mastoids are unremarkable. The visualized orbits are unremarkable. Edema and gas present in the RIGHT neck consistent with recent surgery. Postoperative change from discectomy, spacer placement and anterior fusion from C3 through C6. Diffuse prevertebral soft tissue swelling from the C3-C7 level. No hematoma demonstrated. No evidence for ongoing arterial hemorrhage. Airway appears patent. Thyroid gland is unremarkable. Visualized lung apices show minimal dependent atelectasis.     8/3/2017  1.  Postoperative change from multilevel cervical discectomy and anterior fusion from C3 through C6. 2.  Diffuse prevertebral soft tissue swelling, without focal fluid collection or hematoma. 3.  Soft tissue edema and gas tracking in the RIGHT neck consistent with recent surgery. 4.  No apparent narrowing of the airway.    Dx-chest-limited (1 View)    8/23/2017 8/22/2017 11:56 PM HISTORY/REASON FOR EXAM:  Shortness of Breath Neck cellulitis. TECHNIQUE/EXAM DESCRIPTION AND NUMBER OF VIEWS: Single portable view of the chest. COMPARISON: 8/3/2017 FINDINGS: Partially visualized cervical spinal hardware. No pulmonary infiltrates or consolidations are  noted. No pleural effusion. No pneumothorax. Stable cardiopericardial silhouette. Partially visualized right humeral fracture.     8/23/2017  No acute cardiopulmonary abnormalities are identified. Partially visualized right humeral fracture.    Dx-chest-limited (1 View)    8/3/2017  8/3/2017 6:19 PM HISTORY/REASON FOR EXAM:  Shortness of Breath. Difficulty swallowing. TECHNIQUE/EXAM DESCRIPTION AND NUMBER OF VIEWS: Single AP view of the chest. COMPARISON: 4/5/2016 FINDINGS: Lines/tubes:  None. Lungs:  There is minimal linear atelectasis in each lower lobe. No lobar consolidation is present. Pleura:  There is no pleural effusion or pneumothorax. Heart and mediastinum:  The heart silhouette is within normal limits. Bones: Cervical fusion hardware is present in the mid and distal cervical spine.     8/3/2017  Minimal linear atelectasis in each lower lobe. No lobar consolidation or pleural effusion.    Dx-abdomen For Tube Placement    8/7/2017 8/7/2017 8:08 PM HISTORY/REASON FOR EXAM:  Line evaluation. TECHNIQUE/EXAM DESCRIPTION AND NUMBER OF VIEWS:  1 view(s) of the abdomen. COMPARISON:  11/10/2010 FINDINGS: Enteric tube has been placed. The tip projects over the stomach. The bowel gas pattern is within normal limits. Cholecystectomy clips are present. Lumbosacral fusion hardware is present.     8/7/2017  Enteric tube tip projects over the stomach      ASSESSMENT/PLAN:     1.  Cellulitis with possible abscess of postoperative wound. Check blood and wound cultures, start IV vancomycin and Zosyn, neurosurgery Dr. Herrera has been contacted her physician  2.  Hypokalemia. Start potassium replacement follow BMP magnesium and phosphorus  3.  Dysphagia. Continue nectar thick liquid diet  4.  Status post anterior cervical spine fusion. Pain control  5.  Asthma. RT protocol  6.  Bipolar disorder. Continue current medications  7.  Hypothyroid. Continue Synthroid, check TSH    PPX: SCD    CODE STATUS: Full    Anticipate that  patient will need more than 2 midnights for management of the above discussed medical issues.    This dictation was created using voice recognition software. The accuracy of the dictation is limited to the abilities of the software. I expect there may be some errors of grammar and possibly content.

## 2017-08-23 NOTE — DIETARY
NUTRITION SERVICES: BMI - Pt with BMI >40 (=48.97). Weight loss counseling not appropriate in acute care setting. RECOMMEND - Referral to outpatient nutrition services for weight management after D/C.

## 2017-08-23 NOTE — ED PROVIDER NOTES
ED Provider Note    Scribed for Cheyenne Quevedo M.D. by Dora Kamara. 8/22/2017, 11:12 PM.    Primary care provider: Dallas Bolivar PA-C  Means of arrival: Ambulance  History obtained from: Patient  History limited by: None    CHIEF COMPLAINT  Chief Complaint   Patient presents with   • Neck Pain   • Wound Infection       HPI  Smita Faith is a 51 y.o. Female, POD #20 s/p cervical disc fusion with anterior approach. Transferred to this emergency department from [facility] Emergency Department with neck pain and a wound infection. The patient had a anterior cervical disc fusion on 8/2/17 with Dr. Herrera, and was then evaluated in the [facility] ED 8/3/17 for severe swelling. Her neck pain following the procedure began worsening a few days ago. She reports the incision site on the right side of her neck became red and swollen this morning, and this has been worsening throughout the day. The patient also complains of shortness of breath today. She presented to [facility] earlier tonight and was started on 2 g vancomycin, Aztreonam and 0.5 mg Dilaudid prior to transfer to the [facility] ED for neurosurgery consultation. She denies chest pain, fever, chills, abdominal pain, nausea, vomiting, or other symptoms.    REVIEW OF SYSTEMS  Pertinent positives include neck pain, redness, swelling, shortness of breath. Pertinent negatives include no chest pain, fever, chills, abdominal pain, nausea, vomiting.  All other systems reviewed and negative. C.    PAST MEDICAL HISTORY   has a past medical history of Bipolar 2 disorder (CMS-HCC); Depression; Anxiety disorder; Pneumonia; Bronchitis; Backpain; Unspecified disorder of thyroid (1180-1149); Alcoholism (CMS-HCC); Heart burn; Indigestion; Arthritis; Gynecological disorder; Bowel habit changes; and ASTHMA.    SURGICAL HISTORY   has past surgical history that includes gastric bypass laparoscopic; other abdominal surgery; tubal ligation; removal of tonsils,<11 y/o;  "cholecystostomy,percut; lumbar fusion anterior (11/10/2010); breast biopsy; reduction of large breast (2010); and hysterectomy robotic xi (N/A, 4/7/2016).    SOCIAL HISTORY  Social History   Substance Use Topics   • Smoking status: Former Smoker -- 0.25 packs/day for 4 years     Types: Cigarettes     Quit date: 01/01/2015   • Smokeless tobacco: Never Used      Comment: 2009   • Alcohol Use: No      Comment: smoked off and on.      History   Drug Use No     Comment: marijuana, crank, \"did everything.\" last used 04/2015       FAMILY HISTORY  No contributing family history noted.     CURRENT MEDICATIONS  Reviewed.  See Encounter Summary.     ALLERGIES  Allergies   Allergen Reactions   • Bloodless    • Ciprofloxacin Anaphylaxis     SOB   • Codeine      Upset stomach    • Erythromycin Vomiting   • Nsaids      Does not take due to gastric bypass   • Other Food Anaphylaxis     pj oliva       PHYSICAL EXAM  Vital Signs: /80 mmHg  Pulse 102  Temp(Src) 37.7 °C (99.9 °F)  Resp 20  Ht 1.549 m (5' 1\")  Wt 108.863 kg (240 lb)  BMI 45.37 kg/m2  LMP 03/02/2016  Constitutional: Alert, mild distress.   HENT: Normocephalic, atraumatic, moist mucus membranes. Normal posterior oropharynx   Eyes: Pupils equal and reactive, normal conjunctiva, non-icteric  Neck: Swelling to right anterior neck with 1 cm area of surgical incision dehiscence with scant purulent drainage, Large area of surrounding cellulitis, controlling her secretions easily, no stridor.   Cardiovascular: Regular rhythm, Normal peripheral perfusion, no cyanosis, Normal cardiac auscultation  Pulmonary: No respiratory distress, normal work of breathing, no accessory muscle usage, Clear to auscultation bilaterally  Abdomen: Soft, non tender, no peritoneal signs, bowel sounds are present.   Skin: Warm, dry, no rashes or lesions  Musculoskeletal: Normal range of motion in all extremities, no swelling or deformity noted  Neurologic: Alert, oriented, normal motor " function, no speech deficits  Psychiatric: Normal and appropriate mood and affect    DIAGNOSTIC STUDIES/PROCEDURES:    LABS  Labs Reviewed   CBC WITH DIFFERENTIAL   COMP METABOLIC PANEL   CULTURE WOUND W/ GRAM STAIN   BLOOD CULTURE   BLOOD CULTURE   TSH     All labs reviewed by me.      Radiology results revealed:   DX-CHEST-LIMITED (1 VIEW)   Final Result         No acute cardiopulmonary abnormalities are identified.      Partially visualized right humeral fracture.      CT-FOREIGN FILM CAT SCAN   Preliminary Result           COURSE & MEDICAL DECISION MAKING  Pertinent Labs & Imaging studies reviewed. (See chart for details)    Review of old medical records for continuity of care.     CT neck soft tissue with contrast report reviewed performed at [facility] 8/22/17. Postsurgical changes cervical spine as described without significant prevertebral abscess formation. Soft tissue edema suggesting minimal cellulitis or postsurgical changes with small amount of gas seen in the soft tissues in the right submandibular region, no definitive abscess identified at this time. Soft tissue edema consistent with recent surgery from C3-C6 vertebral bodies with anterior fixation plate and screws and intervertebral disc device is noted. Emergency department records reviewed, patient complains of anterior neck pain and swelling onset yesterday. Recent admission to [facility] for difficulty swallowing. White blood count 8.9. No evidence of airway compromise. Transferred to [facility] for neurosurgical evaluation which is not available at referring facility. Treated with aztreonam and vancomycin prior to transfer.    Discharge summary reviewed from patient's recent admission to this facility. Patient admitted 8/3/17, discharge date/13/17. Admitted for swelling of the throat progressing to the point where she is unable to swallow liquids or foods. Cor tract placed for tube  feeds. Seen by speech therapy and was able to pass swallow evaluation for nectar thick diet on 8/11/17.    Differential diagnoses include but are not limited to: Postoperative infection, cellulitis, abscess    11:12 PM - Patient seen and examined at bedside. I explained to the patient I will order a chest x-ray and basic labs to evaluate, and I will consult Dr. Herrera. Ordered DX-chest, CBC, CMP to evaluate her symptoms.     11:43 PM Paged neurosurgery.     11:49 PM I discussed the patient's case and the above findings with Dr. Herrera (neurosurgery) who will evaluate the patient in the coming morning.     11:52 PM Paged hospitalist.      12:12 AM Spoke with Dr. Graves, hospitalist, concerning patient case. Agreed to admit patient for further treatment and evaluation.     Decision Making:  This is a 51 y.o. year old female who presents as a transfer from [facility] for postoperative cellulitis and concern for draining abscess. [facility] any fluid collections. Patient was treated with aztreonam and vancomycin prior to transfer. On arrival she is controlling her secretions well, no evidence of airway compromise. She has a normal white blood count at the [facility] facility, no tachycardia, no hypotension, less concerning for sepsis.    I discussed the case with Dr. Herrera who kindly agrees to evaluate the patient in the morning. [facility] and [facility]    DISPOSITION:  Patient will be admitted to Dr. Graves in guarded condition.     FINAL IMPRESSION  1. Post-operative infection          Dora IBARRA (Josesito), am scribing for, and in the presence of, Cheyenne Quevedo M.D..    Electronically signed by: Dora Kamara (Scribe), 8/22/2017    Cheyenne IBARRA M.D. personally performed the services described in this documentation, as scribed by Dora Kamara in  my presence, and it is both accurate and complete.    The note accurately reflects work and decisions made by me.  Cheyenne Quevedo  8/23/2017  4:43 AM

## 2017-08-23 NOTE — PROGRESS NOTES
Patient arrived safely via transport from ED. Patient resting quietly in bed, no S/S of distress, AA&Ox4. Denies SOB, chest pain, dizziness. Bed alarm on, call light within reach,  pt calls appropriately and does not get out of bed. Bed in lowest position, bed locked, RN and CNA numbers provided, no further needs at this time. No changes from EPIC. Hourly rounding in place.

## 2017-08-23 NOTE — PROGRESS NOTES
"Pharmacy Kinetics 51 y.o. female on vancomycin day # 1 2017    Currently on Vancomycin:  17: ~500mg IV PTA (bag still running upon arrival)        17 at 0111: 2200mg IV x1 (to finish 25mg/kg loading dose)         Indication for Treatment: SSTI    Pertinent history per medical record: Admitted on 2017 for cellulitis. Pt transferred from [facility] for evaluation of neck pain and swelling x 1 day. Pt had anterior cervical spine fusion 3 weeks ago and she noticed increased redness and swelling on the anterior part of the neck yesterday as well as fever and chills. CT scan at [facility] showed soft tissue swelling but no discrete abscess. Dr. Herrera of neurosurgery to consult patient. Pt received approximately 500mg of vancomycin from outside facility.    Other antibiotics: Piperacillin-tazobactam 3.375g IV Q6H    Allergies: Bloodless; Ciprofloxacin; Codeine; Erythromycin; Nsaids; and Other food     List concerns for renal function: obesity (BMI = 48.9), BMP pending    Pertinent cultures to date:   pending    No results for input(s): WBC, NEUTSPOLYS, BANDSSTABS in the last 72 hours.  No results for input(s): BUN, CREATININE, ALBUMIN in the last 72 hours.  No results for input(s): VANCOTROUGH, VANCOPEAK, VANCORANDOM in the last 72 hours.No intake or output data in the 24 hours ending 17 0209   Blood pressure 115/58, pulse 99, temperature 37.4 °C (99.4 °F), resp. rate 16, height 1.549 m (5' 1\"), weight 117.5 kg (259 lb 0.7 oz), last menstrual period 2016, SpO2 93 %. Temp (24hrs), Av.6 °C (99.7 °F), Min:37.4 °C (99.4 °F), Max:37.7 °C (99.9 °F)      A/P   1. Vancomycin dose change: pulse dose  2. Next vancomycin level: 12 hour VR (level ordered for  at 1300)  3. Goal trough: 12-16 mcg/mL  4. Comments: Significant concern for accumulation due to BMI = 48.9. Will check 12 hour level to assess clearance of loading dose. Clinical pharmacist to f/u in AM. Look to de-escalate pending cultures.    Pk STEEL" [place] PharmD

## 2017-08-23 NOTE — ED NOTES
Patient placed on 2L nasal cannula. Patient continues to clear her throat stating that she has a hard time with her saliva intermittently. Pt breathing equal and unlabored. ERP notified immediately and at bedside.

## 2017-08-23 NOTE — PROGRESS NOTES
Patient admitted by my colleague overnight.  Chart reviewed and patient examined.  Will continue current management including wound care and IV antibiotic therapy.  Neurosurgery following and will do wound washout as needed.

## 2017-08-23 NOTE — PROGRESS NOTES
Dr. Herrera has reviewed case.   Will take patient to OR tomorrow for anterior cervical wound revision.

## 2017-08-23 NOTE — WOUND TEAM
Wound consult placed for evaluation anterior neck wound.  Per staff RN, drainage has decreased since this AM and plan is for surgical debridement tomorrow.  Instructed staff RN to continue with dry gauze dressing change PRN drainage.  No involvement by wound team at this time.

## 2017-08-23 NOTE — PROGRESS NOTES
Progress Note               Author: Konstantin Meyer Date & Time created: 2017  8:32 AM     Interval History:  POD#21 C3-6 ACDF with Dr. Herrera  Presented to ER yesterday with incisional swelling/ drainage  Seen by hospitalist, currently on vanc and zosyn  Cultures today  Labs tomorrow    Review of Systems:  Review of Systems   Constitutional: Negative for fever and chills.   Gastrointestinal:        Dysphagia  Odynophagia     Musculoskeletal: Negative for neck pain.       Physical Exam:  Physical Exam   Skin:   Erythema and large area of edema tete-incisionally on anterior neck.   ACDF incision is dehisced with ja white pus visible within wound bed.  No active drainage.         Labs:        Invalid input(s): [id number]      No results for input(s): SODIUM, POTASSIUM, CHLORIDE, CO2, BUN, CREATININE, MAGNESIUM, PHOSPHORUS, CALCIUM in the last 72 hours.  No results for input(s): ALTSGPT, ASTSGOT, ALKPHOSPHAT, TBILIRUBIN, DBILIRUBIN, GAMMAGT, AMYLASE, LIPASE, ALB, PREALBUMIN, GLUCOSE in the last 72 hours.  No results for input(s): RBC, HEMOGLOBIN, HEMATOCRIT, PLATELETCT, PROTHROMBTM, APTT, INR, IRON, FERRITIN, TOTIRONBC in the last 72 hours.      Hemodynamics:  Temp (24hrs), Av.3 °C (99.2 °F), Min:36.9 °C (98.4 °F), Max:37.7 °C (99.9 °F)  Temperature: 36.9 °C (98.4 °F)  Pulse  Av.9  Min: 50  Max: 104Heart Rate (Monitored): (!) 103  Blood Pressure: 126/67 mmHg, NIBP: 106/59 mmHg     Respiratory:    Respiration: 16, Pulse Oximetry: 96 %, O2 Daily Delivery Respiratory : Room Air with O2 Available     Work Of Breathing / Effort: Mild  RML Breath Sounds: Clear, RLL Breath Sounds: Clear, BRIAN Breath Sounds: Clear, LLL Breath Sounds: Clear  Fluids:    Intake/Output Summary (Last 24 hours) at 17 0832  Last data filed at 17 0600   Gross per 24 hour   Intake    700 ml   Output      0 ml   Net    700 ml     Weight: 117.5 kg (259 lb 0.7 oz)  GI/Nutrition:  Orders Placed This Encounter   Procedures    • Diet Order     Standing Status: Standing      Number of Occurrences: 1      Standing Expiration Date:      Order Specific Question:  Diet:     Answer:  Regular [1]     Order Specific Question:  Consistency/Fluid modifications:     Answer:  Nectar Thick [2]     Medical Decision Making, by Problem:  Active Hospital Problems    Diagnosis   • Cellulitis [L03.90]       Plan:  Wound care nurse to evaluate   Continue abx regimen per hospitalist recs  Ambulate as tolerating  Possible OR for wound washout and closure    Core Measures

## 2017-08-23 NOTE — ASSESSMENT & PLAN NOTE
To OR today for washout. Continue antibiotic.  Cultures preliminary for heavy staphylococcal growth.  Full speciation pending

## 2017-08-23 NOTE — PROGRESS NOTES
"Pharmacy Kinetics 51 y.o. female on vancomycin day # 2 2017    Currently on Vancomycin 2,000 mg iv q24hr (1630)    Indication for Treatment: SSTI     Pertinent history per medical record: Admitted on 2017 as a transfer from [facility] for neck pain/swelling, fever, and chills.   CT scan at [facility] showed soft tissue swelling but no discrete abscess. Patient is POD 21 of cervical spine fusion. Dr. Herrera consulting on the case reports possible return to the OR for wound revision tomorrow.     Other antibiotics: Zosyn     Allergies: Bloodless; Ciprofloxacin; Codeine; Erythromycin; Nsaids; and Other food     List concerns for renal function: Obesity (BMI ~49)    Pertinent cultures to date:   : Neck Wound Cx = Many WBCs, Many gram + cocci   : Blood cultures in process       Recent Labs      17   0745   WBC  7.8     No results for input(s): BUN, CREATININE, ALBUMIN in the last 72 hours.  Recent Labs      17   1305   VANCORANDOM  11.9     Intake/Output Summary (Last 24 hours) at 17 1601  Last data filed at 17 0600   Gross per 24 hour   Intake    700 ml   Output      0 ml   Net    700 ml      Blood pressure 113/69, pulse 82, temperature 36.7 °C (98 °F), resp. rate 16, height 1.549 m (5' 1\"), weight 117.5 kg (259 lb 0.7 oz), last menstrual period 2016, SpO2 97 %. Temp (24hrs), Av.1 °C (98.8 °F), Min:36.7 °C (98 °F), Max:37.7 °C (99.9 °F)      A/P   1. Vancomycin dose change: Initiated 2,000mg IV every 24 hours   2. Next vancomycin level: Plan for Friday,  (not ordered)  3. Goal trough: 12 - 16 mcg/mL   4. Comments: Patient was given 500mg vancomycin at outlying facility and then received a loading dose of 2,200mg upon admission ( @ 0111). Expect a larger volume of distribution in this patient d/t obesity (BMI ~ 49) with high risk of accumulation of drug over time. Random vancomycin level was drawn 12 hours post loading dose and was within goal range as outlined above " which demonstrates adequate drug clearance. Anticipate that patient will tolerate every 24 hour dosing regimen. Will draw a true trough level in ~ 2 days to further assess the pharmacokinetics and tolerability of scheduled dosing. Clinical pharmacist will adjust dose as indicated, monitor for changes in renal function, and follow cultures.     Ivette Templeton, TEMID

## 2017-08-23 NOTE — ED NOTES
"Patient presents as transfer from [facility] for cellulitis of her anterior neck incision post surgery on August 2nd. Patient states that the pain has been increasing for a few days, and worsened over past 12 hours. Pt received \"2g azactam IV\" PTA and was started on 2g vanco at 2200 prior to arrival. Plan is to consult neuro surgery. Pt alert, VSS on arrival. Pt received dilaudid in route. Pts sats are ranging from 85-95%. Patient states that she thinks has a history of low oxygen saturations after medication. Continue to monitor.   "

## 2017-08-24 LAB
ANION GAP SERPL CALC-SCNC: 8 MMOL/L (ref 0–11.9)
BASOPHILS # BLD AUTO: 0.6 % (ref 0–1.8)
BASOPHILS # BLD: 0.04 K/UL (ref 0–0.12)
BUN SERPL-MCNC: 7 MG/DL (ref 8–22)
CALCIUM SERPL-MCNC: 8.5 MG/DL (ref 8.5–10.5)
CHLORIDE SERPL-SCNC: 111 MMOL/L (ref 96–112)
CO2 SERPL-SCNC: 25 MMOL/L (ref 20–33)
CREAT SERPL-MCNC: 0.5 MG/DL (ref 0.5–1.4)
EOSINOPHIL # BLD AUTO: 0.16 K/UL (ref 0–0.51)
EOSINOPHIL NFR BLD: 2.4 % (ref 0–6.9)
ERYTHROCYTE [DISTWIDTH] IN BLOOD BY AUTOMATED COUNT: 52.9 FL (ref 35.9–50)
GFR SERPL CREATININE-BSD FRML MDRD: >60 ML/MIN/1.73 M 2
GLUCOSE SERPL-MCNC: 88 MG/DL (ref 65–99)
HCT VFR BLD AUTO: 27.1 % (ref 37–47)
HGB BLD-MCNC: 7.9 G/DL (ref 12–16)
IMM GRANULOCYTES # BLD AUTO: 0.02 K/UL (ref 0–0.11)
IMM GRANULOCYTES NFR BLD AUTO: 0.3 % (ref 0–0.9)
LYMPHOCYTES # BLD AUTO: 1.98 K/UL (ref 1–4.8)
LYMPHOCYTES NFR BLD: 29.7 % (ref 22–41)
MCH RBC QN AUTO: 22.2 PG (ref 27–33)
MCHC RBC AUTO-ENTMCNC: 29.2 G/DL (ref 33.6–35)
MCV RBC AUTO: 76.1 FL (ref 81.4–97.8)
MONOCYTES # BLD AUTO: 0.82 K/UL (ref 0–0.85)
MONOCYTES NFR BLD AUTO: 12.3 % (ref 0–13.4)
NEUTROPHILS # BLD AUTO: 3.65 K/UL (ref 2–7.15)
NEUTROPHILS NFR BLD: 54.7 % (ref 44–72)
NRBC # BLD AUTO: 0 K/UL
NRBC BLD AUTO-RTO: 0 /100 WBC
PLATELET # BLD AUTO: 337 K/UL (ref 164–446)
PMV BLD AUTO: 8.9 FL (ref 9–12.9)
POTASSIUM SERPL-SCNC: 4 MMOL/L (ref 3.6–5.5)
RBC # BLD AUTO: 3.56 M/UL (ref 4.2–5.4)
SODIUM SERPL-SCNC: 144 MMOL/L (ref 135–145)
WBC # BLD AUTO: 6.7 K/UL (ref 4.8–10.8)

## 2017-08-24 PROCEDURE — A9270 NON-COVERED ITEM OR SERVICE: HCPCS

## 2017-08-24 PROCEDURE — 160036 HCHG PACU - EA ADDL 30 MINS PHASE I: Performed by: NEUROLOGICAL SURGERY

## 2017-08-24 PROCEDURE — 160029 HCHG SURGERY MINUTES - 1ST 30 MINS LEVEL 4: Performed by: NEUROLOGICAL SURGERY

## 2017-08-24 PROCEDURE — 36415 COLL VENOUS BLD VENIPUNCTURE: CPT

## 2017-08-24 PROCEDURE — 85025 COMPLETE CBC W/AUTO DIFF WBC: CPT

## 2017-08-24 PROCEDURE — 160048 HCHG OR STATISTICAL LEVEL 1-5: Performed by: NEUROLOGICAL SURGERY

## 2017-08-24 PROCEDURE — A9270 NON-COVERED ITEM OR SERVICE: HCPCS | Performed by: PHYSICIAN ASSISTANT

## 2017-08-24 PROCEDURE — 700111 HCHG RX REV CODE 636 W/ 250 OVERRIDE (IP)

## 2017-08-24 PROCEDURE — 700105 HCHG RX REV CODE 258: Performed by: FAMILY MEDICINE

## 2017-08-24 PROCEDURE — 500367 HCHG DRAIN KIT, HEMOVAC: Performed by: NEUROLOGICAL SURGERY

## 2017-08-24 PROCEDURE — 502626 HCHG SURGIFLO HEMOSTATIC MATRIX 6ML: Performed by: NEUROLOGICAL SURGERY

## 2017-08-24 PROCEDURE — 700102 HCHG RX REV CODE 250 W/ 637 OVERRIDE(OP)

## 2017-08-24 PROCEDURE — 770001 HCHG ROOM/CARE - MED/SURG/GYN PRIV*

## 2017-08-24 PROCEDURE — 160009 HCHG ANES TIME/MIN: Performed by: NEUROLOGICAL SURGERY

## 2017-08-24 PROCEDURE — A6402 STERILE GAUZE <= 16 SQ IN: HCPCS | Performed by: NEUROLOGICAL SURGERY

## 2017-08-24 PROCEDURE — 700111 HCHG RX REV CODE 636 W/ 250 OVERRIDE (IP): Performed by: FAMILY MEDICINE

## 2017-08-24 PROCEDURE — 0HB4XZZ EXCISION OF NECK SKIN, EXTERNAL APPROACH: ICD-10-PCS | Performed by: NEUROLOGICAL SURGERY

## 2017-08-24 PROCEDURE — 700101 HCHG RX REV CODE 250: Performed by: FAMILY MEDICINE

## 2017-08-24 PROCEDURE — 500364 HCHG DISSECT TOOL, MIDAS: Performed by: NEUROLOGICAL SURGERY

## 2017-08-24 PROCEDURE — 160041 HCHG SURGERY MINUTES - EA ADDL 1 MIN LEVEL 4: Performed by: NEUROLOGICAL SURGERY

## 2017-08-24 PROCEDURE — C1713 ANCHOR/SCREW BN/BN,TIS/BN: HCPCS | Performed by: NEUROLOGICAL SURGERY

## 2017-08-24 PROCEDURE — 500122 HCHG BOVIE, BLADE: Performed by: NEUROLOGICAL SURGERY

## 2017-08-24 PROCEDURE — 160035 HCHG PACU - 1ST 60 MINS PHASE I: Performed by: NEUROLOGICAL SURGERY

## 2017-08-24 PROCEDURE — 160002 HCHG RECOVERY MINUTES (STAT): Performed by: NEUROLOGICAL SURGERY

## 2017-08-24 PROCEDURE — 99232 SBSQ HOSP IP/OBS MODERATE 35: CPT | Performed by: HOSPITALIST

## 2017-08-24 PROCEDURE — 700102 HCHG RX REV CODE 250 W/ 637 OVERRIDE(OP): Performed by: FAMILY MEDICINE

## 2017-08-24 PROCEDURE — 501838 HCHG SUTURE GENERAL: Performed by: NEUROLOGICAL SURGERY

## 2017-08-24 PROCEDURE — A9270 NON-COVERED ITEM OR SERVICE: HCPCS | Performed by: FAMILY MEDICINE

## 2017-08-24 PROCEDURE — 700101 HCHG RX REV CODE 250

## 2017-08-24 PROCEDURE — 80048 BASIC METABOLIC PNL TOTAL CA: CPT

## 2017-08-24 PROCEDURE — 700112 HCHG RX REV CODE 229: Performed by: PHYSICIAN ASSISTANT

## 2017-08-24 PROCEDURE — 500864 HCHG NEEDLE, SPINAL 18G: Performed by: NEUROLOGICAL SURGERY

## 2017-08-24 RX ORDER — SODIUM CHLORIDE, SODIUM LACTATE, POTASSIUM CHLORIDE, AND CALCIUM CHLORIDE .6; .31; .03; .02 G/100ML; G/100ML; G/100ML; G/100ML
IRRIGANT IRRIGATION
Status: DISCONTINUED | OUTPATIENT
Start: 2017-08-24 | End: 2017-08-24 | Stop reason: HOSPADM

## 2017-08-24 RX ORDER — OXYCODONE HCL 5 MG/5 ML
SOLUTION, ORAL ORAL
Status: COMPLETED
Start: 2017-08-24 | End: 2017-08-24

## 2017-08-24 RX ORDER — ENEMA 19; 7 G/133ML; G/133ML
1 ENEMA RECTAL
Status: DISCONTINUED | OUTPATIENT
Start: 2017-08-24 | End: 2017-08-26 | Stop reason: HOSPADM

## 2017-08-24 RX ORDER — AMOXICILLIN 250 MG
1 CAPSULE ORAL
Status: DISCONTINUED | OUTPATIENT
Start: 2017-08-24 | End: 2017-08-26 | Stop reason: HOSPADM

## 2017-08-24 RX ORDER — AMOXICILLIN 250 MG
1 CAPSULE ORAL NIGHTLY
Status: DISCONTINUED | OUTPATIENT
Start: 2017-08-24 | End: 2017-08-24

## 2017-08-24 RX ORDER — ACETAMINOPHEN 500 MG
TABLET ORAL
Status: DISPENSED
Start: 2017-08-24 | End: 2017-08-25

## 2017-08-24 RX ORDER — BISACODYL 10 MG
10 SUPPOSITORY, RECTAL RECTAL
Status: DISCONTINUED | OUTPATIENT
Start: 2017-08-24 | End: 2017-08-24

## 2017-08-24 RX ORDER — POLYETHYLENE GLYCOL 3350 17 G/17G
1 POWDER, FOR SOLUTION ORAL 2 TIMES DAILY PRN
Status: DISCONTINUED | OUTPATIENT
Start: 2017-08-24 | End: 2017-08-26 | Stop reason: HOSPADM

## 2017-08-24 RX ORDER — BACITRACIN 50000 [IU]/1
INJECTION, POWDER, FOR SOLUTION INTRAMUSCULAR
Status: DISCONTINUED | OUTPATIENT
Start: 2017-08-24 | End: 2017-08-24 | Stop reason: HOSPADM

## 2017-08-24 RX ORDER — DOCUSATE SODIUM 100 MG/1
100 CAPSULE, LIQUID FILLED ORAL 2 TIMES DAILY
Status: DISCONTINUED | OUTPATIENT
Start: 2017-08-24 | End: 2017-08-26 | Stop reason: HOSPADM

## 2017-08-24 RX ADMIN — DOCUSATE SODIUM 100 MG: 100 CAPSULE ORAL at 22:02

## 2017-08-24 RX ADMIN — OXYCODONE HYDROCHLORIDE 20 MG: 10 TABLET ORAL at 22:02

## 2017-08-24 RX ADMIN — FENTANYL CITRATE 50 MCG: 50 INJECTION, SOLUTION INTRAMUSCULAR; INTRAVENOUS at 18:48

## 2017-08-24 RX ADMIN — FENTANYL CITRATE 50 MCG: 50 INJECTION, SOLUTION INTRAMUSCULAR; INTRAVENOUS at 20:15

## 2017-08-24 RX ADMIN — PIPERACILLIN SODIUM AND TAZOBACTAM SODIUM 3.38 G: 3; .375 INJECTION, POWDER, FOR SOLUTION INTRAVENOUS at 05:47

## 2017-08-24 RX ADMIN — FENTANYL CITRATE 50 MCG: 50 INJECTION, SOLUTION INTRAMUSCULAR; INTRAVENOUS at 19:11

## 2017-08-24 RX ADMIN — MORPHINE SULFATE 4 MG: 4 INJECTION INTRAVENOUS at 10:48

## 2017-08-24 RX ADMIN — MORPHINE SULFATE 2 MG: 4 INJECTION INTRAVENOUS at 15:06

## 2017-08-24 RX ADMIN — PIPERACILLIN SODIUM AND TAZOBACTAM SODIUM 3.38 G: 3; .375 INJECTION, POWDER, FOR SOLUTION INTRAVENOUS at 10:48

## 2017-08-24 RX ADMIN — STANDARDIZED SENNA CONCENTRATE AND DOCUSATE SODIUM 2 TABLET: 8.6; 5 TABLET, FILM COATED ORAL at 08:33

## 2017-08-24 RX ADMIN — METHOCARBAMOL 750 MG: 750 TABLET ORAL at 13:00

## 2017-08-24 RX ADMIN — METHOCARBAMOL 750 MG: 750 TABLET ORAL at 08:33

## 2017-08-24 RX ADMIN — SERTRALINE 150 MG: 50 TABLET, FILM COATED ORAL at 08:33

## 2017-08-24 RX ADMIN — POTASSIUM CHLORIDE AND SODIUM CHLORIDE: 900; 150 INJECTION, SOLUTION INTRAVENOUS at 05:30

## 2017-08-24 RX ADMIN — LEVOTHYROXINE SODIUM 25 MCG: 25 TABLET ORAL at 05:31

## 2017-08-24 RX ADMIN — METHOCARBAMOL 750 MG: 750 TABLET ORAL at 22:03

## 2017-08-24 RX ADMIN — OXYCODONE HYDROCHLORIDE 20 MG: 10 TABLET ORAL at 08:06

## 2017-08-24 RX ADMIN — GABAPENTIN 300 MG: 300 CAPSULE ORAL at 08:33

## 2017-08-24 RX ADMIN — FENTANYL CITRATE 50 MCG: 50 INJECTION, SOLUTION INTRAMUSCULAR; INTRAVENOUS at 18:35

## 2017-08-24 RX ADMIN — OXYCODONE HYDROCHLORIDE 10 MG: 5 SOLUTION ORAL at 18:35

## 2017-08-24 RX ADMIN — ONDANSETRON 4 MG: 2 INJECTION INTRAMUSCULAR; INTRAVENOUS at 08:06

## 2017-08-24 RX ADMIN — GABAPENTIN 300 MG: 300 CAPSULE ORAL at 22:03

## 2017-08-24 RX ADMIN — GABAPENTIN 300 MG: 300 CAPSULE ORAL at 15:00

## 2017-08-24 RX ADMIN — MORPHINE SULFATE 4 MG: 4 INJECTION INTRAVENOUS at 05:31

## 2017-08-24 RX ADMIN — STANDARDIZED SENNA CONCENTRATE AND DOCUSATE SODIUM 2 TABLET: 8.6; 5 TABLET, FILM COATED ORAL at 22:03

## 2017-08-24 RX ADMIN — OXYCODONE HYDROCHLORIDE 20 MG: 10 TABLET ORAL at 15:06

## 2017-08-24 ASSESSMENT — PAIN SCALES - GENERAL
PAINLEVEL_OUTOF10: 0
PAINLEVEL_OUTOF10: 3
PAINLEVEL_OUTOF10: 3
PAINLEVEL_OUTOF10: 6
PAINLEVEL_OUTOF10: 7
PAINLEVEL_OUTOF10: 6
PAINLEVEL_OUTOF10: 7
PAINLEVEL_OUTOF10: 0
PAINLEVEL_OUTOF10: 6
PAINLEVEL_OUTOF10: 7
PAINLEVEL_OUTOF10: 6
PAINLEVEL_OUTOF10: 3

## 2017-08-24 ASSESSMENT — ENCOUNTER SYMPTOMS
NECK PAIN: 0
WEAKNESS: 1
BACK PAIN: 1
BLURRED VISION: 0
SHORTNESS OF BREATH: 0
ABDOMINAL PAIN: 0
HEADACHES: 0
DOUBLE VISION: 0
CHILLS: 0
DIARRHEA: 0
CONSTIPATION: 0
VOMITING: 0
NAUSEA: 0
NECK PAIN: 1
FEVER: 0

## 2017-08-24 NOTE — CARE PLAN
Problem: Infection  Goal: Will remain free from infection  Intervention: Assess signs and symptoms of infection  Goal met.       Problem: Pain Management  Goal: Pain level will decrease to patient’s comfort goal  Intervention: Follow pain managment plan developed in collaboration with patient and Interdisciplinary Team  Goal partially met.

## 2017-08-24 NOTE — CARE PLAN
Problem: Safety  Goal: Will remain free from injury  Outcome: PROGRESSING AS EXPECTED  Pt able to ambulate with steady gait.  She does use call light appropriately.  Keeping wound site clean and dry, with dressing changes prn.

## 2017-08-24 NOTE — PROGRESS NOTES
Provided report to pre-op nurse and completed pre-op check list.  Pt signed consent for wound washout and debridement.  No questions.  Pt wheeled down with transport at 1540.

## 2017-08-24 NOTE — PROGRESS NOTES
Progress Note               Author: Malcom Gay Date & Time created: 2017  8:02 AM     Interval History:  POD#22 C3-6 ACDF with Dr. Herrera  Presented to ER  with incisional swelling/ drainage  Seen by hospitalist, currently on vanc and zosyn  Cultures yesterday: grew out many gram positive cocci  Labs reviewed: white count 6/7    Review of Systems:  Review of Systems   Constitutional: Negative for fever and chills.   Gastrointestinal:        Dysphagia  Odynophagia     Musculoskeletal: Negative for neck pain.       Physical Exam:  Physical Exam   Skin:   Erythema and large area of edema tete-incisionally on anterior neck.   ACDF incision is dehisced with ja white pus visible within wound bed.  No active drainage.         Labs:        Invalid input(s): [id number]      Recent Labs      17   1305  17   0353   SODIUM  139  144   POTASSIUM  3.9  4.0   CHLORIDE  107  111   CO2  22  25   BUN  8  7*   CREATININE  0.55  0.50   CALCIUM  8.6  8.5     Recent Labs      17   1305  17   0353   ALTSGPT  10   --    ASTSGOT  11*   --    ALKPHOSPHAT  80   --    TBILIRUBIN  0.4   --    GLUCOSE  111*  88     Recent Labs      17   0745  17   0353   RBC  3.80*  3.56*   HEMOGLOBIN  8.5*  7.9*   HEMATOCRIT  28.9*  27.1*   PLATELETCT  370  337     Recent Labs      17   0745  17   1305  17   0353   WBC  7.8   --   6.7   NEUTSPOLYS  58.90   --   54.70   LYMPHOCYTES  25.40   --   29.70   MONOCYTES  12.80   --   12.30   EOSINOPHILS  1.80   --   2.40   BASOPHILS  0.80   --   0.60   ASTSGOT   --   11*   --    ALTSGPT   --   10   --    ALKPHOSPHAT   --   80   --    TBILIRUBIN   --   0.4   --      Hemodynamics:  Temp (24hrs), Av.7 °C (98.1 °F), Min:36.5 °C (97.7 °F), Max:36.9 °C (98.4 °F)  Temperature: 36.5 °C (97.7 °F)  Pulse  Av.4  Min: 50  Max: 104  Blood Pressure: 102/53 mmHg     Respiratory:    Respiration: 16, Pulse Oximetry: 94 %     Work Of Breathing / Effort:  Mild  RUL Breath Sounds: Clear, RML Breath Sounds: Clear, RLL Breath Sounds: Clear, BRIAN Breath Sounds: Clear, LLL Breath Sounds: Clear  Fluids:    Intake/Output Summary (Last 24 hours) at 08/23/17 0832  Last data filed at 08/23/17 0600   Gross per 24 hour   Intake    700 ml   Output      0 ml   Net    700 ml        GI/Nutrition:  Orders Placed This Encounter   Procedures   • Diet Order     Standing Status: Standing      Number of Occurrences: 1      Standing Expiration Date:      Order Specific Question:  Diet:     Answer:  Regular [1]     Order Specific Question:  Consistency/Fluid modifications:     Answer:  Nectar Thick [2]     Medical Decision Making, by Problem:  Active Hospital Problems    Diagnosis   • Cellulitis [L03.90]       Plan:  Wound care nurse to evaluate   Continue abx regimen per hospitalist recs  Ambulate as tolerating  OR today for wound washout/ revision with Dr. Herrera  NPO and consent done    Core Measures

## 2017-08-24 NOTE — CARE PLAN
Problem: Infection  Goal: Will remain free from infection  Outcome: PROGRESSING AS EXPECTED  Pt has open wound on R anterior neck - see measurements in wound worksheet.  There is ourulent pus coming from area.  Plan for surgical debridement tomorrow.  Q 4 hour vital signs monitoring due to risk for infection.  Pt is not hypotensive and remains alert, oriented, and interested in participating in adls.  IV antibiotics given as ordered.  Wound consult completed.,  Plan to keep dry dressing over area, will follow surgeon wound orders post operatively.

## 2017-08-24 NOTE — PROGRESS NOTES
[facility] Hospitalist Progress Note    Date of Service: 2017    Chief Complaint  51 y.o. female admitted 2017 with surgical wound dehiscence     Interval Problem Update  Patient was started on intravenous antibiotics and evaluated by neurosurgery.  Plans for today include washout in the operating room.     Consultants/Specialty  Neurosurgery     Disposition  Home when stable         Review of Systems   Constitutional: Negative for fever and chills.   Eyes: Negative for blurred vision and double vision.   Respiratory: Negative for shortness of breath.    Cardiovascular: Negative for chest pain.   Gastrointestinal: Negative for nausea, vomiting, abdominal pain, diarrhea and constipation.   Musculoskeletal: Positive for back pain and neck pain.   Neurological: Positive for weakness. Negative for headaches.      Physical Exam  Laboratory/Imaging   Hemodynamics  Temp (24hrs), Av.6 °C (97.8 °F), Min:36.2 °C (97.2 °F), Max:36.9 °C (98.4 °F)   Temperature: 36.2 °C (97.2 °F)  Pulse  Av.3  Min: 77  Max: 104   Blood Pressure: 107/53 mmHg      Respiratory      Respiration: 18, Pulse Oximetry: 95 %     Work Of Breathing / Effort: Mild  RUL Breath Sounds: Clear, RML Breath Sounds: Clear, RLL Breath Sounds: Clear, BRIAN Breath Sounds: Clear, LLL Breath Sounds: Clear    Fluids    Intake/Output Summary (Last 24 hours) at 17 1414  Last data filed at 17 2300   Gross per 24 hour   Intake    400 ml   Output      0 ml   Net    400 ml       Nutrition  Orders Placed This Encounter   Procedures   • Diet Order     Standing Status: Standing      Number of Occurrences: 1      Standing Expiration Date:      Order Specific Question:  Diet:     Answer:  Regular [1]     Order Specific Question:  Consistency/Fluid modifications:     Answer:  Nectar Thick [2]     Physical Exam   Constitutional: She is oriented to person, place, and time. She appears well-developed and well-nourished.   HENT:   Head: Normocephalic.   Eyes:  Pupils are equal, round, and reactive to light.   Neck:   Midline incision with dehiscence, small amount of pus noted    Cardiovascular: Normal rate, regular rhythm and normal heart sounds.  Exam reveals no gallop and no friction rub.    No murmur heard.  Pulmonary/Chest: Effort normal. She has no wheezes. She has no rales.   Abdominal: Soft. Bowel sounds are normal. She exhibits no distension and no mass. There is no tenderness.   Musculoskeletal: She exhibits no edema.   Neurological: She is alert and oriented to person, place, and time. No cranial nerve deficit.       Recent Labs      08/23/17   0745  08/24/17   0353   WBC  7.8  6.7   RBC  3.80*  3.56*   HEMOGLOBIN  8.5*  7.9*   HEMATOCRIT  28.9*  27.1*   MCV  76.1*  76.1*   MCH  22.4*  22.2*   MCHC  29.4*  29.2*   RDW  52.9*  52.9*   PLATELETCT  370  337   MPV  9.0  8.9*     Recent Labs      08/23/17   1305  08/24/17   0353   SODIUM  139  144   POTASSIUM  3.9  4.0   CHLORIDE  107  111   CO2  22  25   GLUCOSE  111*  88   BUN  8  7*   CREATININE  0.55  0.50   CALCIUM  8.6  8.5                      Assessment/Plan     * Dehiscence of external surgical wound  Assessment & Plan  To OR today for washout. Continue antibiotic.  Cultures preliminary for heavy staphylococcal growth.  Full speciation pending     Status post cervical spinal fusion (present on admission)  Assessment & Plan  Stable     Hypothyroid (present on admission)  Assessment & Plan  Continue replacement therapy     Cellulitis  Assessment & Plan  Continue antibiotic therapy     Obesity (BMI 30-39.9)  Assessment & Plan  stable      Labs reviewed and Medications reviewed  Gloria catheter: No Gloria        DVT prophylaxis - mechanical: SCDs  Ulcer prophylaxis: No  Antibiotics: Treating active infection/contamination beyond 24 hours perioperative coverage

## 2017-08-24 NOTE — PROGRESS NOTES
Pt is a&ox4 with n&t of the RLE. Pt has two open wounds with yellow slough drainage with redness around the periwound area.Dressing changed. PERRLA. Pt frequently turns and ambulates. Patient tolerates ice chips and nectar thickened liquids. Bed locked. Nonskid socks on.

## 2017-08-24 NOTE — PROGRESS NOTES
"Pharmacy Kinetics 51 y.o. female on vancomycin day # 2 2017    Currently on Vancomycin 2000 mg iv q24hr    Indication for Treatment: SSTI    Pertinent history per medical record: Admitted on 2017 for cellulitis. Pt transferred from [facility], [place] for evaluation of neck pain and swelling x 1 day. Pt had anterior cervical spine fusion  and she noticed increased redness and swelling on the anterior part of the neck yesterday with accompanying fever and chills. CT scan at [facility], [place] showed soft tissue swelling but no discrete abscess. Neurosurgery consulting. Pt received approximately 500mg of vancomycin from outside facility.    Other antibiotics: Zosyn 3.375 g IV q6h    Allergies: Bloodless; Ciprofloxacin; Codeine; Erythromycin; Nsaids; and Other food     List concerns for renal function: obesity (BMI ~ 49)    Pertinent cultures to date:    PBC x 2: NGTD   neck wound: staphyloccocus aureus     Recent Labs      17   0745  17   0353   WBC  7.8  6.7   NEUTSPOLYS  58.90  54.70     Recent Labs      17   1305  17   0353   BUN  8  7*   CREATININE  0.55  0.50   ALBUMIN  3.1*   --      Recent Labs      17   1305   VANCORANDOM  11.9      Blood pressure 107/53, pulse 81, temperature 36.2 °C (97.2 °F), resp. rate 18, height 1.549 m (5' 1\"), weight 117.5 kg (259 lb 0.7 oz), last menstrual period 2016, SpO2 95 %. Temp (24hrs), Av.6 °C (97.8 °F), Min:36.2 °C (97.2 °F), Max:36.9 °C (98.4 °F)      A/P   1. Vancomycin dose change: not indicated at this time  2. Next vancomycin level: ~2 days (not yet ordered)  3. Goal trough: 12-16 mcg/mL  4. Comments: cultures updated with staphylococcus aureus today, sensitivities still in process. Plan to take patient to OR tonight to evacuate any abscess, washout and revision. Would highly recommend intraoperative cultures. Renal indices stable. If vancomycin to continue past tomorrow, would recommend trough in 2 days. Will continue current dose for now, " continue to follow.    Bindu Elizabeth, PHARMD

## 2017-08-25 VITALS
WEIGHT: 259.04 LBS | OXYGEN SATURATION: 93 % | DIASTOLIC BLOOD PRESSURE: 58 MMHG | SYSTOLIC BLOOD PRESSURE: 108 MMHG | RESPIRATION RATE: 16 BRPM | HEART RATE: 78 BPM | HEIGHT: 61 IN | BODY MASS INDEX: 48.91 KG/M2 | TEMPERATURE: 97.6 F

## 2017-08-25 LAB
ANION GAP SERPL CALC-SCNC: 7 MMOL/L (ref 0–11.9)
BACTERIA WND AEROBE CULT: ABNORMAL
BACTERIA WND AEROBE CULT: ABNORMAL
BASOPHILS # BLD AUTO: 0.6 % (ref 0–1.8)
BASOPHILS # BLD: 0.03 K/UL (ref 0–0.12)
BUN SERPL-MCNC: 10 MG/DL (ref 8–22)
CALCIUM SERPL-MCNC: 8.5 MG/DL (ref 8.5–10.5)
CHLORIDE SERPL-SCNC: 109 MMOL/L (ref 96–112)
CO2 SERPL-SCNC: 25 MMOL/L (ref 20–33)
CREAT SERPL-MCNC: 0.55 MG/DL (ref 0.5–1.4)
EOSINOPHIL # BLD AUTO: 0.01 K/UL (ref 0–0.51)
EOSINOPHIL NFR BLD: 0.2 % (ref 0–6.9)
ERYTHROCYTE [DISTWIDTH] IN BLOOD BY AUTOMATED COUNT: 52.4 FL (ref 35.9–50)
GFR SERPL CREATININE-BSD FRML MDRD: >60 ML/MIN/1.73 M 2
GLUCOSE SERPL-MCNC: 150 MG/DL (ref 65–99)
GRAM STN SPEC: ABNORMAL
HCT VFR BLD AUTO: 28.7 % (ref 37–47)
HGB BLD-MCNC: 8.3 G/DL (ref 12–16)
IMM GRANULOCYTES # BLD AUTO: 0.02 K/UL (ref 0–0.11)
IMM GRANULOCYTES NFR BLD AUTO: 0.4 % (ref 0–0.9)
LYMPHOCYTES # BLD AUTO: 0.65 K/UL (ref 1–4.8)
LYMPHOCYTES NFR BLD: 13.9 % (ref 22–41)
MCH RBC QN AUTO: 22 PG (ref 27–33)
MCHC RBC AUTO-ENTMCNC: 28.9 G/DL (ref 33.6–35)
MCV RBC AUTO: 76.1 FL (ref 81.4–97.8)
MONOCYTES # BLD AUTO: 0.27 K/UL (ref 0–0.85)
MONOCYTES NFR BLD AUTO: 5.8 % (ref 0–13.4)
NEUTROPHILS # BLD AUTO: 3.69 K/UL (ref 2–7.15)
NEUTROPHILS NFR BLD: 79.1 % (ref 44–72)
NRBC # BLD AUTO: 0.02 K/UL
NRBC BLD AUTO-RTO: 0.4 /100 WBC
PLATELET # BLD AUTO: 347 K/UL (ref 164–446)
PMV BLD AUTO: 9.3 FL (ref 9–12.9)
POTASSIUM SERPL-SCNC: 4.1 MMOL/L (ref 3.6–5.5)
RBC # BLD AUTO: 3.77 M/UL (ref 4.2–5.4)
SIGNIFICANT IND 70042: ABNORMAL
SITE SITE: ABNORMAL
SODIUM SERPL-SCNC: 141 MMOL/L (ref 135–145)
SOURCE SOURCE: ABNORMAL
WBC # BLD AUTO: 4.7 K/UL (ref 4.8–10.8)

## 2017-08-25 PROCEDURE — 36415 COLL VENOUS BLD VENIPUNCTURE: CPT

## 2017-08-25 PROCEDURE — G8988 SELF CARE GOAL STATUS: HCPCS | Mod: CI

## 2017-08-25 PROCEDURE — A9270 NON-COVERED ITEM OR SERVICE: HCPCS | Performed by: FAMILY MEDICINE

## 2017-08-25 PROCEDURE — 700111 HCHG RX REV CODE 636 W/ 250 OVERRIDE (IP): Performed by: HOSPITALIST

## 2017-08-25 PROCEDURE — 97161 PT EVAL LOW COMPLEX 20 MIN: CPT

## 2017-08-25 PROCEDURE — G8987 SELF CARE CURRENT STATUS: HCPCS | Mod: CI

## 2017-08-25 PROCEDURE — 80048 BASIC METABOLIC PNL TOTAL CA: CPT

## 2017-08-25 PROCEDURE — 700112 HCHG RX REV CODE 229: Performed by: PHYSICIAN ASSISTANT

## 2017-08-25 PROCEDURE — 85025 COMPLETE CBC W/AUTO DIFF WBC: CPT

## 2017-08-25 PROCEDURE — 700111 HCHG RX REV CODE 636 W/ 250 OVERRIDE (IP): Performed by: FAMILY MEDICINE

## 2017-08-25 PROCEDURE — 700105 HCHG RX REV CODE 258

## 2017-08-25 PROCEDURE — 97165 OT EVAL LOW COMPLEX 30 MIN: CPT

## 2017-08-25 PROCEDURE — G8978 MOBILITY CURRENT STATUS: HCPCS | Mod: CI

## 2017-08-25 PROCEDURE — G8979 MOBILITY GOAL STATUS: HCPCS | Mod: CI

## 2017-08-25 PROCEDURE — 99239 HOSP IP/OBS DSCHRG MGMT >30: CPT | Performed by: HOSPITALIST

## 2017-08-25 PROCEDURE — 700105 HCHG RX REV CODE 258: Performed by: FAMILY MEDICINE

## 2017-08-25 PROCEDURE — G8980 MOBILITY D/C STATUS: HCPCS | Mod: CI

## 2017-08-25 PROCEDURE — 700102 HCHG RX REV CODE 250 W/ 637 OVERRIDE(OP): Performed by: FAMILY MEDICINE

## 2017-08-25 PROCEDURE — A9270 NON-COVERED ITEM OR SERVICE: HCPCS | Performed by: PHYSICIAN ASSISTANT

## 2017-08-25 PROCEDURE — G8989 SELF CARE D/C STATUS: HCPCS | Mod: CI

## 2017-08-25 RX ORDER — SODIUM CHLORIDE 9 MG/ML
INJECTION, SOLUTION INTRAVENOUS
Status: COMPLETED
Start: 2017-08-25 | End: 2017-08-25

## 2017-08-25 RX ORDER — UBIDECARENONE 75 MG
100 CAPSULE ORAL DAILY
COMMUNITY
End: 2018-06-11

## 2017-08-25 RX ORDER — FERROUS GLUCONATE 324(38)MG
324 TABLET ORAL DAILY
COMMUNITY
End: 2019-05-03

## 2017-08-25 RX ORDER — LEVOTHYROXINE SODIUM 0.05 MG/1
50 TABLET ORAL
COMMUNITY
End: 2018-06-11

## 2017-08-25 RX ORDER — LEVOTHYROXINE SODIUM 0.05 MG/1
50 TABLET ORAL
Qty: 30 TAB | Refills: 0 | Status: ON HOLD | OUTPATIENT
Start: 2017-08-26 | End: 2018-06-21

## 2017-08-25 RX ORDER — ASCORBIC ACID 500 MG
500 TABLET ORAL DAILY
COMMUNITY
End: 2018-06-11

## 2017-08-25 RX ORDER — LEVOTHYROXINE SODIUM 0.05 MG/1
50 TABLET ORAL
Status: DISCONTINUED | OUTPATIENT
Start: 2017-08-26 | End: 2017-08-26 | Stop reason: HOSPADM

## 2017-08-25 RX ORDER — CEPHALEXIN 500 MG/1
500 CAPSULE ORAL 4 TIMES DAILY
Qty: 28 CAP | Refills: 0 | Status: SHIPPED | OUTPATIENT
Start: 2017-08-25 | End: 2017-09-01

## 2017-08-25 RX ADMIN — OXYCODONE HYDROCHLORIDE 20 MG: 10 TABLET ORAL at 11:25

## 2017-08-25 RX ADMIN — LEVOTHYROXINE SODIUM 25 MCG: 25 TABLET ORAL at 05:37

## 2017-08-25 RX ADMIN — METHOCARBAMOL 750 MG: 750 TABLET ORAL at 18:18

## 2017-08-25 RX ADMIN — PIPERACILLIN SODIUM AND TAZOBACTAM SODIUM 3.38 G: 3; .375 INJECTION, POWDER, FOR SOLUTION INTRAVENOUS at 00:06

## 2017-08-25 RX ADMIN — DOCUSATE SODIUM 100 MG: 100 CAPSULE ORAL at 09:08

## 2017-08-25 RX ADMIN — SERTRALINE 150 MG: 50 TABLET, FILM COATED ORAL at 09:07

## 2017-08-25 RX ADMIN — SODIUM CHLORIDE 1000 ML: 9 INJECTION, SOLUTION INTRAVENOUS at 10:41

## 2017-08-25 RX ADMIN — ONDANSETRON 4 MG: 4 TABLET, ORALLY DISINTEGRATING ORAL at 00:19

## 2017-08-25 RX ADMIN — METHOCARBAMOL 750 MG: 750 TABLET ORAL at 09:08

## 2017-08-25 RX ADMIN — ONDANSETRON 4 MG: 4 TABLET, ORALLY DISINTEGRATING ORAL at 09:56

## 2017-08-25 RX ADMIN — GABAPENTIN 300 MG: 300 CAPSULE ORAL at 15:02

## 2017-08-25 RX ADMIN — ONDANSETRON 4 MG: 4 TABLET, ORALLY DISINTEGRATING ORAL at 05:37

## 2017-08-25 RX ADMIN — PIPERACILLIN SODIUM AND TAZOBACTAM SODIUM 3.38 G: 3; .375 INJECTION, POWDER, FOR SOLUTION INTRAVENOUS at 11:25

## 2017-08-25 RX ADMIN — PIPERACILLIN SODIUM AND TAZOBACTAM SODIUM 3.38 G: 3; .375 INJECTION, POWDER, FOR SOLUTION INTRAVENOUS at 05:38

## 2017-08-25 RX ADMIN — CEFAZOLIN SODIUM 1 G: 1 INJECTION, SOLUTION INTRAVENOUS at 15:02

## 2017-08-25 RX ADMIN — METHOCARBAMOL 750 MG: 750 TABLET ORAL at 13:10

## 2017-08-25 RX ADMIN — OXYCODONE HYDROCHLORIDE 20 MG: 10 TABLET ORAL at 18:18

## 2017-08-25 RX ADMIN — GABAPENTIN 300 MG: 300 CAPSULE ORAL at 09:08

## 2017-08-25 RX ADMIN — OXYCODONE HYDROCHLORIDE 20 MG: 10 TABLET ORAL at 05:37

## 2017-08-25 ASSESSMENT — GAIT ASSESSMENTS
DISTANCE (FEET): 250
ASSISTIVE DEVICE: SINGLE POINT CANE
GAIT LEVEL OF ASSIST: SUPERVISED

## 2017-08-25 ASSESSMENT — COGNITIVE AND FUNCTIONAL STATUS - GENERAL
SUGGESTED CMS G CODE MODIFIER MOBILITY: CI
SUGGESTED CMS G CODE MODIFIER DAILY ACTIVITY: CI
DAILY ACTIVITIY SCORE: 23
HELP NEEDED FOR BATHING: A LITTLE
TURNING FROM BACK TO SIDE WHILE IN FLAT BAD: A LITTLE
MOBILITY SCORE: 23

## 2017-08-25 ASSESSMENT — ENCOUNTER SYMPTOMS
NAUSEA: 0
HEADACHES: 0
SENSORY CHANGE: 1
PALPITATIONS: 0
BACK PAIN: 0
FEVER: 0
NECK PAIN: 0
TREMORS: 0
VOMITING: 0
COUGH: 0
CHILLS: 0

## 2017-08-25 ASSESSMENT — PAIN SCALES - GENERAL
PAINLEVEL_OUTOF10: 4
PAINLEVEL_OUTOF10: 6
PAINLEVEL_OUTOF10: 4

## 2017-08-25 ASSESSMENT — ACTIVITIES OF DAILY LIVING (ADL): TOILETING: INDEPENDENT

## 2017-08-25 NOTE — OR SURGEON
Operative Report    PreOp Diagnosis: Anterior cervical wound infection    PostOp Diagnosis: same    Procedure(s):  IRRIGATION AND DEBRIDEMENT ANTERIOR CERVICAL WOUND - Wound Class: Dirty or Infected    Surgeon(s):  Micha Herrera M.D.    Anesthesiologist/Type of Anesthesia:  Anesthesiologist: Kayla Dukes M.D./General    Surgical Staff:  Assistant: Konstantin Meyer PA-C  Circulator: Martina Patel R.N.  Relief Circulator: Shelly Dejesus R.N.  Scrub Person: Aline Serra Jr.  Radiology Technologist: Nola Castaneda    Specimens:  * No specimens in log *    Estimated Blood Loss: 10cc    Findings: superficial wound breakdown. No large fluid collection    Complications: None        8/24/2017 6:21 PM Konstantin Meyer

## 2017-08-25 NOTE — THERAPY
"Occupational Therapy Evaluation completed.   Functional Status:  Pt s/p I& D anterior cervical wound, orginally had C3-6 ACDF on 8/2. Pt performed ADLs and functional mobility during ADLs with SPC with supervision/MI, able to maintain c-spine precautions, per formal orders no brace indicated. Pt does not present the need for further acute skilled services at this time.   Plan of Care: Patient with no further skilled OT needs in the acute care setting at this time  Discharge Recommendations:  Equipment: No Equipment Needed. Post-acute therapy Discharge to home with outpatient or home health for additional skilled therapy services.    See \"Rehab Therapy-Acute\" Patient Summary Report for complete documentation.    "

## 2017-08-25 NOTE — DISCHARGE PLANNING
Received choice form from Brayan(GANGA). Referral sent to [facility].

## 2017-08-25 NOTE — PROGRESS NOTES
Received report and assumed pt care at 0715.  Pt resting comfortably, A&Ox4, SCDs on, refused bed alarm.  Will continue to monitor.

## 2017-08-25 NOTE — PROGRESS NOTES
Progress Note               Author: Adriana Hays Date & Time created: 8/25/2017  8:28 AM     Interval History:  POD#1 Revision anterior cervical wound  HMV drain - output 5 cc/8hr  Culture grew staph tamas  Patient reports doing well, no pain or paresthesias in arms  Admits to nausea well controlled by zofran  Ambulating, voiding, passed flatus    Review of Systems:  Review of Systems   Constitutional: Negative for fever and chills.   Respiratory: Negative for cough.    Cardiovascular: Negative for chest pain and palpitations.   Gastrointestinal: Negative for nausea and vomiting.   Musculoskeletal: Negative for back pain and neck pain.   Neurological: Positive for sensory change. Negative for tremors and headaches.       Physical Exam:  Physical Exam   Musculoskeletal:   Motor: 5/5 in b/l UEs, 4/5 pain limited in right hip flexor/ TA otherwise 5/5  Sensory: intact in b/l UEs and LEs  No calf tenderness  Dressing - intact no strikethrough       Labs:        Invalid input(s): [id number]      Recent Labs      08/23/17   1305  08/24/17 0353 08/25/17 0212   SODIUM  139  144  141   POTASSIUM  3.9  4.0  4.1   CHLORIDE  107  111  109   CO2  22  25  25   BUN  8  7*  10   CREATININE  0.55  0.50  0.55   CALCIUM  8.6  8.5  8.5     Recent Labs      08/23/17   1305  08/24/17 0353  08/25/17 0212   ALTSGPT  10   --    --    ASTSGOT  11*   --    --    ALKPHOSPHAT  80   --    --    TBILIRUBIN  0.4   --    --    GLUCOSE  111*  88  150*     Recent Labs      08/23/17   0745  08/24/17 0353 08/25/17 0212   RBC  3.80*  3.56*  3.77*   HEMOGLOBIN  8.5*  7.9*  8.3*   HEMATOCRIT  28.9*  27.1*  28.7*   PLATELETCT  370  337  347     Recent Labs      08/23/17   0745  08/23/17   1305  08/24/17 0353 08/25/17 0212   WBC  7.8   --   6.7  4.7*   NEUTSPOLYS  58.90   --   54.70  79.10*   LYMPHOCYTES  25.40   --   29.70  13.90*   MONOCYTES  12.80   --   12.30  5.80   EOSINOPHILS  1.80   --   2.40  0.20   BASOPHILS  0.80   --    0.60  0.60   ASTSGOT   --   11*   --    --    ALTSGPT   --   10   --    --    ALKPHOSPHAT   --   80   --    --    TBILIRUBIN   --   0.4   --    --      Hemodynamics:  Temp (24hrs), Av.3 °C (97.3 °F), Min:35.9 °C (96.6 °F), Max:36.9 °C (98.4 °F)  Temperature: 36.1 °C (97 °F)  Pulse  Av.6  Min: 50  Max: 104Heart Rate (Monitored): 69  Blood Pressure: (!) 99/62 mmHg, NIBP: 113/60 mmHg     Respiratory:    Respiration: 18, Pulse Oximetry: 98 %        RUL Breath Sounds: Clear, RML Breath Sounds: Clear, RLL Breath Sounds: Clear, BRIAN Breath Sounds: Clear, LLL Breath Sounds: Clear  Fluids:    Intake/Output Summary (Last 24 hours) at 17 0828  Last data filed at 17 1811   Gross per 24 hour   Intake    500 ml   Output      5 ml   Net    495 ml        GI/Nutrition:  Orders Placed This Encounter   Procedures   • Diet Order     Standing Status: Standing      Number of Occurrences: 1      Standing Expiration Date:      Order Specific Question:  Diet:     Answer:  Regular [1]     Order Specific Question:  Consistency/Fluid modifications:     Answer:  Nectar Thick [2]     Medical Decision Making, by Problem:  Active Hospital Problems    Diagnosis   • *Dehiscence of external surgical wound [T81.31XA]   • Cellulitis [L03.90]   • Obesity (BMI 30-39.9) [E66.9]   • Hypothyroid [E03.9]   • Status post cervical spinal fusion [Z98.1]       Plan:  D/c hmv drain today  Continue on abx per hospitalist  Encourage IS and compression stockings when in bed  Will continue to follow      Labs reviewed and Medications reviewed  Gloria catheter: No Gloria

## 2017-08-25 NOTE — OP REPORT
DATE OF SERVICE:  08/24/2017    PREOPERATIVE DIAGNOSES:  Cervical wound breakdown and dehiscence.    POSTOPERATIVE DIAGNOSES:  Cervical wound breakdown and dehiscence.    PROCEDURE:  Reexploration of cervical wound.  Ellipsing of 8 cm cervical wound   with irrigation and debridement and primary closure of the cervical wound   status post C3-C6 ACDF.    SURGEON:  Micha Herrera MD, neurosurgery-spine surgery.    ASSISTANT:  Konstantin Meyer PA-C.    ANESTHESIA:  General endotracheal.    COMPLICATIONS:  None.    ESTIMATED BLOOD LOSS:  Less than 10 mL.    PREOPERATIVE NOTE:  This extremely pleasant, 51-year-old lady who presents   with wound dehiscence and some drainage status post C3-C6 ACDF.  Patient had   some dysphagia after surgery initially and was treated on steroids and this   probably contributed to her wound, nonhealing so well.  Patient came up to the   hospital and she has very thin, frail skin and her wound was certainly   dehiscing and had some drainage.  The patient remained afebrile, and was not   septic.  Given the open nature of the wound and the hardware that was deep, I   felt it was best to take the patient back to surgery to ellipse the wound and   explore the hardware to make sure the patient was not having any deep hardware   related issues.  I discussed surgical procedure, alternatives, goals, risks   and benefits, complications in detail with patient and answered all questions   to the best of my ability.  Patient understood and consented to surgery.    NARRATIVE DICTATION:  Patient was brought to the operating room and placed   under endotracheal anesthesia.  She was placed supine on the operating table   with care taken about the bony prominences, peripheral nerves.  Her neck was   gently extended over gel pads and secured to the operating table.  Anterior   cervical areas prepped and draped in the usual sterile fashion.  Following   this, I infiltrated the skin with local anesthetic.  I  ellipsed the prior skin   approximately 8 cm to a good vascular tissue.  I then obtained hemostasis.    The previous sutures were removed, I then opened the fascia and explored   deeply and I could not see any deep fluid collection at all and this deep area   looked quite well without any problems, this appeared to be just a   superficial issue with some superficial drainage.  There was not any fluid to   send for cultures that I could appreciate.  The wound was copiously irrigated   with bacitracin irrigation.  The wound was irrigated and immaculate hemostasis   achieved.  The wound was then closed over a drain brought through a separate   incision with 3-0 Vicryl to platysma, 3-0 Vicryl subcuticular with a running   4-0 Prolene subcuticular as well to oppose the skin nicely for cosmetic   purposes.  A sterile dressing was applied.  Swabs, needles, instruments   correct by 2 count.  No complications were encountered.  Patient tolerated   procedure, and was stable, and transferred to recovery.    Patient will follow up at [facility] in a standard plan in 2 weeks.       ____________________________________     MD URBIA CLEMENS / SHEILA    DD:  08/24/2017 18:19:30  DT:  08/24/2017 18:49:06    D#:  [id number]  Job#:  [id number]

## 2017-08-25 NOTE — CARE PLAN
Problem: Venous Thromboembolism (VTW)/Deep Vein Thrombosis (DVT) Prevention:  Goal: Patient will participate in Venous Thrombosis (VTE)/Deep Vein Thrombosis (DVT)Prevention Measures  Intervention: Ensure patient wears graduated elastic stockings (IDA hose) and/or SCDs, if ordered, when in bed or chair (Remove at least once per shift for skin check)  Pt. Refusing SCD's at this time.       Problem: Pain Management  Goal: Pain level will decrease to patient’s comfort goal  Intervention: Follow pain managment plan developed in collaboration with patient and Interdisciplinary Team  Pt. Reports that her pain goes down to acceptable levels when she receives prn pain medication.

## 2017-08-25 NOTE — THERAPY
"50 y/o female adm for I and d of  cervical spine, 8/2/17  cervical fusion. Pt with intact  motor and sensory ble. no lob with level ground amb with spc use. No further acute PT services required at this time .    Physical Therapy Evaluation completed.   Bed Mobility:  Supine to Sit: Supervised  Transfers: Sit to Stand: Supervised  Gait: Level Of Assist: Supervised with Single Point Cane       Plan of Care: Patient with no further skilled PT needs in the acute care setting at this time  Discharge Recommendations: Equipment: No Equipment Needed. Post-acute therapy Discharge to home with outpatient or home health for additional skilled therapy services.    See \"Rehab Therapy-Acute\" Patient Summary Report for complete documentation.         "

## 2017-08-25 NOTE — PROGRESS NOTES
Med rec complete per pt at bedside  Allergies reviewed  Pt states she got prescribed Keflex on Monday and only took 3 caps before coming to hospital

## 2017-08-25 NOTE — CARE PLAN
Problem: Bowel/Gastric:  Goal: Normal bowel function is maintained or improved  No bowel disfunction noted.  LBM 8/24/17.  Pt gets up self to bathroom.    Problem: Pain Management  Goal: Pain level will decrease to patient’s comfort goal  Pt given PO medications for pain including muscle relaxers.  Ambulating in halls and up to chair/bathroom.

## 2017-08-25 NOTE — FACE TO FACE
Face to Face Supporting Documentation - Home Health    The encounter with this patient was in whole or in part the primary reason for home health admission.    Date of encounter:   Patient:                    MRN:                       YOB: 2017  Smita Faith  [id number]  1965     Home health to see patient for:  Skilled Nursing care for assessment, interventions & education, Wound Care, Physical Therapy evaluation and treatment and Occupational therapy evaluation and treatment    Skilled need for:  Surgical Aftercare Cervical surgical wound dehiscence     Skilled nursing interventions to include:  Wound Care    Homebound status evidenced by:  Needs the assistance of another person in order to leave the home. Leaving home requires a considerable and taxing effort. There is a normal inability to leave the home.    Community Physician to provide follow up care: Dallas Bolivar PA-C     Optional Interventions? No      I certify the face to face encounter for this home health care referral meets the CMS requirements and the encounter/clinical assessment with the patient was, in whole, or in part, for the medical condition(s) listed above, which is the primary reason for home health care. Based on my clinical findings: the service(s) are medically necessary, support the need for home health care, and the homebound criteria are met.  I certify that this patient has had a face to face encounter by myself.  Rigo Oakes M.D. - NPI: [id number]

## 2017-08-25 NOTE — PROGRESS NOTES
Pt. Is AAOx4  Pt. Moves all extremeties,  Complains of pain at hip and anterior neck  Oxycodone 20 mg given for pain  Pt. Up with standby assistance  22 ga in LFA [place], site CDI  Bed alarm refused  SCD's refused  Treaded socks in place  Call light within reach

## 2017-08-25 NOTE — OR NURSING
Report called to Diego CARSON. Plan of care discussed. Patient resting in bed with calm and even respirations. Patient reports minimal burning to incision. No complaints of nausea, tolerating ice chips.

## 2017-08-25 NOTE — CARE PLAN
Problem: Pain Management  Goal: Pain level will decrease to patient’s comfort goal  Intervention: Follow pain managment plan developed in collaboration with patient and Interdisciplinary Team  Pt requiring pain medication for hip pain and wound pain.  Does require morphine 2 - 4 mg IV for BTP.

## 2017-08-26 NOTE — PROGRESS NOTES
D/C instructions and upcoming appointments discussed with pt.  PIV removed. Pt ambulatory and discharged with family members.

## 2017-08-26 NOTE — DISCHARGE INSTRUCTIONS
Discharge Instructions    Discharged to home by car with relative. Discharged via wheelchair, hospital escort: Refused.  Special equipment needed: Not Applicable    Be sure to schedule a follow-up appointment with your primary care doctor or any specialists as instructed.     Discharge Plan:   Diet Plan: Discussed  Activity Level: Discussed  Confirmed Follow up Appointment: Appointment Scheduled  Confirmed Symptoms Management: Discussed  Medication Reconciliation Updated: Yes    I understand that a diet low in cholesterol, fat, and sodium is recommended for good health. Unless I have been given specific instructions below for another diet, I accept this instruction as my diet prescription.   Other diet: regular    Special Instructions: None    · Is patient discharged on Warfarin / Coumadin?   No     · Is patient Post Blood Transfusion?  No    Depression / Suicide Risk    As you are discharged from this [facility] facility, it is important to learn how to keep safe from harming yourself.    Recognize the warning signs:  · Abrupt changes in personality, positive or negative- including increase in energy   · Giving away possessions  · Change in eating patterns- significant weight changes-  positive or negative  · Change in sleeping patterns- unable to sleep or sleeping all the time   · Unwillingness or inability to communicate  · Depression  · Unusual sadness, discouragement and loneliness  · Talk of wanting to die  · Neglect of personal appearance   · Rebelliousness- reckless behavior  · Withdrawal from people/activities they love  · Confusion- inability to concentrate     If you or a loved one observes any of these behaviors or has concerns about self-harm, here's what you can do:  · Talk about it- your feelings and reasons for harming yourself  · Remove any means that you might use to hurt yourself (examples: pills, rope, extension cords, firearm)  · Get professional help from the community (Mental Health,  Substance Abuse, psychological counseling)  · Do not be alone:Call your Safe Contact- someone whom you trust who will be there for you.  · Call your local CRISIS HOTLINE 460-4480 or 257-393-2491  · Call your local Children's Mobile Crisis Response Team [place] (799) 212-8897 or www.Kids360  · Call the toll free National Suicide Prevention Hotlines   · National Suicide Prevention Lifeline 617-097-AKHI (0523)  · National Hope Line Network 800-SUICIDE (128-5079)

## 2017-08-26 NOTE — DISCHARGE SUMMARY
CHIEF COMPLAINT ON ADMISSION  Chief Complaint   Patient presents with   • Neck Pain   • Wound Infection       CODE STATUS  Full Code    HPI & HOSPITAL COURSE  This is a 51 y.o. female here with post operative wound dehiscence with cellulitis.    Patient was admitted to the hospitalist service.  She was evaluated by the neurosurgery team, and noted to have dehiscence of her prior cervical surgery wound.  There was ja pus at the site, and antibiotics were initiated.  She was taken to the operating room for wound washout, and cultures returned positive for methicillin sensitive staphylococcus.  When she was clinically stable for discharge, she was started on an additional 7 day course of cephalexin and discharged home.  She will follow as scheduled with [facility].   Patient already has a pain contract with that group, and will follow with them for any further pain medication needs.  Home health will be resumed for wound care as well as physical and occupational therapy.       Therefore, she is discharged in good and stable condition with close outpatient follow-up.    SPECIFIC OUTPATIENT FOLLOW-UP  [facility] as scheduled    DISCHARGE PROBLEM LIST  Principal Problem:    Dehiscence of external surgical wound POA: Unknown  Active Problems:    Status post cervical spinal fusion POA: Yes    Hypothyroid POA: Yes    Cellulitis POA: Unknown    Obesity (BMI 30-39.9) POA: Unknown  Resolved Problems:    * No resolved hospital problems. *      FOLLOW UP  No future appointments.  [facility]  [address] [place] [phone]          MEDICATIONS ON DISCHARGE   Smita Faith   Home Medication Instructions JUSTINA:[id number]    Printed on:08/25/17 1243   Medication Information                      albuterol 108 (90 BASE) MCG/ACT Aero Soln inhalation aerosol  Inhale 2 Puffs by mouth every 6 hours as needed for Shortness of Breath.             ascorbic acid (ASCORBIC ACID) 500 MG Tab  Take 500 mg by  mouth every day.             Calcium 7768-4203 MG-UNIT CHEW  Take 1 Each by mouth every day.             cephALEXin (KEFLEX) 500 MG Cap  Take 1 Cap by mouth 4 times a day for 7 days.             Cholecalciferol (VITAMIN D) 1000 UNIT Cap  Take 1,000 Units by mouth every day.             Coenzyme Q10 (COQ-10 PO)  Take 1 Tab by mouth every day.             cyanocobalamin (VITAMIN B-12) 100 MCG Tab  Take 100 mcg by mouth every day.             ferrous gluconate (FERGON) 324 (38 Fe) MG Tab  Take 324 mg by mouth every day.             gabapentin (NEURONTIN) 300 MG Cap  Take 300 mg by mouth 3 times a day.             levothyroxine (SYNTHROID) 50 MCG Tab  Take 50 mcg by mouth Every morning on an empty stomach.             levothyroxine (SYNTHROID) 50 MCG Tab  Take 1 Tab by mouth Every morning on an empty stomach.             methocarbamol (ROBAXIN) 750 MG Tab  Take 750 mg by mouth 4 times a day.             Multiple Vitamins-Minerals (MULTIVITAMIN & MINERAL PO)  Take 1 Tab by mouth every day.             Oxycodone HCl 20 MG Tab  Take 20 mg by mouth every 6 hours as needed.             sertraline (ZOLOFT) 100 MG Tab  Take 150 mg by mouth every day.                 DIET  Orders Placed This Encounter   Procedures   • Diet Order     Standing Status: Standing      Number of Occurrences: 1      Standing Expiration Date:      Order Specific Question:  Diet:     Answer:  Regular [1]     Order Specific Question:  Consistency/Fluid modifications:     Answer:  Nectar Thick [2]       ACTIVITY  As tolerated.  Weight bearing as tolerated      CONSULTATIONS  Neurosurgery     PROCEDURES  Cervical neck wound washout     LABORATORY  Lab Results   Component Value Date/Time    SODIUM 141 08/25/2017 02:12 AM    POTASSIUM 4.1 08/25/2017 02:12 AM    CHLORIDE 109 08/25/2017 02:12 AM    CO2 25 08/25/2017 02:12 AM    GLUCOSE 150* 08/25/2017 02:12 AM    BUN 10 08/25/2017 02:12 AM    CREATININE 0.55 08/25/2017 02:12 AM        Lab Results   Component  Value Date/Time    WBC 4.7* 08/25/2017 02:12 AM    HEMOGLOBIN 8.3* 08/25/2017 02:12 AM    HEMATOCRIT 28.7* 08/25/2017 02:12 AM    PLATELET COUNT 347 08/25/2017 02:12 AM        Total time of the discharge process exceeds 32 minutes

## 2017-09-13 ENCOUNTER — HOSPITAL ENCOUNTER (OUTPATIENT)
Dept: RADIOLOGY | Facility: MEDICAL CENTER | Age: 52
End: 2017-09-13
Attending: PHYSICIAN ASSISTANT
Payer: COMMERCIAL

## 2017-09-13 DIAGNOSIS — M54.2 CERVICALGIA: ICD-10-CM

## 2017-09-13 DIAGNOSIS — R43.1 ABNORMAL SMELL: ICD-10-CM

## 2017-09-13 PROCEDURE — 72040 X-RAY EXAM NECK SPINE 2-3 VW: CPT

## 2017-09-13 PROCEDURE — 70210 X-RAY EXAM OF SINUSES: CPT

## 2018-02-11 ENCOUNTER — APPOINTMENT (OUTPATIENT)
Dept: RADIOLOGY | Facility: MEDICAL CENTER | Age: 53
End: 2018-02-11
Payer: COMMERCIAL

## 2018-02-11 ENCOUNTER — APPOINTMENT (OUTPATIENT)
Dept: RADIOLOGY | Facility: MEDICAL CENTER | Age: 53
End: 2018-02-11
Attending: EMERGENCY MEDICINE
Payer: COMMERCIAL

## 2018-02-11 ENCOUNTER — HOSPITAL ENCOUNTER (EMERGENCY)
Facility: MEDICAL CENTER | Age: 53
End: 2018-02-12
Attending: EMERGENCY MEDICINE
Payer: COMMERCIAL

## 2018-02-11 DIAGNOSIS — R40.4 TRANSIENT ALTERATION OF AWARENESS: ICD-10-CM

## 2018-02-11 DIAGNOSIS — T40.601A OPIATE OVERDOSE, ACCIDENTAL OR UNINTENTIONAL, INITIAL ENCOUNTER (HCC): Primary | ICD-10-CM

## 2018-02-11 LAB
ALBUMIN SERPL BCP-MCNC: 4 G/DL (ref 3.2–4.9)
ALBUMIN/GLOB SERPL: 1.3 G/DL
ALP SERPL-CCNC: 77 U/L (ref 30–99)
ALT SERPL-CCNC: 10 U/L (ref 2–50)
AMPHET UR QL SCN: NEGATIVE
ANION GAP SERPL CALC-SCNC: 7 MMOL/L (ref 0–11.9)
ANISOCYTOSIS BLD QL SMEAR: ABNORMAL
AST SERPL-CCNC: 16 U/L (ref 12–45)
BARBITURATES UR QL SCN: NEGATIVE
BASOPHILS # BLD AUTO: 0.9 % (ref 0–1.8)
BASOPHILS # BLD: 0.05 K/UL (ref 0–0.12)
BENZODIAZ UR QL SCN: POSITIVE
BILIRUB SERPL-MCNC: 0.3 MG/DL (ref 0.1–1.5)
BUN SERPL-MCNC: 20 MG/DL (ref 8–22)
BZE UR QL SCN: NEGATIVE
CALCIUM SERPL-MCNC: 8.5 MG/DL (ref 8.5–10.5)
CANNABINOIDS UR QL SCN: NEGATIVE
CHLORIDE SERPL-SCNC: 108 MMOL/L (ref 96–112)
CO2 SERPL-SCNC: 22 MMOL/L (ref 20–33)
COMMENT 1642: NORMAL
CREAT SERPL-MCNC: 0.7 MG/DL (ref 0.5–1.4)
DACRYOCYTES BLD QL SMEAR: NORMAL
EKG IMPRESSION: NORMAL
EOSINOPHIL # BLD AUTO: 0.16 K/UL (ref 0–0.51)
EOSINOPHIL NFR BLD: 2.8 % (ref 0–6.9)
ERYTHROCYTE [DISTWIDTH] IN BLOOD BY AUTOMATED COUNT: 54.2 FL (ref 35.9–50)
GLOBULIN SER CALC-MCNC: 3.1 G/DL (ref 1.9–3.5)
GLUCOSE BLD-MCNC: 111 MG/DL (ref 65–99)
GLUCOSE SERPL-MCNC: 107 MG/DL (ref 65–99)
HCT VFR BLD AUTO: 30.6 % (ref 37–47)
HGB BLD-MCNC: 8.9 G/DL (ref 12–16)
IMM GRANULOCYTES # BLD AUTO: 0.02 K/UL (ref 0–0.11)
IMM GRANULOCYTES NFR BLD AUTO: 0.4 % (ref 0–0.9)
LYMPHOCYTES # BLD AUTO: 2.07 K/UL (ref 1–4.8)
LYMPHOCYTES NFR BLD: 36.6 % (ref 22–41)
MCH RBC QN AUTO: 22 PG (ref 27–33)
MCHC RBC AUTO-ENTMCNC: 29.1 G/DL (ref 33.6–35)
MCV RBC AUTO: 75.6 FL (ref 81.4–97.8)
METHADONE UR QL SCN: NEGATIVE
MICROCYTES BLD QL SMEAR: ABNORMAL
MONOCYTES # BLD AUTO: 0.65 K/UL (ref 0–0.85)
MONOCYTES NFR BLD AUTO: 11.5 % (ref 0–13.4)
MORPHOLOGY BLD-IMP: NORMAL
NEUTROPHILS # BLD AUTO: 2.71 K/UL (ref 2–7.15)
NEUTROPHILS NFR BLD: 47.8 % (ref 44–72)
NRBC # BLD AUTO: 0 K/UL
NRBC BLD-RTO: 0 /100 WBC
OPIATES UR QL SCN: POSITIVE
OVALOCYTES BLD QL SMEAR: NORMAL
OXYCODONE UR QL SCN: POSITIVE
PCP UR QL SCN: NEGATIVE
PLATELET # BLD AUTO: 400 K/UL (ref 164–446)
PLATELET BLD QL SMEAR: NORMAL
PMV BLD AUTO: 9.2 FL (ref 9–12.9)
POIKILOCYTOSIS BLD QL SMEAR: NORMAL
POTASSIUM SERPL-SCNC: 3.6 MMOL/L (ref 3.6–5.5)
PROPOXYPH UR QL SCN: NEGATIVE
PROT SERPL-MCNC: 7.1 G/DL (ref 6–8.2)
RBC # BLD AUTO: 4.05 M/UL (ref 4.2–5.4)
RBC BLD AUTO: PRESENT
SODIUM SERPL-SCNC: 137 MMOL/L (ref 135–145)
WBC # BLD AUTO: 5.7 K/UL (ref 4.8–10.8)

## 2018-02-11 PROCEDURE — 99285 EMERGENCY DEPT VISIT HI MDM: CPT

## 2018-02-11 PROCEDURE — 80053 COMPREHEN METABOLIC PANEL: CPT

## 2018-02-11 PROCEDURE — 71045 X-RAY EXAM CHEST 1 VIEW: CPT

## 2018-02-11 PROCEDURE — 36415 COLL VENOUS BLD VENIPUNCTURE: CPT

## 2018-02-11 PROCEDURE — 85025 COMPLETE CBC W/AUTO DIFF WBC: CPT

## 2018-02-11 PROCEDURE — 96374 THER/PROPH/DIAG INJ IV PUSH: CPT

## 2018-02-11 PROCEDURE — 93005 ELECTROCARDIOGRAM TRACING: CPT | Performed by: EMERGENCY MEDICINE

## 2018-02-11 PROCEDURE — 700111 HCHG RX REV CODE 636 W/ 250 OVERRIDE (IP): Performed by: EMERGENCY MEDICINE

## 2018-02-11 PROCEDURE — 82962 GLUCOSE BLOOD TEST: CPT

## 2018-02-11 PROCEDURE — 80307 DRUG TEST PRSMV CHEM ANLYZR: CPT

## 2018-02-11 PROCEDURE — 70450 CT HEAD/BRAIN W/O DYE: CPT

## 2018-02-11 RX ORDER — NALOXONE HYDROCHLORIDE 0.4 MG/ML
0.4 INJECTION, SOLUTION INTRAMUSCULAR; INTRAVENOUS; SUBCUTANEOUS ONCE
Status: COMPLETED | OUTPATIENT
Start: 2018-02-11 | End: 2018-02-11

## 2018-02-11 RX ADMIN — NALOXONE HYDROCHLORIDE 0.4 MG: 0.4 INJECTION, SOLUTION INTRAMUSCULAR; INTRAVENOUS; SUBCUTANEOUS at 22:22

## 2018-02-11 ASSESSMENT — LIFESTYLE VARIABLES: DO YOU DRINK ALCOHOL: NO

## 2018-02-11 ASSESSMENT — PAIN SCALES - GENERAL: PAINLEVEL_OUTOF10: 8

## 2018-02-12 VITALS
OXYGEN SATURATION: 100 % | HEIGHT: 62 IN | TEMPERATURE: 97.9 F | WEIGHT: 242 LBS | RESPIRATION RATE: 22 BRPM | HEART RATE: 75 BPM | DIASTOLIC BLOOD PRESSURE: 78 MMHG | BODY MASS INDEX: 44.53 KG/M2 | SYSTOLIC BLOOD PRESSURE: 121 MMHG

## 2018-02-12 LAB — APAP SERPL-MCNC: <10 UG/ML (ref 10–30)

## 2018-02-12 PROCEDURE — 36415 COLL VENOUS BLD VENIPUNCTURE: CPT

## 2018-02-12 PROCEDURE — 90791 PSYCH DIAGNOSTIC EVALUATION: CPT

## 2018-02-12 PROCEDURE — 96374 THER/PROPH/DIAG INJ IV PUSH: CPT

## 2018-02-12 PROCEDURE — 99285 EMERGENCY DEPT VISIT HI MDM: CPT

## 2018-02-12 ASSESSMENT — ENCOUNTER SYMPTOMS
FOCAL WEAKNESS: 0
NAUSEA: 0
DIZZINESS: 0
HEADACHES: 0
VOMITING: 0
PALPITATIONS: 0
ABDOMINAL PAIN: 0
SHORTNESS OF BREATH: 0
FEVER: 0
SORE THROAT: 0

## 2018-02-12 ASSESSMENT — LIFESTYLE VARIABLES: SUBSTANCE_ABUSE: 1

## 2018-02-12 NOTE — ED NOTES
Pt crying in bed stating pain is increased, educated pt that we gave narcan due to overdose of medication.  Pt states can't stand the pain, doctor notified with no new orders.  Will re-evaluate patient.

## 2018-02-12 NOTE — ED TRIAGE NOTES
stated has not brought pt to ER for the Altered mental status because it comes and goes for the past 3 days.

## 2018-02-12 NOTE — ED NOTES
Pt belongings removed and security called, pt is attached to appropriate monitoring for complaint at this time.  Pt is sleeping at present bed in lowest position.

## 2018-02-12 NOTE — ED TRIAGE NOTES
"Pt states maybe took to much medication, family states unsure, pt states depression with SI presently with attempts in the past, pt is on medication for back issues-percocet, pt has been acting odd for about 3 days, very sleepy but follows commands, during triage, pt states \" I can't live like this I may have taken extra what ever I could take to get the pain to go away\".  Pt able to ambulate from wheelchair to bed with help.  Pt falling asleep during questioning but is easily aroused and answers questions.     "

## 2018-02-12 NOTE — ED NOTES
Family verbalized understanding of interventions to help pt and how to evaluate for over medication along with going to pharmacy for narcan, no questions at this time.  Mehdi life skills speaking with family to reiterate

## 2018-02-12 NOTE — ED PROVIDER NOTES
"ED Provider Note    Scribed for JAYDEN Garcia II* by Ivette Hidalgo. 2/11/2018  9:56 PM    Means of Arrival: walk in   History obtained by: patient and nurse   Limitations: patient's mental status     CHIEF COMPLAINT  Chief Complaint   Patient presents with   • ALOC   • Hip Pain   • T-5000 GLF       HPI  Smita Faith is a 52 y.o. female who presents with altered mental status prior to arrival. Per nurses note, Smita may have taken a lot of medication tonight. Additionally, she may have had a ground level fall this evening. Patient reports associated generalized pain. She has a history of chronic hip pain. Per nurse, Smita has experienced SI with multiple attempts in the past (alcohol related). Smita stated to her nurse that she \"doesn't want to live anymore with pain and that she searched for pills for pain control\". She admitted to taking Morphine and Percocet at home. She has a SAD score 5.     HPI is limited secondary to the patient's altered mental status.     REVIEW OF SYSTEMS  Review of Systems   Constitutional: Negative for fever.        Generalized pain   HENT: Negative for sore throat.    Respiratory: Negative for shortness of breath.    Cardiovascular: Negative for chest pain and palpitations.   Gastrointestinal: Negative for abdominal pain, nausea and vomiting.   Musculoskeletal:        Chronic right hip pain and back pain. No change in location of pain.    Neurological: Negative for dizziness, focal weakness and headaches.   Psychiatric/Behavioral: Positive for substance abuse (hitory of).   All other systems reviewed and are negative.  See HPI for further details.  C    ROS is limited secondary to the patient's altered mental status.     PAST MEDICAL HISTORY   has a past medical history of Alcoholism (CMS-HCC); Anxiety disorder; Arthritis; ASTHMA; Backpain; Bipolar 2 disorder (CMS-HCC); Bowel habit changes; Bronchitis; Depression; Gynecological disorder; Heart burn; Indigestion; Pneumonia; and " "Unspecified disorder of thyroid (3964-2885).    SOCIAL HISTORY  Social History     Social History Main Topics   • Smoking status: Former Smoker     Packs/day: 0.25     Years: 4.00     Types: Cigarettes     Quit date: 1/1/2015   • Smokeless tobacco: Never Used      Comment: 2009   • Alcohol use No      Comment: smoked off and on.   • Drug use: No      Comment: marijuana, crank, \"did everything.\" last used 04/2015   • Sexual activity:        SURGICAL HISTORY   has a past surgical history that includes gastric bypass laparoscopic; other abdominal surgery; tubal ligation; removal of tonsils,<13 y/o; cholecystostomy,percut; lumbar fusion anterior (11/10/2010); breast biopsy; reduction of large breast (2010); hysterectomy robotic xi (N/A, 4/7/2016); and cervical disk and fusion anterior (8/24/2017).    CURRENT MEDICATIONS  Home Medications     Reviewed by Cassy Landon R.N. (Registered Nurse) on 02/11/18 at 2135  Med List Status: Not Addressed   Medication Last Dose Status   albuterol 108 (90 BASE) MCG/ACT Aero Soln inhalation aerosol 8/23/2017 Active   ascorbic acid (ASCORBIC ACID) 500 MG Tab 8/23/2017 Active   Calcium 5710-9462 MG-UNIT CHEW 8/23/2017 Active   Cholecalciferol (VITAMIN D) 1000 UNIT Cap 8/23/2017 Active   Coenzyme Q10 (COQ-10 PO) 8/23/2017 Active   cyanocobalamin (VITAMIN B-12) 100 MCG Tab 8/23/2017 Active   ferrous gluconate (FERGON) 324 (38 Fe) MG Tab 8/23/2017 Active   gabapentin (NEURONTIN) 300 MG Cap 8/23/2017 Active   levothyroxine (SYNTHROID) 50 MCG Tab 8/23/2017 Active   levothyroxine (SYNTHROID) 50 MCG Tab  Active   methocarbamol (ROBAXIN) 750 MG Tab 8/23/2017 Active   Multiple Vitamins-Minerals (MULTIVITAMIN & MINERAL PO) 8/23/2017 Active   Oxycodone HCl 20 MG Tab 8/23/2017 Active   sertraline (ZOLOFT) 100 MG Tab 8/23/2017 Active                ALLERGIES  Allergies   Allergen Reactions   • Bloodless    • Ciprofloxacin Anaphylaxis     SOB   • Codeine      Upset stomach    • Erythromycin " "Vomiting   • Nsaids      Does not take due to gastric bypass   • Other Food Anaphylaxis     pj oliva       PHYSICAL EXAM  VITAL SIGNS: /78   Pulse 94   Temp 36.6 °C (97.9 °F) (Temporal)   Resp 18   Ht 1.562 m (5' 1.5\")   Wt 109.8 kg (242 lb)   LMP 03/02/2016 Comment: hcg sent with labs  SpO2 98%   BMI 44.99 kg/m²     Pulse ox interpretation: I interpret this pulse ox as normal.  Constitutional: 52 year old woman laying in bed. Slow, slurred speech.   HENT: No signs of trauma, No obvious signs of head injury. Bilateral external ears normal, Nose normal.   Eyes: Pupils are 2 mm and reactive, Conjunctiva normal, Non-icteric.   Neck: Normal range of motion, Supple, No stridor.   Lymphatic: No lymphadenopathy noted.   Cardiovascular: Regular rate and rhythm, no murmurs.   Thorax & Lungs: Normal breath sounds, No respiratory distress, No wheezing.  Abdomen: Bowel sounds normal, Soft, obese. No masses, No pulsatile masses. No peritoneal signs.  Skin: Warm, Dry, No erythema, No rash.   Back: No bony tenderness, No CVA tenderness.   Extremities: Intact distal pulses, No edema, No cyanosis. Mild right hip pain (chronic).   Musculoskeletal: Mild right hip pain (chronic).   Neurologic:  Normal motor function, Normal sensory function, No focal deficits noted. Slow, slurred speech.   Psychiatric: Unable to assess secondary to the patient's altered mental status. SAD score of 5.     DIAGNOSTIC STUDIES / PROCEDURES    EKG  EKG Interpretation:  Interpreted by me    Rhythm:  Normal sinus rhythm   Rate: 82  Axis: normal  Ectopy: none  Conduction: normal  ST Segments: No ST elevations or depressions   T Waves: Inverted T waves in V2  Qt border prolonged 46 low voltage AVL  Clinical Impression: Normal EKG with borderline conduction     LABS    Pertinent Labs & Imaging studies reviewed. (See chart for details)    RADIOLOGY  CT-HEAD W/O   Final Result         1. No acute intracranial abnormality. No evidence of acute " intracranial hemorrhage or mass lesion.               DX-CHEST-LIMITED (1 VIEW)   Final Result         Low lung volume with hypoventilatory change and bibasilar atelectasis.        Pertinent Labs & Imaging studies reviewed. (See chart for details)    COURSE & MEDICAL DECISION MAKING  Pertinent Labs & Imaging studies reviewed. (See chart for details)    9:56 PM This is a 52 y.o. female who presents with altered mental status and the differential diagnosis includes but is not limited to suspicion for narcotics overdose, other medication overdose, head injury, metabolic abnormalities. Ordered for CT head, EKG, urine drug screen, CBC, CMP, blood culture, DX chest to evaluate. Patient will be treated with Narcan 0.4 mg for her symptoms.     10:19 PM- Reviewed the patient's Accu-chek which is 111.     10:34 PM- The patient is more alert secondary to receiving Narcan.     11:43 PM- Reviewed the patient's lab and imaging results. Pain complaints likely chronic.     12:37 AM- Discussed the case with life skills who informed me the patient's  feels comfortable taking her home and states the patient does not need to be on a legal hold. She denies intentional overdose. I agree. We agree the patient should be observed further and then she will be stable for discharge.     1:43 AM  She is still slightly drowsy, but normal respirations and saturations. She has chronic pain but I do not think narcotics are a good option for her. She needs to call her prescribing doctor tomorrow regarding pain management.  She can not take NSAIDS because of gastric bypass. I cannot give her tylenol here because she likely took combination medications. Acetaminophen level pending.     Ms. Faith was here with a confirmed overdose of T40.4 - Synthetic narcotics and T40.6 - Other narcotics; she has no other known overdoses.       2:00 AM  APAP level undetectable. Will discharge.     DISPOSITION:  Patient will be discharged home in stable  condition.    FOLLOW UP:  Maria Fernanda Bolivar P.A.-C.  [address]  [place] [address]  [phone]    Call  For follow up asap regarding pain management.     [facility], Emergency Dept  [address]  [place] [address] [phone]    If symptoms worsen, feeling suicidal or other serious concerns      OUTPATIENT MEDICATIONS:  New Prescriptions    No medications on file     FINAL IMPRESSION  1. Opiate overdose, accidental or unintentional, initial encounter    2. Transient alteration of awareness       Ivette IBARRA (Josesito), am scribing for, and in the presence of, MINE Garcia II.    Electronically signed by: Ivette Hidalgo (Josesito), 2/11/2018    Otto IBARRA II, M* personally performed the services described in this documentation, as scribed by Ivette Hidalgo in my presence, and it is both accurate and complete.    The note accurately reflects work and decisions made by me.  Otto Gabriel II  2/12/2018  1:45 AM

## 2018-02-12 NOTE — ED NOTES
Family member on the way from home to get patient, life skills will come talk to patient family in regards to the intervention options for patient.  Pt verbalized understanding of poc and discharge instructions.  Pt has no questions at this time. resp even and non labored in nad at this time

## 2018-02-12 NOTE — ED NOTES
Break RN: Patient resting in bed comfortably.  Evaluated by life skills, allowed to contact  with cell phone by life skills.

## 2018-02-12 NOTE — DISCHARGE PLANNING
[facility]  Crisis/Safety Plan    Name:  Smita Faith  MRN:  [id number]  Date:  2018    Warning signs that a crisis may be developing for me or I may be at risk:  1) feeling overwhelmed  2) increase in anxiety and depression  3)any thoughts of self harm or abusing drugs    Coping strategies I can use on my own (relaxation, physical activity, etc):  1) exercise as much as possible  2) read for pleasure  3) watch tv    Ways I can make my environment safe:  1)no weapons in the house  2)secure sharps and meds  3)have narcan available (can  at a pharmacy without prescription)    Things I want to tell myself when I feel a crisis developin) try to stay calm  2) remember there is help out there  3) get help before a crisis develops.    People I can contact for support or distraction (and their phone numbers):  1) carmina [phone]  2) use numbers below  3)    If I’m not able to reach my support people, or the above strategies don’t help, I can contact the following professionals, agencies, or hotlines:  1) Crisis Call Center ():  [phone] -OR- [phone]  2) Crisis Text Line ():  Text START to 285992  3)   4)     Mehdi Shi R.N.

## 2018-02-12 NOTE — ED TRIAGE NOTES
Smita Faith  52 y.o.  female  Chief Complaint   Patient presents with   • ALOC   • Hip Pain   • T-5000 GLF     Present to triage accompanied by her . Per  patient had a GLF 4 days ago. C/o right hip pain. Patient supposed to have a hip replacement on the same hip but need to loose weight first. For the past 3 days  noticed patient intermittently incoherent. Patient been taking percocet for pain but not overdosing self per . Patient slow to answer questions in triage.
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
## 2018-02-24 ENCOUNTER — OFFICE VISIT (OUTPATIENT)
Dept: URGENT CARE | Facility: PHYSICIAN GROUP | Age: 53
End: 2018-02-24
Payer: COMMERCIAL

## 2018-02-24 VITALS
SYSTOLIC BLOOD PRESSURE: 124 MMHG | TEMPERATURE: 96.5 F | BODY MASS INDEX: 43.8 KG/M2 | HEIGHT: 61 IN | HEART RATE: 78 BPM | DIASTOLIC BLOOD PRESSURE: 72 MMHG | WEIGHT: 232 LBS | OXYGEN SATURATION: 98 %

## 2018-02-24 DIAGNOSIS — H10.32 ACUTE BACTERIAL CONJUNCTIVITIS OF LEFT EYE: Primary | ICD-10-CM

## 2018-02-24 PROCEDURE — 99214 OFFICE O/P EST MOD 30 MIN: CPT | Performed by: NURSE PRACTITIONER

## 2018-02-24 RX ORDER — TOPIRAMATE 100 MG/1
TABLET, FILM COATED ORAL
COMMUNITY
Start: 2018-02-01 | End: 2018-06-11

## 2018-02-24 RX ORDER — AMOXICILLIN 500 MG/1
CAPSULE ORAL
COMMUNITY
Start: 2018-01-31 | End: 2018-06-11

## 2018-02-24 RX ORDER — OXYCODONE AND ACETAMINOPHEN 7.5; 325 MG/1; MG/1
1 TABLET ORAL
COMMUNITY
Start: 2018-02-21 | End: 2019-05-03

## 2018-02-24 RX ORDER — TOPIRAMATE 50 MG/1
TABLET, FILM COATED ORAL
COMMUNITY
Start: 2018-01-24 | End: 2018-06-11

## 2018-02-24 RX ORDER — POLYMYXIN B SULFATE AND TRIMETHOPRIM 1; 10000 MG/ML; [USP'U]/ML
1 SOLUTION OPHTHALMIC
Qty: 1 BOTTLE | Refills: 0 | Status: SHIPPED | OUTPATIENT
Start: 2018-02-24 | End: 2018-06-11

## 2018-02-24 RX ORDER — PENICILLIN V POTASSIUM 500 MG/1
TABLET ORAL
COMMUNITY
Start: 2018-02-24 | End: 2018-06-11

## 2018-02-24 ASSESSMENT — ENCOUNTER SYMPTOMS
PHOTOPHOBIA: 0
VISUAL CHANGE: 0
CHILLS: 0
FEVER: 0
COUGH: 0
HEADACHES: 0
EYE REDNESS: 1
EYE PAIN: 1
SORE THROAT: 0
EYE DISCHARGE: 1
BLURRED VISION: 0

## 2018-02-24 ASSESSMENT — PAIN SCALES - GENERAL: PAINLEVEL: 3=SLIGHT PAIN

## 2018-02-25 NOTE — PROGRESS NOTES
"Subjective:      Smita Faith is a 52 y.o. female who presents with Eye Problem (x2 days. Pt complains of eye redness, swelling, discharge.)            Medications, Allergies and Prior Medical Hx reviewed and updated in [facility].with patient/family today         Conjunctivitis   This is a new problem. The current episode started yesterday. The problem occurs constantly. The problem has been gradually worsening. Pertinent negatives include no chills, congestion, coughing, fever, headaches, sore throat or visual change. Nothing aggravates the symptoms. She has tried nothing for the symptoms. The treatment provided no relief.       Review of Systems   Constitutional: Negative for chills and fever.   HENT: Negative for congestion and sore throat.    Eyes: Positive for pain (on upper/lower eyelid margins), discharge and redness. Negative for blurred vision and photophobia.   Respiratory: Negative for cough.    Neurological: Negative for headaches.          Objective:     /72   Pulse 78   Temp 35.8 °C (96.5 °F)   Ht 1.549 m (5' 1\")   Wt 105.2 kg (232 lb)   LMP 03/02/2016 Comment: hcg sent with labs  SpO2 98%   Breastfeeding? No   BMI 43.84 kg/m²      Physical Exam   Constitutional: She appears well-developed and well-nourished. No distress.   HENT:   Head: Normocephalic and atraumatic.   Eyes: EOM are normal. Pupils are equal, round, and reactive to light. Right eye exhibits discharge. Right eye exhibits no hordeolum. Right conjunctiva is injected. Right conjunctiva has no hemorrhage.   Swelling, mild erythema, and mild pain along eyelid margins.    Neck: Neck supple.   Cardiovascular: Normal rate.    Pulmonary/Chest: Effort normal. No respiratory distress.   Neurological: She is alert.   Awake, alert, answering questions appropriately, moving all extremeties   Skin: Skin is warm and dry. Capillary refill takes less than 2 seconds. No rash noted.   Psychiatric: She has a normal mood and affect. Her behavior " is normal.               Assessment/Plan:        1. Acute bacterial conjunctivitis of left eye  polymixin-trimethoprim (POLYTRIM) 95465-6.1 UNIT/ML-% Solution       Warm compresses to eyes, good hand washing, change towels, washcloths, pillow cases, and eye makeup  Do not wear contacts until sx resolve  Pt will go to the ER for worsening or changing symptoms as discussed, pain, pain with eye movement, redness of eyelids or surrounding area, or vision changes  Follow-up with your primary care provider or return here if not improving in 2-3 days   Discharge instructions discussed with pt/family who verbalize understanding and agreement with poc

## 2018-04-27 ENCOUNTER — HOSPITAL ENCOUNTER (OUTPATIENT)
Dept: RADIOLOGY | Facility: MEDICAL CENTER | Age: 53
End: 2018-04-27
Attending: COLON & RECTAL SURGERY
Payer: COMMERCIAL

## 2018-04-27 DIAGNOSIS — E66.01 MORBID OBESITY (HCC): ICD-10-CM

## 2018-04-27 DIAGNOSIS — Z01.818 PREOP EXAMINATION: ICD-10-CM

## 2018-04-27 PROCEDURE — 74241 DX-UPPER GI-SERIES WITH KUB: CPT

## 2018-05-02 ENCOUNTER — HOSPITAL ENCOUNTER (OUTPATIENT)
Dept: RADIOLOGY | Facility: MEDICAL CENTER | Age: 53
End: 2018-05-02
Attending: PHYSICIAN ASSISTANT
Payer: COMMERCIAL

## 2018-05-02 DIAGNOSIS — Z12.31 VISIT FOR SCREENING MAMMOGRAM: ICD-10-CM

## 2018-05-02 PROCEDURE — 77067 SCR MAMMO BI INCL CAD: CPT

## 2018-06-11 ENCOUNTER — HOSPITAL ENCOUNTER (OUTPATIENT)
Dept: LAB | Facility: MEDICAL CENTER | Age: 53
End: 2018-06-11
Attending: COLON & RECTAL SURGERY
Payer: COMMERCIAL

## 2018-06-11 DIAGNOSIS — Z01.812 PRE-OPERATIVE LABORATORY EXAMINATION: ICD-10-CM

## 2018-06-11 LAB
ANION GAP SERPL CALC-SCNC: 14 MMOL/L (ref 0–11.9)
BASOPHILS # BLD AUTO: 0.4 % (ref 0–1.8)
BASOPHILS # BLD: 0.03 K/UL (ref 0–0.12)
BUN SERPL-MCNC: 18 MG/DL (ref 8–22)
CALCIUM SERPL-MCNC: 9.8 MG/DL (ref 8.5–10.5)
CHLORIDE SERPL-SCNC: 104 MMOL/L (ref 96–112)
CO2 SERPL-SCNC: 22 MMOL/L (ref 20–33)
CREAT SERPL-MCNC: 0.67 MG/DL (ref 0.5–1.4)
EOSINOPHIL # BLD AUTO: 0.07 K/UL (ref 0–0.51)
EOSINOPHIL NFR BLD: 1 % (ref 0–6.9)
ERYTHROCYTE [DISTWIDTH] IN BLOOD BY AUTOMATED COUNT: 50.4 FL (ref 35.9–50)
GLUCOSE SERPL-MCNC: 86 MG/DL (ref 65–99)
HCT VFR BLD AUTO: 42.3 % (ref 37–47)
HGB BLD-MCNC: 13.1 G/DL (ref 12–16)
HIV 1+2 AB+HIV1 P24 AG SERPL QL IA: NON REACTIVE
IMM GRANULOCYTES # BLD AUTO: 0.02 K/UL (ref 0–0.11)
IMM GRANULOCYTES NFR BLD AUTO: 0.3 % (ref 0–0.9)
LYMPHOCYTES # BLD AUTO: 2 K/UL (ref 1–4.8)
LYMPHOCYTES NFR BLD: 29.5 % (ref 22–41)
MCH RBC QN AUTO: 25.3 PG (ref 27–33)
MCHC RBC AUTO-ENTMCNC: 31 G/DL (ref 33.6–35)
MCV RBC AUTO: 81.8 FL (ref 81.4–97.8)
MONOCYTES # BLD AUTO: 0.53 K/UL (ref 0–0.85)
MONOCYTES NFR BLD AUTO: 7.8 % (ref 0–13.4)
NEUTROPHILS # BLD AUTO: 4.12 K/UL (ref 2–7.15)
NEUTROPHILS NFR BLD: 61 % (ref 44–72)
NRBC # BLD AUTO: 0 K/UL
NRBC BLD-RTO: 0 /100 WBC
PLATELET # BLD AUTO: 412 K/UL (ref 164–446)
PMV BLD AUTO: 9.7 FL (ref 9–12.9)
POTASSIUM SERPL-SCNC: 4.3 MMOL/L (ref 3.6–5.5)
RBC # BLD AUTO: 5.17 M/UL (ref 4.2–5.4)
SCCMEC + MECA PNL NOSE NAA+PROBE: NEGATIVE
SCCMEC + MECA PNL NOSE NAA+PROBE: POSITIVE
SODIUM SERPL-SCNC: 140 MMOL/L (ref 135–145)
WBC # BLD AUTO: 6.8 K/UL (ref 4.8–10.8)

## 2018-06-11 PROCEDURE — 85025 COMPLETE CBC W/AUTO DIFF WBC: CPT

## 2018-06-11 PROCEDURE — 80048 BASIC METABOLIC PNL TOTAL CA: CPT

## 2018-06-11 PROCEDURE — 87389 HIV-1 AG W/HIV-1&-2 AB AG IA: CPT

## 2018-06-11 PROCEDURE — 87640 STAPH A DNA AMP PROBE: CPT

## 2018-06-11 PROCEDURE — 87641 MR-STAPH DNA AMP PROBE: CPT

## 2018-06-11 PROCEDURE — 36415 COLL VENOUS BLD VENIPUNCTURE: CPT

## 2018-06-11 NOTE — DISCHARGE PLANNING
DISCHARGE PLANNING NOTE - TOTAL JOINT     Procedure: Procedure(s):  HIP ARTHROPLASTY ANTERIOR TOTAL  Procedure Date: 6/21/2018  Insurance:  Payor: [employer] / Plan: AMBETTER   Equipment currently available at home? cane, four-wheel walker, front-wheel walker and wheelchair  Steps into the home? 2 with grab bar  Steps within the home? 0  Toilet height? Standard  Type of shower? tub-shower and walk-in  Who will be with you during your recovery? spouse  Is Outpatient Physical Therapy set up after surgery? No   Did you take the Total Joint Class and where? No, provided a Total Hip Replacement - Patient Guide book     Plan: Reviewed Equipment Resource list and provided a copy to the patient. Recommended a tub transfer bench and raised toilet seat. Provided Home Safety Checklist. Anticipate discharge home.

## 2018-06-11 NOTE — OR NURSING
Completed preadmit interview/labs/MRSA swab for scheduled procedure 6.21.18; instructed pt if MRSA swab positive she will be notified by MD office; pt verb understanding. Contacted Dr Ritchie office, spoke to Shelli, regarding pt is bloodless; per Shelli will notify MD.

## 2018-06-15 ENCOUNTER — APPOINTMENT (OUTPATIENT)
Dept: LAB | Facility: MEDICAL CENTER | Age: 53
End: 2018-06-15
Payer: COMMERCIAL

## 2018-06-20 RX ORDER — CEFAZOLIN SODIUM 2 G/100ML
2 INJECTION, SOLUTION INTRAVENOUS
Status: DISCONTINUED | OUTPATIENT
Start: 2018-06-21 | End: 2018-06-22 | Stop reason: HOSPADM

## 2018-06-21 ENCOUNTER — HOSPITAL ENCOUNTER (INPATIENT)
Facility: MEDICAL CENTER | Age: 53
LOS: 1 days | DRG: 470 | End: 2018-06-22
Attending: ORTHOPAEDIC SURGERY | Admitting: ORTHOPAEDIC SURGERY
Payer: COMMERCIAL

## 2018-06-21 ENCOUNTER — APPOINTMENT (OUTPATIENT)
Dept: RADIOLOGY | Facility: MEDICAL CENTER | Age: 53
DRG: 470 | End: 2018-06-21
Attending: ORTHOPAEDIC SURGERY
Payer: COMMERCIAL

## 2018-06-21 DIAGNOSIS — Z96.651 HISTORY OF TOTAL KNEE ARTHROPLASTY, RIGHT: ICD-10-CM

## 2018-06-21 PROCEDURE — 700111 HCHG RX REV CODE 636 W/ 250 OVERRIDE (IP)

## 2018-06-21 PROCEDURE — 700111 HCHG RX REV CODE 636 W/ 250 OVERRIDE (IP): Performed by: ORTHOPAEDIC SURGERY

## 2018-06-21 PROCEDURE — 502578 HCHG PACK, TOTAL HIP: Performed by: ORTHOPAEDIC SURGERY

## 2018-06-21 PROCEDURE — 160002 HCHG RECOVERY MINUTES (STAT): Performed by: ORTHOPAEDIC SURGERY

## 2018-06-21 PROCEDURE — 501838 HCHG SUTURE GENERAL: Performed by: ORTHOPAEDIC SURGERY

## 2018-06-21 PROCEDURE — 700101 HCHG RX REV CODE 250

## 2018-06-21 PROCEDURE — 700101 HCHG RX REV CODE 250: Performed by: ORTHOPAEDIC SURGERY

## 2018-06-21 PROCEDURE — 502000 HCHG MISC OR IMPLANTS RC 0278: Performed by: ORTHOPAEDIC SURGERY

## 2018-06-21 PROCEDURE — 503339 HCHG DRESSSING PICO: Performed by: ORTHOPAEDIC SURGERY

## 2018-06-21 PROCEDURE — 160048 HCHG OR STATISTICAL LEVEL 1-5: Performed by: ORTHOPAEDIC SURGERY

## 2018-06-21 PROCEDURE — 160036 HCHG PACU - EA ADDL 30 MINS PHASE I: Performed by: ORTHOPAEDIC SURGERY

## 2018-06-21 PROCEDURE — A9270 NON-COVERED ITEM OR SERVICE: HCPCS | Performed by: ORTHOPAEDIC SURGERY

## 2018-06-21 PROCEDURE — 700102 HCHG RX REV CODE 250 W/ 637 OVERRIDE(OP)

## 2018-06-21 PROCEDURE — 700102 HCHG RX REV CODE 250 W/ 637 OVERRIDE(OP): Performed by: ORTHOPAEDIC SURGERY

## 2018-06-21 PROCEDURE — 160035 HCHG PACU - 1ST 60 MINS PHASE I: Performed by: ORTHOPAEDIC SURGERY

## 2018-06-21 PROCEDURE — 700105 HCHG RX REV CODE 258: Performed by: ORTHOPAEDIC SURGERY

## 2018-06-21 PROCEDURE — 160031 HCHG SURGERY MINUTES - 1ST 30 MINS LEVEL 5: Performed by: ORTHOPAEDIC SURGERY

## 2018-06-21 PROCEDURE — 770001 HCHG ROOM/CARE - MED/SURG/GYN PRIV*

## 2018-06-21 PROCEDURE — 160042 HCHG SURGERY MINUTES - EA ADDL 1 MIN LEVEL 5: Performed by: ORTHOPAEDIC SURGERY

## 2018-06-21 PROCEDURE — 160009 HCHG ANES TIME/MIN: Performed by: ORTHOPAEDIC SURGERY

## 2018-06-21 PROCEDURE — 94760 N-INVAS EAR/PLS OXIMETRY 1: CPT

## 2018-06-21 PROCEDURE — 500002 HCHG ADHESIVE, DERMABOND: Performed by: ORTHOPAEDIC SURGERY

## 2018-06-21 PROCEDURE — 700112 HCHG RX REV CODE 229: Performed by: ORTHOPAEDIC SURGERY

## 2018-06-21 PROCEDURE — A6402 STERILE GAUZE <= 16 SQ IN: HCPCS | Performed by: ORTHOPAEDIC SURGERY

## 2018-06-21 PROCEDURE — A9270 NON-COVERED ITEM OR SERVICE: HCPCS

## 2018-06-21 PROCEDURE — 0SR906Z REPLACEMENT OF RIGHT HIP JOINT WITH OXIDIZED ZIRCONIUM ON POLYETHYLENE SYNTHETIC SUBSTITUTE, OPEN APPROACH: ICD-10-PCS | Performed by: ORTHOPAEDIC SURGERY

## 2018-06-21 DEVICE — IMPLANTABLE DEVICE: Type: IMPLANTABLE DEVICE | Site: HIP | Status: FUNCTIONAL

## 2018-06-21 DEVICE — IMPLANT R3 3 HOLE ACET SHELL 52MM (1EA): Type: IMPLANTABLE DEVICE | Site: HIP | Status: FUNCTIONAL

## 2018-06-21 DEVICE — IMPLANT REF SPHER HEAD SCREW 20MM (1EA): Type: IMPLANTABLE DEVICE | Site: HIP | Status: FUNCTIONAL

## 2018-06-21 DEVICE — IMPLANT R3 0 DEG XLPE ACET LNR 32MM X 52MM: Type: IMPLANTABLE DEVICE | Site: HIP | Status: FUNCTIONAL

## 2018-06-21 DEVICE — STEM POLAR CEMENTLESS WITH COLLAR 3: Type: IMPLANTABLE DEVICE | Site: HIP | Status: FUNCTIONAL

## 2018-06-21 DEVICE — IMPLANT OXINIUM FEM HD 12/14 32MM +0 (1EA): Type: IMPLANTABLE DEVICE | Site: HIP | Status: FUNCTIONAL

## 2018-06-21 DEVICE — IMPLANT REF THREADED HOLE COVER (1EA): Type: IMPLANTABLE DEVICE | Site: HIP | Status: FUNCTIONAL

## 2018-06-21 RX ORDER — DIPHENHYDRAMINE HCL 25 MG
25 TABLET ORAL EVERY 6 HOURS PRN
Status: DISCONTINUED | OUTPATIENT
Start: 2018-06-21 | End: 2018-06-22 | Stop reason: HOSPADM

## 2018-06-21 RX ORDER — DEXAMETHASONE SODIUM PHOSPHATE 4 MG/ML
4 INJECTION, SOLUTION INTRA-ARTICULAR; INTRALESIONAL; INTRAMUSCULAR; INTRAVENOUS; SOFT TISSUE
Status: DISCONTINUED | OUTPATIENT
Start: 2018-06-21 | End: 2018-06-22 | Stop reason: HOSPADM

## 2018-06-21 RX ORDER — LEVOTHYROXINE SODIUM 0.07 MG/1
75 TABLET ORAL
Status: DISCONTINUED | OUTPATIENT
Start: 2018-06-22 | End: 2018-06-22 | Stop reason: HOSPADM

## 2018-06-21 RX ORDER — CHLORPROMAZINE HYDROCHLORIDE 25 MG/ML
25 INJECTION INTRAMUSCULAR EVERY 6 HOURS PRN
Status: DISCONTINUED | OUTPATIENT
Start: 2018-06-21 | End: 2018-06-22 | Stop reason: HOSPADM

## 2018-06-21 RX ORDER — DIPHENHYDRAMINE HYDROCHLORIDE 50 MG/ML
25 INJECTION INTRAMUSCULAR; INTRAVENOUS EVERY 6 HOURS PRN
Status: DISCONTINUED | OUTPATIENT
Start: 2018-06-21 | End: 2018-06-22 | Stop reason: HOSPADM

## 2018-06-21 RX ORDER — SODIUM CHLORIDE, SODIUM LACTATE, POTASSIUM CHLORIDE, CALCIUM CHLORIDE 600; 310; 30; 20 MG/100ML; MG/100ML; MG/100ML; MG/100ML
INJECTION, SOLUTION INTRAVENOUS CONTINUOUS
Status: DISCONTINUED | OUTPATIENT
Start: 2018-06-21 | End: 2018-06-22 | Stop reason: HOSPADM

## 2018-06-21 RX ORDER — ONDANSETRON 2 MG/ML
4 INJECTION INTRAMUSCULAR; INTRAVENOUS EVERY 4 HOURS PRN
Status: DISCONTINUED | OUTPATIENT
Start: 2018-06-21 | End: 2018-06-22 | Stop reason: HOSPADM

## 2018-06-21 RX ORDER — LIDOCAINE HYDROCHLORIDE 10 MG/ML
INJECTION, SOLUTION INFILTRATION; PERINEURAL
Status: COMPLETED
Start: 2018-06-21 | End: 2018-06-21

## 2018-06-21 RX ORDER — MAGNESIUM HYDROXIDE 1200 MG/15ML
LIQUID ORAL
Status: COMPLETED | OUTPATIENT
Start: 2018-06-21 | End: 2018-06-21

## 2018-06-21 RX ORDER — OXYCODONE HYDROCHLORIDE 10 MG/1
10 TABLET ORAL
Status: DISCONTINUED | OUTPATIENT
Start: 2018-06-21 | End: 2018-06-22 | Stop reason: HOSPADM

## 2018-06-21 RX ORDER — POLYETHYLENE GLYCOL 3350 17 G/17G
1 POWDER, FOR SOLUTION ORAL 2 TIMES DAILY PRN
Status: DISCONTINUED | OUTPATIENT
Start: 2018-06-21 | End: 2018-06-22 | Stop reason: HOSPADM

## 2018-06-21 RX ORDER — CEFAZOLIN SODIUM 2 G/100ML
2 INJECTION, SOLUTION INTRAVENOUS EVERY 8 HOURS
Status: COMPLETED | OUTPATIENT
Start: 2018-06-21 | End: 2018-06-22

## 2018-06-21 RX ORDER — MORPHINE SULFATE 4 MG/ML
4 INJECTION, SOLUTION INTRAMUSCULAR; INTRAVENOUS
Status: DISCONTINUED | OUTPATIENT
Start: 2018-06-21 | End: 2018-06-22 | Stop reason: HOSPADM

## 2018-06-21 RX ORDER — ACETAMINOPHEN 500 MG
1000 TABLET ORAL EVERY 8 HOURS
Status: DISCONTINUED | OUTPATIENT
Start: 2018-06-21 | End: 2018-06-22 | Stop reason: HOSPADM

## 2018-06-21 RX ORDER — HALOPERIDOL 5 MG/ML
INJECTION INTRAMUSCULAR
Status: COMPLETED
Start: 2018-06-21 | End: 2018-06-21

## 2018-06-21 RX ORDER — LEVOTHYROXINE SODIUM 0.07 MG/1
75 TABLET ORAL
COMMUNITY
End: 2022-03-04 | Stop reason: SDUPTHER

## 2018-06-21 RX ORDER — LIDOCAINE HYDROCHLORIDE 10 MG/ML
0.5 INJECTION, SOLUTION INFILTRATION; PERINEURAL
Status: ACTIVE | OUTPATIENT
Start: 2018-06-21 | End: 2018-06-22

## 2018-06-21 RX ORDER — OXYCODONE HYDROCHLORIDE 5 MG/1
5 TABLET ORAL
Status: DISCONTINUED | OUTPATIENT
Start: 2018-06-21 | End: 2018-06-22 | Stop reason: HOSPADM

## 2018-06-21 RX ORDER — AMOXICILLIN 250 MG
1 CAPSULE ORAL
Status: DISCONTINUED | OUTPATIENT
Start: 2018-06-21 | End: 2018-06-22 | Stop reason: HOSPADM

## 2018-06-21 RX ORDER — DIPHENHYDRAMINE HCL 25 MG
25 TABLET ORAL NIGHTLY PRN
Status: DISCONTINUED | OUTPATIENT
Start: 2018-06-22 | End: 2018-06-22 | Stop reason: HOSPADM

## 2018-06-21 RX ORDER — ACETAMINOPHEN 650 MG
TABLET, EXTENDED RELEASE ORAL
Status: DISCONTINUED | OUTPATIENT
Start: 2018-06-21 | End: 2018-06-21 | Stop reason: HOSPADM

## 2018-06-21 RX ORDER — ONDANSETRON 2 MG/ML
INJECTION INTRAMUSCULAR; INTRAVENOUS
Status: COMPLETED
Start: 2018-06-21 | End: 2018-06-21

## 2018-06-21 RX ORDER — SERTRALINE HYDROCHLORIDE 100 MG/1
100 TABLET, FILM COATED ORAL DAILY
Status: DISCONTINUED | OUTPATIENT
Start: 2018-06-21 | End: 2018-06-22 | Stop reason: HOSPADM

## 2018-06-21 RX ORDER — EPINEPHRINE 1 MG/ML
INJECTION INTRAMUSCULAR; INTRAVENOUS; SUBCUTANEOUS
Status: DISCONTINUED | OUTPATIENT
Start: 2018-06-21 | End: 2018-06-21 | Stop reason: HOSPADM

## 2018-06-21 RX ORDER — BISACODYL 10 MG
10 SUPPOSITORY, RECTAL RECTAL
Status: DISCONTINUED | OUTPATIENT
Start: 2018-06-21 | End: 2018-06-22 | Stop reason: HOSPADM

## 2018-06-21 RX ORDER — ACETAMINOPHEN 500 MG
TABLET ORAL
Status: COMPLETED
Start: 2018-06-21 | End: 2018-06-21

## 2018-06-21 RX ORDER — ONDANSETRON 4 MG/1
4 TABLET, ORALLY DISINTEGRATING ORAL EVERY 8 HOURS PRN
COMMUNITY
End: 2022-03-10 | Stop reason: SDUPTHER

## 2018-06-21 RX ORDER — SCOLOPAMINE TRANSDERMAL SYSTEM 1 MG/1
1 PATCH, EXTENDED RELEASE TRANSDERMAL
Status: DISCONTINUED | OUTPATIENT
Start: 2018-06-21 | End: 2018-06-22 | Stop reason: HOSPADM

## 2018-06-21 RX ORDER — FERROUS GLUCONATE 324(38)MG
324 TABLET ORAL DAILY
Status: DISCONTINUED | OUTPATIENT
Start: 2018-06-22 | End: 2018-06-22 | Stop reason: HOSPADM

## 2018-06-21 RX ORDER — TAMSULOSIN HYDROCHLORIDE 0.4 MG/1
0.4 CAPSULE ORAL DAILY
Status: DISCONTINUED | OUTPATIENT
Start: 2018-06-22 | End: 2018-06-22 | Stop reason: HOSPADM

## 2018-06-21 RX ORDER — HALOPERIDOL 5 MG/ML
1 INJECTION INTRAMUSCULAR EVERY 6 HOURS PRN
Status: DISCONTINUED | OUTPATIENT
Start: 2018-06-21 | End: 2018-06-22 | Stop reason: HOSPADM

## 2018-06-21 RX ORDER — CHLORPROMAZINE HYDROCHLORIDE 10 MG/1
25 TABLET, FILM COATED ORAL EVERY 6 HOURS PRN
Status: DISCONTINUED | OUTPATIENT
Start: 2018-06-21 | End: 2018-06-22 | Stop reason: HOSPADM

## 2018-06-21 RX ORDER — TRAMADOL HYDROCHLORIDE 50 MG/1
50 TABLET ORAL EVERY 4 HOURS PRN
Status: DISCONTINUED | OUTPATIENT
Start: 2018-06-21 | End: 2018-06-22 | Stop reason: HOSPADM

## 2018-06-21 RX ORDER — GABAPENTIN 300 MG/1
CAPSULE ORAL
Status: COMPLETED
Start: 2018-06-21 | End: 2018-06-21

## 2018-06-21 RX ORDER — AMOXICILLIN 250 MG
1 CAPSULE ORAL NIGHTLY
Status: DISCONTINUED | OUTPATIENT
Start: 2018-06-21 | End: 2018-06-22 | Stop reason: HOSPADM

## 2018-06-21 RX ORDER — ENEMA 19; 7 G/133ML; G/133ML
1 ENEMA RECTAL
Status: DISCONTINUED | OUTPATIENT
Start: 2018-06-21 | End: 2018-06-22 | Stop reason: HOSPADM

## 2018-06-21 RX ORDER — OXYCODONE HCL 5 MG/5 ML
SOLUTION, ORAL ORAL
Status: COMPLETED
Start: 2018-06-21 | End: 2018-06-21

## 2018-06-21 RX ORDER — SODIUM CHLORIDE 9 MG/ML
INJECTION, SOLUTION INTRAVENOUS CONTINUOUS
Status: DISCONTINUED | OUTPATIENT
Start: 2018-06-21 | End: 2018-06-22 | Stop reason: HOSPADM

## 2018-06-21 RX ORDER — DOCUSATE SODIUM 100 MG/1
100 CAPSULE, LIQUID FILLED ORAL 2 TIMES DAILY
Status: DISCONTINUED | OUTPATIENT
Start: 2018-06-21 | End: 2018-06-22 | Stop reason: HOSPADM

## 2018-06-21 RX ORDER — ALBUTEROL SULFATE 90 UG/1
2 AEROSOL, METERED RESPIRATORY (INHALATION) EVERY 6 HOURS PRN
Status: DISCONTINUED | OUTPATIENT
Start: 2018-06-21 | End: 2018-06-22 | Stop reason: HOSPADM

## 2018-06-21 RX ADMIN — SODIUM CHLORIDE, SODIUM LACTATE, POTASSIUM CHLORIDE, CALCIUM CHLORIDE: 600; 310; 30; 20 INJECTION, SOLUTION INTRAVENOUS at 12:30

## 2018-06-21 RX ADMIN — ACETAMINOPHEN 500 MG: 500 TABLET, FILM COATED ORAL at 12:04

## 2018-06-21 RX ADMIN — FENTANYL CITRATE 50 MCG: 50 INJECTION, SOLUTION INTRAMUSCULAR; INTRAVENOUS at 14:45

## 2018-06-21 RX ADMIN — SODIUM CHLORIDE: 9 INJECTION, SOLUTION INTRAVENOUS at 17:42

## 2018-06-21 RX ADMIN — ACETAMINOPHEN 1000 MG: 500 TABLET ORAL at 17:41

## 2018-06-21 RX ADMIN — HYDROMORPHONE HYDROCHLORIDE 0.5 MG: 10 INJECTION, SOLUTION INTRAMUSCULAR; INTRAVENOUS; SUBCUTANEOUS at 15:05

## 2018-06-21 RX ADMIN — GABAPENTIN 300 MG: 300 CAPSULE ORAL at 12:04

## 2018-06-21 RX ADMIN — HYDROMORPHONE HYDROCHLORIDE 0.5 MG: 10 INJECTION, SOLUTION INTRAMUSCULAR; INTRAVENOUS; SUBCUTANEOUS at 15:28

## 2018-06-21 RX ADMIN — OXYCODONE HYDROCHLORIDE 10 MG: 5 SOLUTION ORAL at 15:05

## 2018-06-21 RX ADMIN — DOCUSATE SODIUM 100 MG: 100 CAPSULE ORAL at 21:01

## 2018-06-21 RX ADMIN — HYDROMORPHONE HYDROCHLORIDE 0.5 MG: 10 INJECTION, SOLUTION INTRAMUSCULAR; INTRAVENOUS; SUBCUTANEOUS at 15:20

## 2018-06-21 RX ADMIN — ONDANSETRON 4 MG: 2 INJECTION INTRAMUSCULAR; INTRAVENOUS at 21:02

## 2018-06-21 RX ADMIN — HYDROMORPHONE HYDROCHLORIDE 0.5 MG: 10 INJECTION, SOLUTION INTRAMUSCULAR; INTRAVENOUS; SUBCUTANEOUS at 15:35

## 2018-06-21 RX ADMIN — HYDROMORPHONE HYDROCHLORIDE 0.5 MG: 10 INJECTION, SOLUTION INTRAMUSCULAR; INTRAVENOUS; SUBCUTANEOUS at 15:15

## 2018-06-21 RX ADMIN — CEFAZOLIN SODIUM 2 G: 2 INJECTION, SOLUTION INTRAVENOUS at 17:42

## 2018-06-21 RX ADMIN — SENNOSIDES AND DOCUSATE SODIUM 1 TABLET: 8.6; 5 TABLET ORAL at 21:01

## 2018-06-21 RX ADMIN — FENTANYL CITRATE 50 MCG: 50 INJECTION, SOLUTION INTRAMUSCULAR; INTRAVENOUS at 14:50

## 2018-06-21 RX ADMIN — HYDROMORPHONE HYDROCHLORIDE 0.5 MG: 10 INJECTION, SOLUTION INTRAMUSCULAR; INTRAVENOUS; SUBCUTANEOUS at 14:45

## 2018-06-21 RX ADMIN — HYDROMORPHONE HYDROCHLORIDE 0.5 MG: 10 INJECTION, SOLUTION INTRAMUSCULAR; INTRAVENOUS; SUBCUTANEOUS at 15:00

## 2018-06-21 RX ADMIN — HYDROMORPHONE HYDROCHLORIDE 0.5 MG: 10 INJECTION, SOLUTION INTRAMUSCULAR; INTRAVENOUS; SUBCUTANEOUS at 14:50

## 2018-06-21 RX ADMIN — OXYCODONE HYDROCHLORIDE 10 MG: 10 TABLET ORAL at 21:02

## 2018-06-21 RX ADMIN — ONDANSETRON HYDROCHLORIDE 4 MG: 2 INJECTION, SOLUTION INTRAMUSCULAR; INTRAVENOUS at 14:56

## 2018-06-21 RX ADMIN — HALOPERIDOL LACTATE 1 MG: 5 INJECTION, SOLUTION INTRAMUSCULAR at 15:15

## 2018-06-21 RX ADMIN — LIDOCAINE HYDROCHLORIDE 0.5 ML: 10 INJECTION, SOLUTION INFILTRATION; PERINEURAL at 12:30

## 2018-06-21 RX ADMIN — SERTRALINE 100 MG: 100 TABLET, FILM COATED ORAL at 17:40

## 2018-06-21 RX ADMIN — OXYCODONE HYDROCHLORIDE 10 MG: 10 TABLET ORAL at 17:41

## 2018-06-21 ASSESSMENT — COPD QUESTIONNAIRES
HAVE YOU SMOKED AT LEAST 100 CIGARETTES IN YOUR ENTIRE LIFE: NO/DON'T KNOW
COPD SCREENING SCORE: 1
DO YOU EVER COUGH UP ANY MUCUS OR PHLEGM?: NO/ONLY WITH OCCASIONAL COLDS OR INFECTIONS
DURING THE PAST 4 WEEKS HOW MUCH DID YOU FEEL SHORT OF BREATH: NONE/LITTLE OF THE TIME

## 2018-06-21 ASSESSMENT — PATIENT HEALTH QUESTIONNAIRE - PHQ9
1. LITTLE INTEREST OR PLEASURE IN DOING THINGS: SEVERAL DAYS
2. FEELING DOWN, DEPRESSED, IRRITABLE, OR HOPELESS: NOT AT ALL
5. POOR APPETITE OR OVEREATING: NOT AT ALL
4. FEELING TIRED OR HAVING LITTLE ENERGY: SEVERAL DAYS
9. THOUGHTS THAT YOU WOULD BE BETTER OFF DEAD, OR OF HURTING YOURSELF: SEVERAL DAYS
6. FEELING BAD ABOUT YOURSELF - OR THAT YOU ARE A FAILURE OR HAVE LET YOURSELF OR YOUR FAMILY DOWN: SEVERAL DAYS
8. MOVING OR SPEAKING SO SLOWLY THAT OTHER PEOPLE COULD HAVE NOTICED. OR THE OPPOSITE, BEING SO FIGETY OR RESTLESS THAT YOU HAVE BEEN MOVING AROUND A LOT MORE THAN USUAL: NOT AT ALL
3. TROUBLE FALLING OR STAYING ASLEEP OR SLEEPING TOO MUCH: SEVERAL DAYS
SUM OF ALL RESPONSES TO PHQ9 QUESTIONS 1 AND 2: 1
7. TROUBLE CONCENTRATING ON THINGS, SUCH AS READING THE NEWSPAPER OR WATCHING TELEVISION: NEARLY EVERY DAY
SUM OF ALL RESPONSES TO PHQ QUESTIONS 1-9: 8

## 2018-06-21 ASSESSMENT — LIFESTYLE VARIABLES
EVER_SMOKED: YES
EVER_SMOKED: NEVER

## 2018-06-21 ASSESSMENT — PAIN SCALES - GENERAL
PAINLEVEL_OUTOF10: ASSUMED PAIN PRESENT
PAINLEVEL_OUTOF10: 7
PAINLEVEL_OUTOF10: 7
PAINLEVEL_OUTOF10: 8
PAINLEVEL_OUTOF10: 7
PAINLEVEL_OUTOF10: 7
PAINLEVEL_OUTOF10: 5
PAINLEVEL_OUTOF10: 6
PAINLEVEL_OUTOF10: 9
PAINLEVEL_OUTOF10: 9

## 2018-06-21 NOTE — OR NURSING
"Pt is Bloodless, MD aware, bloodless consent signed, \" Bloodless\" entered into allergy section, Patient Has \" No Blood\" armband in place and stickers applied to chart.  "

## 2018-06-21 NOTE — OP REPORT
DIAGNOSIS: Osteoarthritis, right hip.    PROCEDURE: right Total hip arthroplasty.    ANESTHESIA: General.    COMPLICATIONS: None.    SURGEON: Oral Ritchie MD.    ASSISTANT: Benoit Colon    INDICATIONS: This is a patient with severe osteoarthritis causing pain,   having failed conservative treatments.    DESCRIPTION OF PROCEDURE: Patient was identified in the preop area, site was   marked, taken back to the operating room and underwent general anesthesia.   right lower extremity was prepped and draped in sterile manner. Preoperative   timeout was held and antibiotics were given. Incision was made coming off the  ASIS. Soft tissue dissected down to fascia. Fascia was split in line with   the tensor. Tensor was retracted laterally. Deep fascia was incised and   vessels were ligated. A capsulotomy was performed and then an osteotomy of   the femoral neck. The acetabulum was then reamed up to a 52 and a 52 R3 cluster   hole cup by [employer] was placed. A liner was placed for a 32 head.   Osteophytes were debrided and then the femur was externally rotated and   extended. This was then broached up to a size 3, and a 3 standard offset polar stem  by Smith and Nep[employer] was placed. Final trialing showed equal leg lengths with  a 0 head, the 0 32 Oxinium head by Smith and Nephew was placed. Wound was   soaked with dilute Betadine solution and was injected with Marcaine. Vicryl   was used for the fascia, Monocryl soft tissue skin and Dermabond for the final  skin layer. Patient was woken up, taken back to PACU, will be weightbear as   tolerated.

## 2018-06-21 NOTE — OR NURSING
Pt AA/Ox4. VSS. ESVIN dressing to right hip, CDI. CMS+. Pt moves all extremities. Pt reports 6/10 pain scale. Pain medications given. Pt reports nausea. Anti-emetics given. Pt denies numbness or tingling. SCDs in place. Report given to YAMILETH Hernandez. Pt's  updated.     Pt via bed, accompanied by transport, was transferred to [facility] at 1614. All personal belongings sent with Pt.

## 2018-06-21 NOTE — PROGRESS NOTES
Patient arrived to the unit. Asleep but easily aroused, PIV patent. Safety measures in place, call light is within reach. POC discussed

## 2018-06-22 VITALS
BODY MASS INDEX: 43.62 KG/M2 | SYSTOLIC BLOOD PRESSURE: 120 MMHG | DIASTOLIC BLOOD PRESSURE: 81 MMHG | OXYGEN SATURATION: 97 % | WEIGHT: 231.04 LBS | HEART RATE: 79 BPM | RESPIRATION RATE: 18 BRPM | HEIGHT: 61 IN | TEMPERATURE: 99.4 F

## 2018-06-22 PROCEDURE — G8979 MOBILITY GOAL STATUS: HCPCS | Mod: CI

## 2018-06-22 PROCEDURE — 97165 OT EVAL LOW COMPLEX 30 MIN: CPT

## 2018-06-22 PROCEDURE — 700102 HCHG RX REV CODE 250 W/ 637 OVERRIDE(OP): Performed by: ORTHOPAEDIC SURGERY

## 2018-06-22 PROCEDURE — G8978 MOBILITY CURRENT STATUS: HCPCS | Mod: CI

## 2018-06-22 PROCEDURE — G8989 SELF CARE D/C STATUS: HCPCS | Mod: CI

## 2018-06-22 PROCEDURE — A9270 NON-COVERED ITEM OR SERVICE: HCPCS | Performed by: ORTHOPAEDIC SURGERY

## 2018-06-22 PROCEDURE — G8980 MOBILITY D/C STATUS: HCPCS | Mod: CI

## 2018-06-22 PROCEDURE — 700105 HCHG RX REV CODE 258: Performed by: ORTHOPAEDIC SURGERY

## 2018-06-22 PROCEDURE — G8987 SELF CARE CURRENT STATUS: HCPCS | Mod: CI

## 2018-06-22 PROCEDURE — 700112 HCHG RX REV CODE 229: Performed by: ORTHOPAEDIC SURGERY

## 2018-06-22 PROCEDURE — 700111 HCHG RX REV CODE 636 W/ 250 OVERRIDE (IP): Performed by: ORTHOPAEDIC SURGERY

## 2018-06-22 PROCEDURE — 97161 PT EVAL LOW COMPLEX 20 MIN: CPT

## 2018-06-22 PROCEDURE — 97535 SELF CARE MNGMENT TRAINING: CPT

## 2018-06-22 PROCEDURE — G8988 SELF CARE GOAL STATUS: HCPCS | Mod: CI

## 2018-06-22 RX ORDER — OXYCODONE HYDROCHLORIDE 5 MG/1
5-10 TABLET ORAL
Qty: 60 TAB | Refills: 0 | Status: SHIPPED | OUTPATIENT
Start: 2018-06-22 | End: 2018-07-06

## 2018-06-22 RX ADMIN — TAMSULOSIN HYDROCHLORIDE 0.4 MG: 0.4 CAPSULE ORAL at 07:32

## 2018-06-22 RX ADMIN — SODIUM CHLORIDE: 9 INJECTION, SOLUTION INTRAVENOUS at 00:00

## 2018-06-22 RX ADMIN — ASPIRIN 81 MG: 81 TABLET, COATED ORAL at 05:14

## 2018-06-22 RX ADMIN — OXYCODONE HYDROCHLORIDE 10 MG: 10 TABLET ORAL at 00:00

## 2018-06-22 RX ADMIN — OXYCODONE HYDROCHLORIDE 5 MG: 5 TABLET ORAL at 07:33

## 2018-06-22 RX ADMIN — SERTRALINE 100 MG: 100 TABLET, FILM COATED ORAL at 07:31

## 2018-06-22 RX ADMIN — CEFAZOLIN SODIUM 2 G: 2 INJECTION, SOLUTION INTRAVENOUS at 02:07

## 2018-06-22 RX ADMIN — OXYCODONE HYDROCHLORIDE 10 MG: 10 TABLET ORAL at 10:53

## 2018-06-22 RX ADMIN — ACETAMINOPHEN 1000 MG: 500 TABLET ORAL at 02:07

## 2018-06-22 RX ADMIN — DOCUSATE SODIUM 100 MG: 100 CAPSULE ORAL at 07:31

## 2018-06-22 RX ADMIN — OXYCODONE HYDROCHLORIDE 10 MG: 10 TABLET ORAL at 03:56

## 2018-06-22 RX ADMIN — LEVOTHYROXINE SODIUM 75 MCG: 75 TABLET ORAL at 05:13

## 2018-06-22 RX ADMIN — ACETAMINOPHEN 1000 MG: 500 TABLET ORAL at 10:53

## 2018-06-22 ASSESSMENT — PATIENT HEALTH QUESTIONNAIRE - PHQ9
SUM OF ALL RESPONSES TO PHQ9 QUESTIONS 1 AND 2: 1
8. MOVING OR SPEAKING SO SLOWLY THAT OTHER PEOPLE COULD HAVE NOTICED. OR THE OPPOSITE, BEING SO FIGETY OR RESTLESS THAT YOU HAVE BEEN MOVING AROUND A LOT MORE THAN USUAL: NOT AT ALL
9. THOUGHTS THAT YOU WOULD BE BETTER OFF DEAD, OR OF HURTING YOURSELF: SEVERAL DAYS
4. FEELING TIRED OR HAVING LITTLE ENERGY: SEVERAL DAYS
6. FEELING BAD ABOUT YOURSELF - OR THAT YOU ARE A FAILURE OR HAVE LET YOURSELF OR YOUR FAMILY DOWN: SEVERAL DAYS
3. TROUBLE FALLING OR STAYING ASLEEP OR SLEEPING TOO MUCH: SEVERAL DAYS
SUM OF ALL RESPONSES TO PHQ QUESTIONS 1-9: 8
1. LITTLE INTEREST OR PLEASURE IN DOING THINGS: SEVERAL DAYS
7. TROUBLE CONCENTRATING ON THINGS, SUCH AS READING THE NEWSPAPER OR WATCHING TELEVISION: NEARLY EVERY DAY
5. POOR APPETITE OR OVEREATING: NOT AT ALL
2. FEELING DOWN, DEPRESSED, IRRITABLE, OR HOPELESS: NOT AT ALL

## 2018-06-22 ASSESSMENT — COGNITIVE AND FUNCTIONAL STATUS - GENERAL
PERSONAL GROOMING: A LITTLE
DAILY ACTIVITIY SCORE: 20
MOVING TO AND FROM BED TO CHAIR: A LITTLE
STANDING UP FROM CHAIR USING ARMS: A LITTLE
MOBILITY SCORE: 18
SUGGESTED CMS G CODE MODIFIER DAILY ACTIVITY: CJ
TOILETING: A LITTLE
SUGGESTED CMS G CODE MODIFIER MOBILITY: CK
DRESSING REGULAR LOWER BODY CLOTHING: A LITTLE
HELP NEEDED FOR BATHING: A LITTLE
WALKING IN HOSPITAL ROOM: A LITTLE
MOVING FROM LYING ON BACK TO SITTING ON SIDE OF FLAT BED: A LITTLE
CLIMB 3 TO 5 STEPS WITH RAILING: A LITTLE
TURNING FROM BACK TO SIDE WHILE IN FLAT BAD: A LITTLE

## 2018-06-22 ASSESSMENT — PAIN SCALES - GENERAL
PAINLEVEL_OUTOF10: ASSUMED PAIN PRESENT
PAINLEVEL_OUTOF10: 9
PAINLEVEL_OUTOF10: 7
PAINLEVEL_OUTOF10: 2

## 2018-06-22 ASSESSMENT — GAIT ASSESSMENTS
ASSISTIVE DEVICE: FRONT WHEEL WALKER
GAIT LEVEL OF ASSIST: STAND BY ASSIST
DEVIATION: STEP TO;ANTALGIC;BRADYKINETIC
DISTANCE (FEET): 80

## 2018-06-22 ASSESSMENT — ACTIVITIES OF DAILY LIVING (ADL): TOILETING: INDEPENDENT

## 2018-06-22 NOTE — DISCHARGE SUMMARY
Patient was admitted for a Total Knee Arthroplasty.  Had no complications during the surgery. Did well post-operatively.               There are no active hospital problems to display for this patient.      Uneventful hospital course.     Medication List      START taking these medications      Instructions   oxyCODONE immediate-release 5 MG Tabs  Commonly known as:  ROXICODONE   Take 1-2 Tabs by mouth every 3 hours as needed for up to 14 days.  Dose:  5-10 mg        ASK your doctor about these medications      Instructions   albuterol 108 (90 Base) MCG/ACT Aers inhalation aerosol   Inhale 2 Puffs by mouth every 6 hours as needed for Shortness of Breath.  Dose:  2 Puff     ALLEGRA PO   Take 1 Tab by mouth every day.  Dose:  1 Tab     Calcium 4187-2954 MG-UNIT Chew   Take 1 Each by mouth every day.  Dose:  1 Each     EVENING PRIMROSE OIL PO   Take 1 Tab by mouth every day.  Dose:  1 Tab     ferrous gluconate 324 (38 Fe) MG Tabs  Commonly known as:  FERGON   Take 324 mg by mouth every day.  Dose:  324 mg     levothyroxine 75 MCG Tabs  Commonly known as:  SYNTHROID   Take 75 mcg by mouth Every morning on an empty stomach.  Dose:  75 mcg     MULTIVITAMIN & MINERAL PO   Take 1 Tab by mouth every day.  Dose:  1 Tab     ondansetron 4 MG Tbdp  Commonly known as:  ZOFRAN ODT   Take 4 mg by mouth every 8 hours as needed for Nausea.  Dose:  4 mg     oxyCODONE-acetaminophen 7.5-325 MG per tablet  Commonly known as:  PERCOCET   Take 1 Tab by mouth 1 time daily as needed.  Dose:  1 Tab     sertraline 100 MG Tabs  Commonly known as:  ZOLOFT   Take 100 mg by mouth every day.  Dose:  100 mg     Vitamin D 1000 UNIT Caps   Take 1,000 Units by mouth every day.  Dose:  1000 Units              Patient will be discharged home and follow up with Dr. Ritchie clinic in 2 weeks, for which the patient already has scheduled. Patient to begin Physical Therapy.  Encourage ROM of the knee.

## 2018-06-22 NOTE — THERAPY
"Occupational Therapy Evaluation completed.   Functional Status:  SPV level for BADLs in this setting  Plan of Care: Patient with no further skilled OT needs in the acute care setting at this time  Discharge Recommendations:  Equipment: No Equipment Needed. Post-acute therapy: Recommend DC home, defer to physical therapy notes for further post acute therapy needs.     See \"Rehab Therapy-Acute\" Patient Summary Report for complete documentation.      Pt is a 51 y/o female who presents to acute acute s/p  R OZ - anterior approach. Pt is WBAT in R LE per chart. Pt lives in single story home w/ spouse who is available to assist 24/7 PRN. Pt ed/trained on how to perform BADLs w/ decreased pain. Pt performed full body dressing w/ SPV, functional mobility to toilet w/ FWW and SBA, toileting w/ SPV. Ended session in chair. No further Acute OT needs noted at this time. Recommend DC home.   "

## 2018-06-22 NOTE — DISCHARGE INSTRUCTIONS
*Follow up with Dr. Ritchie at scheduled appointment  *Weight bearing as tolerated                     *Activity as tolerated  *Use assistive device for all activity  *Continue exercises provided by physical therapy  *Elevate leg as needed  *Ice as needed (20 minutes every 1-2 hours)  *Keep dressing in place until 06/26/18 postoperative day #5   *Starting 06/27/18 remove dressing and shower. Do not soak or scrub incision, after shower pat dry and leave open to air.  *No soaking of the incision; no baths, hot tubs, or swimming until cleared by doctor  * ASA 81mg 2x a day for blood clot prevention for 6 weeks        *Take medications as prescribed by doctor  *Call doctor’s office with any questions or concerns     Discharge Instructions    Discharged to home by car with relative. Discharged via wheelchair, hospital escort: Yes.  Special equipment needed: Not Applicable    Be sure to schedule a follow-up appointment with your primary care doctor or any specialists as instructed.     Discharge Plan:   Diet Plan: Discussed  Activity Level: Discussed  Confirmed Follow up Appointment: Appointment Scheduled  Confirmed Symptoms Management: Discussed  Medication Reconciliation Updated: Yes  Influenza Vaccine Indication: Indicated: Not available from distributor/    I understand that a diet low in cholesterol, fat, and sodium is recommended for good health. Unless I have been given specific instructions below for another diet, I accept this instruction as my diet prescription.       Special Instructions: Discharge instructions for the Orthopedic Patient    Follow up with Primary Care Physician within 2 weeks of discharge to home, regarding:  Review of medications and diagnostic testing.  Surveillance for medical complications.  Workup and treatment of osteoporosis, if appropriate.     -Is this a Joint Replacement patient? Yes   Total Joint Hip Replacement Discharge Instructions    Pain  - The goal is to slowly wean  off the prescription pain medicine.  - Ice can be used for pain control.  20 minutes at a time is recommended, and never directly against your skin or incision.  - Most patients are off the pain pills by 3 weeks; others may require a low level of pain medications for many months. If your pain continues to be severe, follow up with your physician.  Infection  Deep hip joint infections that require removal of the prostheses occur in less than 0.1% of patients. Lesser infections in the skin (cellulites) are more common and much more easily treated.  - Keep the incision as clean and dry as possible.  - Always wash your hands before touching your incision.  - Skin infections tend to develop around 7-10 days after surgery, most can be treated with oral antibiotics.  - Dental Care should be delayed for 3 months after surgery, your surgeon recommends taking a dose of antibiotics 1 hour prior to any dental procedure.  After 2 years, most surgeons recommend antibiotics only before an extensive procedure.  Ask your surgeon what he recommends.  - Signs and symptoms of infection can include:  low grade fever, redness, pain, swelling and drainage from your incision.  Notify your surgeon immediately if you develop any of these symptoms.  Post op Disturbances  - Bowel habits - constipation is extremely common and is caused by a combination of anesthesia, lack of mobility and pain medicine.  Use stool softeners or laxatives if necessary. It is important not to ignore this problem, as bowel obstructions can be a serious complication after joint replacement surgery.  - Mood/Energy Level - Many patients experience a lack of energy and endurance for up to 2-3 months after surgery.  Some may also feel down and can even become depressed.  This is likely due to the postoperative anemia, change in activity level, lack of sleep, pain medicine and just the emotional reaction to the surgery itself that is a big disruption in a person’s life.   This usually passes.  If symptoms persist, follow up with your primary physician.  - Returning to work - Your surgeon will give you more specific instructions.  Generally, if you work a sedentary job requiring little standing or walking, most patients may return within 2-6 weeks.  Manual labor jobs involving walking, lifting and standing may take 3-4 months.  Your surgeon’s office can provide a release to part-time or light duty work early on in your recovery and progress you to full duty as able.  - Driving - You can begin driving an automatic shift car in 4 to 8 weeks, provided you are no longer taking narcotic pain medication. If you have a stick-shift car and your right hip was replaced, do not begin driving until your doctor says you can.   - Avoiding falls -  throw rugs and tack down loose carpeting.  Be aware of floor hazards such as pets, small objects or uneven surfaces.   -  Airport Metal Detectors - The sensitivity of metal detectors varies and it is likely that your prosthesis will cause an alarm. Inform the  that you have an artificial joint.  Diet  - Resume your normal diet as tolerated.  - It is important to achieve a healthy nutritional status by eating a well balanced diet on a regular basis.  - Your physician may recommend that you take iron and vitamin supplements.   - Continue to drink plenty of fluids.  Shower/Bathing  - You may shower as soon as you get home from the hospital unless otherwise instructed.  - Keep your incision out of water.  To keep the incision dry when showering, cover it with a plastic bag or plastic wrap.  - Pat incision dry if it gets wet.  Don’t rub.  - Do not submerge in a bath until staples are out and the incision is completely healed. (Approximately 6-8 weeks after surgery).  Dressing Change:  Procedure (if recommended by your physician)  - Wash hands.  - Open all dressing change materials.  - Remove old dressing and discard.  - Inspect incision  for redness, increase in clear drainage, yellow/green drainage, odor and surrounding skin hot to touch.  -  ABD (large gauze) pad by one corner and lay over the incision.  Be careful not to touch the inside of the dressing that will lay over the incision.  - Secure in place as instructed (Ace wrap or tape).    Swelling/Bruising  - Swelling is normal after hip replacement and can involve the thigh, knee, calf and foot.  - Swelling can last from 3-6 months.  - Elevate your leg higher than your heart while reclining.  The first week you are home you should elevate your leg an equal amount of time, as you are active.    - Anti-inflammatory pills can be taken once you have stopped the blood thinners.  - The swelling is usually worse after you go home since you are upright for longer periods of time.  - Bruising is common and can involve the entire leg including the thigh, calf and even foot.  Bruising often does not appear until after you arrive home and it can be quite dramatic- purple, black, green.  The bruising you can see is not usually concerning and will subside without any treatment.      Blood Clot Prevention  Blood clots in the legs and the less common, but frightening, clots that travel to the lungs are a real focus of our preventative. Most patients are at standard risk for them, but those patients who are at higher risk include people who have had previous clots, a family history of clotting, smoking, diabetes, obesity, advanced age, use of estrogen and a sedentary lifestyle.    - Signs of blood clots in legs - Swelling in thigh, calf or ankle that does not go down with elevation.  Pain, heat and tenderness in calf, back of calf or groin area.  NOTE: blood clots can occur in either leg.  - You have been receiving anticoagulant therapy (blood thinners) in the hospital and you may be instructed to continue at home depending on your risk factors.  - Your risk for developing a clot continues for up to 2-3  months after surgery.  You should avoid prolonged sitting and dehydration during that time (long air trips and car trips).  If you do take a trip during this time, please get up and move around every 1- 1.5 hours.  - If you are prescribed blood thinning medication for home, follow instructions as directed. (Handouts provided if applicable).      Activity    Once you get home, you should stay active. The key is not to overdo it! While you can expect some good days and some bad days, you should notice a gradual improvement over time you should notice a gradual improvement and a gradual increase in your endurance over the next 6 to 12 months.    - Weight Bearing - If you have undergone cemented or hybrid hip replacement, you can put some weight on the leg immediately using a cane or walker, and you should continue to use some support for 4 to 6 weeks to help the muscles recover.   - Sleeping Positions - Sleep on your back with your legs slightly apart or on your side with a regular pillow between your knees. Be sure to use the pillow for at least 6 weeks, or until your doctor says you can do without it. Sleeping on your stomach should be all right  - Sitting - For at least the first 3 months, sit only in chairs that have arms. Do not sit on low chairs, low stools, or reclining chairs. Do not cross your legs at the knees. The physical therapist will show you how to sit and stand from a chair, keeping your affected leg out in front of you. Get up and move around on a regular basis--at least once every hour.  - Walking - Walk as much as you like once your doctor gives you the go-ahead, but remember that walking is no substitute for your prescribed exercises. Walking with a pair of trekking poles is helpful and adds as much as 40% to the exercise you get when you walk  - Therapy may be needed in some cases, to strengthen your muscles and improve your gait (walking pattern).  This decision will be made at your  post-operative appointment.  Follow your therapist recommended post-operative exercises (handout provided by Therapist).  - Swimming is also recommended; you can begin as soon as the sutures have been removed and the wound is healed, approximately 6 to 8 weeks after surgery. Using a pair of training fins may make swimming a more enjoyable and effective exercise.  - Other activities - Lower impact activities are preferred.  If you have specific questions, consult your Surgeon.    - Sexual activity - Your surgeon can tell you when it should be safe to resume sexual activity.      When to Call the Doctor   Call the physician if:   - Fever over 100.5? F  - Increased pain, drainage, redness, odor or heat around the incision area  - Shaking chills  - Increased knee pain with activity and rest  - Increased pain in calf, tenderness or redness above or below the knee  - Increased swelling of calf, ankle, foot  - Sudden increased shortness of breath, sudden onset of chest pain, localized chest pain with coughing  - Incision opening  Or, if there are any questions or concerns about medications or care.       -Is this patient being discharged with medication to prevent blood clots?  Yes, Aspirin Aspirin, ASA oral tablets  What is this medicine?  ASPIRIN (AS pir in) is a pain reliever. It is used to treat mild pain and fever. This medicine is also used as directed by a doctor to prevent and to treat heart attacks, to prevent strokes, and to treat arthritis or inflammation.  This medicine may be used for other purposes; ask your health care provider or pharmacist if you have questions.  COMMON BRAND NAME(S): Aspir-Low, Aspir-Sharda, Aspirtab, Jake Advanced Aspirin, Jake Aspirin, Jake Aspirin Extra Strength, Jake Aspirin Plus, Jake Extra Strength, Jake Extra Strength Plus, Jake Genuine Aspirin, Jake Womens Aspirin, Bufferin, Bufferin Extra Strength, Bufferin Low Dose  What should I tell my health care provider before I take  this medicine?  They need to know if you have any of these conditions:  -anemia  -asthma  -bleeding problems  -child with chickenpox, the flu, or other viral infection  -diabetes  -gout  -if you frequently drink alcohol containing drinks  -kidney disease  -liver disease  -low level of vitamin K  -lupus  -smoke tobacco  -stomach ulcers or other problems  -an unusual or allergic reaction to aspirin, tartrazine dye, other medicines, dyes, or preservatives  -pregnant or trying to get pregnant  -breast-feeding  How should I use this medicine?  Take this medicine by mouth with a glass of water. Follow the directions on the package or prescription label. You can take this medicine with or without food. If it upsets your stomach, take it with food. Do not take your medicine more often than directed.  Talk to your pediatrician regarding the use of this medicine in children. While this drug may be prescribed for children as young as 12 years of age for selected conditions, precautions do apply. Children and teenagers should not use this medicine to treat chicken pox or flu symptoms unless directed by a doctor.  Patients over 65 years old may have a stronger reaction and need a smaller dose.  Overdosage: If you think you have taken too much of this medicine contact a poison control center or emergency room at once.  NOTE: This medicine is only for you. Do not share this medicine with others.  What if I miss a dose?  If you are taking this medicine on a regular schedule and miss a dose, take it as soon as you can. If it is almost time for your next dose, take only that dose. Do not take double or extra doses.  What may interact with this medicine?  Do not take this medicine with any of the following medications:  -cidofovir  -ketorolac  -probenecid  This medicine may also interact with the following medications:  -alcohol  -alendronate  -bismuth subsalicylate  -flavocoxid  -herbal supplements like feverfew, garlic, pooja,  ginkgo biloba, horse chestnut  -medicines for diabetes or glaucoma like acetazolamide, methazolamide  -medicines for gout  -medicines that treat or prevent blood clots like enoxaparin, heparin, ticlopidine, warfarin  -other aspirin and aspirin-like medicines  -NSAIDs, medicines for pain and inflammation, like ibuprofen or naproxen  -pemetrexed  -sulfinpyrazone  -varicella live vaccine  This list may not describe all possible interactions. Give your health care provider a list of all the medicines, herbs, non-prescription drugs, or dietary supplements you use. Also tell them if you smoke, drink alcohol, or use illegal drugs. Some items may interact with your medicine.  What should I watch for while using this medicine?  If you are treating yourself for pain, tell your doctor or health care professional if the pain lasts more than 10 days, if it gets worse, or if there is a new or different kind of pain. Tell your doctor if you see redness or swelling. Also, check with your doctor if you have a fever that lasts for more than 3 days. Only take this medicine to prevent heart attacks or blood clotting if prescribed by your doctor or health care professional.  Do not take aspirin or aspirin-like medicines with this medicine. Too much aspirin can be dangerous. Always read the labels carefully.  This medicine can irritate your stomach or cause bleeding problems. Do not smoke cigarettes or drink alcohol while taking this medicine. Do not lie down for 30 minutes after taking this medicine to prevent irritation to your throat.  If you are scheduled for any medical or dental procedure, tell your healthcare provider that you are taking this medicine. You may need to stop taking this medicine before the procedure.  This medicine may be used to treat migraines. If you take migraine medicines for 10 or more days a month, your migraines may get worse. Keep a diary of headache days and medicine use. Contact your healthcare  professional if your migraine attacks occur more frequently.  What side effects may I notice from receiving this medicine?  Side effects that you should report to your doctor or health care professional as soon as possible:  -allergic reactions like skin rash, itching or hives, swelling of the face, lips, or tongue  -breathing problems  -changes in hearing, ringing in the ears  -confusion  -general ill feeling or flu-like symptoms  -pain on swallowing  -redness, blistering, peeling or loosening of the skin, including inside the mouth or nose  -signs and symptoms of bleeding such as bloody or black, tarry stools; red or dark-brown urine; spitting up blood or brown material that looks like coffee grounds; red spots on the skin; unusual bruising or bleeding from the eye, gums, or nose  -trouble passing urine or change in the amount of urine  -unusually weak or tired  -yellowing of the eyes or skin  Side effects that usually do not require medical attention (report to your doctor or health care professional if they continue or are bothersome):  -diarrhea or constipation  -headache  -nausea, vomiting  -stomach gas, heartburn  This list may not describe all possible side effects. Call your doctor for medical advice about side effects. You may report side effects to FDA at 2-619-FDA-3767.  Where should I keep my medicine?  Keep out of the reach of children.  Store at room temperature between 15 and 30 degrees C (59 and 86 degrees F). Protect from heat and moisture. Do not use this medicine if it has a strong vinegar smell. Throw away any unused medicine after the expiration date.  NOTE: This sheet is a summary. It may not cover all possible information. If you have questions about this medicine, talk to your doctor, pharmacist, or health care provider.  © 2018 [employer]/Gold Standard (2014-08-19 11:30:31)      · Is patient discharged on Warfarin / Coumadin?   No     Depression / Suicide Risk    As you are discharged from  this [facility] facility, it is important to learn how to keep safe from harming yourself.    Recognize the warning signs:  · Abrupt changes in personality, positive or negative- including increase in energy   · Giving away possessions  · Change in eating patterns- significant weight changes-  positive or negative  · Change in sleeping patterns- unable to sleep or sleeping all the time   · Unwillingness or inability to communicate  · Depression  · Unusual sadness, discouragement and loneliness  · Talk of wanting to die  · Neglect of personal appearance   · Rebelliousness- reckless behavior  · Withdrawal from people/activities they love  · Confusion- inability to concentrate     If you or a loved one observes any of these behaviors or has concerns about self-harm, here's what you can do:  · Talk about it- your feelings and reasons for harming yourself  · Remove any means that you might use to hurt yourself (examples: pills, rope, extension cords, firearm)  · Get professional help from the community (Mental Health, Substance Abuse, psychological counseling)  · Do not be alone:Call your Safe Contact- someone whom you trust who will be there for you.  · Call your local CRISIS HOTLINE 478-1694 or 802-610-6726  · Call your local Children's Mobile Crisis Response Team [place] (968) 537-1076 or www.[employer]  · Call the toll free National Suicide Prevention Hotlines   · National Suicide Prevention Lifeline 002-236-MFGK (1849)  · National Hope Line Network 800-SUICIDE (548-8003)

## 2018-06-22 NOTE — PROGRESS NOTES
"Patient seen and examined  Doing well    Blood pressure 103/58, pulse 72, temperature 36.9 °C (98.5 °F), resp. rate 16, height 1.549 m (5' 1\"), weight 104.8 kg (231 lb 0.7 oz), last menstrual period 03/02/2016, SpO2 100 %, not currently breastfeeding.          No acute distress  Dressing clean dry and intact  Neurovascularly intact      Plan:  Doing well  Discharge home.            "

## 2018-06-22 NOTE — CARE PLAN
Problem: Risk for Infection, Impaired Wound Healing  Goal: Remain free from signs and symptoms of infection  Outcome: PROGRESSING AS EXPECTED  No s/s of infection. Hands are washed before and after entering the room. Before IV access the hub is scrubbed for 15 secs and allowed to dry.    Problem: Risk for Deep Vein Thrombosis/Venous Thromboembolism  Goal: DVT/VTE Prevention Measures in Place  Outcome: PROGRESSING AS EXPECTED  No s/s of DVT. Pt has SCDs on. Pt ambulates occasionally. Pt will start ASA in the morning.

## 2018-06-22 NOTE — PROGRESS NOTES
D/C'd.  Discharge instructions provided to pt.  Pt states understanding.  Pt states all questions have been answered.  Copy of discharge provided to pt.  Signed copy in chart.  Prescriptions provided to pt, copy in chart. Pt states that all personal belongings are in possession.     Pt escorted off unit with assistance from  without incident. Patient D/C at 1200 with  in wheel chair without event. CNA escorted her out.

## 2018-06-22 NOTE — DISCHARGE SUMMARY
Patient was admitted for a Total Hip Arthroplasty.  Had no complications during the surgery. Did well post-operatively.               There are no active hospital problems to display for this patient.      Uneventful hospital course.     Medication List      START taking these medications      Instructions   oxyCODONE immediate-release 5 MG Tabs  Commonly known as:  ROXICODONE   Take 1-2 Tabs by mouth every 3 hours as needed for up to 14 days.  Dose:  5-10 mg        ASK your doctor about these medications      Instructions   albuterol 108 (90 Base) MCG/ACT Aers inhalation aerosol   Inhale 2 Puffs by mouth every 6 hours as needed for Shortness of Breath.  Dose:  2 Puff     ALLEGRA PO   Take 1 Tab by mouth every day.  Dose:  1 Tab     Calcium 1574-2510 MG-UNIT Chew   Take 1 Each by mouth every day.  Dose:  1 Each     EVENING PRIMROSE OIL PO   Take 1 Tab by mouth every day.  Dose:  1 Tab     ferrous gluconate 324 (38 Fe) MG Tabs  Commonly known as:  FERGON   Take 324 mg by mouth every day.  Dose:  324 mg     levothyroxine 75 MCG Tabs  Commonly known as:  SYNTHROID   Take 75 mcg by mouth Every morning on an empty stomach.  Dose:  75 mcg     MULTIVITAMIN & MINERAL PO   Take 1 Tab by mouth every day.  Dose:  1 Tab     ondansetron 4 MG Tbdp  Commonly known as:  ZOFRAN ODT   Take 4 mg by mouth every 8 hours as needed for Nausea.  Dose:  4 mg     oxyCODONE-acetaminophen 7.5-325 MG per tablet  Commonly known as:  PERCOCET   Take 1 Tab by mouth 1 time daily as needed.  Dose:  1 Tab     sertraline 100 MG Tabs  Commonly known as:  ZOLOFT   Take 100 mg by mouth every day.  Dose:  100 mg     Vitamin D 1000 UNIT Caps   Take 1,000 Units by mouth every day.  Dose:  1000 Units            Patient will be discharged home and follow up with Dr. Ritchie clinic in 2 weeks, for which the patient already has scheduled.

## 2018-06-22 NOTE — DIETARY
NUTRITION SERVICES: BMI - Pt with BMI >40 (=43.65). Weight loss counseling not appropriate in acute care setting. RECOMMEND - Referral to outpatient nutrition services for weight management after D/C.

## 2018-06-22 NOTE — CARE PLAN
Problem: Risk for Impaired Mobility  Goal: Mobilization  Outcome: PROGRESSING AS EXPECTED  Pt ambulated to the bathroom with FWW steady stand by assist. Minimal discomfort.     Problem: Elimination  Goal: Regular urinary elimination  Outcome: PROGRESSING AS EXPECTED  Post op void complete    Problem: Oxygenation/Respiratory Function  Goal: Patient will Achieve/Maintain Optimal Respiratory Function    Intervention: Incentive Spirometry Promotion  Education provided with return demonstration for IS.

## 2018-06-22 NOTE — CARE PLAN
Problem: Risk for Infection, Impaired Wound Healing  Goal: Remain free from signs and symptoms of infection  Outcome: PROGRESSING AS EXPECTED  Educated at length on infection prevention and animals sleeping in bed with her and her .

## 2018-07-30 ENCOUNTER — HOSPITAL ENCOUNTER (OUTPATIENT)
Dept: RADIOLOGY | Facility: MEDICAL CENTER | Age: 53
End: 2018-07-30
Attending: PHYSICIAN ASSISTANT
Payer: COMMERCIAL

## 2018-07-30 DIAGNOSIS — R11.2 NAUSEA AND VOMITING, INTRACTABILITY OF VOMITING NOT SPECIFIED, UNSPECIFIED VOMITING TYPE: ICD-10-CM

## 2018-07-30 DIAGNOSIS — K21.9 GASTROESOPHAGEAL REFLUX DISEASE, ESOPHAGITIS PRESENCE NOT SPECIFIED: ICD-10-CM

## 2018-07-30 PROCEDURE — 74018 RADEX ABDOMEN 1 VIEW: CPT

## 2018-07-31 ENCOUNTER — HOSPITAL ENCOUNTER (OUTPATIENT)
Dept: LAB | Facility: MEDICAL CENTER | Age: 53
End: 2018-07-31
Attending: INTERNAL MEDICINE
Payer: COMMERCIAL

## 2018-07-31 LAB
25(OH)D3 SERPL-MCNC: 17 NG/ML (ref 30–100)
ALBUMIN SERPL BCP-MCNC: 4.5 G/DL (ref 3.2–4.9)
ALBUMIN/GLOB SERPL: 1.4 G/DL
ALP SERPL-CCNC: 106 U/L (ref 30–99)
ALT SERPL-CCNC: 13 U/L (ref 2–50)
AMYLASE SERPL-CCNC: 36 U/L (ref 20–103)
ANION GAP SERPL CALC-SCNC: 13 MMOL/L (ref 0–11.9)
ANISOCYTOSIS BLD QL SMEAR: ABNORMAL
AST SERPL-CCNC: 14 U/L (ref 12–45)
B-HCG SERPL-ACNC: 3.5 MIU/ML (ref 0–5)
BASOPHILS # BLD AUTO: 0.9 % (ref 0–1.8)
BASOPHILS # BLD: 0.05 K/UL (ref 0–0.12)
BILIRUB SERPL-MCNC: 0.4 MG/DL (ref 0.1–1.5)
BUN SERPL-MCNC: 12 MG/DL (ref 8–22)
BURR CELLS BLD QL SMEAR: NORMAL
CALCIUM SERPL-MCNC: 9.7 MG/DL (ref 8.5–10.5)
CHLORIDE SERPL-SCNC: 104 MMOL/L (ref 96–112)
CHOLEST SERPL-MCNC: 202 MG/DL (ref 100–199)
CO2 SERPL-SCNC: 22 MMOL/L (ref 20–33)
CREAT SERPL-MCNC: 0.68 MG/DL (ref 0.5–1.4)
EOSINOPHIL # BLD AUTO: 0 K/UL (ref 0–0.51)
EOSINOPHIL NFR BLD: 0 % (ref 0–6.9)
ERYTHROCYTE [DISTWIDTH] IN BLOOD BY AUTOMATED COUNT: 47.1 FL (ref 35.9–50)
ESTRADIOL SERPL-MCNC: <20 PG/ML
FOLATE SERPL-MCNC: >24 NG/ML
FSH SERPL-ACNC: 61.9 MIU/ML
GLOBULIN SER CALC-MCNC: 3.3 G/DL (ref 1.9–3.5)
GLUCOSE SERPL-MCNC: 95 MG/DL (ref 65–99)
HAV IGM SERPL QL IA: NEGATIVE
HBV CORE IGM SER QL: NEGATIVE
HBV SURFACE AG SER QL: NEGATIVE
HCT VFR BLD AUTO: 38.6 % (ref 37–47)
HCV AB SER QL: NEGATIVE
HDLC SERPL-MCNC: 62 MG/DL
HGB BLD-MCNC: 11.5 G/DL (ref 12–16)
IRON SATN MFR SERPL: 5 % (ref 15–55)
IRON SERPL-MCNC: 26 UG/DL (ref 40–170)
LDLC SERPL CALC-MCNC: 122 MG/DL
LH SERPL-ACNC: 30 IU/L
LIPASE SERPL-CCNC: 14 U/L (ref 11–82)
LYMPHOCYTES # BLD AUTO: 1.98 K/UL (ref 1–4.8)
LYMPHOCYTES NFR BLD: 33 % (ref 22–41)
MAGNESIUM SERPL-MCNC: 2.1 MG/DL (ref 1.5–2.5)
MANUAL DIFF BLD: NORMAL
MCH RBC QN AUTO: 25.1 PG (ref 27–33)
MCHC RBC AUTO-ENTMCNC: 29.8 G/DL (ref 33.6–35)
MCV RBC AUTO: 84.1 FL (ref 81.4–97.8)
MICROCYTES BLD QL SMEAR: ABNORMAL
MONOCYTES # BLD AUTO: 0.37 K/UL (ref 0–0.85)
MONOCYTES NFR BLD AUTO: 6.1 % (ref 0–13.4)
MORPHOLOGY BLD-IMP: NORMAL
NEUTROPHILS # BLD AUTO: 3.6 K/UL (ref 2–7.15)
NEUTROPHILS NFR BLD: 60 % (ref 44–72)
NRBC # BLD AUTO: 0 K/UL
NRBC BLD-RTO: 0 /100 WBC
OVALOCYTES BLD QL SMEAR: NORMAL
PHOSPHATE SERPL-MCNC: 3.4 MG/DL (ref 2.5–4.5)
PLATELET # BLD AUTO: 572 K/UL (ref 164–446)
PLATELET BLD QL SMEAR: NORMAL
PMV BLD AUTO: 9.9 FL (ref 9–12.9)
POIKILOCYTOSIS BLD QL SMEAR: NORMAL
POTASSIUM SERPL-SCNC: 3.9 MMOL/L (ref 3.6–5.5)
PROT SERPL-MCNC: 7.8 G/DL (ref 6–8.2)
RBC # BLD AUTO: 4.59 M/UL (ref 4.2–5.4)
RBC BLD AUTO: PRESENT
SCHISTOCYTES BLD QL SMEAR: NORMAL
SODIUM SERPL-SCNC: 139 MMOL/L (ref 135–145)
T4 FREE SERPL-MCNC: 1.19 NG/DL (ref 0.53–1.43)
TIBC SERPL-MCNC: 512 UG/DL (ref 250–450)
TRIGL SERPL-MCNC: 88 MG/DL (ref 0–149)
TSH SERPL DL<=0.005 MIU/L-ACNC: 2.03 UIU/ML (ref 0.38–5.33)
VIT B12 SERPL-MCNC: 979 PG/ML (ref 211–911)
WBC # BLD AUTO: 6 K/UL (ref 4.8–10.8)

## 2018-07-31 PROCEDURE — 82746 ASSAY OF FOLIC ACID SERUM: CPT

## 2018-07-31 PROCEDURE — 82306 VITAMIN D 25 HYDROXY: CPT

## 2018-07-31 PROCEDURE — 84439 ASSAY OF FREE THYROXINE: CPT

## 2018-07-31 PROCEDURE — 84443 ASSAY THYROID STIM HORMONE: CPT

## 2018-07-31 PROCEDURE — 83550 IRON BINDING TEST: CPT

## 2018-07-31 PROCEDURE — 80061 LIPID PANEL: CPT

## 2018-07-31 PROCEDURE — 80307 DRUG TEST PRSMV CHEM ANLYZR: CPT

## 2018-07-31 PROCEDURE — 85027 COMPLETE CBC AUTOMATED: CPT

## 2018-07-31 PROCEDURE — 85007 BL SMEAR W/DIFF WBC COUNT: CPT

## 2018-07-31 PROCEDURE — 84100 ASSAY OF PHOSPHORUS: CPT

## 2018-07-31 PROCEDURE — 83735 ASSAY OF MAGNESIUM: CPT

## 2018-07-31 PROCEDURE — 82150 ASSAY OF AMYLASE: CPT

## 2018-07-31 PROCEDURE — 84702 CHORIONIC GONADOTROPIN TEST: CPT

## 2018-07-31 PROCEDURE — G0480 DRUG TEST DEF 1-7 CLASSES: HCPCS

## 2018-07-31 PROCEDURE — 83002 ASSAY OF GONADOTROPIN (LH): CPT

## 2018-07-31 PROCEDURE — 82607 VITAMIN B-12: CPT

## 2018-07-31 PROCEDURE — 82670 ASSAY OF TOTAL ESTRADIOL: CPT

## 2018-07-31 PROCEDURE — 83540 ASSAY OF IRON: CPT

## 2018-07-31 PROCEDURE — 80074 ACUTE HEPATITIS PANEL: CPT

## 2018-07-31 PROCEDURE — 36415 COLL VENOUS BLD VENIPUNCTURE: CPT

## 2018-07-31 PROCEDURE — 83001 ASSAY OF GONADOTROPIN (FSH): CPT

## 2018-07-31 PROCEDURE — 83690 ASSAY OF LIPASE: CPT

## 2018-07-31 PROCEDURE — 80053 COMPREHEN METABOLIC PANEL: CPT

## 2018-08-02 ENCOUNTER — OFFICE VISIT (OUTPATIENT)
Dept: URGENT CARE | Facility: PHYSICIAN GROUP | Age: 53
End: 2018-08-02
Payer: COMMERCIAL

## 2018-08-02 ENCOUNTER — APPOINTMENT (OUTPATIENT)
Dept: RADIOLOGY | Facility: IMAGING CENTER | Age: 53
End: 2018-08-02
Attending: FAMILY MEDICINE
Payer: COMMERCIAL

## 2018-08-02 VITALS
BODY MASS INDEX: 41.76 KG/M2 | TEMPERATURE: 98 F | DIASTOLIC BLOOD PRESSURE: 88 MMHG | RESPIRATION RATE: 16 BRPM | WEIGHT: 221.2 LBS | OXYGEN SATURATION: 98 % | SYSTOLIC BLOOD PRESSURE: 122 MMHG | HEART RATE: 74 BPM | HEIGHT: 61 IN

## 2018-08-02 DIAGNOSIS — R07.9 CHEST PAIN, UNSPECIFIED TYPE: ICD-10-CM

## 2018-08-02 LAB
AMPHET CTO UR CFM-MCNC: NEGATIVE NG/ML
BARBITURATES CTO UR CFM-MCNC: NEGATIVE NG/ML
BENZODIAZ CTO UR CFM-MCNC: POSITIVE NG/ML
CANNABINOIDS CTO UR CFM-MCNC: POSITIVE NG/ML
COCAINE CTO UR CFM-MCNC: NEGATIVE NG/ML
DRUG COMMENT 753798: NORMAL
METHADONE CTO UR CFM-MCNC: NEGATIVE NG/ML
OPIATES CTO UR CFM-MCNC: POSITIVE NG/ML
PCP CTO UR CFM-MCNC: NEGATIVE NG/ML
PROPOXYPH CTO UR CFM-MCNC: NEGATIVE NG/ML

## 2018-08-02 PROCEDURE — 71046 X-RAY EXAM CHEST 2 VIEWS: CPT | Mod: TC | Performed by: FAMILY MEDICINE

## 2018-08-02 PROCEDURE — 99214 OFFICE O/P EST MOD 30 MIN: CPT | Performed by: FAMILY MEDICINE

## 2018-08-02 RX ORDER — DICYCLOMINE HYDROCHLORIDE 10 MG/1
10 CAPSULE ORAL
COMMUNITY
End: 2019-05-03

## 2018-08-02 ASSESSMENT — ENCOUNTER SYMPTOMS
EXERTIONAL CHEST PRESSURE: 0
FEVER: 0
SHORTNESS OF BREATH: 0
VOMITING: 0
PALPITATIONS: 0
IRREGULAR HEARTBEAT: 0

## 2018-08-02 NOTE — PROGRESS NOTES
GI coctail administered at 11:35 am.  Pt tolerated well.  Pt kept for 10 minutes to make sure there was no reaction to medication.  Medication confirmed with Tyrel Kelly MA Ass't

## 2018-08-03 NOTE — PROGRESS NOTES
"Subjective:   Smita Faith is a 52 y.o. female who presents for Chest Pain (RUQ abdominal pain, back pain, R shoulder pain, x2 weeks)        Chest Pain    This is a new problem. The current episode started 1 to 4 weeks ago. The onset quality is gradual. The problem occurs constantly. The problem has been rapidly worsening. The pain is present in the substernal region and epigastric region. The pain is severe. The quality of the pain is described as burning and crushing. The pain does not radiate. Pertinent negatives include no exertional chest pressure, fever, irregular heartbeat, palpitations, shortness of breath or vomiting.     Review of Systems   Constitutional: Negative for fever.   Respiratory: Negative for shortness of breath.    Cardiovascular: Positive for chest pain. Negative for palpitations.   Gastrointestinal: Negative for vomiting.     Allergies   Allergen Reactions   • Bloodless    • Ciprofloxacin Anaphylaxis     SOB   • Codeine      Upset stomach    • Erythromycin Vomiting   • Nsaids      Does not take due to gastric bypass   • Other Food Anaphylaxis     pj oliva      Objective:   /88   Pulse 74   Temp 36.7 °C (98 °F)   Resp 16   Ht 1.549 m (5' 1\")   Wt 100.3 kg (221 lb 3.2 oz)   LMP 03/02/2016 Comment: hcg sent with labs  SpO2 98%   BMI 41.80 kg/m²   Physical Exam   Constitutional: She is oriented to person, place, and time. She appears well-developed and well-nourished. No distress.   HENT:   Head: Normocephalic and atraumatic.   Eyes: Pupils are equal, round, and reactive to light. Conjunctivae and EOM are normal.   Cardiovascular: Normal rate and regular rhythm.    No murmur heard.  Pulmonary/Chest: Effort normal and breath sounds normal. No respiratory distress. She has no wheezes. She has no rales. She exhibits tenderness.   Abdominal: Soft. She exhibits no distension. There is tenderness.   Neurological: She is alert and oriented to person, place, and time. She has normal " reflexes. No sensory deficit.   Skin: Skin is warm and dry.   Psychiatric: She has a normal mood and affect.         Assessment/Plan:   Assessment    1. Chest pain, unspecified type  - EKG - Clinic Performed  - DX-CHEST-2 VIEWS; Future    Other orders  - dicyclomine (BENTYL) 10 MG Cap; Take 10 mg by mouth 4 Times a Day,Before Meals and at Bedtime.    Differential diagnosis, natural history, supportive care, and indications for immediate follow-up discussed.    Significant improvement with GI cocktail in office.  Restart previously prescribed heartburn and coating medications.

## 2018-08-04 LAB
1OH-MIDAZOLAM UR CFM-MCNC: <20 NG/ML
6MAM UR CFM-MCNC: <10 NG/ML
7AMINOCLONAZEPAM UR CFM-MCNC: <5 NG/ML
A-OH ALPRAZ UR CFM-MCNC: <5 NG/ML
ALPRAZ UR CFM-MCNC: <5 NG/ML
CHLORDIAZEP UR CFM-MCNC: <20 NG/ML
CLONAZEPAM UR CFM-MCNC: <5 NG/ML
CODEINE UR CFM-MCNC: <20 NG/ML
DIAZEPAM UR CFM-MCNC: <20 NG/ML
HYDROCODONE UR CFM-MCNC: <20 NG/ML
HYDROMORPHONE UR CFM-MCNC: <20 NG/ML
LORAZEPAM UR CFM-MCNC: <20 NG/ML
MIDAZOLAM UR CFM-MCNC: <20 NG/ML
MORPHINE UR CFM-MCNC: <20 NG/ML
NORDIAZEPAM UR CFM-MCNC: 44 NG/ML
NORHYDROCODONE UR CFM-MCNC: <20 NG/ML
NOROXYCODONE UR CFM-MCNC: 260 NG/ML
OPIATES UR NOROXYM Q0836: 189 NG/ML
OXAZEPAM UR CFM-MCNC: 522 NG/ML
OXYCODONE UR CFM-MCNC: 37 NG/ML
OXYMORPHONE UR CFM-MCNC: <20 NG/ML
TEMAZEPAM UR CFM-MCNC: 113 NG/ML

## 2018-08-05 LAB — THC UR CFM-MCNC: >500 NG/ML

## 2018-08-16 ENCOUNTER — OFFICE VISIT (OUTPATIENT)
Dept: URGENT CARE | Facility: CLINIC | Age: 53
End: 2018-08-16
Payer: COMMERCIAL

## 2018-08-16 VITALS
RESPIRATION RATE: 16 BRPM | HEIGHT: 61 IN | OXYGEN SATURATION: 97 % | DIASTOLIC BLOOD PRESSURE: 84 MMHG | HEART RATE: 79 BPM | WEIGHT: 224 LBS | SYSTOLIC BLOOD PRESSURE: 122 MMHG | TEMPERATURE: 98.2 F | BODY MASS INDEX: 42.29 KG/M2

## 2018-08-16 DIAGNOSIS — M25.551 HIP PAIN, ACUTE, RIGHT: ICD-10-CM
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
## 2018-08-16 DIAGNOSIS — S09.90XA CLOSED HEAD INJURY WITHOUT LOSS OF CONSCIOUSNESS, INITIAL ENCOUNTER: ICD-10-CM

## 2018-08-16 PROCEDURE — 99213 OFFICE O/P EST LOW 20 MIN: CPT | Performed by: NURSE PRACTITIONER

## 2018-08-17 ASSESSMENT — ENCOUNTER SYMPTOMS
FEVER: 0
BLURRED VISION: 0
HEADACHES: 1
CHILLS: 0
SENSORY CHANGE: 0
NECK PAIN: 0
DIZZINESS: 0
PHOTOPHOBIA: 0
BACK PAIN: 0
DOUBLE VISION: 0
TINGLING: 0
VOMITING: 0
NAUSEA: 0

## 2018-08-17 NOTE — PROGRESS NOTES
Subjective:      Smita Faith is a 52 y.o. female who presents with Head Injury (Fell down over side of couch around 3pm today, hit her hip and head, now has a headache, hip replacement 11 wks. ago.)            HPI New problem. 52 year old female with possible head injury and right hip pain after a fall on right side. Fall was ground level off a sofa. She report that she hit back of head on wall. She has mild headache. Denies dizziness, visual changes, nausea or vomiting. She has some mild hip pain (hip replacement on this side in June). No other injuries to report.  Bloodless; Ciprofloxacin; Codeine; Erythromycin; Nsaids; and Other food  Current Outpatient Prescriptions on File Prior to Visit   Medication Sig Dispense Refill   • dicyclomine (BENTYL) 10 MG Cap Take 10 mg by mouth 4 Times a Day,Before Meals and at Bedtime.     • levothyroxine (SYNTHROID) 75 MCG Tab Take 75 mcg by mouth Every morning on an empty stomach.     • EVENING PRIMROSE OIL PO Take 1 Tab by mouth every day.     • ondansetron (ZOFRAN ODT) 4 MG TABLET DISPERSIBLE Take 4 mg by mouth every 8 hours as needed for Nausea.     • Fexofenadine HCl (ALLEGRA PO) Take 1 Tab by mouth every day.     • ferrous gluconate (FERGON) 324 (38 Fe) MG Tab Take 324 mg by mouth every day.     • sertraline (ZOLOFT) 100 MG Tab Take 100 mg by mouth every day.     • Cholecalciferol (VITAMIN D) 1000 UNIT Cap Take 1,000 Units by mouth every day.     • Multiple Vitamins-Minerals (MULTIVITAMIN & MINERAL PO) Take 1 Tab by mouth every day.     • Calcium 9480-9975 MG-UNIT CHEW Take 1 Each by mouth every day.     • oxyCODONE-acetaminophen (PERCOCET) 7.5-325 MG per tablet Take 1 Tab by mouth 1 time daily as needed.     • albuterol 108 (90 BASE) MCG/ACT Aero Soln inhalation aerosol Inhale 2 Puffs by mouth every 6 hours as needed for Shortness of Breath.       No current facility-administered medications on file prior to visit.      Social History     Social History   • Marital  "status:      Spouse name: N/A   • Number of children: N/A   • Years of education: N/A     Occupational History   • Not on file.     Social History Main Topics   • Smoking status: Former Smoker     Packs/day: 0.10     Years: 4.00     Types: Cigarettes     Quit date: 1/1/2015   • Smokeless tobacco: Never Used      Comment: 2009   • Alcohol use No   • Drug use: Yes     Types: Oral      Comment: marijuana edibles, last had tuesday 6/19 , crank, \"did everything.\" last used 04/2015   • Sexual activity: Not on file     Other Topics Concern   • Not on file     Social History Narrative   • No narrative on file     family history is not on file.      Review of Systems   Constitutional: Negative for chills, fever and malaise/fatigue.   Eyes: Negative for blurred vision, double vision and photophobia.   Cardiovascular: Negative for chest pain.   Gastrointestinal: Negative for nausea and vomiting.   Musculoskeletal: Positive for joint pain. Negative for back pain and neck pain.   Neurological: Positive for headaches. Negative for dizziness, tingling and sensory change.          Objective:     /84   Pulse 79   Temp 36.8 °C (98.2 °F)   Resp 16   Ht 1.549 m (5' 1\")   Wt 101.6 kg (224 lb)   LMP 03/02/2016 Comment: hcg sent with labs  SpO2 97%   BMI 42.32 kg/m²      Physical Exam   Constitutional: She is oriented to person, place, and time. She appears well-developed and well-nourished. No distress.   HENT:   Head: Normocephalic and atraumatic.   Right Ear: External ear and ear canal normal. Tympanic membrane is not injected and not perforated. No middle ear effusion.   Left Ear: External ear and ear canal normal. Tympanic membrane is not injected and not perforated.  No middle ear effusion.   Nose: No mucosal edema.   Mouth/Throat: No oropharyngeal exudate or posterior oropharyngeal erythema.   Eyes: Conjunctivae are normal. Right eye exhibits no discharge. Left eye exhibits no discharge.   Neck: Normal range of " motion. Neck supple.   Cardiovascular: Normal rate, regular rhythm and normal heart sounds.    No murmur heard.  Pulmonary/Chest: Effort normal and breath sounds normal. No respiratory distress.   Musculoskeletal: Normal range of motion.   Normal movement of all 4 extremities.   Lymphadenopathy:     She has no cervical adenopathy.        Right: No supraclavicular adenopathy present.        Left: No supraclavicular adenopathy present.   Neurological: She is alert and oriented to person, place, and time. No cranial nerve deficit or sensory deficit. She exhibits normal muscle tone. Coordination and gait normal.   Skin: Skin is warm and dry.   Psychiatric: She has a normal mood and affect. Her behavior is normal. Thought content normal.   Nursing note and vitals reviewed.              Assessment/Plan:     1. Closed head injury without loss of consciousness, initial encounter     2. Hip pain, acute, right     3. Fall, initial encounter       Neuro exam wnl  Hip pain very mild, no gait issues or mobility issues.  Ibuprofen for headache.  Strict ED precautions for vomiting or neuro changes.  Differential diagnosis, natural history, supportive care, and indications for immediate follow-up discussed at length.

## 2018-10-10 ENCOUNTER — HOSPITAL ENCOUNTER (OUTPATIENT)
Dept: RADIOLOGY | Facility: MEDICAL CENTER | Age: 53
End: 2018-10-10
Attending: PHYSICIAN ASSISTANT
Payer: COMMERCIAL

## 2018-10-10 DIAGNOSIS — R10.9 ABDOMINAL PAIN, UNSPECIFIED ABDOMINAL LOCATION: ICD-10-CM

## 2018-10-10 PROCEDURE — 74177 CT ABD & PELVIS W/CONTRAST: CPT

## 2018-10-10 PROCEDURE — 700117 HCHG RX CONTRAST REV CODE 255: Performed by: PHYSICIAN ASSISTANT

## 2018-10-10 RX ADMIN — IOHEXOL 50 ML: 240 INJECTION, SOLUTION INTRATHECAL; INTRAVASCULAR; INTRAVENOUS; ORAL at 10:26

## 2018-10-10 RX ADMIN — IOHEXOL 100 ML: 350 INJECTION, SOLUTION INTRAVENOUS at 10:26

## 2019-01-24 ENCOUNTER — PATIENT OUTREACH (OUTPATIENT)
Dept: HEALTH INFORMATION MANAGEMENT | Facility: OTHER | Age: 54
End: 2019-01-24

## 2019-01-29 NOTE — PROGRESS NOTES
Attempt #: Final  WebIZ Checked & Epic Updated: yes    Verify PCP: yes / Pt stated that her PCP is Maria Fernanda Bolivar P.A.-C.,     Communication Preference Obtained: yes     Annual Wellness Visit Scheduling  1. Scheduling Status:Not Scheduled. Patient states they are under Doctor's care     Care Gap Scheduling (Attempt to Schedule EACH Overdue Care Gap!)  Health Maintenance Due   Topic Date Due   • IMM DTaP/Tdap/Td Vaccine (1 - Tdap) 12/31/1984   • COLONOSCOPY  12/31/2015   • IMM ZOSTER VACCINES (1 of 2) 12/31/2015     MyChart Activation: already active

## 2019-01-29 NOTE — PROGRESS NOTES
Outcome: Left Message    Please transfer to Patient Outreach Team at [phone] when patient returns call.    WebIZ Checked & Epic Updated:  yes    HealthConnect Verified: yes    Attempt # 2

## 2019-02-12 ENCOUNTER — PATIENT OUTREACH (OUTPATIENT)
Dept: HEALTH INFORMATION MANAGEMENT | Facility: OTHER | Age: 54
End: 2019-02-12

## 2019-04-21 ENCOUNTER — HOSPITAL ENCOUNTER (OUTPATIENT)
Dept: RADIOLOGY | Facility: MEDICAL CENTER | Age: 54
End: 2019-04-21
Attending: NURSE PRACTITIONER
Payer: MEDICARE

## 2019-04-21 DIAGNOSIS — M25.562 LEFT KNEE PAIN, UNSPECIFIED CHRONICITY: ICD-10-CM

## 2019-04-21 PROCEDURE — 73721 MRI JNT OF LWR EXTRE W/O DYE: CPT | Mod: LT

## 2019-04-29 ENCOUNTER — HOSPITAL ENCOUNTER (OUTPATIENT)
Facility: MEDICAL CENTER | Age: 54
End: 2019-04-29
Attending: ORTHOPAEDIC SURGERY | Admitting: ORTHOPAEDIC SURGERY
Payer: MEDICARE

## 2019-05-03 ENCOUNTER — APPOINTMENT (OUTPATIENT)
Dept: ADMISSIONS | Facility: MEDICAL CENTER | Age: 54
End: 2019-05-03
Attending: ORTHOPAEDIC SURGERY
Payer: MEDICARE

## 2019-05-03 VITALS — HEIGHT: 61 IN | WEIGHT: 264.55 LBS | BODY MASS INDEX: 49.95 KG/M2

## 2019-05-03 DIAGNOSIS — Z01.812 PRE-OPERATIVE LABORATORY EXAMINATION: ICD-10-CM

## 2019-05-03 LAB
ERYTHROCYTE [DISTWIDTH] IN BLOOD BY AUTOMATED COUNT: 46.5 FL (ref 35.9–50)
HCT VFR BLD AUTO: 33.7 % (ref 37–47)
HGB BLD-MCNC: 10.2 G/DL (ref 12–16)
MCH RBC QN AUTO: 23.3 PG (ref 27–33)
MCHC RBC AUTO-ENTMCNC: 30.3 G/DL (ref 33.6–35)
MCV RBC AUTO: 76.9 FL (ref 81.4–97.8)
PLATELET # BLD AUTO: 429 K/UL (ref 164–446)
PMV BLD AUTO: 9 FL (ref 9–12.9)
RBC # BLD AUTO: 4.38 M/UL (ref 4.2–5.4)
WBC # BLD AUTO: 5.4 K/UL (ref 4.8–10.8)

## 2019-05-03 PROCEDURE — 85027 COMPLETE CBC AUTOMATED: CPT

## 2019-05-03 PROCEDURE — 36415 COLL VENOUS BLD VENIPUNCTURE: CPT

## 2019-05-03 RX ORDER — FLUTICASONE PROPIONATE 50 MCG
1 SPRAY, SUSPENSION (ML) NASAL DAILY
COMMUNITY

## 2019-05-03 RX ORDER — ARIPIPRAZOLE 5 MG/1
5 TABLET ORAL DAILY
Status: ON HOLD | COMMUNITY
End: 2019-08-29

## 2019-05-03 RX ORDER — PREGABALIN 75 MG/1
100 CAPSULE ORAL 3 TIMES DAILY
COMMUNITY
End: 2021-08-03

## 2019-05-03 RX ORDER — OXYCODONE HYDROCHLORIDE AND ACETAMINOPHEN 5; 325 MG/1; MG/1
1-2 TABLET ORAL
COMMUNITY
End: 2020-07-29

## 2019-05-03 NOTE — OR NURSING
This patient has gained approximately 15 kg in less than one year’s time since her OZ which has bumped up her BMI significantly to its current status.  She will need a primary care for clearance prior to this surgery.  Thank you.  Shaggy Harmon M.D.  [facility]    On May 3, 2019, at 12:54, Bridgette Escobar <[email or web address]> wrote:   Willow Harmon, I’m sorry here is the correct pt with bmi of 49.99, she is having a knee scope with Dr. Munguia on 5/9 her ismael was 4, she denies any hx of htn, high cholesterol, or diabetes.  Please disregard that other email        Anesthesia Summary Report            Patient Name: Smita Faith MRN: [id number] Admission Date: Patient not admitted   Allergies: Bloodless, Ciprofloxacin, Codeine, Erythromycin, Nsaids, Other Food

## 2019-05-21 ENCOUNTER — HOSPITAL ENCOUNTER (OUTPATIENT)
Dept: LAB | Facility: MEDICAL CENTER | Age: 54
End: 2019-05-21
Attending: NURSE PRACTITIONER
Payer: MEDICARE

## 2019-05-21 LAB
ALBUMIN SERPL BCP-MCNC: 4.3 G/DL (ref 3.2–4.9)
ALBUMIN/GLOB SERPL: 1.6 G/DL
ALP SERPL-CCNC: 109 U/L (ref 30–99)
ALT SERPL-CCNC: 12 U/L (ref 2–50)
ANION GAP SERPL CALC-SCNC: 9 MMOL/L (ref 0–11.9)
AST SERPL-CCNC: 11 U/L (ref 12–45)
BILIRUB SERPL-MCNC: 0.4 MG/DL (ref 0.1–1.5)
BUN SERPL-MCNC: 15 MG/DL (ref 8–22)
CALCIUM SERPL-MCNC: 9.1 MG/DL (ref 8.5–10.5)
CHLORIDE SERPL-SCNC: 108 MMOL/L (ref 96–112)
CHOLEST SERPL-MCNC: 179 MG/DL (ref 100–199)
CO2 SERPL-SCNC: 26 MMOL/L (ref 20–33)
CREAT SERPL-MCNC: 0.6 MG/DL (ref 0.5–1.4)
GLOBULIN SER CALC-MCNC: 2.7 G/DL (ref 1.9–3.5)
GLUCOSE SERPL-MCNC: 91 MG/DL (ref 65–99)
HDLC SERPL-MCNC: 75 MG/DL
IRON SATN MFR SERPL: 4 % (ref 15–55)
IRON SERPL-MCNC: 21 UG/DL (ref 40–170)
LDLC SERPL CALC-MCNC: 92 MG/DL
POTASSIUM SERPL-SCNC: 4.2 MMOL/L (ref 3.6–5.5)
PROT SERPL-MCNC: 7 G/DL (ref 6–8.2)
SODIUM SERPL-SCNC: 143 MMOL/L (ref 135–145)
TIBC SERPL-MCNC: 563 UG/DL (ref 250–450)
TRIGL SERPL-MCNC: 62 MG/DL (ref 0–149)
TSH SERPL DL<=0.005 MIU/L-ACNC: 1.94 UIU/ML (ref 0.38–5.33)

## 2019-05-21 PROCEDURE — 80053 COMPREHEN METABOLIC PANEL: CPT

## 2019-05-21 PROCEDURE — 84443 ASSAY THYROID STIM HORMONE: CPT

## 2019-05-21 PROCEDURE — 83550 IRON BINDING TEST: CPT

## 2019-05-21 PROCEDURE — 80061 LIPID PANEL: CPT

## 2019-05-21 PROCEDURE — 83540 ASSAY OF IRON: CPT

## 2019-05-21 PROCEDURE — 83036 HEMOGLOBIN GLYCOSYLATED A1C: CPT

## 2019-05-21 PROCEDURE — 85025 COMPLETE CBC W/AUTO DIFF WBC: CPT

## 2019-05-22 LAB
ANISOCYTOSIS BLD QL SMEAR: ABNORMAL
BASOPHILS # BLD AUTO: 0.4 % (ref 0–1.8)
BASOPHILS # BLD: 0.02 K/UL (ref 0–0.12)
COMMENT 1642: NORMAL
EOSINOPHIL # BLD AUTO: 0.08 K/UL (ref 0–0.51)
EOSINOPHIL NFR BLD: 1.6 % (ref 0–6.9)
ERYTHROCYTE [DISTWIDTH] IN BLOOD BY AUTOMATED COUNT: 53.6 FL (ref 35.9–50)
EST. AVERAGE GLUCOSE BLD GHB EST-MCNC: 111 MG/DL
HBA1C MFR BLD: 5.5 % (ref 0–5.6)
HCT VFR BLD AUTO: 38.5 % (ref 37–47)
HGB BLD-MCNC: 10.9 G/DL (ref 12–16)
IMM GRANULOCYTES # BLD AUTO: 0.01 K/UL (ref 0–0.11)
IMM GRANULOCYTES NFR BLD AUTO: 0.2 % (ref 0–0.9)
LYMPHOCYTES # BLD AUTO: 1.63 K/UL (ref 1–4.8)
LYMPHOCYTES NFR BLD: 32.7 % (ref 22–41)
MCH RBC QN AUTO: 22.9 PG (ref 27–33)
MCHC RBC AUTO-ENTMCNC: 28.3 G/DL (ref 33.6–35)
MCV RBC AUTO: 80.9 FL (ref 81.4–97.8)
MICROCYTES BLD QL SMEAR: ABNORMAL
MONOCYTES # BLD AUTO: 0.47 K/UL (ref 0–0.85)
MONOCYTES NFR BLD AUTO: 9.4 % (ref 0–13.4)
MORPHOLOGY BLD-IMP: NORMAL
NEUTROPHILS # BLD AUTO: 2.78 K/UL (ref 2–7.15)
NEUTROPHILS NFR BLD: 55.7 % (ref 44–72)
NRBC # BLD AUTO: 0 K/UL
NRBC BLD-RTO: 0 /100 WBC
OVALOCYTES BLD QL SMEAR: NORMAL
PLATELET # BLD AUTO: 483 K/UL (ref 164–446)
PLATELET BLD QL SMEAR: NORMAL
PMV BLD AUTO: 9.6 FL (ref 9–12.9)
POIKILOCYTOSIS BLD QL SMEAR: NORMAL
RBC # BLD AUTO: 4.76 M/UL (ref 4.2–5.4)
RBC BLD AUTO: PRESENT
SCHISTOCYTES BLD QL SMEAR: NORMAL
WBC # BLD AUTO: 5 K/UL (ref 4.8–10.8)

## 2019-06-13 ENCOUNTER — ANESTHESIA (OUTPATIENT)
Dept: SURGERY | Facility: MEDICAL CENTER | Age: 54
End: 2019-06-13
Payer: MEDICARE

## 2019-06-13 ENCOUNTER — HOSPITAL ENCOUNTER (OUTPATIENT)
Facility: MEDICAL CENTER | Age: 54
End: 2019-06-13
Attending: ORTHOPAEDIC SURGERY | Admitting: ORTHOPAEDIC SURGERY
Payer: MEDICARE

## 2019-06-13 ENCOUNTER — ANESTHESIA EVENT (OUTPATIENT)
Dept: SURGERY | Facility: MEDICAL CENTER | Age: 54
End: 2019-06-13
Payer: MEDICARE

## 2019-06-13 VITALS
WEIGHT: 261.69 LBS | RESPIRATION RATE: 16 BRPM | BODY MASS INDEX: 48.16 KG/M2 | TEMPERATURE: 97.3 F | DIASTOLIC BLOOD PRESSURE: 56 MMHG | HEIGHT: 62 IN | SYSTOLIC BLOOD PRESSURE: 106 MMHG | OXYGEN SATURATION: 94 %

## 2019-06-13 DIAGNOSIS — R07.89 CHEST WALL PAIN FOLLOWING SURGERY: ICD-10-CM

## 2019-06-13 DIAGNOSIS — G89.18 CHEST WALL PAIN FOLLOWING SURGERY: ICD-10-CM

## 2019-06-13 PROCEDURE — 700111 HCHG RX REV CODE 636 W/ 250 OVERRIDE (IP): Performed by: ANESTHESIOLOGY

## 2019-06-13 PROCEDURE — 160041 HCHG SURGERY MINUTES - EA ADDL 1 MIN LEVEL 4: Performed by: ORTHOPAEDIC SURGERY

## 2019-06-13 PROCEDURE — 160036 HCHG PACU - EA ADDL 30 MINS PHASE I: Performed by: ORTHOPAEDIC SURGERY

## 2019-06-13 PROCEDURE — 160025 RECOVERY II MINUTES (STATS): Performed by: ORTHOPAEDIC SURGERY

## 2019-06-13 PROCEDURE — 700101 HCHG RX REV CODE 250: Performed by: ANESTHESIOLOGY

## 2019-06-13 PROCEDURE — 700105 HCHG RX REV CODE 258: Performed by: ORTHOPAEDIC SURGERY

## 2019-06-13 PROCEDURE — 160048 HCHG OR STATISTICAL LEVEL 1-5: Performed by: ORTHOPAEDIC SURGERY

## 2019-06-13 PROCEDURE — A6222 GAUZE <=16 IN NO W/SAL W/O B: HCPCS | Performed by: ORTHOPAEDIC SURGERY

## 2019-06-13 PROCEDURE — 700105 HCHG RX REV CODE 258: Performed by: ANESTHESIOLOGY

## 2019-06-13 PROCEDURE — 160029 HCHG SURGERY MINUTES - 1ST 30 MINS LEVEL 4: Performed by: ORTHOPAEDIC SURGERY

## 2019-06-13 PROCEDURE — 502580 HCHG PACK, KNEE ARTHROSCOPY: Performed by: ORTHOPAEDIC SURGERY

## 2019-06-13 PROCEDURE — 160046 HCHG PACU - 1ST 60 MINS PHASE II: Performed by: ORTHOPAEDIC SURGERY

## 2019-06-13 PROCEDURE — 160009 HCHG ANES TIME/MIN: Performed by: ORTHOPAEDIC SURGERY

## 2019-06-13 PROCEDURE — 160035 HCHG PACU - 1ST 60 MINS PHASE I: Performed by: ORTHOPAEDIC SURGERY

## 2019-06-13 PROCEDURE — 700102 HCHG RX REV CODE 250 W/ 637 OVERRIDE(OP): Performed by: ANESTHESIOLOGY

## 2019-06-13 PROCEDURE — 160002 HCHG RECOVERY MINUTES (STAT): Performed by: ORTHOPAEDIC SURGERY

## 2019-06-13 PROCEDURE — 501838 HCHG SUTURE GENERAL: Performed by: ORTHOPAEDIC SURGERY

## 2019-06-13 PROCEDURE — A9270 NON-COVERED ITEM OR SERVICE: HCPCS | Performed by: ANESTHESIOLOGY

## 2019-06-13 RX ORDER — MEPERIDINE HYDROCHLORIDE 25 MG/ML
12.5 INJECTION INTRAMUSCULAR; INTRAVENOUS; SUBCUTANEOUS
Status: DISCONTINUED | OUTPATIENT
Start: 2019-06-13 | End: 2019-06-13 | Stop reason: HOSPADM

## 2019-06-13 RX ORDER — HYDROMORPHONE HYDROCHLORIDE 1 MG/ML
0.4 INJECTION, SOLUTION INTRAMUSCULAR; INTRAVENOUS; SUBCUTANEOUS
Status: DISCONTINUED | OUTPATIENT
Start: 2019-06-13 | End: 2019-06-13 | Stop reason: HOSPADM

## 2019-06-13 RX ORDER — HYDROMORPHONE HYDROCHLORIDE 1 MG/ML
0.1 INJECTION, SOLUTION INTRAMUSCULAR; INTRAVENOUS; SUBCUTANEOUS
Status: DISCONTINUED | OUTPATIENT
Start: 2019-06-13 | End: 2019-06-13 | Stop reason: HOSPADM

## 2019-06-13 RX ORDER — HYDRALAZINE HYDROCHLORIDE 20 MG/ML
5 INJECTION INTRAMUSCULAR; INTRAVENOUS
Status: DISCONTINUED | OUTPATIENT
Start: 2019-06-13 | End: 2019-06-13 | Stop reason: HOSPADM

## 2019-06-13 RX ORDER — GABAPENTIN 300 MG/1
600 CAPSULE ORAL ONCE
Status: COMPLETED | OUTPATIENT
Start: 2019-06-13 | End: 2019-06-13

## 2019-06-13 RX ORDER — DEXAMETHASONE SODIUM PHOSPHATE 4 MG/ML
INJECTION, SOLUTION INTRA-ARTICULAR; INTRALESIONAL; INTRAMUSCULAR; INTRAVENOUS; SOFT TISSUE PRN
Status: DISCONTINUED | OUTPATIENT
Start: 2019-06-13 | End: 2019-06-13 | Stop reason: SURG

## 2019-06-13 RX ORDER — ACETAMINOPHEN 500 MG
1000 TABLET ORAL ONCE
Status: COMPLETED | OUTPATIENT
Start: 2019-06-13 | End: 2019-06-13

## 2019-06-13 RX ORDER — BUPIVACAINE HYDROCHLORIDE AND EPINEPHRINE 2.5; 5 MG/ML; UG/ML
INJECTION, SOLUTION EPIDURAL; INFILTRATION; INTRACAUDAL; PERINEURAL
Status: DISCONTINUED | OUTPATIENT
Start: 2019-06-13 | End: 2019-06-13 | Stop reason: HOSPADM

## 2019-06-13 RX ORDER — ONDANSETRON 2 MG/ML
4 INJECTION INTRAMUSCULAR; INTRAVENOUS
Status: DISCONTINUED | OUTPATIENT
Start: 2019-06-13 | End: 2019-06-13 | Stop reason: HOSPADM

## 2019-06-13 RX ORDER — SODIUM CHLORIDE, SODIUM LACTATE, POTASSIUM CHLORIDE, CALCIUM CHLORIDE 600; 310; 30; 20 MG/100ML; MG/100ML; MG/100ML; MG/100ML
INJECTION, SOLUTION INTRAVENOUS
Status: DISCONTINUED | OUTPATIENT
Start: 2019-06-13 | End: 2019-06-13 | Stop reason: SURG

## 2019-06-13 RX ORDER — OXYCODONE HCL 5 MG/5 ML
5 SOLUTION, ORAL ORAL
Status: COMPLETED | OUTPATIENT
Start: 2019-06-13 | End: 2019-06-13

## 2019-06-13 RX ORDER — HYDROMORPHONE HYDROCHLORIDE 1 MG/ML
0.2 INJECTION, SOLUTION INTRAMUSCULAR; INTRAVENOUS; SUBCUTANEOUS
Status: DISCONTINUED | OUTPATIENT
Start: 2019-06-13 | End: 2019-06-13 | Stop reason: HOSPADM

## 2019-06-13 RX ORDER — ONDANSETRON 2 MG/ML
INJECTION INTRAMUSCULAR; INTRAVENOUS PRN
Status: DISCONTINUED | OUTPATIENT
Start: 2019-06-13 | End: 2019-06-13 | Stop reason: SURG

## 2019-06-13 RX ORDER — MODAFINIL 100 MG/1
100 TABLET ORAL
Status: ON HOLD | COMMUNITY
Start: 2019-05-21 | End: 2019-08-29

## 2019-06-13 RX ORDER — SODIUM CHLORIDE, SODIUM LACTATE, POTASSIUM CHLORIDE, CALCIUM CHLORIDE 600; 310; 30; 20 MG/100ML; MG/100ML; MG/100ML; MG/100ML
INJECTION, SOLUTION INTRAVENOUS CONTINUOUS
Status: DISCONTINUED | OUTPATIENT
Start: 2019-06-13 | End: 2019-06-13 | Stop reason: HOSPADM

## 2019-06-13 RX ORDER — HALOPERIDOL 5 MG/ML
1 INJECTION INTRAMUSCULAR
Status: DISCONTINUED | OUTPATIENT
Start: 2019-06-13 | End: 2019-06-13 | Stop reason: HOSPADM

## 2019-06-13 RX ORDER — LABETALOL HYDROCHLORIDE 5 MG/ML
5 INJECTION, SOLUTION INTRAVENOUS
Status: DISCONTINUED | OUTPATIENT
Start: 2019-06-13 | End: 2019-06-13 | Stop reason: HOSPADM

## 2019-06-13 RX ORDER — CEFAZOLIN SODIUM 1 G/3ML
INJECTION, POWDER, FOR SOLUTION INTRAMUSCULAR; INTRAVENOUS PRN
Status: DISCONTINUED | OUTPATIENT
Start: 2019-06-13 | End: 2019-06-13 | Stop reason: SURG

## 2019-06-13 RX ORDER — LAMOTRIGINE 200 MG/1
200 TABLET ORAL
Refills: 2 | COMMUNITY
Start: 2019-04-25 | End: 2021-05-21

## 2019-06-13 RX ORDER — DIPHENHYDRAMINE HYDROCHLORIDE 50 MG/ML
12.5 INJECTION INTRAMUSCULAR; INTRAVENOUS
Status: DISCONTINUED | OUTPATIENT
Start: 2019-06-13 | End: 2019-06-13 | Stop reason: HOSPADM

## 2019-06-13 RX ORDER — OXYCODONE HCL 5 MG/5 ML
10 SOLUTION, ORAL ORAL
Status: COMPLETED | OUTPATIENT
Start: 2019-06-13 | End: 2019-06-13

## 2019-06-13 RX ORDER — OXYCODONE HYDROCHLORIDE 5 MG/1
5 TABLET ORAL EVERY 6 HOURS PRN
Qty: 30 TAB | Refills: 0 | Status: SHIPPED | OUTPATIENT
Start: 2019-06-13 | End: 2019-06-18

## 2019-06-13 RX ORDER — MIDAZOLAM HYDROCHLORIDE 1 MG/ML
1 INJECTION INTRAMUSCULAR; INTRAVENOUS
Status: DISCONTINUED | OUTPATIENT
Start: 2019-06-13 | End: 2019-06-13 | Stop reason: HOSPADM

## 2019-06-13 RX ADMIN — ONDANSETRON 4 MG: 2 INJECTION INTRAMUSCULAR; INTRAVENOUS at 13:30

## 2019-06-13 RX ADMIN — ACETAMINOPHEN 1000 MG: 500 TABLET, FILM COATED ORAL at 11:19

## 2019-06-13 RX ADMIN — FENTANYL CITRATE 25 MCG: 50 INJECTION, SOLUTION INTRAMUSCULAR; INTRAVENOUS at 14:13

## 2019-06-13 RX ADMIN — OXYCODONE HYDROCHLORIDE 10 MG: 5 SOLUTION ORAL at 13:50

## 2019-06-13 RX ADMIN — GABAPENTIN 600 MG: 300 CAPSULE ORAL at 11:19

## 2019-06-13 RX ADMIN — FENTANYL CITRATE 50 MCG: 50 INJECTION INTRAMUSCULAR; INTRAVENOUS at 13:50

## 2019-06-13 RX ADMIN — FENTANYL CITRATE 50 MCG: 50 INJECTION, SOLUTION INTRAMUSCULAR; INTRAVENOUS at 13:18

## 2019-06-13 RX ADMIN — SODIUM CHLORIDE, POTASSIUM CHLORIDE, SODIUM LACTATE AND CALCIUM CHLORIDE: 600; 310; 30; 20 INJECTION, SOLUTION INTRAVENOUS at 13:02

## 2019-06-13 RX ADMIN — PROPOFOL 200 MG: 10 INJECTION, EMULSION INTRAVENOUS at 13:06

## 2019-06-13 RX ADMIN — FENTANYL CITRATE 100 MCG: 50 INJECTION, SOLUTION INTRAMUSCULAR; INTRAVENOUS at 13:06

## 2019-06-13 RX ADMIN — CEFAZOLIN 2 G: 1 INJECTION, POWDER, FOR SOLUTION INTRAVENOUS at 13:06

## 2019-06-13 RX ADMIN — SODIUM CHLORIDE, POTASSIUM CHLORIDE, SODIUM LACTATE AND CALCIUM CHLORIDE: 600; 310; 30; 20 INJECTION, SOLUTION INTRAVENOUS at 09:47

## 2019-06-13 RX ADMIN — DEXAMETHASONE SODIUM PHOSPHATE 8 MG: 4 INJECTION, SOLUTION INTRAMUSCULAR; INTRAVENOUS at 13:06

## 2019-06-13 RX ADMIN — EPHEDRINE SULFATE 10 MG: 50 INJECTION INTRAMUSCULAR; INTRAVENOUS; SUBCUTANEOUS at 13:13

## 2019-06-13 RX ADMIN — LIDOCAINE HYDROCHLORIDE 100 MG: 20 INJECTION, SOLUTION INFILTRATION; PERINEURAL at 13:06

## 2019-06-13 RX ADMIN — FENTANYL CITRATE 25 MCG: 50 INJECTION, SOLUTION INTRAMUSCULAR; INTRAVENOUS at 14:00

## 2019-06-13 ASSESSMENT — PAIN SCALES - GENERAL: PAIN_LEVEL: 4

## 2019-06-13 NOTE — ANESTHESIA PROCEDURE NOTES
Airway  Date/Time: 6/13/2019 1:06 PM  Performed by: MIKEL BOOKER  Authorized by: MIKEL BOOKER     Location:  OR  Urgency:  Elective  Indications for Airway Management:  Anesthesia  Spontaneous Ventilation: absent    Sedation Level:  Deep  Preoxygenated: Yes    Final Airway Type:  Supraglottic airway  Final Supraglottic Airway:  Standard LMA  SGA Size:  4  Number of Attempts at Approach:  1

## 2019-06-13 NOTE — ANESTHESIA QCDR
2019 [facility] Clinical Data Registry (for Quality Improvement)     Postoperative nausea/vomiting risk protocol (Adult = 18 yrs and Pediatric 3-17 yrs)- (430 and 463)  General inhalation anesthetic (NOT TIVA) with PONV risk factors: Yes  Provision of anti-emetic therapy with at least 2 different classes of agents: Yes   Patient DID NOT receive anti-emetic therapy and reason is documented in Medical Record:  N/A    Multimodal Pain Management- (AQI59)  Patient undergoing Elective Surgery (i.e. Outpatient, or ASC, or Prescheduled Surgery prior to Hospital Admission): Yes  Use of Multimodal Pain Management, two or more drugs and/or interventions, NOT including systemic opioids: Yes   Exception: Documented allergy to multiple classes of analgesics:  N/A    PACU assessment of acute postoperative pain prior to Anesthesia Care End- Applies to Patients Age = 18- (ABG7)  Initial PACU pain score is which of the following: < 7/10  Patient unable to report pain score: N/A    Post-anesthetic transfer of care checklist/protocol to PACU/ICU- (426 and 427)  Upon conclusion of case, patient transferred to which of the following locations: PACU/Non-ICU  Use of transfer checklist/protocol: Yes  Exclusion: Service Performed in Patient Hospital Room (and thus did not require transfer): N/A    PACU Reintubation- (AQI31)  General anesthesia requiring endotracheal intubation (ETT) along with subsequent extubation in OR or PACU: No  Required reintubation in the PACU: N/A  Extubation was a planned trial documented in the medical record prior to removal of the original airway device: N/A    Unplanned admission to ICU related to anesthesia service up through end of PACU care- (MD51)  Unplanned admission to ICU (not initially anticipated at anesthesia start time): No

## 2019-06-13 NOTE — DISCHARGE INSTRUCTIONS
ACTIVITY: Rest and take it easy for the first 24 hours.  A responsible adult is recommended to remain with you during that time.  It is normal to feel sleepy.  We encourage you to not do anything that requires balance, judgment or coordination.    MILD FLU-LIKE SYMPTOMS ARE NORMAL. YOU MAY EXPERIENCE GENERALIZED MUSCLE ACHES, THROAT IRRITATION, HEADACHE AND/OR SOME NAUSEA.    FOR 24 HOURS DO NOT:  Drive, operate machinery or run household appliances.  Drink beer or alcoholic beverages.   Make important decisions or sign legal documents.    SPECIAL INSTRUCTIONS: Ice and elevate extremity  Weight bearing as tolerated with crutches for comfort    DIET: To avoid nausea, slowly advance diet as tolerated, avoiding spicy or greasy foods for the first day.  Add more substantial food to your diet according to your physician's instructions.  Babies can be fed formula or breast milk as soon as they are hungry.  INCREASE FLUIDS AND FIBER TO AVOID CONSTIPATION.    SURGICAL DRESSING/BATHING: May remove dressing post-op day number 2 [Saturday] and shower with wound uncovered  Then apply bandaid  Do not soak or submerge incision for 2 weeks    FOLLOW-UP APPOINTMENT:  A follow-up appointment should be arranged with your doctor in 7-10 days; call to schedule.    You should CALL YOUR PHYSICIAN if you develop:  Fever greater than 101 degrees F.  Pain not relieved by medication, or persistent nausea or vomiting.  Excessive bleeding (blood soaking through dressing) or unexpected drainage from the wound.  Extreme redness or swelling around the incision site, drainage of pus or foul smelling drainage.  Inability to urinate or empty your bladder within 8 hours.  Problems with breathing or chest pain.    You should call 911 if you develop problems with breathing or chest pain.  If you are unable to contact your doctor or surgical center, you should go to the nearest emergency room or urgent care center.  Physician's telephone #:  [phone]    If any questions arise, call your doctor.  If your doctor is not available, please feel free to call the Surgical Center at [phone].  The Center is open Monday through Friday from 7AM to 7PM.  You can also call the HEALTH HOTLINE open 24 hours/day, 7 days/week and speak to a nurse at [phone], or toll free at [phone].    A registered nurse may call you a few days after your surgery to see how you are doing after your procedure.    MEDICATIONS: Resume taking daily medication.  Take prescribed pain medication with food.  If no medication is prescribed, you may take non-aspirin pain medication if needed.  PAIN MEDICATION CAN BE VERY CONSTIPATING.  Take a stool softener or laxative such as senokot, pericolace, or milk of magnesia if needed.    Prescription given for Roxicodone.  Last pain medication given at .1:45    If your physician has prescribed pain medication that includes Acetaminophen (Tylenol), do not take additional Acetaminophen (Tylenol) while taking the prescribed medication.    Depression / Suicide Risk    As you are discharged from this [facility] facility, it is important to learn how to keep safe from harming yourself.    Recognize the warning signs:  · Abrupt changes in personality, positive or negative- including increase in energy   · Giving away possessions  · Change in eating patterns- significant weight changes-  positive or negative  · Change in sleeping patterns- unable to sleep or sleeping all the time   · Unwillingness or inability to communicate  · Depression  · Unusual sadness, discouragement and loneliness  · Talk of wanting to die  · Neglect of personal appearance   · Rebelliousness- reckless behavior  · Withdrawal from people/activities they love  · Confusion- inability to concentrate     If you or a loved one observes any of these behaviors or has concerns about self-harm, here's what you can do:  · Talk about it- your feelings and reasons for harming  yourself  · Remove any means that you might use to hurt yourself (examples: pills, rope, extension cords, firearm)  · Get professional help from the community (Mental Health, Substance Abuse, psychological counseling)  · Do not be alone:Call your Safe Contact- someone whom you trust who will be there for you.  · Call your local CRISIS HOTLINE 251-5799 or 130-888-1515  · Call your local Children's Mobile Crisis Response Team [place] (555) 102-5444 or www.[employer] [place]  · Call the toll free National Suicide Prevention Hotlines   · National Suicide Prevention Lifeline 248-160-RIYH (1305)  · National Hope Line Network 800-SUICIDE (060-7117)

## 2019-06-13 NOTE — ANESTHESIA TIME REPORT
Anesthesia Start and Stop Event Times     Date Time Event    6/13/2019 1302 Anesthesia Start     1341 Anesthesia Stop        Responsible Staff  06/13/19    Name Role Begin End    Kunal Sinclair D.O. Anesth 1302 1341        Preop Diagnosis (Free Text):  Pre-op Diagnosis     TEAR MEDIAL MENISCUS AND PAIN LEFT KNEE        Preop Diagnosis (Codes):  Diagnosis Information     Diagnosis Code(s):         Post op Diagnosis  Dislocation of left knee with lateral meniscus tear      Premium Reason  Non-Premium    Comments:

## 2019-06-13 NOTE — ANESTHESIA POSTPROCEDURE EVALUATION
Patient: Smita Faith    Procedure Summary     Date:  06/13/19 Room / Location:   OR 06 / SURGERY [facility]    Anesthesia Start:  1302 Anesthesia Stop:  1341    Procedures:       ARTHROSCOPY, KNEE (Left Knee)      MENISCECTOMY, KNEE, MEDIAL - PARTIAL (Left Knee)      CHONDROPLASTY - AND IRVIN (Left Knee) Diagnosis:  (TEAR MEDIAL MENISCUS AND PAIN LEFT KNEE)    Surgeon:  Jatin Munguia M.D. Responsible Provider:  Kunal Sinclair D.O.    Anesthesia Type:  general ASA Status:  3          Final Anesthesia Type: general  Last vitals  BP   Blood Pressure: 106/56    Temp   36.3 °C (97.3 °F)    Pulse   Heart Rate (Monitored): 76   Resp   16    SpO2   94 %      Anesthesia Post Evaluation    Patient location during evaluation: PACU  Patient participation: complete - patient participated  Level of consciousness: awake and alert  Pain score: 4    Airway patency: patent  Anesthetic complications: no  Cardiovascular status: hemodynamically stable  Respiratory status: acceptable  Hydration status: euvolemic    PONV: none           Nurse Pain Score: 5 (NPRS)

## 2019-06-13 NOTE — ANESTHESIA PREPROCEDURE EVALUATION
Relevant Problems   (+) Hypothyroid   (+) Obesity (BMI 30-39.9)       Physical Exam    Airway   Mallampati: II  TM distance: >3 FB  Neck ROM: full       Cardiovascular - normal exam  Rhythm: regular  Rate: normal  (-) murmur     Dental - normal exam         Pulmonary - normal exam  Breath sounds clear to auscultation     Abdominal   (+) obese     Neurological - normal exam                 Anesthesia Plan    ASA 3   ASA physical status 3 criteria: morbid obesity - BMI greater than or equal to 40    Plan - general       Airway plan will be LMA        Induction: intravenous    Postoperative Plan: Postoperative administration of opioids is intended.    Pertinent diagnostic labs and testing reviewed    Informed Consent:    Anesthetic plan and risks discussed with patient.    Use of blood products discussed with: patient whom consented to blood products.

## 2019-06-14 NOTE — OP REPORT
DATE OF SERVICE:  06/13/2019    PREOPERATIVE DIAGNOSES:  Left knee medial meniscus tear and early   osteoarthrosis of the knee.    POSTOPERATIVE DIAGNOSES:  1.  Left knee complex posterior horn medial meniscal tear.  2.  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  3.  Synovitis of the knee.    PROCEDURES:  1.  Left knee diagnostic arthroscopy with arthroscopic partial medial   meniscectomy.  2.  Left knee arthroscopic chondroplasty of the medial femoral condyle, medial   tibial plateau, patella and trochlea.  3.  Left knee arthroscopic synovectomy.    SURGEON:  Jatin Munguia MD    ASSISTANT:  Luisana Greenberg PA-C    ANESTHESIA:  General.    ANESTHESIOLOGIST:  Shaggy Harmon MD    IMPLANTS:  None.    COMPLICATIONS:  None.    DISPOSITION:  Stable to postanesthesia care unit.    INDICATIONS:  The patient has had progressive left knee pain, which has been   unresponsive to conservative management.  The risks, benefits, alternatives,   and limitations of surgical intervention were discussed in detail.  She   expressed understanding and desired to proceed.    DESCRIPTION OF PROCEDURE:  The patient and the correct operative extremity   were identified in the preoperative area.  The knee was marked.  She was   brought to the operating room where the correct operative extremity was again   confirmed.  She was placed supine on the OR table where she underwent general   anesthesia without complication.  Examination under anesthesia showed morbid   obesity, no instability of the knee, full range of motion.  The knee was   prepped with alcohol and injected with 30 mL of 1% lidocaine with epinephrine.    The knee was then prepped and draped in the usual sterile fashion using   ChloraPrep.  Diagnostic arthroscopy was then performed, which showed diffuse   grade III chondromalacia of the patella and the trochlea.  There was some   central grade IV  chondromalacia of the trochlea as well.  The notch showed an   intact ACL and PCL.  The lateral compartment showed an intact meniscus and   intact cartilage.  There were loose chondral bodies diffusely throughout the   knee, which were all removed with the arthroscopic shaver.  The medial   compartment showed a very complex tear of the posterior horn of the medial   meniscus with diffuse grade III and areas of grade IV chondromalacia of the   medial femoral condyle and the medial tibial plateau.  Partial medial   meniscectomy was performed with a duckbill resector and the arthroscopic   shaver.  Chondroplasty was performed of the medial femoral condyle, the medial   tibial plateau, the patella and the trochlea.  There was significant   synovitis in the joint.  Synovectomy was performed removing inflamed synovium   from the suprapatellar pouch, medial and lateral gutter, and the retropatellar   fat pad.  Hemostasis was obtained with the Arthrocare wand.  A spinal needle   was placed into the suprapatellar pouch under direct vision.  The fluid was   removed from the knee.  The scope was withdrawn.  The portal was closed with   3-0 nylon.  The knee injected with 0.5% bupivacaine with epinephrine.  Sterile   dressings were applied.  The knee was loosely overwrapped with an Ace wrap.    The patient was then allowed to awake from anesthesia, transferred to her   hospital cart, and taken to the postanesthesia care unit in stable condition.    She tolerated the procedure well.  There were no immediate complications.       ____________________________________     ALCIDES GONZALEZ MD RD / SHEILA    DD:  06/13/2019 15:23:46  DT:  06/13/2019 17:38:07    D#:  [id number]  Job#:  [id number]

## 2019-08-29 ENCOUNTER — APPOINTMENT (OUTPATIENT)
Dept: RADIOLOGY | Facility: MEDICAL CENTER | Age: 54
End: 2019-08-29
Attending: EMERGENCY MEDICINE
Payer: MEDICARE

## 2019-08-29 ENCOUNTER — OFFICE VISIT (OUTPATIENT)
Dept: URGENT CARE | Facility: CLINIC | Age: 54
End: 2019-08-29
Payer: MEDICARE

## 2019-08-29 ENCOUNTER — HOSPITAL ENCOUNTER (OUTPATIENT)
Facility: MEDICAL CENTER | Age: 54
End: 2019-08-30
Attending: EMERGENCY MEDICINE | Admitting: HOSPITALIST
Payer: MEDICARE

## 2019-08-29 VITALS
HEART RATE: 85 BPM | OXYGEN SATURATION: 99 % | DIASTOLIC BLOOD PRESSURE: 76 MMHG | HEIGHT: 61 IN | TEMPERATURE: 97.1 F | RESPIRATION RATE: 18 BRPM | SYSTOLIC BLOOD PRESSURE: 124 MMHG | WEIGHT: 252 LBS | BODY MASS INDEX: 47.58 KG/M2

## 2019-08-29 DIAGNOSIS — R07.9 ACUTE CHEST PAIN: ICD-10-CM

## 2019-08-29 DIAGNOSIS — R82.998 LEUKOCYTES IN URINE: ICD-10-CM

## 2019-08-29 DIAGNOSIS — R10.11 RIGHT UPPER QUADRANT ABDOMINAL PAIN: ICD-10-CM

## 2019-08-29 DIAGNOSIS — R07.9 CHEST PAIN, UNSPECIFIED TYPE: ICD-10-CM

## 2019-08-29 PROBLEM — D50.9 MICROCYTIC ANEMIA: Status: ACTIVE | Noted: 2019-08-29

## 2019-08-29 LAB
ALBUMIN SERPL BCP-MCNC: 4 G/DL (ref 3.2–4.9)
ALBUMIN/GLOB SERPL: 1.2 G/DL
ALP SERPL-CCNC: 124 U/L (ref 30–99)
ALT SERPL-CCNC: 11 U/L (ref 2–50)
ANION GAP SERPL CALC-SCNC: 8 MMOL/L (ref 0–11.9)
ANISOCYTOSIS BLD QL SMEAR: ABNORMAL
APPEARANCE UR: CLEAR
AST SERPL-CCNC: 10 U/L (ref 12–45)
BASOPHILS # BLD AUTO: 0.6 % (ref 0–1.8)
BASOPHILS # BLD: 0.04 K/UL (ref 0–0.12)
BILIRUB SERPL-MCNC: 0.5 MG/DL (ref 0.1–1.5)
BILIRUB UR STRIP-MCNC: NEGATIVE MG/DL
BUN SERPL-MCNC: 11 MG/DL (ref 8–22)
CALCIUM SERPL-MCNC: 9.4 MG/DL (ref 8.5–10.5)
CHLORIDE SERPL-SCNC: 107 MMOL/L (ref 96–112)
CO2 SERPL-SCNC: 24 MMOL/L (ref 20–33)
COLOR UR AUTO: YELLOW
COMMENT 1642: NORMAL
CREAT SERPL-MCNC: 0.62 MG/DL (ref 0.5–1.4)
EKG IMPRESSION: NORMAL
EOSINOPHIL # BLD AUTO: 0.08 K/UL (ref 0–0.51)
EOSINOPHIL NFR BLD: 1.2 % (ref 0–6.9)
ERYTHROCYTE [DISTWIDTH] IN BLOOD BY AUTOMATED COUNT: 46.7 FL (ref 35.9–50)
GLOBULIN SER CALC-MCNC: 3.4 G/DL (ref 1.9–3.5)
GLUCOSE SERPL-MCNC: 91 MG/DL (ref 65–99)
GLUCOSE UR STRIP.AUTO-MCNC: NEGATIVE MG/DL
HCT VFR BLD AUTO: 38.3 % (ref 37–47)
HGB BLD-MCNC: 11.4 G/DL (ref 12–16)
HYPOCHROMIA BLD QL SMEAR: ABNORMAL
IMM GRANULOCYTES # BLD AUTO: 0 K/UL (ref 0–0.11)
IMM GRANULOCYTES NFR BLD AUTO: 0 % (ref 0–0.9)
KETONES UR STRIP.AUTO-MCNC: NEGATIVE MG/DL
LEUKOCYTE ESTERASE UR QL STRIP.AUTO: NORMAL
LYMPHOCYTES # BLD AUTO: 1.95 K/UL (ref 1–4.8)
LYMPHOCYTES NFR BLD: 28.2 % (ref 22–41)
MCH RBC QN AUTO: 23.4 PG (ref 27–33)
MCHC RBC AUTO-ENTMCNC: 29.8 G/DL (ref 33.6–35)
MCV RBC AUTO: 78.6 FL (ref 81.4–97.8)
MICROCYTES BLD QL SMEAR: ABNORMAL
MONOCYTES # BLD AUTO: 0.64 K/UL (ref 0–0.85)
MONOCYTES NFR BLD AUTO: 9.2 % (ref 0–13.4)
MORPHOLOGY BLD-IMP: NORMAL
NEUTROPHILS # BLD AUTO: 4.21 K/UL (ref 2–7.15)
NEUTROPHILS NFR BLD: 60.8 % (ref 44–72)
NITRITE UR QL STRIP.AUTO: NEGATIVE
NRBC # BLD AUTO: 0 K/UL
NRBC BLD-RTO: 0 /100 WBC
OVALOCYTES BLD QL SMEAR: NORMAL
PH UR STRIP.AUTO: 6 [PH] (ref 5–8)
PLATELET # BLD AUTO: 464 K/UL (ref 164–446)
PLATELET BLD QL SMEAR: NORMAL
PMV BLD AUTO: 9 FL (ref 9–12.9)
POIKILOCYTOSIS BLD QL SMEAR: NORMAL
POTASSIUM SERPL-SCNC: 3.8 MMOL/L (ref 3.6–5.5)
PROT SERPL-MCNC: 7.4 G/DL (ref 6–8.2)
PROT UR QL STRIP: NEGATIVE MG/DL
RBC # BLD AUTO: 4.87 M/UL (ref 4.2–5.4)
RBC BLD AUTO: PRESENT
RBC UR QL AUTO: NEGATIVE
SODIUM SERPL-SCNC: 139 MMOL/L (ref 135–145)
SP GR UR STRIP.AUTO: 1.01
TROPONIN T SERPL-MCNC: <6 NG/L (ref 6–19)
UROBILINOGEN UR STRIP-MCNC: 0.2 MG/DL
WBC # BLD AUTO: 6.9 K/UL (ref 4.8–10.8)

## 2019-08-29 PROCEDURE — 99285 EMERGENCY DEPT VISIT HI MDM: CPT

## 2019-08-29 PROCEDURE — 93000 ELECTROCARDIOGRAM COMPLETE: CPT | Performed by: NURSE PRACTITIONER

## 2019-08-29 PROCEDURE — 85025 COMPLETE CBC W/AUTO DIFF WBC: CPT

## 2019-08-29 PROCEDURE — G0378 HOSPITAL OBSERVATION PER HR: HCPCS

## 2019-08-29 PROCEDURE — 700111 HCHG RX REV CODE 636 W/ 250 OVERRIDE (IP): Performed by: EMERGENCY MEDICINE

## 2019-08-29 PROCEDURE — 93005 ELECTROCARDIOGRAM TRACING: CPT

## 2019-08-29 PROCEDURE — 99220 PR INITIAL OBSERVATION CARE,LEVL III: CPT | Performed by: HOSPITALIST

## 2019-08-29 PROCEDURE — 84484 ASSAY OF TROPONIN QUANT: CPT

## 2019-08-29 PROCEDURE — 96375 TX/PRO/DX INJ NEW DRUG ADDON: CPT | Mod: XU

## 2019-08-29 PROCEDURE — 80053 COMPREHEN METABOLIC PANEL: CPT

## 2019-08-29 PROCEDURE — 71045 X-RAY EXAM CHEST 1 VIEW: CPT

## 2019-08-29 PROCEDURE — 700117 HCHG RX CONTRAST REV CODE 255: Performed by: EMERGENCY MEDICINE

## 2019-08-29 PROCEDURE — A9270 NON-COVERED ITEM OR SERVICE: HCPCS | Performed by: HOSPITALIST

## 2019-08-29 PROCEDURE — 99214 OFFICE O/P EST MOD 30 MIN: CPT | Performed by: NURSE PRACTITIONER

## 2019-08-29 PROCEDURE — 93005 ELECTROCARDIOGRAM TRACING: CPT | Performed by: EMERGENCY MEDICINE

## 2019-08-29 PROCEDURE — 96374 THER/PROPH/DIAG INJ IV PUSH: CPT | Mod: XU

## 2019-08-29 PROCEDURE — 71275 CT ANGIOGRAPHY CHEST: CPT

## 2019-08-29 PROCEDURE — 700102 HCHG RX REV CODE 250 W/ 637 OVERRIDE(OP): Performed by: HOSPITALIST

## 2019-08-29 PROCEDURE — 36415 COLL VENOUS BLD VENIPUNCTURE: CPT

## 2019-08-29 PROCEDURE — 81002 URINALYSIS NONAUTO W/O SCOPE: CPT | Performed by: NURSE PRACTITIONER

## 2019-08-29 RX ORDER — ASPIRIN 300 MG/1
300 SUPPOSITORY RECTAL DAILY
Status: DISCONTINUED | OUTPATIENT
Start: 2019-08-29 | End: 2019-08-30 | Stop reason: HOSPADM

## 2019-08-29 RX ORDER — PREGABALIN 75 MG/1
75 CAPSULE ORAL 2 TIMES DAILY
Status: DISCONTINUED | OUTPATIENT
Start: 2019-08-29 | End: 2019-08-30 | Stop reason: HOSPADM

## 2019-08-29 RX ORDER — AMOXICILLIN 250 MG
2 CAPSULE ORAL 2 TIMES DAILY
Status: DISCONTINUED | OUTPATIENT
Start: 2019-08-30 | End: 2019-08-30 | Stop reason: HOSPADM

## 2019-08-29 RX ORDER — ONDANSETRON 2 MG/ML
4 INJECTION INTRAMUSCULAR; INTRAVENOUS ONCE
Status: COMPLETED | OUTPATIENT
Start: 2019-08-29 | End: 2019-08-29

## 2019-08-29 RX ORDER — REGADENOSON 0.08 MG/ML
0.4 INJECTION, SOLUTION INTRAVENOUS
Status: COMPLETED | OUTPATIENT
Start: 2019-08-29 | End: 2019-08-30

## 2019-08-29 RX ORDER — PROMETHAZINE HYDROCHLORIDE 25 MG/1
12.5-25 SUPPOSITORY RECTAL EVERY 4 HOURS PRN
Status: DISCONTINUED | OUTPATIENT
Start: 2019-08-29 | End: 2019-08-30 | Stop reason: HOSPADM

## 2019-08-29 RX ORDER — POLYETHYLENE GLYCOL 3350 17 G/17G
1 POWDER, FOR SOLUTION ORAL
Status: DISCONTINUED | OUTPATIENT
Start: 2019-08-29 | End: 2019-08-30 | Stop reason: HOSPADM

## 2019-08-29 RX ORDER — ASPIRIN 81 MG/1
324 TABLET, CHEWABLE ORAL DAILY
Status: DISCONTINUED | OUTPATIENT
Start: 2019-08-29 | End: 2019-08-30 | Stop reason: HOSPADM

## 2019-08-29 RX ORDER — MODAFINIL 100 MG/1
100 TABLET ORAL
Status: DISCONTINUED | OUTPATIENT
Start: 2019-08-29 | End: 2019-08-29

## 2019-08-29 RX ORDER — MORPHINE SULFATE 4 MG/ML
4 INJECTION, SOLUTION INTRAMUSCULAR; INTRAVENOUS ONCE
Status: COMPLETED | OUTPATIENT
Start: 2019-08-29 | End: 2019-08-29

## 2019-08-29 RX ORDER — ONDANSETRON 4 MG/1
4 TABLET, ORALLY DISINTEGRATING ORAL EVERY 8 HOURS PRN
Status: DISCONTINUED | OUTPATIENT
Start: 2019-08-29 | End: 2019-08-29

## 2019-08-29 RX ORDER — ASPIRIN 325 MG
325 TABLET ORAL DAILY
Status: DISCONTINUED | OUTPATIENT
Start: 2019-08-29 | End: 2019-08-30 | Stop reason: HOSPADM

## 2019-08-29 RX ORDER — ONDANSETRON 2 MG/ML
4 INJECTION INTRAMUSCULAR; INTRAVENOUS EVERY 4 HOURS PRN
Status: DISCONTINUED | OUTPATIENT
Start: 2019-08-29 | End: 2019-08-30 | Stop reason: HOSPADM

## 2019-08-29 RX ORDER — ONDANSETRON 4 MG/1
4 TABLET, ORALLY DISINTEGRATING ORAL EVERY 4 HOURS PRN
Status: DISCONTINUED | OUTPATIENT
Start: 2019-08-29 | End: 2019-08-30 | Stop reason: HOSPADM

## 2019-08-29 RX ORDER — PROCHLORPERAZINE EDISYLATE 5 MG/ML
5-10 INJECTION INTRAMUSCULAR; INTRAVENOUS EVERY 4 HOURS PRN
Status: DISCONTINUED | OUTPATIENT
Start: 2019-08-29 | End: 2019-08-30 | Stop reason: HOSPADM

## 2019-08-29 RX ORDER — LEVOTHYROXINE SODIUM 0.07 MG/1
75 TABLET ORAL
Status: DISCONTINUED | OUTPATIENT
Start: 2019-08-29 | End: 2019-08-30 | Stop reason: HOSPADM

## 2019-08-29 RX ORDER — ALBUTEROL SULFATE 90 UG/1
2 AEROSOL, METERED RESPIRATORY (INHALATION) EVERY 6 HOURS PRN
Status: DISCONTINUED | OUTPATIENT
Start: 2019-08-29 | End: 2019-08-30 | Stop reason: HOSPADM

## 2019-08-29 RX ORDER — OXYCODONE HYDROCHLORIDE AND ACETAMINOPHEN 5; 325 MG/1; MG/1
1-2 TABLET ORAL
Status: DISCONTINUED | OUTPATIENT
Start: 2019-08-29 | End: 2019-08-30 | Stop reason: HOSPADM

## 2019-08-29 RX ORDER — AMINOPHYLLINE 25 MG/ML
100 INJECTION, SOLUTION INTRAVENOUS
Status: DISCONTINUED | OUTPATIENT
Start: 2019-08-29 | End: 2019-08-30 | Stop reason: HOSPADM

## 2019-08-29 RX ORDER — BISACODYL 10 MG
10 SUPPOSITORY, RECTAL RECTAL
Status: DISCONTINUED | OUTPATIENT
Start: 2019-08-29 | End: 2019-08-30 | Stop reason: HOSPADM

## 2019-08-29 RX ORDER — GUAIFENESIN/DEXTROMETHORPHAN 100-10MG/5
10 SYRUP ORAL EVERY 6 HOURS PRN
Status: DISCONTINUED | OUTPATIENT
Start: 2019-08-29 | End: 2019-08-30 | Stop reason: HOSPADM

## 2019-08-29 RX ORDER — PROMETHAZINE HYDROCHLORIDE 25 MG/1
12.5-25 TABLET ORAL EVERY 4 HOURS PRN
Status: DISCONTINUED | OUTPATIENT
Start: 2019-08-29 | End: 2019-08-30 | Stop reason: HOSPADM

## 2019-08-29 RX ORDER — LAMOTRIGINE 100 MG/1
200 TABLET ORAL
Status: DISCONTINUED | OUTPATIENT
Start: 2019-08-29 | End: 2019-08-30 | Stop reason: HOSPADM

## 2019-08-29 RX ORDER — LABETALOL HYDROCHLORIDE 5 MG/ML
10 INJECTION, SOLUTION INTRAVENOUS EVERY 4 HOURS PRN
Status: DISCONTINUED | OUTPATIENT
Start: 2019-08-29 | End: 2019-08-30 | Stop reason: HOSPADM

## 2019-08-29 RX ORDER — FLUTICASONE PROPIONATE 50 MCG
1 SPRAY, SUSPENSION (ML) NASAL
Status: DISCONTINUED | OUTPATIENT
Start: 2019-08-29 | End: 2019-08-30 | Stop reason: HOSPADM

## 2019-08-29 RX ORDER — ACETAMINOPHEN 325 MG/1
650 TABLET ORAL EVERY 6 HOURS PRN
Status: DISCONTINUED | OUTPATIENT
Start: 2019-08-29 | End: 2019-08-30 | Stop reason: HOSPADM

## 2019-08-29 RX ORDER — ARIPIPRAZOLE 10 MG/1
5 TABLET ORAL DAILY
Status: DISCONTINUED | OUTPATIENT
Start: 2019-08-29 | End: 2019-08-29

## 2019-08-29 RX ADMIN — MORPHINE SULFATE 4 MG: 4 INJECTION INTRAVENOUS at 13:33

## 2019-08-29 RX ADMIN — ACETAMINOPHEN 650 MG: 325 TABLET ORAL at 21:46

## 2019-08-29 RX ADMIN — LAMOTRIGINE 200 MG: 100 TABLET ORAL at 21:47

## 2019-08-29 RX ADMIN — LEVOTHYROXINE SODIUM 75 MCG: 75 TABLET ORAL at 17:33

## 2019-08-29 RX ADMIN — PREGABALIN 75 MG: 75 CAPSULE ORAL at 17:34

## 2019-08-29 RX ADMIN — VITAMIN D, TAB 1000IU (100/BT) 1000 UNITS: 25 TAB at 17:33

## 2019-08-29 RX ADMIN — IOHEXOL 78 ML: 350 INJECTION, SOLUTION INTRAVENOUS at 13:29

## 2019-08-29 RX ADMIN — ACETAMINOPHEN 650 MG: 325 TABLET ORAL at 16:21

## 2019-08-29 RX ADMIN — ONDANSETRON 4 MG: 2 INJECTION INTRAMUSCULAR; INTRAVENOUS at 13:33

## 2019-08-29 RX ADMIN — OXYCODONE HYDROCHLORIDE AND ACETAMINOPHEN 1 TABLET: 5; 325 TABLET ORAL at 18:56

## 2019-08-29 ASSESSMENT — ENCOUNTER SYMPTOMS
CONSTIPATION: 0
WHEEZING: 0
VOMITING: 0
NERVOUS/ANXIOUS: 0
ROS GI COMMENTS: 1
DIZZINESS: 0
COUGH: 0
MUSCULOSKELETAL NEGATIVE: 1
BLOOD IN STOOL: 0
NEUROLOGICAL NEGATIVE: 1
FEVER: 0
EYES NEGATIVE: 1
PSYCHIATRIC NEGATIVE: 1
HEARTBURN: 0
DIAPHORESIS: 0
SHORTNESS OF BREATH: 1
SEIZURES: 0
COUGH: 0
HEADACHES: 0
CONSTITUTIONAL NEGATIVE: 1
DOUBLE VISION: 0
BRUISES/BLEEDS EASILY: 0
FEVER: 0
NAUSEA: 1
NAUSEA: 1
DIARRHEA: 0
ABDOMINAL PAIN: 1
LOSS OF CONSCIOUSNESS: 0
PALPITATIONS: 0
VOMITING: 0
HEMOPTYSIS: 0
CHILLS: 0
FOCAL WEAKNESS: 0

## 2019-08-29 ASSESSMENT — LIFESTYLE VARIABLES
CONSUMPTION TOTAL: NEGATIVE
AVERAGE NUMBER OF DAYS PER WEEK YOU HAVE A DRINK CONTAINING ALCOHOL: 0
TOTAL SCORE: 0
ON A TYPICAL DAY WHEN YOU DRINK ALCOHOL HOW MANY DRINKS DO YOU HAVE: 0
HAVE YOU EVER FELT YOU SHOULD CUT DOWN ON YOUR DRINKING: NO
EVER HAD A DRINK FIRST THING IN THE MORNING TO STEADY YOUR NERVES TO GET RID OF A HANGOVER: NO
EVER_SMOKED: YES
TOTAL SCORE: 0
EVER FELT BAD OR GUILTY ABOUT YOUR DRINKING: NO
ALCOHOL_USE: NO
DOES PATIENT WANT TO STOP DRINKING: NO
HOW MANY TIMES IN THE PAST YEAR HAVE YOU HAD 5 OR MORE DRINKS IN A DAY: 0
HAVE PEOPLE ANNOYED YOU BY CRITICIZING YOUR DRINKING: NO
TOTAL SCORE: 0

## 2019-08-29 ASSESSMENT — PATIENT HEALTH QUESTIONNAIRE - PHQ9
SUM OF ALL RESPONSES TO PHQ9 QUESTIONS 1 AND 2: 0
1. LITTLE INTEREST OR PLEASURE IN DOING THINGS: NOT AT ALL
2. FEELING DOWN, DEPRESSED, IRRITABLE, OR HOPELESS: NOT AT ALL

## 2019-08-29 NOTE — ASSESSMENT & PLAN NOTE
Patient states that chest pain started about 3 AM.  Its a to heaviness that is in the epigastric region and also spreads to the right under her breast.  The patient says accompanied by shortness of breath, nausea but no vomiting, she had no diaphoresis.  It radiates mostly into her shoulder.  Patient is never had a stress test or cardiac work-up.  The patient at this point will be evaluated for her chest discomfort with a stress test.

## 2019-08-29 NOTE — ED NOTES
Agree with triage assessment, no changes noted. Pt reports Right sided generalized pain that is worse with deep breath. Pt reports pain of 6 out of 10. Pt hooked to monitor. Pt denies any further needs at this time. Call light within reach. Bed in low locked position. Comfort measures provided.       ERP at bedside

## 2019-08-29 NOTE — ASSESSMENT & PLAN NOTE
-supportive measures with synthroid supplementation at 75 mcg per day, no change  -latest TSH is 1.94 and this is with in establish normal guidlines

## 2019-08-29 NOTE — CARE PLAN
Problem: Communication  Goal: The ability to communicate needs accurately and effectively will improve  Outcome: PROGRESSING AS EXPECTED     Problem: Safety  Goal: Will remain free from injury  Outcome: PROGRESSING AS EXPECTED  Goal: Will remain free from falls  Outcome: PROGRESSING AS EXPECTED   Call light education provide, patient completed return demonstration successfully. Re-enforced use of call light to ensure patient safety and decrease risk of fall.

## 2019-08-29 NOTE — ED PROVIDER NOTES
ED Provider Note    Scribed for Zachary Tejeda M.D. by Lizeth Schwartz. 8/29/2019, 12:31 PM.    Primary care provider: Pcp Pt States None  Means of arrival: walk-in  History obtained from: Patient  History limited by: None    CHIEF COMPLAINT  Chief Complaint   Patient presents with   • Chest Pain     woke up with right side chest pain at 0300 this morning, radiation to back, hurts with deep breath   • Sent from Urgent Care       HPI  Smita Faith is a 53 y.o. female who presents to the Emergency Department for complaints of ongoing right sided chest pain onset 3:00 am this morning. She states that breathing and laying down exacerbates the chest pain and standing without exertion alleviates the symptoms. The patient denies a history of DVT but reports family history of DVT (mother). Patient has a history of cholecystectomy. Negative for fever.    REVIEW OF SYSTEMS  Pertinent positives include right sided chest pain. Pertinent negatives include no fever. As above, all other systems reviewed and are negative.   See HPI for further details.     PAST MEDICAL HISTORY   has a past medical history of Alcoholism (HCC), Anemia, Anemia, Anesthesia, Anxiety disorder, Arthritis, ASTHMA, Backpain, Bipolar 2 disorder (HCC), Bowel habit changes, Bronchitis (1990), Carpal tunnel syndrome, Depression, Gynecological disorder, Heart burn, Indigestion, MRSA (methicillin resistant staph aureus) culture positive (08/2017), Pneumonia (1995), and Unspecified disorder of thyroid (9908-5963).    SURGICAL HISTORY   has a past surgical history that includes gastric bypass laparoscopic; tubal ligation; removal of tonsils,<11 y/o; cholecystostomy,percut (1985); lumbar fusion anterior (11/10/2010); breast biopsy; reduction of large breast (2010); hysterectomy robotic xi (N/A, 4/7/2016); cervical disk and fusion anterior (8/24/2017); hip arth anterior total (Right, 6/21/2018); knee arthroscopy (Left, 6/13/2019); medial meniscectomy (Left,  2019); and chondroplasty (Left, 2019).    SOCIAL HISTORY  Social History     Tobacco Use   • Smoking status: Former Smoker     Packs/day: 0.10     Years: 4.00     Pack years: 0.40     Types: Cigarettes     Last attempt to quit: 2015     Years since quittin.6   • Smokeless tobacco: Never Used   • Tobacco comment: 2009   Substance Use Topics   • Alcohol use: No   • Drug use: Yes     Types: Oral     Comment: Marijuana edibles, and vaping once per week for break through pain      Social History     Substance and Sexual Activity   Drug Use Yes   • Types: Oral    Comment: Marijuana edibles, and vaping once per week for break through pain       FAMILY HISTORY  DVT (mother)    CURRENT MEDICATIONS    Current Outpatient Medications:   •  lamotrigine (LAMICTAL) 200 MG tablet, Take 200 mg by mouth every bedtime., Disp: , Rfl: 2  •  modafinil (PROVIGIL) 100 MG Tab, Take 100 mg by mouth every day. For 6 days, Disp: , Rfl:   •  pregabalin (LYRICA) 75 MG Cap, Take 75 mg by mouth 2 times a day., Disp: , Rfl:   •  aripiprazole (ABILIFY) 5 MG tablet, Take 5 mg by mouth every day., Disp: , Rfl:   •  fluticasone (FLONASE) 50 MCG/ACT nasal spray, Spray 1 Spray in nose every day., Disp: , Rfl:   •  oxyCODONE-acetaminophen (PERCOCET) 5-325 MG Tab, Take 1-2 Tabs by mouth 1 time daily as needed., Disp: , Rfl:   •  FERROUS GLUCONATE PO, Take 4 mg by mouth every day., Disp: , Rfl:   •  levothyroxine (SYNTHROID) 75 MCG Tab, Take 75 mcg by mouth Every morning on an empty stomach., Disp: , Rfl:   •  EVENING PRIMROSE OIL PO, Take 1 Tab by mouth every day., Disp: , Rfl:   •  ondansetron (ZOFRAN ODT) 4 MG TABLET DISPERSIBLE, Take 4 mg by mouth every 8 hours as needed for Nausea., Disp: , Rfl:   •  Fexofenadine HCl (ALLEGRA PO), Take 1 Tab by mouth every day., Disp: , Rfl:   •  albuterol 108 (90 BASE) MCG/ACT Aero Soln inhalation aerosol, Inhale 2 Puffs by mouth every 6 hours as needed for Shortness of Breath., Disp: , Rfl:   •   "Cholecalciferol (VITAMIN D) 1000 UNIT Cap, Take 1,000 Units by mouth every day., Disp: , Rfl:   •  Multiple Vitamins-Minerals (MULTIVITAMIN & MINERAL PO), Take 1 Tab by mouth every day., Disp: , Rfl:   •  Calcium 7437-6837 MG-UNIT CHEW, Take 1 Each by mouth every day., Disp: , Rfl:       ALLERGIES  Allergies   Allergen Reactions   • Bloodless    • Ciprofloxacin Anaphylaxis     SOB   • Codeine      Upset stomach    • Erythromycin Vomiting   • Nsaids      Does not take due to gastric bypass   • Other Food Anaphylaxis     pj pj       PHYSICAL EXAM  VITAL SIGNS: /82   Pulse 75   Temp 36.6 °C (97.9 °F) (Temporal)   Resp 18   Ht 1.549 m (5' 1\")   Wt 119 kg (262 lb 5.6 oz)   LMP 03/02/2016 Comment: hcg sent with labs  SpO2 96%   BMI 49.57 kg/m²   Vitals reviewed.  Constitutional: Alert in no apparent distress.  HENT: No signs of trauma, Bilateral external ears normal, Nose normal.   Eyes: Pupils are equal and reactive, Conjunctiva normal, Non-icteric.   Neck: Normal range of motion, No tenderness, Supple, No stridor.   Lymphatic: No lymphadenopathy noted.   Cardiovascular: Regular rate and rhythm, no murmurs.   Thorax & Lungs: Normal breath sounds, No respiratory distress, No wheezing, No chest tenderness.   Abdomen: Bowel sounds normal, Soft, No tenderness, No peritoneal signs, No masses, No pulsatile masses.   Skin: Warm, Dry, No erythema, No rash.   Back: No bony tenderness, No CVA tenderness.   Extremities: Intact distal pulses, No edema, No tenderness, No cyanosis  Musculoskeletal: Good range of motion in all major joints. No tenderness to palpation or major deformities noted.   Neurologic: Alert , Normal motor function, Normal sensory function, No focal deficits noted.   Psychiatric: Affect normal, Judgment normal, Mood normal.     DIAGNOSTIC STUDIES / PROCEDURES    LABS  Labs Reviewed   CBC WITH DIFFERENTIAL - Abnormal; Notable for the following components:       Result Value    Hemoglobin 11.4 (*) "     MCV 78.6 (*)     MCH 23.4 (*)     MCHC 29.8 (*)     Platelet Count 464 (*)     All other components within normal limits   COMP METABOLIC PANEL - Abnormal; Notable for the following components:    AST(SGOT) 10 (*)     Alkaline Phosphatase 124 (*)     All other components within normal limits   TROPONIN   ESTIMATED GFR   PERIPHERAL SMEAR REVIEW   PLATELET ESTIMATE   MORPHOLOGY   DIFFERENTIAL COMMENT   TROPONIN   TROPONIN   URINE DRUG SCREEN      All labs reviewed by me.    EKG Interpretation:  Interpreted by me    12 Lead EKG interpreted by me to show:  Normal sinus rhythm  Rate 73  Axis: Normal  Intervals: Normal  Normal T waves  Normal ST segments  My impression of this EKG compared to EKG from 2/11/18: Does not meet STEMI criteria.    RADIOLOGY  CT-CTA CHEST PULMONARY ARTERY W/ RECONS   Final Result      No pulmonary embolus. No acute abnormality in the chest.            DX-CHEST-PORTABLE (1 VIEW)   Final Result      No acute cardiopulmonary abnormality.           The radiologist's interpretation of all radiological studies have been reviewed by me.    COURSE & MEDICAL DECISION MAKING  Nursing notes, VS, PMSFHx reviewed in chart.  Differential diagnoses include but not limited to: PE, pleurisy, ACS    12:31 PM Patient seen and examined at bedside. Ordered for DX-chest, CT-CTA chest pulmonary artery, CBC with differential, CMP, troponin, and EKG to evaluate.     1:30 PM - Update from RN, patient complains of nausea and pain. Patient will be treated with Morphine 4 mg and Zofran 4 mg.    2:20 PM - Patient was reevaluated at bedside. Discussed lab and radiology results with the patient and informed them that they will be admitted to CDU for atypical chest pain and a stress test due to her risk factors of obesity at this time. Patient is agreeable.    2:24 PM -  I discussed the patient's case and the above findings with Dr. Solo (CDU) who agrees to admit the patient.  I discussed her Troponin levels were normal at  first, however a stress test will be done given the patient has no history of stress test in the pasat and is obese.      DISPOSITION:  Patient will be admitted to Dr. Solo in guarded condition.      FINAL IMPRESSION  1. Acute chest pain          Lizeth IBARRA (Scribgrayson), am scribing for, and in the presence of, Zachary Tejeda M.D..    Electronically signed by: Lizeth Schwartz (Josesito), 8/29/2019    Zachary IBARRA M.D. personally performed the services described in this documentation, as scribed by Lizeth Schwartz in my presence, and it is both accurate and complete. C    The note accurately reflects work and decisions made by me.  Zachary Tejeda  8/29/2019  6:20 PM

## 2019-08-29 NOTE — PROGRESS NOTES
"  Subjective:     Smita Faith is a 53 y.o. female who presents for GI Problem (xtoday, RUQ pain, hurts to take deep breaths, pain radiates from front to back. nausea)      Patient presents with c/o right chest/rib pain with deep breath. History of back pain, thought it was that initially, however she had an episode where she \"Couldn't catch her breath\". Pain 7/10, sharp burning pain, stabbing pain. States she has shallow breathing, hurts to bad, not necessaritly SOB  No history of similar symptoms. Pain is under right rib cage,\" lung\". Radiates up to right shoulder and around back. Gallbladder removed, 1985. No hx of Kidney stone. . No PE or DVT history. Non smoker. No vag discharge. Hx of GERD, not currently on meds for GERD. No changes in bowel pattern. No cardiac or HTN history. No long time use for birth control. Mother has history of blood clots, and pt told she should not take birth control due to risk.     GI Problem   Associated symptoms include chest pain and nausea. Pertinent negatives include no chills, coughing, fever, urinary symptoms or vomiting. Associated symptoms comments: CP \"under my boob\".. She has tried nothing for the symptoms.       Past Medical History:   Diagnosis Date   • Alcoholism ([facility])    • Anemia    • Anemia    • Anesthesia     difficulty waking, nausea and vomiting   • Anxiety disorder    • Arthritis     back, hands,right hip arm-osteo   • ASTHMA     States trigered by allergy reaction, PRN inhaler    • Backpain     Lumbar chronic/bilat hips/knees   • Bipolar 2 disorder (HCC)    • Bowel habit changes     constipation, irreg diarrhea,    • Bronchitis 1990   • Carpal tunnel syndrome     bilateral   • Depression    • Gynecological disorder     cyst, removed with hysterectomy   • Heart burn     possible h pylori test pending   • Indigestion    • MRSA (methicillin resistant staph aureus) culture positive 08/2017    post op cervical surgery, MRSA infection, resolved   • Pneumonia 1995 "   • Unspecified disorder of thyroid [id number]       Past Surgical History:   Procedure Laterality Date   • KNEE ARTHROSCOPY Left 6/13/2019    Procedure: ARTHROSCOPY, KNEE;  Surgeon: Jatin Munguia M.D.;  Location: SURGERY [facility];  Service: Orthopedics   • MEDIAL MENISCECTOMY Left 6/13/2019    Procedure: MENISCECTOMY, KNEE, MEDIAL - PARTIAL;  Surgeon: Jatin Munguia M.D.;  Location: SURGERY [facility];  Service: Orthopedics   • CHONDROPLASTY Left 6/13/2019    Procedure: CHONDROPLASTY - AND IRVIN;  Surgeon: Jatin Munguia M.D.;  Location: SURGERY [facility];  Service: Orthopedics   • HIP ARTH ANTERIOR TOTAL Right 6/21/2018    Procedure: HIP ARTHROPLASTY ANTERIOR TOTAL;  Surgeon: Oral Ritchie M.D.;  Location: SURGERY [facility];  Service: Orthopedics   • CERVICAL DISK AND FUSION ANTERIOR  8/24/2017    Procedure: IRRIGATION AND DEBRIDEMENT ANTERIOR CERVICAL WOUND;  Surgeon: Cheryl Herrera M.D.;  Location: SURGERY [facility];  Service:    • HYSTERECTOMY ROBOTIC XI N/A 4/7/2016    Procedure: HYSTERECTOMY ROBOTIC XI ;  Surgeon: Song Goodson M.D.;  Location: SURGERY [facility];  Service:    • LUMBAR FUSION ANTERIOR  11/10/2010    Performed by CHERYL HERRERA at [facility]   • PB REDUCTION OF LARGE BREAST  2010   • PB CHOLECYSTOSTOMY,PERCUT  1985   • BREAST BIOPSY      Right- benign   • GASTRIC BYPASS LAPAROSCOPIC     • PB REMOVAL OF TONSILS,<13 Y/O     • TUBAL LIGATION         Social History     Socioeconomic History   • Marital status:      Spouse name: Not on file   • Number of children: Not on file   • Years of education: Not on file   • Highest education level: Not on file   Occupational History   • Not on file   Social Needs   • Financial resource strain: Not on file   • Food insecurity:     Worry: Not on file     Inability: Not on file   • Transportation needs:     Medical: Not on file     Non-medical: Not on file   Tobacco Use   • Smoking status: Former Smoker      "Packs/day: 0.10     Years: 4.00     Pack years: 0.40     Types: Cigarettes     Last attempt to quit: 2015     Years since quittin.6   • Smokeless tobacco: Never Used   • Tobacco comment:    Substance and Sexual Activity   • Alcohol use: No   • Drug use: Yes     Types: Oral     Comment: Marijuana edibles, and vaping once per week for break through pain   • Sexual activity: Not on file   Lifestyle   • Physical activity:     Days per week: Not on file     Minutes per session: Not on file   • Stress: Not on file   Relationships   • Social connections:     Talks on phone: Not on file     Gets together: Not on file     Attends [employer] service: Not on file     Active member of club or organization: Not on file     Attends meetings of clubs or organizations: Not on file     Relationship status: Not on file   • Intimate partner violence:     Fear of current or ex partner: Not on file     Emotionally abused: Not on file     Physically abused: Not on file     Forced sexual activity: Not on file   Other Topics Concern   • Not on file   Social History Narrative   • Not on file        History reviewed. No pertinent family history.     Allergies   Allergen Reactions   • Bloodless    • Ciprofloxacin Anaphylaxis     SOB   • Codeine      Upset stomach    • Erythromycin Vomiting   • Nsaids      Does not take due to gastric bypass   • Other Food Anaphylaxis     pj pj       Review of Systems   Constitutional: Negative for chills and fever.   Respiratory: Negative for cough and wheezing.    Cardiovascular: Positive for chest pain. Negative for palpitations and leg swelling.   Gastrointestinal: Positive for nausea. Negative for vomiting.        1   Musculoskeletal: Positive for back pain.   All other systems reviewed and are negative.       Objective:   /76   Pulse 85   Temp 36.2 °C (97.1 °F) (Temporal)   Resp 18   Ht 1.549 m (5' 1\")   Wt 114.3 kg (252 lb)   LMP 2016 Comment: hcg sent with labs  SpO2 " 99%   Breastfeeding? No   BMI 47.61 kg/m²     Physical Exam   Constitutional: She is oriented to person, place, and time. She appears well-developed. No distress.   HENT:   Head: Normocephalic.   Right Ear: External ear normal.   Left Ear: External ear normal.   Nose: Nose normal.   Mouth/Throat: Oropharynx is clear and moist.   Eyes: Pupils are equal, round, and reactive to light. Conjunctivae are normal.   Neck: Normal range of motion. No JVD present.   Cardiovascular: Normal rate, regular rhythm, normal heart sounds and intact distal pulses.   Pulses:       Radial pulses are 2+ on the right side, and 2+ on the left side.   Pulmonary/Chest: No accessory muscle usage or stridor. No respiratory distress. She has decreased breath sounds in the right lower field and the left lower field. She has no wheezes. She has no rhonchi. She has no rales. She exhibits no tenderness, no crepitus, no edema and no retraction.   Patient grimacing with deep inspiration, appears to be uncomfortable.    Abdominal: Soft. Normal appearance and bowel sounds are normal. She exhibits no distension and no ascites. There is tenderness in the right upper quadrant and epigastric area. There is no rigidity, no rebound, no guarding, no CVA tenderness, no tenderness at McBurney's point and negative Giles's sign.   Neurological: She is alert and oriented to person, place, and time.   Skin: Skin is warm and dry. Capillary refill takes less than 2 seconds.   Psychiatric: She has a normal mood and affect. Her behavior is normal. Judgment and thought content normal.   Vitals reviewed.      Assessment/Plan:   1. Chest pain, unspecified type  - EKG - Clinic Performed  -No ST elevation    2. Leukocytes in urine    3. Right upper quadrant abdominal pain  - POCT Urinalysis    Referral to the ED for higher level of care based upon severity of pain and c/o sharp right chest pain upon inspiration.     Differential diagnosis, natural history, supportive  care, and indications for immediate follow-up discussed.

## 2019-08-29 NOTE — ED TRIAGE NOTES
".  Chief Complaint   Patient presents with   • Chest Pain     woke up with right side chest pain at 0300 this morning, radiation to back, hurts with deep breath   • Sent from Urgent Care       ./82   Pulse 75   Temp 36.6 °C (97.9 °F) (Temporal)   Resp 18   Ht 1.549 m (5' 1\")   Wt 119 kg (262 lb 5.6 oz)   LMP 03/02/2016 Comment: hcg sent with labs  SpO2 96%   BMI 49.57 kg/m²     Ambulatory to triage with above complaints, called for EKG  "

## 2019-08-29 NOTE — DISCHARGE PLANNING
Care Transition Team Assessment    Spoke with patient at bedside. Anticipate no needs @ present time. Spouse or friend will be ride @ D/C.    Information Source  Orientation : Oriented x 4  Information Given By: Patient    Readmission Evaluation  Is this a readmission?: No    Interdisciplinary Discharge Planning  Does Admitting Nurse Feel This Could be a Complex Discharge?: No  Primary Care Physician: Arnoldo Chinchilla  Lives with - Patient's Self Care Capacity: Spouse  Support Systems: Spouse / Significant Other  Housing / Facility: [address]  Do You Take your Prescribed Medications Regularly: Yes  Able to Return to Previous ADL's: Yes  Mobility Issues: No  Prior Services: Home-Independent  Patient Expects to be Discharged to:: Home  Assistance Needed: No  Durable Medical Equipment: Other - Specify(Cane)    Discharge Preparedness  What are your discharge supports?: Spouse  Prior Functional Level: Uses Cane    Functional Assesment  Prior Functional Level: Uses Cane    Finances  Prescription Coverage: Yes    Anticipated Discharge Information  Anticipated discharge disposition: Home  Discharge Address: [address] [place] [address]  Discharge Contact Phone Number: [phone]

## 2019-08-29 NOTE — PROGRESS NOTES
Patient transported to floor with ACLS RN and telemetry monitored. Patient is A/O x4 and co right back pain rated 4/10

## 2019-08-29 NOTE — H&P
Hospital Medicine History & Physical Note    Date of Service  8/29/2019    Primary Care Physician  Pcp Pt States None    Consultants  None    Code Status  Full code    Chief Complaint  Chest pain    History of Presenting Illness  53 y.o. female who presented 8/29/2019 with chest pain that started around 3 AM.  The patient says it was initially an uncomfortable feeling and then it started getting worse and went up to maybe about a 7 out of 10 intensity.  It stayed in the epigastric region and then wrapped itself to the right under her breast to the midclavicular line.  It also radiates to the shoulder.  It is accompanied by nausea and shortness of breath.  The patient at this point will be risk stratified with serial cardiac markers and a stress test.    Review of Systems  Review of Systems   Constitutional: Negative.  Negative for chills, diaphoresis and fever.   HENT: Negative.    Eyes: Negative.  Negative for double vision.   Respiratory: Positive for shortness of breath. Negative for cough, hemoptysis and wheezing.    Cardiovascular: Positive for chest pain. Negative for palpitations and leg swelling.   Gastrointestinal: Positive for abdominal pain and nausea. Negative for blood in stool, constipation, diarrhea, heartburn and vomiting.   Genitourinary: Negative.  Negative for frequency, hematuria and urgency.   Musculoskeletal: Negative.  Negative for joint pain.   Skin: Negative.  Negative for itching and rash.   Neurological: Negative.  Negative for dizziness, focal weakness, seizures, loss of consciousness and headaches.   Endo/Heme/Allergies: Negative.  Does not bruise/bleed easily.   Psychiatric/Behavioral: Negative.  Negative for suicidal ideas. The patient is not nervous/anxious.    All other systems reviewed and are negative.      Past Medical History   has a past medical history of Alcoholism ([facility]), Anemia, Anemia, Anesthesia, Anxiety disorder, Arthritis, ASTHMA, Backpain, Bipolar 2 disorder ([facility]), Bowel  habit changes, Bronchitis (1990), Carpal tunnel syndrome, Depression, Gynecological disorder, Heart burn, Indigestion, MRSA (methicillin resistant staph aureus) culture positive (08/2017), Pneumonia (1995), and Unspecified disorder of thyroid (9715-3452).    Surgical History   has a past surgical history that includes gastric bypass laparoscopic; tubal ligation; pr removal of tonsils,<11 y/o; pr cholecystostomy,percut (1985); lumbar fusion anterior (11/10/2010); breast biopsy; pr reduction of large breast (2010); hysterectomy robotic xi (N/A, 4/7/2016); cervical disk and fusion anterior (8/24/2017); hip arth anterior total (Right, 6/21/2018); knee arthroscopy (Left, 6/13/2019); medial meniscectomy (Left, 6/13/2019); and chondroplasty (Left, 6/13/2019).     Family History  Patient has a family history of diabetes and hypertension in both her mother and maternal and paternal side of the family.    Social History   reports that she quit smoking about 4 years ago. Her smoking use included cigarettes. She has a 0.40 pack-year smoking history. She has never used smokeless tobacco. She reports that she has current or past drug history. Drug: Oral. She reports that she does not drink alcohol.    Allergies  Allergies   Allergen Reactions   • Bloodless    • Ciprofloxacin Anaphylaxis     SOB   • Codeine      Upset stomach    • Erythromycin Vomiting   • Nsaids      Does not take due to gastric bypass   • Other Food Anaphylaxis     pj pj       Medications  Prior to Admission Medications   Prescriptions Last Dose Informant Patient Reported? Taking?   Calcium 8864-9037 MG-UNIT CHEW  Patient Yes No   Sig: Take 1 Each by mouth every day.   Cholecalciferol (VITAMIN D) 1000 UNIT Cap  Patient Yes No   Sig: Take 1,000 Units by mouth every day.   EVENING PRIMROSE OIL PO  Patient Yes No   Sig: Take 1 Tab by mouth every day.   FERROUS GLUCONATE PO   Yes No   Sig: Take 4 mg by mouth every day.   Fexofenadine HCl (ALLEGRA PO)  Patient  Yes No   Sig: Take 1 Tab by mouth every day.   Multiple Vitamins-Minerals (MULTIVITAMIN & MINERAL PO)  Patient Yes No   Sig: Take 1 Tab by mouth every day.   albuterol 108 (90 BASE) MCG/ACT Aero Soln inhalation aerosol  Patient Yes No   Sig: Inhale 2 Puffs by mouth every 6 hours as needed for Shortness of Breath.   aripiprazole (ABILIFY) 5 MG tablet   Yes No   Sig: Take 5 mg by mouth every day.   fluticasone (FLONASE) 50 MCG/ACT nasal spray   Yes No   Sig: Spray 1 Spray in nose every day.   lamotrigine (LAMICTAL) 200 MG tablet   Yes No   Sig: Take 200 mg by mouth every bedtime.   levothyroxine (SYNTHROID) 75 MCG Tab  Patient Yes No   Sig: Take 75 mcg by mouth Every morning on an empty stomach.   modafinil (PROVIGIL) 100 MG Tab   Yes No   Sig: Take 100 mg by mouth every day. For 6 days   ondansetron (ZOFRAN ODT) 4 MG TABLET DISPERSIBLE  Patient Yes No   Sig: Take 4 mg by mouth every 8 hours as needed for Nausea.   oxyCODONE-acetaminophen (PERCOCET) 5-325 MG Tab   Yes No   Sig: Take 1-2 Tabs by mouth 1 time daily as needed.   pregabalin (LYRICA) 75 MG Cap   Yes No   Sig: Take 75 mg by mouth 2 times a day.      Facility-Administered Medications: None       Physical Exam  Temp:  [36.5 °C (97.7 °F)-36.6 °C (97.9 °F)] 36.5 °C (97.7 °F)  Pulse:  [62-79] 75  Resp:  [16-18] 16  BP: (103-138)/(56-82) 138/78  SpO2:  [93 %-100 %] 96 %    Physical Exam   Constitutional: She is oriented to person, place, and time. She appears well-developed and well-nourished.   HENT:   Head: Normocephalic and atraumatic.   Right Ear: External ear normal.   Left Ear: External ear normal.   Nose: Nose normal.   Mouth/Throat: Oropharynx is clear and moist.   Eyes: Pupils are equal, round, and reactive to light. Conjunctivae and EOM are normal.   Neck: Normal range of motion. Neck supple. No JVD present. No thyromegaly present.   Cardiovascular: Normal rate and regular rhythm.   No murmur heard.  Pulmonary/Chest: Effort normal and breath sounds  normal. She has no wheezes. She has no rales. She exhibits no tenderness.   Abdominal: Soft. Bowel sounds are normal. She exhibits no distension and no mass. There is tenderness in the epigastric area. There is no rebound and no guarding.   Musculoskeletal: Normal range of motion. She exhibits no edema or tenderness.   Lymphadenopathy:     She has no cervical adenopathy.   Neurological: She is alert and oriented to person, place, and time. She has normal reflexes. No cranial nerve deficit. Coordination normal.   Skin: Skin is warm and dry. No rash noted. No erythema.   Psychiatric: She has a normal mood and affect. Her behavior is normal. Judgment and thought content normal.   Nursing note and vitals reviewed.      Laboratory:  Recent Labs     08/29/19  1140   WBC 6.9   RBC 4.87   HEMOGLOBIN 11.4*   HEMATOCRIT 38.3   MCV 78.6*   MCH 23.4*   MCHC 29.8*   RDW 46.7   PLATELETCT 464*   MPV 9.0     Recent Labs     08/29/19  1140   SODIUM 139   POTASSIUM 3.8   CHLORIDE 107   CO2 24   GLUCOSE 91   BUN 11   CREATININE 0.62   CALCIUM 9.4     Recent Labs     08/29/19  1140   ALTSGPT 11   ASTSGOT 10*   ALKPHOSPHAT 124*   TBILIRUBIN 0.5   GLUCOSE 91         No results for input(s): NTPROBNP in the last 72 hours.      Recent Labs     08/29/19  1140   TROPONINT <6       Urinalysis:    No results found     Imaging:  CT-CTA CHEST PULMONARY ARTERY W/ RECONS   Final Result      No pulmonary embolus. No acute abnormality in the chest.            DX-CHEST-PORTABLE (1 VIEW)   Final Result      No acute cardiopulmonary abnormality.      NM-CARDIAC STRESS TEST    (Results Pending)         Assessment/Plan:  I anticipate this patient is appropriate for observation status at this time.    * Chest pain in adult  Assessment & Plan  Patient states that chest pain started about 3 AM.  Its a to heaviness that is in the epigastric region and also spreads to the right under her breast.  The patient says accompanied by shortness of breath, nausea  but no vomiting, she had no diaphoresis.  It radiates mostly into her shoulder.  Patient is never had a stress test or cardiac work-up.  The patient at this point will be evaluated for her chest discomfort with a stress test.    Microcytic anemia  Assessment & Plan  Check iron panel and TIBC.    Obesity (BMI 30-39.9)- (present on admission)  Assessment & Plan  Will benefit from outpatient weight loss management program.  Body mass index is 49.82 kg/m².      Hypothyroid- (present on admission)  Assessment & Plan  -supportive measures with synthroid supplementation at 75 mcg per day, no change  -latest TSH is 1.94 and this is with in establish normal guidlines     Status post cervical spinal fusion- (present on admission)  Assessment & Plan  Continue with pain management      VTE prophylaxis: SCDs

## 2019-08-30 ENCOUNTER — APPOINTMENT (OUTPATIENT)
Dept: RADIOLOGY | Facility: MEDICAL CENTER | Age: 54
End: 2019-08-30
Attending: HOSPITALIST
Payer: MEDICARE

## 2019-08-30 ENCOUNTER — PATIENT OUTREACH (OUTPATIENT)
Dept: HEALTH INFORMATION MANAGEMENT | Facility: OTHER | Age: 54
End: 2019-08-30

## 2019-08-30 VITALS
TEMPERATURE: 97.2 F | SYSTOLIC BLOOD PRESSURE: 119 MMHG | OXYGEN SATURATION: 97 % | HEIGHT: 61 IN | RESPIRATION RATE: 18 BRPM | BODY MASS INDEX: 49.78 KG/M2 | DIASTOLIC BLOOD PRESSURE: 64 MMHG | WEIGHT: 263.67 LBS | HEART RATE: 75 BPM

## 2019-08-30 LAB
AMPHET UR QL SCN: NEGATIVE
BARBITURATES UR QL SCN: NEGATIVE
BENZODIAZ UR QL SCN: NEGATIVE
BZE UR QL SCN: NEGATIVE
CANNABINOIDS UR QL SCN: NEGATIVE
CHOLEST SERPL-MCNC: 152 MG/DL (ref 100–199)
HDLC SERPL-MCNC: 52 MG/DL
LDLC SERPL CALC-MCNC: 85 MG/DL
METHADONE UR QL SCN: NEGATIVE
OPIATES UR QL SCN: POSITIVE
OXYCODONE UR QL SCN: POSITIVE
PCP UR QL SCN: NEGATIVE
PROPOXYPH UR QL SCN: NEGATIVE
TRIGL SERPL-MCNC: 77 MG/DL (ref 0–149)

## 2019-08-30 PROCEDURE — 700111 HCHG RX REV CODE 636 W/ 250 OVERRIDE (IP)

## 2019-08-30 PROCEDURE — A9270 NON-COVERED ITEM OR SERVICE: HCPCS | Performed by: HOSPITALIST

## 2019-08-30 PROCEDURE — 99217 PR OBSERVATION CARE DISCHARGE: CPT | Performed by: HOSPITALIST

## 2019-08-30 PROCEDURE — 80061 LIPID PANEL: CPT

## 2019-08-30 PROCEDURE — 700102 HCHG RX REV CODE 250 W/ 637 OVERRIDE(OP): Performed by: HOSPITALIST

## 2019-08-30 PROCEDURE — A9502 TC99M TETROFOSMIN: HCPCS

## 2019-08-30 PROCEDURE — 80307 DRUG TEST PRSMV CHEM ANLYZR: CPT

## 2019-08-30 PROCEDURE — G0378 HOSPITAL OBSERVATION PER HR: HCPCS

## 2019-08-30 RX ORDER — REGADENOSON 0.08 MG/ML
INJECTION, SOLUTION INTRAVENOUS
Status: COMPLETED
Start: 2019-08-30 | End: 2019-08-30

## 2019-08-30 RX ADMIN — VITAMIN D, TAB 1000IU (100/BT) 1000 UNITS: 25 TAB at 05:17

## 2019-08-30 RX ADMIN — PREGABALIN 75 MG: 75 CAPSULE ORAL at 05:16

## 2019-08-30 RX ADMIN — OXYCODONE HYDROCHLORIDE AND ACETAMINOPHEN 2 TABLET: 5; 325 TABLET ORAL at 03:33

## 2019-08-30 RX ADMIN — CALCIUM CARBONATE-CHOLECALCIFEROL TAB 250 MG-125 UNIT 4 TABLET: 250-125 TAB at 05:16

## 2019-08-30 RX ADMIN — LEVOTHYROXINE SODIUM 75 MCG: 75 TABLET ORAL at 05:16

## 2019-08-30 RX ADMIN — REGADENOSON 0.4 MG: 0.08 INJECTION, SOLUTION INTRAVENOUS at 08:26

## 2019-08-30 RX ADMIN — OXYCODONE HYDROCHLORIDE AND ACETAMINOPHEN 2 TABLET: 5; 325 TABLET ORAL at 09:26

## 2019-08-30 NOTE — DISCHARGE INSTRUCTIONS
Discharge Instructions    Discharged to home by car with relative. Discharged via wheelchair, hospital escort: Yes.  Special equipment needed: Not Applicable    Be sure to schedule a follow-up appointment with your primary care doctor or any specialists as instructed.     Discharge Plan:   Diet Plan: Discussed  Activity Level: Discussed  Confirmed Follow up Appointment: Patient to Call and Schedule Appointment  Confirmed Symptoms Management: Discussed  Medication Reconciliation Updated: Yes  Influenza Vaccine Indication: Patient Refuses    I understand that a diet low in cholesterol, fat, and sodium is recommended for good health. Unless I have been given specific instructions below for another diet, I accept this instruction as my diet prescription.   Other diet: Heart Healthy     Special Instructions: None    · Is patient discharged on Warfarin / Coumadin?   No     Depression / Suicide Risk    As you are discharged from this [facility] facility, it is important to learn how to keep safe from harming yourself.    Recognize the warning signs:  · Abrupt changes in personality, positive or negative- including increase in energy   · Giving away possessions  · Change in eating patterns- significant weight changes-  positive or negative  · Change in sleeping patterns- unable to sleep or sleeping all the time   · Unwillingness or inability to communicate  · Depression  · Unusual sadness, discouragement and loneliness  · Talk of wanting to die  · Neglect of personal appearance   · Rebelliousness- reckless behavior  · Withdrawal from people/activities they love  · Confusion- inability to concentrate     If you or a loved one observes any of these behaviors or has concerns about self-harm, here's what you can do:  · Talk about it- your feelings and reasons for harming yourself  · Remove any means that you might use to hurt yourself (examples: pills, rope, extension cords, firearm)  · Get professional help from the  community (Mental Health, Substance Abuse, psychological counseling)  · Do not be alone:Call your Safe Contact- someone whom you trust who will be there for you.  · Call your local CRISIS HOTLINE 329-7568 or 987-878-9494  · Call your local Children's Mobile Crisis Response Team [place] (166) 731-3472 or www.HW  · Call the toll free National Suicide Prevention Hotlines   · National Suicide Prevention Lifeline 609-015-JYCN (7334)  · National Hope Line Network 800-SUICIDE (022-6751)        Chest Pain Observation  It is often hard to give a specific diagnosis for the cause of chest pain. Among other possibilities your symptoms might be caused by inadequate oxygen delivery to your heart (angina). Angina that is not treated or evaluated can lead to a heart attack (myocardial infarction) or death.  Blood tests, electrocardiograms, and X-rays may have been done to help determine a possible cause of your chest pain. After evaluation and observation, your health care provider has determined that it is unlikely your pain was caused by an unstable condition that requires hospitalization. However, a full evaluation of your pain may need to be completed, with additional diagnostic testing as directed. It is very important to keep your follow-up appointments. Not keeping your follow-up appointments could result in permanent heart damage, disability, or death. If there is any problem keeping your follow-up appointments, you must call your health care provider.  HOME CARE INSTRUCTIONS   Due to the slight chance that your pain could be angina, it is important to follow your health care provider's treatment plan and also maintain a healthy lifestyle:  · Maintain or work toward achieving a healthy weight.  · Stay physically active and exercise regularly.  · Decrease your salt intake.  · Eat a balanced, healthy diet. Talk to a dietitian to learn about heart-healthy foods.  · Increase your fiber intake by including  whole grains, vegetables, fruits, and nuts in your diet.  · Avoid situations that cause stress, anger, or depression.  · Take medicines as advised by your health care provider. Report any side effects to your health care provider. Do not stop medicines or adjust the dosages on your own.  · Quit smoking. Do not use nicotine patches or gum until you check with your health care provider.  · Keep your blood pressure, blood sugar, and cholesterol levels within normal limits.  · Limit alcohol intake to no more than 1 drink per day for women who are not pregnant and 2 drinks per day for men.  · Do not abuse drugs.  SEEK IMMEDIATE MEDICAL CARE IF:  You have severe chest pain or pressure which may include symptoms such as:  · You feel pain or pressure in your arms, neck, jaw, or back.  · You have severe back or abdominal pain, feel sick to your stomach (nauseous), or throw up (vomit).  · You are sweating profusely.  · You are having a fast or irregular heartbeat.  · You feel short of breath while at rest.  · You notice increasing shortness of breath during rest, sleep, or with activity.  · You have chest pain that does not get better after rest or after taking your usual medicine.  · You wake from sleep with chest pain.  · You are unable to sleep because you cannot breathe.  · You develop a frequent cough or you are coughing up blood.  · You feel dizzy, faint, or experience extreme fatigue.  · You develop severe weakness, dizziness, fainting, or chills.  Any of these symptoms may represent a serious problem that is an emergency. Do not wait to see if the symptoms will go away. Call your local emergency services (911 in the [place] Do not drive yourself to the hospital.  MAKE SURE YOU:  · Understand these instructions.  · Will watch your condition.  · Will get help right away if you are not doing well or get worse.     This information is not intended to replace advice given to you by your health care provider. Make sure you  discuss any questions you have with your health care provider.     Document Released: 01/20/2012 Document Revised: 12/23/2014 Document Reviewed: 06/19/2014  Elsevier Interactive Patient Education ©2016 [employer]

## 2019-08-30 NOTE — PROGRESS NOTES
Rounded pt, A/o, respirations are even and unlabored on room air ,assessment completed, vital signs stable, SR on the monitor, pt still complaining of chest and back pain 3/10, heat pack applied, updated communication board,  poc discussed and understood, verbalized understanding, pt aware NPO after mid,  all questions answered at this time , fall precautions in place, call button within reach, will continue to monitor

## 2019-08-30 NOTE — DISCHARGE SUMMARY
Discharge Summary    CHIEF COMPLAINT ON ADMISSION  Chief Complaint   Patient presents with   • Chest Pain     woke up with right side chest pain at 0300 this morning, radiation to back, hurts with deep breath   • Sent from Urgent Care     Reason for Admission  Chest pain    Admission Date  8/29/2019    CODE STATUS  Full Code    HPI & HOSPITAL COURSE  Ms. Faith is a 53-year-old female who presented to the emergency department on 8/29/2019 with complaints of chest pain that woke her up at approximately 3 AM that morning.  Serial troponins were obtained and were negative.  Labs were significant only for stable microcytic anemia, mild thrombocytosis, and mild elevation in her alkaline phosphatase level.  Her lipid profile was normal and her urine drug screen was positive for opiates and oxycodone which she takes at home.  Chest x-ray showed no acute cardiopulmonary abnormalities.  Surgical clips in the right upper quadrant where her pain is at were noted.  Stress test was performed and showed a possible nonreversible defect noted in the inferolateral wall consistent with artifact.  There were no reversible defects that would indicate ischemia and her ejection fraction and wall motion were normal.  A CTA of her chest was then done and was negative.  It is highly possible that she has adhesions in that area that are causing her discomfort.  We have advised she follow-up with her PCP after hospital discharge.  Her vital signs and lab work have remained stable and she is medically clear for discharge today.     Therefore, she is discharged in good and stable condition to home with close outpatient follow-up.    Discharge Date  8/30/2019    FOLLOW UP ITEMS POST DISCHARGE  PCP within 1 to 2 weeks    DISCHARGE DIAGNOSES  Principal Problem:    Chest pain in adult POA: Yes  Active Problems:    Status post cervical spinal fusion POA: Yes    Hypothyroid POA: Yes    Obesity (BMI 30-39.9) POA: Yes    Microcytic anemia POA:  Yes  Resolved Problems:    * No resolved hospital problems. *    FOLLOW UP  MER Veloz  [address] [place] [id number] [phone]    Go on 9/3/2019  Please arrive at 9:10 am for your appointment with Dr Rehman. Thank you     MEDICATIONS ON DISCHARGE     Medication List      Continue taking these medications      Instructions   albuterol 108 (90 Base) MCG/ACT Aers inhalation aerosol   Inhale 2 Puffs by mouth every 6 hours as needed for Shortness of Breath.  Dose:  2 Puff     ALLEGRA PO   Take 1 Tab by mouth every day.  Dose:  1 Tab     Calcium 8521-9418 MG-UNIT Chew   Take 1 Each by mouth every day.  Dose:  1 Each     EVENING PRIMROSE OIL PO   Take 1 Tab by mouth every day.  Dose:  1 Tab     FERROUS GLUCONATE PO   Take 4 mg by mouth every day.  Dose:  4 mg     FLONASE 50 MCG/ACT nasal spray  Generic drug:  fluticasone   Spray 1 Spray in nose every day.  Dose:  1 Spray     lamotrigine 200 MG tablet  Commonly known as:  LAMICTAL   Take 200 mg by mouth every bedtime.  Dose:  200 mg     levothyroxine 75 MCG Tabs  Commonly known as:  SYNTHROID   Take 75 mcg by mouth Every morning on an empty stomach.  Dose:  75 mcg     MULTIVITAMIN & MINERAL PO   Take 1 Tab by mouth every day.  Dose:  1 Tab     ondansetron 4 MG Tbdp  Commonly known as:  ZOFRAN ODT   Take 4 mg by mouth every 8 hours as needed for Nausea.  Dose:  4 mg     oxyCODONE-acetaminophen 5-325 MG Tabs  Commonly known as:  PERCOCET   Take 1-2 Tabs by mouth 1 time daily as needed.  Dose:  1-2 Tab     pregabalin 75 MG Caps  Commonly known as:  LYRICA   Take 75 mg by mouth 2 times a day.  Dose:  75 mg     Vitamin D 1000 UNIT Caps   Take 1,000 Units by mouth every day.  Dose:  1,000 Units          Allergies  Allergies   Allergen Reactions   • Bloodless    • Ciprofloxacin Anaphylaxis     SOB   • Codeine      Upset stomach    • Erythromycin Vomiting   • Nsaids      Does not take due to gastric bypass   • Other Food Anaphylaxis     pj oliva      DIET  Orders Placed This Encounter   Procedures   • Diet Order Regular     Standing Status:   Standing     Number of Occurrences:   1     Order Specific Question:   Diet:     Answer:   Regular [1]     ACTIVITY  As tolerated.  Exercise encouraged.  Weight bearing as tolerated    CONSULTATIONS  None    PROCEDURES  None    LABORATORY  Lab Results   Component Value Date    SODIUM 139 08/29/2019    POTASSIUM 3.8 08/29/2019    CHLORIDE 107 08/29/2019    CO2 24 08/29/2019    GLUCOSE 91 08/29/2019    BUN 11 08/29/2019    CREATININE 0.62 08/29/2019    CREATININE 0.6 12/18/2007        Lab Results   Component Value Date    WBC 6.9 08/29/2019    HEMOGLOBIN 11.4 (L) 08/29/2019    HEMATOCRIT 38.3 08/29/2019    PLATELETCT 464 (H) 08/29/2019        Total time of the discharge process exceeds 30 minutes.

## 2019-08-30 NOTE — PROGRESS NOTES
Bedside report received 0710. POC discussed with pt; Pt denies CP but c/o chronic pain to back, knees; Medicated per MAR; Pending stress results; No overnight cardiac events;  all questions answered at this time.

## 2019-09-01 ASSESSMENT — ENCOUNTER SYMPTOMS
PALPITATIONS: 0
WHEEZING: 0
CHILLS: 0
BACK PAIN: 1

## 2019-09-03 ENCOUNTER — PATIENT OUTREACH (OUTPATIENT)
Dept: HEALTH INFORMATION MANAGEMENT | Facility: OTHER | Age: 54
End: 2019-09-03

## 2019-09-30 ENCOUNTER — PATIENT OUTREACH (OUTPATIENT)
Dept: HEALTH INFORMATION MANAGEMENT | Facility: OTHER | Age: 54
End: 2019-09-30

## 2019-10-10 NOTE — PROGRESS NOTES
A 53-year-old female was an emergent admission to [facility] from 8/29/2019 to 8/30/2019 to treat Chest pain, unspecified. IHD visited the patient bedside. The patient was discharged Home. The patient's medical condition included: Infection. The patient was not under clinical case management.     The patient has no medication orders.     The patient was ordered to follow-up with PCP. The patient had the following appointment: 9/10/2019 @ 10:30 Louann uGzman, general/family practice - CONFIRMED AS KEPT. The patient has no future appointments scheduled.     IHD followed the patient for a total of 31 days and Patient  was receptive to services. In summary, IHD Connected patient with new physicians. As a result, IHD established patient with new provider(s), Patient adhered with discharge orders, Patient attended follow up appointments, and patient successfully recovered or avoided further complications.

## 2019-11-04 ENCOUNTER — HOSPITAL ENCOUNTER (OUTPATIENT)
Dept: LAB | Facility: MEDICAL CENTER | Age: 54
End: 2019-11-04
Attending: PHYSICIAN ASSISTANT
Payer: MEDICARE

## 2019-11-04 ENCOUNTER — HOSPITAL ENCOUNTER (OUTPATIENT)
Dept: LAB | Facility: MEDICAL CENTER | Age: 54
End: 2019-11-04
Attending: PSYCHIATRY & NEUROLOGY
Payer: MEDICARE

## 2019-11-04 PROCEDURE — 84466 ASSAY OF TRANSFERRIN: CPT

## 2019-11-04 PROCEDURE — 80053 COMPREHEN METABOLIC PANEL: CPT

## 2019-11-04 PROCEDURE — 80061 LIPID PANEL: CPT

## 2019-11-04 PROCEDURE — 82607 VITAMIN B-12: CPT

## 2019-11-04 PROCEDURE — 82746 ASSAY OF FOLIC ACID SERUM: CPT

## 2019-11-04 PROCEDURE — 83036 HEMOGLOBIN GLYCOSYLATED A1C: CPT

## 2019-11-04 PROCEDURE — 84439 ASSAY OF FREE THYROXINE: CPT

## 2019-11-04 PROCEDURE — 83550 IRON BINDING TEST: CPT

## 2019-11-04 PROCEDURE — 83970 ASSAY OF PARATHORMONE: CPT

## 2019-11-04 PROCEDURE — 83540 ASSAY OF IRON: CPT

## 2019-11-04 PROCEDURE — 84134 ASSAY OF PREALBUMIN: CPT

## 2019-11-04 PROCEDURE — 84425 ASSAY OF VITAMIN B-1: CPT

## 2019-11-04 PROCEDURE — 85025 COMPLETE CBC W/AUTO DIFF WBC: CPT

## 2019-11-04 PROCEDURE — 84443 ASSAY THYROID STIM HORMONE: CPT

## 2019-11-04 PROCEDURE — 82306 VITAMIN D 25 HYDROXY: CPT

## 2019-11-05 LAB
25(OH)D3 SERPL-MCNC: 23 NG/ML (ref 30–100)
ALBUMIN SERPL BCP-MCNC: 4.7 G/DL (ref 3.2–4.9)
ALBUMIN/GLOB SERPL: 1.2 G/DL
ALP SERPL-CCNC: 131 U/L (ref 30–99)
ALT SERPL-CCNC: 39 U/L (ref 2–50)
ANION GAP SERPL CALC-SCNC: 15 MMOL/L (ref 0–11.9)
ANISOCYTOSIS BLD QL SMEAR: ABNORMAL
AST SERPL-CCNC: 32 U/L (ref 12–45)
BASOPHILS # BLD AUTO: 1 % (ref 0–1.8)
BASOPHILS # BLD: 0.05 K/UL (ref 0–0.12)
BILIRUB SERPL-MCNC: 0.4 MG/DL (ref 0.1–1.5)
BUN SERPL-MCNC: 13 MG/DL (ref 8–22)
BURR CELLS BLD QL SMEAR: NORMAL
CALCIUM SERPL-MCNC: 10.3 MG/DL (ref 8.5–10.5)
CHLORIDE SERPL-SCNC: 105 MMOL/L (ref 96–112)
CHOLEST SERPL-MCNC: 189 MG/DL (ref 100–199)
CO2 SERPL-SCNC: 21 MMOL/L (ref 20–33)
COMMENT 1642: NORMAL
CREAT SERPL-MCNC: 0.71 MG/DL (ref 0.5–1.4)
EOSINOPHIL # BLD AUTO: 0.1 K/UL (ref 0–0.51)
EOSINOPHIL NFR BLD: 1.9 % (ref 0–6.9)
ERYTHROCYTE [DISTWIDTH] IN BLOOD BY AUTOMATED COUNT: 60.9 FL (ref 35.9–50)
EST. AVERAGE GLUCOSE BLD GHB EST-MCNC: 111 MG/DL
FOLATE SERPL-MCNC: 11.2 NG/ML
GLOBULIN SER CALC-MCNC: 3.9 G/DL (ref 1.9–3.5)
GLUCOSE SERPL-MCNC: 87 MG/DL (ref 65–99)
HBA1C MFR BLD: 5.5 % (ref 0–5.6)
HCT VFR BLD AUTO: 45.4 % (ref 37–47)
HDLC SERPL-MCNC: 71 MG/DL
HGB BLD-MCNC: 13.3 G/DL (ref 12–16)
HYPOCHROMIA BLD QL SMEAR: ABNORMAL
IMM GRANULOCYTES # BLD AUTO: 0 K/UL (ref 0–0.11)
IMM GRANULOCYTES NFR BLD AUTO: 0 % (ref 0–0.9)
IRON SATN MFR SERPL: 6 % (ref 15–55)
IRON SERPL-MCNC: 35 UG/DL (ref 40–170)
LDLC SERPL CALC-MCNC: 97 MG/DL
LYMPHOCYTES # BLD AUTO: 1.41 K/UL (ref 1–4.8)
LYMPHOCYTES NFR BLD: 27.4 % (ref 22–41)
MACROCYTES BLD QL SMEAR: ABNORMAL
MCH RBC QN AUTO: 25.5 PG (ref 27–33)
MCHC RBC AUTO-ENTMCNC: 29.3 G/DL (ref 33.6–35)
MCV RBC AUTO: 87 FL (ref 81.4–97.8)
MICROCYTES BLD QL SMEAR: ABNORMAL
MONOCYTES # BLD AUTO: 0.31 K/UL (ref 0–0.85)
MONOCYTES NFR BLD AUTO: 6 % (ref 0–13.4)
MORPHOLOGY BLD-IMP: NORMAL
NEUTROPHILS # BLD AUTO: 3.27 K/UL (ref 2–7.15)
NEUTROPHILS NFR BLD: 63.7 % (ref 44–72)
NRBC # BLD AUTO: 0 K/UL
NRBC BLD-RTO: 0 /100 WBC
OVALOCYTES BLD QL SMEAR: NORMAL
PLATELET # BLD AUTO: 514 K/UL (ref 164–446)
PLATELET BLD QL SMEAR: NORMAL
PMV BLD AUTO: 9.2 FL (ref 9–12.9)
POIKILOCYTOSIS BLD QL SMEAR: NORMAL
POTASSIUM SERPL-SCNC: 3.8 MMOL/L (ref 3.6–5.5)
PREALB SERPL-MCNC: 19 MG/DL (ref 18–38)
PROT SERPL-MCNC: 8.6 G/DL (ref 6–8.2)
PTH-INTACT SERPL-MCNC: 82.9 PG/ML (ref 14–72)
RBC # BLD AUTO: 5.22 M/UL (ref 4.2–5.4)
RBC BLD AUTO: PRESENT
SODIUM SERPL-SCNC: 141 MMOL/L (ref 135–145)
T4 FREE SERPL-MCNC: 0.91 NG/DL (ref 0.53–1.43)
TIBC SERPL-MCNC: 553 UG/DL (ref 250–450)
TRANSFERRIN SERPL-MCNC: 399 MG/DL (ref 200–370)
TRIGL SERPL-MCNC: 104 MG/DL (ref 0–149)
TSH SERPL DL<=0.005 MIU/L-ACNC: 2.2 UIU/ML (ref 0.38–5.33)
VIT B12 SERPL-MCNC: 438 PG/ML (ref 211–911)
WBC # BLD AUTO: 5.1 K/UL (ref 4.8–10.8)

## 2019-11-07 LAB — VIT B1 BLD-MCNC: 79 NMOL/L (ref 70–180)

## 2020-05-21 ENCOUNTER — HOSPITAL ENCOUNTER (OUTPATIENT)
Dept: RADIOLOGY | Facility: MEDICAL CENTER | Age: 55
End: 2020-05-21
Payer: MEDICARE

## 2020-05-30 ENCOUNTER — HOSPITAL ENCOUNTER (OUTPATIENT)
Dept: RADIOLOGY | Facility: MEDICAL CENTER | Age: 55
End: 2020-05-30
Attending: NURSE PRACTITIONER
Payer: MEDICARE

## 2020-05-30 DIAGNOSIS — Z12.31 VISIT FOR SCREENING MAMMOGRAM: ICD-10-CM

## 2020-05-30 PROCEDURE — 77067 SCR MAMMO BI INCL CAD: CPT

## 2020-06-24 ENCOUNTER — HOSPITAL ENCOUNTER (OUTPATIENT)
Dept: CARDIOLOGY | Facility: MEDICAL CENTER | Age: 55
End: 2020-06-24
Attending: COLON & RECTAL SURGERY
Payer: MEDICARE

## 2020-06-24 LAB — EKG IMPRESSION: NORMAL

## 2020-06-24 PROCEDURE — 93005 ELECTROCARDIOGRAM TRACING: CPT | Performed by: COLON & RECTAL SURGERY

## 2020-06-24 PROCEDURE — 93010 ELECTROCARDIOGRAM REPORT: CPT | Performed by: INTERNAL MEDICINE

## 2020-06-26 ENCOUNTER — HOSPITAL ENCOUNTER (OUTPATIENT)
Dept: RADIOLOGY | Facility: MEDICAL CENTER | Age: 55
End: 2020-06-24
Attending: COLON & RECTAL SURGERY
Payer: MEDICARE

## 2020-06-26 ENCOUNTER — HOSPITAL ENCOUNTER (OUTPATIENT)
Dept: LAB | Facility: MEDICAL CENTER | Age: 55
End: 2020-06-26
Attending: PHYSICIAN ASSISTANT
Payer: MEDICARE

## 2020-06-26 ENCOUNTER — HOSPITAL ENCOUNTER (OUTPATIENT)
Dept: RADIOLOGY | Facility: MEDICAL CENTER | Age: 55
End: 2020-06-26
Attending: COLON & RECTAL SURGERY
Payer: MEDICARE

## 2020-06-26 DIAGNOSIS — M19.90 ACUTE ARTHRITIS: ICD-10-CM

## 2020-06-26 DIAGNOSIS — K76.0 NAFL (NONALCOHOLIC FATTY LIVER): ICD-10-CM

## 2020-06-26 DIAGNOSIS — Z01.818 PREOP EXAMINATION: ICD-10-CM

## 2020-06-26 DIAGNOSIS — E03.9 HYPOTHYROID OBESITY: ICD-10-CM

## 2020-06-26 DIAGNOSIS — Z98.84 BARIATRIC SURGERY STATUS: ICD-10-CM

## 2020-06-26 LAB
25(OH)D3 SERPL-MCNC: 23 NG/ML (ref 30–100)
ALBUMIN SERPL BCP-MCNC: 3.8 G/DL (ref 3.2–4.9)
ALBUMIN/GLOB SERPL: 1.1 G/DL
ALP SERPL-CCNC: 135 U/L (ref 30–99)
ALT SERPL-CCNC: 19 U/L (ref 2–50)
ANION GAP SERPL CALC-SCNC: 10 MMOL/L (ref 7–16)
AST SERPL-CCNC: 14 U/L (ref 12–45)
BASOPHILS # BLD AUTO: 0.7 % (ref 0–1.8)
BASOPHILS # BLD: 0.04 K/UL (ref 0–0.12)
BILIRUB SERPL-MCNC: 0.3 MG/DL (ref 0.1–1.5)
BUN SERPL-MCNC: 16 MG/DL (ref 8–22)
CALCIUM SERPL-MCNC: 9.1 MG/DL (ref 8.5–10.5)
CHLORIDE SERPL-SCNC: 105 MMOL/L (ref 96–112)
CHOLEST SERPL-MCNC: 202 MG/DL (ref 100–199)
CO2 SERPL-SCNC: 26 MMOL/L (ref 20–33)
CREAT SERPL-MCNC: 0.66 MG/DL (ref 0.5–1.4)
EOSINOPHIL # BLD AUTO: 0.06 K/UL (ref 0–0.51)
EOSINOPHIL NFR BLD: 1.1 % (ref 0–6.9)
ERYTHROCYTE [DISTWIDTH] IN BLOOD BY AUTOMATED COUNT: 50.7 FL (ref 35.9–50)
FOLATE SERPL-MCNC: 10.1 NG/ML
GLOBULIN SER CALC-MCNC: 3.4 G/DL (ref 1.9–3.5)
GLUCOSE SERPL-MCNC: 104 MG/DL (ref 65–99)
HCT VFR BLD AUTO: 43.7 % (ref 37–47)
HDLC SERPL-MCNC: 77 MG/DL
HGB BLD-MCNC: 13.7 G/DL (ref 12–16)
IMM GRANULOCYTES # BLD AUTO: 0.01 K/UL (ref 0–0.11)
IMM GRANULOCYTES NFR BLD AUTO: 0.2 % (ref 0–0.9)
IRON SATN MFR SERPL: 17 % (ref 15–55)
IRON SERPL-MCNC: 72 UG/DL (ref 40–170)
LDLC SERPL CALC-MCNC: 107 MG/DL
LYMPHOCYTES # BLD AUTO: 1.52 K/UL (ref 1–4.8)
LYMPHOCYTES NFR BLD: 27.1 % (ref 22–41)
MCH RBC QN AUTO: 27.8 PG (ref 27–33)
MCHC RBC AUTO-ENTMCNC: 31.4 G/DL (ref 33.6–35)
MCV RBC AUTO: 88.6 FL (ref 81.4–97.8)
MONOCYTES # BLD AUTO: 0.56 K/UL (ref 0–0.85)
MONOCYTES NFR BLD AUTO: 10 % (ref 0–13.4)
NEUTROPHILS # BLD AUTO: 3.42 K/UL (ref 2–7.15)
NEUTROPHILS NFR BLD: 60.9 % (ref 44–72)
NRBC # BLD AUTO: 0 K/UL
NRBC BLD-RTO: 0 /100 WBC
PLATELET # BLD AUTO: 375 K/UL (ref 164–446)
PMV BLD AUTO: 10 FL (ref 9–12.9)
POTASSIUM SERPL-SCNC: 4.8 MMOL/L (ref 3.6–5.5)
PREALB SERPL-MCNC: 20.9 MG/DL (ref 18–38)
PROT SERPL-MCNC: 7.2 G/DL (ref 6–8.2)
RBC # BLD AUTO: 4.93 M/UL (ref 4.2–5.4)
SODIUM SERPL-SCNC: 141 MMOL/L (ref 135–145)
TIBC SERPL-MCNC: 435 UG/DL (ref 250–450)
TRANSFERRIN SERPL-MCNC: 374 MG/DL (ref 200–370)
TRIGL SERPL-MCNC: 90 MG/DL (ref 0–149)
UIBC SERPL-MCNC: 363 UG/DL (ref 110–370)
VIT B12 SERPL-MCNC: 305 PG/ML (ref 211–911)
WBC # BLD AUTO: 5.6 K/UL (ref 4.8–10.8)

## 2020-06-26 PROCEDURE — 84134 ASSAY OF PREALBUMIN: CPT

## 2020-06-26 PROCEDURE — 36415 COLL VENOUS BLD VENIPUNCTURE: CPT

## 2020-06-26 PROCEDURE — 83540 ASSAY OF IRON: CPT

## 2020-06-26 PROCEDURE — 85025 COMPLETE CBC W/AUTO DIFF WBC: CPT

## 2020-06-26 PROCEDURE — 82746 ASSAY OF FOLIC ACID SERUM: CPT

## 2020-06-26 PROCEDURE — 84207 ASSAY OF VITAMIN B-6: CPT

## 2020-06-26 PROCEDURE — 80061 LIPID PANEL: CPT

## 2020-06-26 PROCEDURE — 84252 ASSAY OF VITAMIN B-2: CPT

## 2020-06-26 PROCEDURE — 82306 VITAMIN D 25 HYDROXY: CPT

## 2020-06-26 PROCEDURE — 82607 VITAMIN B-12: CPT

## 2020-06-26 PROCEDURE — 84466 ASSAY OF TRANSFERRIN: CPT

## 2020-06-26 PROCEDURE — 71046 X-RAY EXAM CHEST 2 VIEWS: CPT

## 2020-06-26 PROCEDURE — 83550 IRON BINDING TEST: CPT

## 2020-06-26 PROCEDURE — 74240 X-RAY XM UPR GI TRC 1CNTRST: CPT

## 2020-06-26 PROCEDURE — 80053 COMPREHEN METABOLIC PANEL: CPT

## 2020-06-26 PROCEDURE — 84425 ASSAY OF VITAMIN B-1: CPT

## 2020-06-29 LAB — VIT B1 BLD-MCNC: 159 NMOL/L (ref 70–180)

## 2020-06-30 LAB — VIT B6 SERPL-MCNC: 28 NMOL/L (ref 20–125)

## 2020-07-02 LAB — VIT B2 SERPL-SCNC: 12 NMOL/L (ref 5–50)

## 2020-07-29 DIAGNOSIS — Z01.812 PRE-OPERATIVE LABORATORY EXAMINATION: ICD-10-CM

## 2020-07-29 LAB
ALBUMIN SERPL BCP-MCNC: 4.2 G/DL (ref 3.2–4.9)
ALBUMIN/GLOB SERPL: 1.3 G/DL
ALP SERPL-CCNC: 129 U/L (ref 30–99)
ALT SERPL-CCNC: 16 U/L (ref 2–50)
ANION GAP SERPL CALC-SCNC: 16 MMOL/L (ref 7–16)
AST SERPL-CCNC: 13 U/L (ref 12–45)
BASOPHILS # BLD AUTO: 0.6 % (ref 0–1.8)
BASOPHILS # BLD: 0.03 K/UL (ref 0–0.12)
BILIRUB SERPL-MCNC: 0.3 MG/DL (ref 0.1–1.5)
BUN SERPL-MCNC: 14 MG/DL (ref 8–22)
CALCIUM SERPL-MCNC: 9 MG/DL (ref 8.5–10.5)
CHLORIDE SERPL-SCNC: 102 MMOL/L (ref 96–112)
CO2 SERPL-SCNC: 20 MMOL/L (ref 20–33)
CREAT SERPL-MCNC: 0.63 MG/DL (ref 0.5–1.4)
EOSINOPHIL # BLD AUTO: 0.08 K/UL (ref 0–0.51)
EOSINOPHIL NFR BLD: 1.5 % (ref 0–6.9)
ERYTHROCYTE [DISTWIDTH] IN BLOOD BY AUTOMATED COUNT: 51.8 FL (ref 35.9–50)
GLOBULIN SER CALC-MCNC: 3.3 G/DL (ref 1.9–3.5)
GLUCOSE SERPL-MCNC: 99 MG/DL (ref 65–99)
HCT VFR BLD AUTO: 43.5 % (ref 37–47)
HGB BLD-MCNC: 13.8 G/DL (ref 12–16)
IMM GRANULOCYTES # BLD AUTO: 0.01 K/UL (ref 0–0.11)
IMM GRANULOCYTES NFR BLD AUTO: 0.2 % (ref 0–0.9)
INR PPP: 0.99 (ref 0.87–1.13)
LYMPHOCYTES # BLD AUTO: 1.47 K/UL (ref 1–4.8)
LYMPHOCYTES NFR BLD: 27.4 % (ref 22–41)
MCH RBC QN AUTO: 28.2 PG (ref 27–33)
MCHC RBC AUTO-ENTMCNC: 31.7 G/DL (ref 33.6–35)
MCV RBC AUTO: 89 FL (ref 81.4–97.8)
MONOCYTES # BLD AUTO: 0.51 K/UL (ref 0–0.85)
MONOCYTES NFR BLD AUTO: 9.5 % (ref 0–13.4)
NEUTROPHILS # BLD AUTO: 3.26 K/UL (ref 2–7.15)
NEUTROPHILS NFR BLD: 60.8 % (ref 44–72)
NRBC # BLD AUTO: 0 K/UL
NRBC BLD-RTO: 0 /100 WBC
PLATELET # BLD AUTO: 364 K/UL (ref 164–446)
PMV BLD AUTO: 9.5 FL (ref 9–12.9)
POTASSIUM SERPL-SCNC: 4.5 MMOL/L (ref 3.6–5.5)
PROT SERPL-MCNC: 7.5 G/DL (ref 6–8.2)
PROTHROMBIN TIME: 13.4 SEC (ref 12–14.6)
RBC # BLD AUTO: 4.89 M/UL (ref 4.2–5.4)
SODIUM SERPL-SCNC: 138 MMOL/L (ref 135–145)
WBC # BLD AUTO: 5.4 K/UL (ref 4.8–10.8)

## 2020-07-29 PROCEDURE — 80053 COMPREHEN METABOLIC PANEL: CPT

## 2020-07-29 PROCEDURE — 85025 COMPLETE CBC W/AUTO DIFF WBC: CPT

## 2020-07-29 PROCEDURE — 85610 PROTHROMBIN TIME: CPT

## 2020-07-29 PROCEDURE — 36415 COLL VENOUS BLD VENIPUNCTURE: CPT

## 2020-07-29 RX ORDER — PAROXETINE HYDROCHLORIDE 20 MG/1
20 TABLET, FILM COATED ORAL EVERY MORNING
COMMUNITY
End: 2021-09-07 | Stop reason: SDUPTHER

## 2020-07-29 RX ORDER — FERROUS SULFATE 7.5 MG/0.5
75 SYRINGE (EA) ORAL
COMMUNITY
End: 2021-05-21

## 2020-07-29 RX ORDER — ACETAMINOPHEN 500 MG
500 TABLET ORAL PRN
COMMUNITY

## 2020-07-29 ASSESSMENT — FIBROSIS 4 INDEX: FIB4 SCORE: 0.46

## 2020-08-01 ENCOUNTER — OFFICE VISIT (OUTPATIENT)
Dept: ADMISSIONS | Facility: MEDICAL CENTER | Age: 55
DRG: 621 | End: 2020-08-01
Attending: COLON & RECTAL SURGERY
Payer: MEDICARE

## 2020-08-01 DIAGNOSIS — Z01.812 PRE-OPERATIVE LABORATORY EXAMINATION: ICD-10-CM

## 2020-08-01 LAB — COVID ORDER STATUS COVID19: NORMAL

## 2020-08-01 PROCEDURE — U0003 INFECTIOUS AGENT DETECTION BY NUCLEIC ACID (DNA OR RNA); SEVERE ACUTE RESPIRATORY SYNDROME CORONAVIRUS 2 (SARS-COV-2) (CORONAVIRUS DISEASE [COVID-19]), AMPLIFIED PROBE TECHNIQUE, MAKING USE OF HIGH THROUGHPUT TECHNOLOGIES AS DESCRIBED BY CMS-2020-01-R: HCPCS

## 2020-08-03 LAB
SARS-COV-2 RNA RESP QL NAA+PROBE: NOTDETECTED
SPECIMEN SOURCE: NORMAL

## 2020-08-04 NOTE — PROGRESS NOTES
COVID-19 Pre-surgery screenin. Do you have an undiagnosed respiratory illness or symptoms such as coughing or sneezing? NO   a. Onset of Sx N/A  b. Acute vs. chronic respiratory illness N/A    2. Do you have an unexplained fever greater than 100.4 degrees Fahrenheit or 38 degrees Celsius?     NO    Have you had direct exposure to a patient who tested positive for Covid-19?    NO  3. Have you had any loss of your sense of taste or smell? Have you had N/V or sore throat? NO    Patient has been informed of visitor policy and asked to wear a mask upon entering the hospital   YES

## 2020-08-05 ENCOUNTER — ANESTHESIA EVENT (OUTPATIENT)
Dept: SURGERY | Facility: MEDICAL CENTER | Age: 55
DRG: 621 | End: 2020-08-05
Payer: MEDICARE

## 2020-08-05 ENCOUNTER — ANESTHESIA (OUTPATIENT)
Dept: SURGERY | Facility: MEDICAL CENTER | Age: 55
DRG: 621 | End: 2020-08-05
Payer: MEDICARE

## 2020-08-05 ENCOUNTER — HOSPITAL ENCOUNTER (INPATIENT)
Facility: MEDICAL CENTER | Age: 55
LOS: 1 days | DRG: 621 | End: 2020-08-06
Attending: COLON & RECTAL SURGERY | Admitting: COLON & RECTAL SURGERY
Payer: MEDICARE

## 2020-08-05 LAB — PATHOLOGY CONSULT NOTE: NORMAL

## 2020-08-05 PROCEDURE — 770006 HCHG ROOM/CARE - MED/SURG/GYN SEMI*

## 2020-08-05 PROCEDURE — 160048 HCHG OR STATISTICAL LEVEL 1-5: Performed by: COLON & RECTAL SURGERY

## 2020-08-05 PROCEDURE — 160041 HCHG SURGERY MINUTES - EA ADDL 1 MIN LEVEL 4: Performed by: COLON & RECTAL SURGERY

## 2020-08-05 PROCEDURE — 700102 HCHG RX REV CODE 250 W/ 637 OVERRIDE(OP): Performed by: PHYSICIAN ASSISTANT

## 2020-08-05 PROCEDURE — 160029 HCHG SURGERY MINUTES - 1ST 30 MINS LEVEL 4: Performed by: COLON & RECTAL SURGERY

## 2020-08-05 PROCEDURE — 501838 HCHG SUTURE GENERAL: Performed by: COLON & RECTAL SURGERY

## 2020-08-05 PROCEDURE — 700101 HCHG RX REV CODE 250: Performed by: ANESTHESIOLOGY

## 2020-08-05 PROCEDURE — 501571 HCHG TROCAR, SEPARATOR 12X100: Performed by: COLON & RECTAL SURGERY

## 2020-08-05 PROCEDURE — 500521 HCHG ENDOSTITCH LOAD UNIT: Performed by: COLON & RECTAL SURGERY

## 2020-08-05 PROCEDURE — 501338 HCHG SHEARS, ENDO: Performed by: COLON & RECTAL SURGERY

## 2020-08-05 PROCEDURE — 160002 HCHG RECOVERY MINUTES (STAT): Performed by: COLON & RECTAL SURGERY

## 2020-08-05 PROCEDURE — 700111 HCHG RX REV CODE 636 W/ 250 OVERRIDE (IP): Performed by: PHYSICIAN ASSISTANT

## 2020-08-05 PROCEDURE — 501576 HCHG TROCAR, SMTH CAN&SEAL12: Performed by: COLON & RECTAL SURGERY

## 2020-08-05 PROCEDURE — 160035 HCHG PACU - 1ST 60 MINS PHASE I: Performed by: COLON & RECTAL SURGERY

## 2020-08-05 PROCEDURE — 0FB24ZX EXCISION OF LEFT LOBE LIVER, PERCUTANEOUS ENDOSCOPIC APPROACH, DIAGNOSTIC: ICD-10-PCS | Performed by: COLON & RECTAL SURGERY

## 2020-08-05 PROCEDURE — A9270 NON-COVERED ITEM OR SERVICE: HCPCS | Performed by: COLON & RECTAL SURGERY

## 2020-08-05 PROCEDURE — 0D164Z9 BYPASS STOMACH TO DUODENUM, PERCUTANEOUS ENDOSCOPIC APPROACH: ICD-10-PCS | Performed by: COLON & RECTAL SURGERY

## 2020-08-05 PROCEDURE — 88307 TISSUE EXAM BY PATHOLOGIST: CPT

## 2020-08-05 PROCEDURE — 700102 HCHG RX REV CODE 250 W/ 637 OVERRIDE(OP): Performed by: ANESTHESIOLOGY

## 2020-08-05 PROCEDURE — 700102 HCHG RX REV CODE 250 W/ 637 OVERRIDE(OP): Performed by: COLON & RECTAL SURGERY

## 2020-08-05 PROCEDURE — A9270 NON-COVERED ITEM OR SERVICE: HCPCS | Performed by: ANESTHESIOLOGY

## 2020-08-05 PROCEDURE — 700105 HCHG RX REV CODE 258: Performed by: PHYSICIAN ASSISTANT

## 2020-08-05 PROCEDURE — 500522 HCHG ENDOSTITCH SUTURING DEVICE: Performed by: COLON & RECTAL SURGERY

## 2020-08-05 PROCEDURE — 500800 HCHG LAPAROSCOPIC J/L HOOK: Performed by: COLON & RECTAL SURGERY

## 2020-08-05 PROCEDURE — 700105 HCHG RX REV CODE 258: Performed by: COLON & RECTAL SURGERY

## 2020-08-05 PROCEDURE — 88313 SPECIAL STAINS GROUP 2: CPT

## 2020-08-05 PROCEDURE — 700111 HCHG RX REV CODE 636 W/ 250 OVERRIDE (IP): Performed by: COLON & RECTAL SURGERY

## 2020-08-05 PROCEDURE — 501583 HCHG TROCAR, THRD CAN&SEAL 5X100: Performed by: COLON & RECTAL SURGERY

## 2020-08-05 PROCEDURE — 700111 HCHG RX REV CODE 636 W/ 250 OVERRIDE (IP): Performed by: ANESTHESIOLOGY

## 2020-08-05 PROCEDURE — 501570 HCHG TROCAR, SEPARATOR: Performed by: COLON & RECTAL SURGERY

## 2020-08-05 PROCEDURE — A9270 NON-COVERED ITEM OR SERVICE: HCPCS | Performed by: PHYSICIAN ASSISTANT

## 2020-08-05 PROCEDURE — 700101 HCHG RX REV CODE 250: Performed by: COLON & RECTAL SURGERY

## 2020-08-05 PROCEDURE — 502652 HCHG STAPLES, ETHICON ECR60: Performed by: COLON & RECTAL SURGERY

## 2020-08-05 PROCEDURE — 160009 HCHG ANES TIME/MIN: Performed by: COLON & RECTAL SURGERY

## 2020-08-05 PROCEDURE — 160036 HCHG PACU - EA ADDL 30 MINS PHASE I: Performed by: COLON & RECTAL SURGERY

## 2020-08-05 PROCEDURE — 502240 HCHG MISC OR SUPPLY RC 0272: Performed by: COLON & RECTAL SURGERY

## 2020-08-05 PROCEDURE — 502570 HCHG PACK, GASTRIC BANDING: Performed by: COLON & RECTAL SURGERY

## 2020-08-05 RX ORDER — ROCURONIUM BROMIDE 10 MG/ML
INJECTION, SOLUTION INTRAVENOUS PRN
Status: DISCONTINUED | OUTPATIENT
Start: 2020-08-05 | End: 2020-08-05 | Stop reason: SURG

## 2020-08-05 RX ORDER — OXYCODONE HCL 5 MG/5 ML
10 SOLUTION, ORAL ORAL
Status: COMPLETED | OUTPATIENT
Start: 2020-08-05 | End: 2020-08-05

## 2020-08-05 RX ORDER — DIPHENHYDRAMINE HYDROCHLORIDE 50 MG/ML
12.5 INJECTION INTRAMUSCULAR; INTRAVENOUS
Status: DISCONTINUED | OUTPATIENT
Start: 2020-08-05 | End: 2020-08-05 | Stop reason: HOSPADM

## 2020-08-05 RX ORDER — ACETAMINOPHEN 10 MG/ML
1 INJECTION, SOLUTION INTRAVENOUS ONCE
Status: COMPLETED | OUTPATIENT
Start: 2020-08-05 | End: 2020-08-05

## 2020-08-05 RX ORDER — HALOPERIDOL 5 MG/ML
1 INJECTION INTRAMUSCULAR
Status: DISCONTINUED | OUTPATIENT
Start: 2020-08-05 | End: 2020-08-05 | Stop reason: HOSPADM

## 2020-08-05 RX ORDER — PAROXETINE HYDROCHLORIDE 20 MG/1
20 TABLET, FILM COATED ORAL EVERY MORNING
Status: DISCONTINUED | OUTPATIENT
Start: 2020-08-06 | End: 2020-08-06 | Stop reason: HOSPADM

## 2020-08-05 RX ORDER — HYDROMORPHONE HYDROCHLORIDE 1 MG/ML
0.2 INJECTION, SOLUTION INTRAMUSCULAR; INTRAVENOUS; SUBCUTANEOUS
Status: DISCONTINUED | OUTPATIENT
Start: 2020-08-05 | End: 2020-08-05 | Stop reason: HOSPADM

## 2020-08-05 RX ORDER — ONDANSETRON 2 MG/ML
4 INJECTION INTRAMUSCULAR; INTRAVENOUS
Status: DISCONTINUED | OUTPATIENT
Start: 2020-08-05 | End: 2020-08-05 | Stop reason: HOSPADM

## 2020-08-05 RX ORDER — OXYCODONE HCL 10 MG/1
10 TABLET, FILM COATED, EXTENDED RELEASE ORAL ONCE
Status: COMPLETED | OUTPATIENT
Start: 2020-08-05 | End: 2020-08-05

## 2020-08-05 RX ORDER — ONDANSETRON 2 MG/ML
INJECTION INTRAMUSCULAR; INTRAVENOUS PRN
Status: DISCONTINUED | OUTPATIENT
Start: 2020-08-05 | End: 2020-08-05 | Stop reason: SURG

## 2020-08-05 RX ORDER — MEPERIDINE HYDROCHLORIDE 25 MG/ML
12.5 INJECTION INTRAMUSCULAR; INTRAVENOUS; SUBCUTANEOUS
Status: DISCONTINUED | OUTPATIENT
Start: 2020-08-05 | End: 2020-08-05 | Stop reason: HOSPADM

## 2020-08-05 RX ORDER — HYDROMORPHONE HYDROCHLORIDE 1 MG/ML
0.5 INJECTION, SOLUTION INTRAMUSCULAR; INTRAVENOUS; SUBCUTANEOUS
Status: DISCONTINUED | OUTPATIENT
Start: 2020-08-05 | End: 2020-08-06 | Stop reason: HOSPADM

## 2020-08-05 RX ORDER — OXYCODONE HCL 5 MG/5 ML
5 SOLUTION, ORAL ORAL
Status: DISCONTINUED | OUTPATIENT
Start: 2020-08-05 | End: 2020-08-06 | Stop reason: HOSPADM

## 2020-08-05 RX ORDER — ACETAMINOPHEN 10 MG/ML
1000 INJECTION, SOLUTION INTRAVENOUS EVERY 6 HOURS
Status: COMPLETED | OUTPATIENT
Start: 2020-08-06 | End: 2020-08-06

## 2020-08-05 RX ORDER — SODIUM CHLORIDE, SODIUM LACTATE, POTASSIUM CHLORIDE, AND CALCIUM CHLORIDE .6; .31; .03; .02 G/100ML; G/100ML; G/100ML; G/100ML
500 INJECTION, SOLUTION INTRAVENOUS
Status: DISCONTINUED | OUTPATIENT
Start: 2020-08-05 | End: 2020-08-06 | Stop reason: HOSPADM

## 2020-08-05 RX ORDER — ONDANSETRON 2 MG/ML
4 INJECTION INTRAMUSCULAR; INTRAVENOUS EVERY 6 HOURS
Status: DISCONTINUED | OUTPATIENT
Start: 2020-08-05 | End: 2020-08-06 | Stop reason: HOSPADM

## 2020-08-05 RX ORDER — OXYCODONE HCL 5 MG/5 ML
5 SOLUTION, ORAL ORAL
Status: COMPLETED | OUTPATIENT
Start: 2020-08-05 | End: 2020-08-05

## 2020-08-05 RX ORDER — DEXAMETHASONE SODIUM PHOSPHATE 4 MG/ML
INJECTION, SOLUTION INTRA-ARTICULAR; INTRALESIONAL; INTRAMUSCULAR; INTRAVENOUS; SOFT TISSUE PRN
Status: DISCONTINUED | OUTPATIENT
Start: 2020-08-05 | End: 2020-08-05 | Stop reason: SURG

## 2020-08-05 RX ORDER — MIDAZOLAM HYDROCHLORIDE 1 MG/ML
1 INJECTION INTRAMUSCULAR; INTRAVENOUS
Status: DISCONTINUED | OUTPATIENT
Start: 2020-08-05 | End: 2020-08-05 | Stop reason: HOSPADM

## 2020-08-05 RX ORDER — SODIUM CHLORIDE, SODIUM LACTATE, POTASSIUM CHLORIDE, CALCIUM CHLORIDE 600; 310; 30; 20 MG/100ML; MG/100ML; MG/100ML; MG/100ML
INJECTION, SOLUTION INTRAVENOUS CONTINUOUS
Status: DISCONTINUED | OUTPATIENT
Start: 2020-08-05 | End: 2020-08-05 | Stop reason: HOSPADM

## 2020-08-05 RX ORDER — OXYCODONE HCL 5 MG/5 ML
10 SOLUTION, ORAL ORAL
Status: DISCONTINUED | OUTPATIENT
Start: 2020-08-05 | End: 2020-08-06 | Stop reason: HOSPADM

## 2020-08-05 RX ORDER — BUPIVACAINE HYDROCHLORIDE AND EPINEPHRINE 5; 5 MG/ML; UG/ML
INJECTION, SOLUTION EPIDURAL; INTRACAUDAL; PERINEURAL
Status: DISCONTINUED | OUTPATIENT
Start: 2020-08-05 | End: 2020-08-05 | Stop reason: HOSPADM

## 2020-08-05 RX ORDER — CALCIUM CARBONATE 500 MG/1
500 TABLET, CHEWABLE ORAL
Status: DISCONTINUED | OUTPATIENT
Start: 2020-08-05 | End: 2020-08-06 | Stop reason: HOSPADM

## 2020-08-05 RX ORDER — GABAPENTIN 300 MG/1
300 CAPSULE ORAL ONCE
Status: COMPLETED | OUTPATIENT
Start: 2020-08-05 | End: 2020-08-05

## 2020-08-05 RX ORDER — SIMETHICONE 80 MG
80 TABLET,CHEWABLE ORAL 3 TIMES DAILY PRN
Status: DISCONTINUED | OUTPATIENT
Start: 2020-08-05 | End: 2020-08-06 | Stop reason: HOSPADM

## 2020-08-05 RX ORDER — LIDOCAINE HYDROCHLORIDE 20 MG/ML
INJECTION, SOLUTION EPIDURAL; INFILTRATION; INTRACAUDAL; PERINEURAL PRN
Status: DISCONTINUED | OUTPATIENT
Start: 2020-08-05 | End: 2020-08-05 | Stop reason: SURG

## 2020-08-05 RX ORDER — HYDROMORPHONE HYDROCHLORIDE 1 MG/ML
0.4 INJECTION, SOLUTION INTRAMUSCULAR; INTRAVENOUS; SUBCUTANEOUS
Status: DISCONTINUED | OUTPATIENT
Start: 2020-08-05 | End: 2020-08-05 | Stop reason: HOSPADM

## 2020-08-05 RX ORDER — DIPHENHYDRAMINE HYDROCHLORIDE 50 MG/ML
12.5 INJECTION INTRAMUSCULAR; INTRAVENOUS EVERY 6 HOURS PRN
Status: DISCONTINUED | OUTPATIENT
Start: 2020-08-05 | End: 2020-08-06 | Stop reason: HOSPADM

## 2020-08-05 RX ORDER — PREGABALIN 75 MG/1
75 CAPSULE ORAL 3 TIMES DAILY
Status: DISCONTINUED | OUTPATIENT
Start: 2020-08-05 | End: 2020-08-06 | Stop reason: HOSPADM

## 2020-08-05 RX ORDER — ENALAPRILAT 1.25 MG/ML
2.5 INJECTION INTRAVENOUS EVERY 6 HOURS PRN
Status: DISCONTINUED | OUTPATIENT
Start: 2020-08-05 | End: 2020-08-06 | Stop reason: HOSPADM

## 2020-08-05 RX ORDER — SODIUM CHLORIDE, SODIUM LACTATE, POTASSIUM CHLORIDE, CALCIUM CHLORIDE 600; 310; 30; 20 MG/100ML; MG/100ML; MG/100ML; MG/100ML
INJECTION, SOLUTION INTRAVENOUS CONTINUOUS
Status: DISCONTINUED | OUTPATIENT
Start: 2020-08-05 | End: 2020-08-05

## 2020-08-05 RX ORDER — CEFOTETAN DISODIUM 2 G/20ML
INJECTION, POWDER, FOR SOLUTION INTRAMUSCULAR; INTRAVENOUS PRN
Status: DISCONTINUED | OUTPATIENT
Start: 2020-08-05 | End: 2020-08-05 | Stop reason: SURG

## 2020-08-05 RX ORDER — HYDROMORPHONE HYDROCHLORIDE 1 MG/ML
0.1 INJECTION, SOLUTION INTRAMUSCULAR; INTRAVENOUS; SUBCUTANEOUS
Status: DISCONTINUED | OUTPATIENT
Start: 2020-08-05 | End: 2020-08-05 | Stop reason: HOSPADM

## 2020-08-05 RX ORDER — ALBUTEROL SULFATE 90 UG/1
2 AEROSOL, METERED RESPIRATORY (INHALATION) EVERY 6 HOURS PRN
Status: DISCONTINUED | OUTPATIENT
Start: 2020-08-05 | End: 2020-08-06 | Stop reason: HOSPADM

## 2020-08-05 RX ORDER — ACETAMINOPHEN 500 MG
1000 TABLET ORAL EVERY 6 HOURS PRN
Status: DISCONTINUED | OUTPATIENT
Start: 2020-08-06 | End: 2020-08-06 | Stop reason: HOSPADM

## 2020-08-05 RX ORDER — DIPHENHYDRAMINE HCL 25 MG
25 TABLET ORAL EVERY 6 HOURS PRN
Status: DISCONTINUED | OUTPATIENT
Start: 2020-08-05 | End: 2020-08-06 | Stop reason: HOSPADM

## 2020-08-05 RX ORDER — SCOLOPAMINE TRANSDERMAL SYSTEM 1 MG/1
1 PATCH, EXTENDED RELEASE TRANSDERMAL
Status: DISCONTINUED | OUTPATIENT
Start: 2020-08-05 | End: 2020-08-06 | Stop reason: HOSPADM

## 2020-08-05 RX ORDER — HALOPERIDOL 5 MG/ML
1 INJECTION INTRAMUSCULAR EVERY 6 HOURS PRN
Status: DISCONTINUED | OUTPATIENT
Start: 2020-08-05 | End: 2020-08-06 | Stop reason: HOSPADM

## 2020-08-05 RX ORDER — LAMOTRIGINE 100 MG/1
200 TABLET ORAL
Status: DISCONTINUED | OUTPATIENT
Start: 2020-08-05 | End: 2020-08-06 | Stop reason: HOSPADM

## 2020-08-05 RX ORDER — HYDRALAZINE HYDROCHLORIDE 20 MG/ML
5 INJECTION INTRAMUSCULAR; INTRAVENOUS
Status: DISCONTINUED | OUTPATIENT
Start: 2020-08-05 | End: 2020-08-05 | Stop reason: HOSPADM

## 2020-08-05 RX ORDER — PROMETHAZINE HYDROCHLORIDE 25 MG/1
25 SUPPOSITORY RECTAL EVERY 4 HOURS PRN
Status: DISCONTINUED | OUTPATIENT
Start: 2020-08-05 | End: 2020-08-06 | Stop reason: HOSPADM

## 2020-08-05 RX ORDER — DIPHENHYDRAMINE HYDROCHLORIDE 50 MG/ML
25 INJECTION INTRAMUSCULAR; INTRAVENOUS EVERY 6 HOURS PRN
Status: DISCONTINUED | OUTPATIENT
Start: 2020-08-05 | End: 2020-08-06 | Stop reason: HOSPADM

## 2020-08-05 RX ADMIN — GABAPENTIN 300 MG: 300 CAPSULE ORAL at 12:02

## 2020-08-05 RX ADMIN — ROCURONIUM BROMIDE 50 MG: 10 INJECTION, SOLUTION INTRAVENOUS at 13:32

## 2020-08-05 RX ADMIN — FENTANYL CITRATE 50 MCG: 50 INJECTION INTRAMUSCULAR; INTRAVENOUS at 14:57

## 2020-08-05 RX ADMIN — HYDROMORPHONE HYDROCHLORIDE 0.4 MG: 1 INJECTION, SOLUTION INTRAMUSCULAR; INTRAVENOUS; SUBCUTANEOUS at 20:18

## 2020-08-05 RX ADMIN — ACETAMINOPHEN 1 G: 10 INJECTION, SOLUTION INTRAVENOUS at 12:02

## 2020-08-05 RX ADMIN — POVIDONE-IODINE 15 ML: 10 SOLUTION TOPICAL at 12:02

## 2020-08-05 RX ADMIN — HYDROMORPHONE HYDROCHLORIDE 0.5 MG: 1 INJECTION, SOLUTION INTRAMUSCULAR; INTRAVENOUS; SUBCUTANEOUS at 22:21

## 2020-08-05 RX ADMIN — OXYCODONE HYDROCHLORIDE 10 MG: 5 SOLUTION ORAL at 20:53

## 2020-08-05 RX ADMIN — HYDROMORPHONE HYDROCHLORIDE 0.2 MG: 1 INJECTION, SOLUTION INTRAMUSCULAR; INTRAVENOUS; SUBCUTANEOUS at 14:50

## 2020-08-05 RX ADMIN — POTASSIUM CHLORIDE: 2 INJECTION, SOLUTION, CONCENTRATE INTRAVENOUS at 22:11

## 2020-08-05 RX ADMIN — HYDROMORPHONE HYDROCHLORIDE 0.4 MG: 1 INJECTION, SOLUTION INTRAMUSCULAR; INTRAVENOUS; SUBCUTANEOUS at 19:49

## 2020-08-05 RX ADMIN — HYDROMORPHONE HYDROCHLORIDE 0.2 MG: 1 INJECTION, SOLUTION INTRAMUSCULAR; INTRAVENOUS; SUBCUTANEOUS at 15:58

## 2020-08-05 RX ADMIN — ONDANSETRON 4 MG: 2 INJECTION INTRAMUSCULAR; INTRAVENOUS at 21:51

## 2020-08-05 RX ADMIN — SUGAMMADEX 200 MG: 100 INJECTION, SOLUTION INTRAVENOUS at 14:12

## 2020-08-05 RX ADMIN — LIDOCAINE HYDROCHLORIDE 0.5 ML: 10 INJECTION, SOLUTION EPIDURAL; INFILTRATION; INTRACAUDAL at 11:58

## 2020-08-05 RX ADMIN — OXYCODONE HYDROCHLORIDE 10 MG: 10 TABLET, FILM COATED, EXTENDED RELEASE ORAL at 12:02

## 2020-08-05 RX ADMIN — PREGABALIN 75 MG: 75 CAPSULE ORAL at 21:52

## 2020-08-05 RX ADMIN — FENTANYL CITRATE 50 MCG: 50 INJECTION INTRAMUSCULAR; INTRAVENOUS at 14:39

## 2020-08-05 RX ADMIN — CEFOTETAN DISODIUM 2 G: 2 INJECTION, POWDER, FOR SOLUTION INTRAMUSCULAR; INTRAVENOUS at 13:40

## 2020-08-05 RX ADMIN — LIDOCAINE HYDROCHLORIDE 60 MG: 20 INJECTION, SOLUTION EPIDURAL; INFILTRATION; INTRACAUDAL at 13:32

## 2020-08-05 RX ADMIN — HYDROMORPHONE HYDROCHLORIDE 0.2 MG: 1 INJECTION, SOLUTION INTRAMUSCULAR; INTRAVENOUS; SUBCUTANEOUS at 16:33

## 2020-08-05 RX ADMIN — SODIUM CHLORIDE, POTASSIUM CHLORIDE, SODIUM LACTATE AND CALCIUM CHLORIDE: 600; 310; 30; 20 INJECTION, SOLUTION INTRAVENOUS at 12:02

## 2020-08-05 RX ADMIN — HYDROMORPHONE HYDROCHLORIDE 0.2 MG: 1 INJECTION, SOLUTION INTRAMUSCULAR; INTRAVENOUS; SUBCUTANEOUS at 20:44

## 2020-08-05 RX ADMIN — HYDROMORPHONE HYDROCHLORIDE 0.2 MG: 1 INJECTION, SOLUTION INTRAMUSCULAR; INTRAVENOUS; SUBCUTANEOUS at 17:30

## 2020-08-05 RX ADMIN — ONDANSETRON 4 MG: 2 INJECTION INTRAMUSCULAR; INTRAVENOUS at 14:08

## 2020-08-05 RX ADMIN — FENTANYL CITRATE 100 MCG: 50 INJECTION INTRAMUSCULAR; INTRAVENOUS at 13:30

## 2020-08-05 RX ADMIN — PROPOFOL 180 MG: 10 INJECTION, EMULSION INTRAVENOUS at 13:32

## 2020-08-05 RX ADMIN — LAMOTRIGINE 200 MG: 100 TABLET ORAL at 21:52

## 2020-08-05 RX ADMIN — HYDROMORPHONE HYDROCHLORIDE 0.2 MG: 1 INJECTION, SOLUTION INTRAMUSCULAR; INTRAVENOUS; SUBCUTANEOUS at 14:40

## 2020-08-05 RX ADMIN — FAMOTIDINE 20 MG: 10 INJECTION, SOLUTION INTRAVENOUS at 21:51

## 2020-08-05 RX ADMIN — DEXAMETHASONE SODIUM PHOSPHATE 8 MG: 4 INJECTION, SOLUTION INTRA-ARTICULAR; INTRALESIONAL; INTRAMUSCULAR; INTRAVENOUS; SOFT TISSUE at 13:44

## 2020-08-05 ASSESSMENT — LIFESTYLE VARIABLES
HAVE PEOPLE ANNOYED YOU BY CRITICIZING YOUR DRINKING: NO
HAVE YOU EVER FELT YOU SHOULD CUT DOWN ON YOUR DRINKING: NO
TOTAL SCORE: 0
DOES PATIENT WANT TO STOP DRINKING: NO
EVER FELT BAD OR GUILTY ABOUT YOUR DRINKING: NO
EVER_SMOKED: NEVER
TOTAL SCORE: 0
HOW MANY TIMES IN THE PAST YEAR HAVE YOU HAD 5 OR MORE DRINKS IN A DAY: 0
CONSUMPTION TOTAL: NEGATIVE
ALCOHOL_USE: NO
ON A TYPICAL DAY WHEN YOU DRINK ALCOHOL HOW MANY DRINKS DO YOU HAVE: 0
AVERAGE NUMBER OF DAYS PER WEEK YOU HAVE A DRINK CONTAINING ALCOHOL: 0
TOTAL SCORE: 0
EVER HAD A DRINK FIRST THING IN THE MORNING TO STEADY YOUR NERVES TO GET RID OF A HANGOVER: NO

## 2020-08-05 ASSESSMENT — PAIN SCALES - GENERAL: PAIN_LEVEL: 3

## 2020-08-05 ASSESSMENT — COGNITIVE AND FUNCTIONAL STATUS - GENERAL
MOVING TO AND FROM BED TO CHAIR: A LITTLE
DRESSING REGULAR UPPER BODY CLOTHING: A LITTLE
SUGGESTED CMS G CODE MODIFIER MOBILITY: CK
TOILETING: A LITTLE
MOVING FROM LYING ON BACK TO SITTING ON SIDE OF FLAT BED: A LITTLE
TURNING FROM BACK TO SIDE WHILE IN FLAT BAD: A LITTLE
CLIMB 3 TO 5 STEPS WITH RAILING: A LITTLE
SUGGESTED CMS G CODE MODIFIER DAILY ACTIVITY: CJ
HELP NEEDED FOR BATHING: A LITTLE
MOBILITY SCORE: 18
DAILY ACTIVITIY SCORE: 21
WALKING IN HOSPITAL ROOM: A LITTLE
STANDING UP FROM CHAIR USING ARMS: A LITTLE

## 2020-08-05 ASSESSMENT — FIBROSIS 4 INDEX: FIB4 SCORE: .4821428571428571429

## 2020-08-05 ASSESSMENT — PATIENT HEALTH QUESTIONNAIRE - PHQ9
2. FEELING DOWN, DEPRESSED, IRRITABLE, OR HOPELESS: NOT AT ALL
1. LITTLE INTEREST OR PLEASURE IN DOING THINGS: NOT AT ALL
SUM OF ALL RESPONSES TO PHQ9 QUESTIONS 1 AND 2: 0

## 2020-08-05 NOTE — ANESTHESIA PROCEDURE NOTES
Airway    Date/Time: 8/5/2020 1:33 PM  Performed by: Tyrone Muro M.D.  Authorized by: Tyrone Muro M.D.     Location:  OR  Urgency:  Elective  Difficult Airway: No    Indications for Airway Management:  Anesthesia      Spontaneous Ventilation: absent    Sedation Level:  Deep  Preoxygenated: Yes    Patient Position:  Sniffing  Final Airway Type:  Endotracheal airway  Final Endotracheal Airway:  ETT  Cuffed: Yes    Technique Used for Successful ETT Placement:  Direct laryngoscopy    Insertion Site:  Oral  Blade Type:  Maria Elena  Laryngoscope Blade/Videolaryngoscope Blade Size:  3  ETT Size (mm):  7.5  Measured from:  Lips  ETT to Lips (cm):  21  Placement Verified by: auscultation and capnometry    Cormack-Lehane Classification:  Grade IIb - view of arytenoids or posterior of glottis only  Number of Attempts at Approach:  1  Number of Other Approaches Attempted:  0

## 2020-08-05 NOTE — ANESTHESIA POSTPROCEDURE EVALUATION
Patient: Smita Faith    Procedure Summary     Date: 08/05/20 Room / Location: [place] [id number] / SURGERY [facility]    Anesthesia Start: 1327 Anesthesia Stop: 1429    Procedures:       REVISION, GASTRIC BYPASS, LAPAROSCOPIC- JASON EN Y WITH ENTERO-ILEOSTOMY (Abdomen)      BIOPSY, LIVER, LAPAROSCOPIC (Abdomen) Diagnosis: (MORBID OBESITY)    Surgeon: Prince Rosenthal M.D. Responsible Provider: Tyrone Muro M.D.    Anesthesia Type: general ASA Status: 2          Final Anesthesia Type: general  Last vitals  BP   Blood Pressure: 149/75    Temp   36.3 °C (97.3 °F)    Pulse   Pulse: 75   Resp   (!) 23    SpO2   100 %      Anesthesia Post Evaluation    Patient location during evaluation: PACU  Patient participation: complete - patient participated  Level of consciousness: awake and alert  Pain score: 3    Airway patency: patent  Anesthetic complications: no  Cardiovascular status: hemodynamically stable  Respiratory status: acceptable  Hydration status: euvolemic    PONV: none           Nurse Pain Score: 7 (NPRS)

## 2020-08-05 NOTE — ANESTHESIA TIME REPORT
Anesthesia Start and Stop Event Times     Date Time Event    8/5/2020 1238 Ready for Procedure     1327 Anesthesia Start     1429 Anesthesia Stop        Responsible Staff  08/05/20    Name Role Begin End    Tyrone Muro M.D. Anesth 1327 1429        Preop Diagnosis (Free Text):  Pre-op Diagnosis     MORBID OBESITY        Preop Diagnosis (Codes):    Post op Diagnosis  Morbid (severe) obesity due to excess calories (HCC)      Premium Reason  Non-Premium    Comments:

## 2020-08-05 NOTE — ANESTHESIA PREPROCEDURE EVALUATION
Relevant Problems   ENDO   (+) Hypothyroid       Physical Exam    Airway   Mallampati: II  TM distance: >3 FB  Neck ROM: full       Cardiovascular - normal exam  Rhythm: regular  Rate: normal  (-) murmur     Dental - normal exam           Pulmonary - normal exam  Breath sounds clear to auscultation     Abdominal    Neurological - normal exam                 Anesthesia Plan    ASA 2       Plan - general       Airway plan will be ETT        Induction: intravenous    Postoperative Plan: Postoperative administration of opioids is intended.    Pertinent diagnostic labs and testing reviewed    Informed Consent:    Anesthetic plan and risks discussed with patient.    Use of blood products discussed with: patient whom consented to blood products.

## 2020-08-06 VITALS
BODY MASS INDEX: 50.41 KG/M2 | SYSTOLIC BLOOD PRESSURE: 116 MMHG | HEIGHT: 61 IN | DIASTOLIC BLOOD PRESSURE: 70 MMHG | TEMPERATURE: 96.6 F | WEIGHT: 266.98 LBS | HEART RATE: 61 BPM | OXYGEN SATURATION: 96 % | RESPIRATION RATE: 19 BRPM

## 2020-08-06 LAB
ALBUMIN SERPL BCP-MCNC: 3.7 G/DL (ref 3.2–4.9)
ALBUMIN/GLOB SERPL: 1.2 G/DL
ALP SERPL-CCNC: 121 U/L (ref 30–99)
ALT SERPL-CCNC: 26 U/L (ref 2–50)
ANION GAP SERPL CALC-SCNC: 11 MMOL/L (ref 7–16)
AST SERPL-CCNC: 19 U/L (ref 12–45)
BILIRUB SERPL-MCNC: 0.3 MG/DL (ref 0.1–1.5)
BUN SERPL-MCNC: 9 MG/DL (ref 8–22)
CALCIUM SERPL-MCNC: 9 MG/DL (ref 8.5–10.5)
CHLORIDE SERPL-SCNC: 102 MMOL/L (ref 96–112)
CO2 SERPL-SCNC: 24 MMOL/L (ref 20–33)
CREAT SERPL-MCNC: 0.54 MG/DL (ref 0.5–1.4)
ERYTHROCYTE [DISTWIDTH] IN BLOOD BY AUTOMATED COUNT: 50.2 FL (ref 35.9–50)
GLOBULIN SER CALC-MCNC: 3 G/DL (ref 1.9–3.5)
GLUCOSE SERPL-MCNC: 106 MG/DL (ref 65–99)
HCT VFR BLD AUTO: 39.9 % (ref 37–47)
HGB BLD-MCNC: 12.8 G/DL (ref 12–16)
MCH RBC QN AUTO: 28.4 PG (ref 27–33)
MCHC RBC AUTO-ENTMCNC: 32.1 G/DL (ref 33.6–35)
MCV RBC AUTO: 88.7 FL (ref 81.4–97.8)
PLATELET # BLD AUTO: 382 K/UL (ref 164–446)
PMV BLD AUTO: 9.5 FL (ref 9–12.9)
POTASSIUM SERPL-SCNC: 4.4 MMOL/L (ref 3.6–5.5)
PROT SERPL-MCNC: 6.7 G/DL (ref 6–8.2)
RBC # BLD AUTO: 4.5 M/UL (ref 4.2–5.4)
SODIUM SERPL-SCNC: 137 MMOL/L (ref 135–145)
WBC # BLD AUTO: 9.6 K/UL (ref 4.8–10.8)

## 2020-08-06 PROCEDURE — 94760 N-INVAS EAR/PLS OXIMETRY 1: CPT

## 2020-08-06 PROCEDURE — A9270 NON-COVERED ITEM OR SERVICE: HCPCS | Performed by: PHYSICIAN ASSISTANT

## 2020-08-06 PROCEDURE — 80053 COMPREHEN METABOLIC PANEL: CPT

## 2020-08-06 PROCEDURE — 700105 HCHG RX REV CODE 258: Performed by: PHYSICIAN ASSISTANT

## 2020-08-06 PROCEDURE — 36415 COLL VENOUS BLD VENIPUNCTURE: CPT

## 2020-08-06 PROCEDURE — 85027 COMPLETE CBC AUTOMATED: CPT

## 2020-08-06 PROCEDURE — 700102 HCHG RX REV CODE 250 W/ 637 OVERRIDE(OP): Performed by: PHYSICIAN ASSISTANT

## 2020-08-06 PROCEDURE — 700111 HCHG RX REV CODE 636 W/ 250 OVERRIDE (IP): Performed by: PHYSICIAN ASSISTANT

## 2020-08-06 RX ADMIN — OXYCODONE HYDROCHLORIDE 5 MG: 5 SOLUTION ORAL at 08:54

## 2020-08-06 RX ADMIN — OXYCODONE HYDROCHLORIDE 5 MG: 5 SOLUTION ORAL at 13:04

## 2020-08-06 RX ADMIN — ONDANSETRON 4 MG: 2 INJECTION INTRAMUSCULAR; INTRAVENOUS at 04:39

## 2020-08-06 RX ADMIN — OXYCODONE HYDROCHLORIDE 5 MG: 5 SOLUTION ORAL at 04:38

## 2020-08-06 RX ADMIN — PREGABALIN 75 MG: 75 CAPSULE ORAL at 04:38

## 2020-08-06 RX ADMIN — POTASSIUM CHLORIDE: 2 INJECTION, SOLUTION, CONCENTRATE INTRAVENOUS at 04:05

## 2020-08-06 RX ADMIN — LEVOTHYROXINE SODIUM 75 MCG: 25 TABLET ORAL at 04:38

## 2020-08-06 RX ADMIN — POTASSIUM CHLORIDE: 2 INJECTION, SOLUTION, CONCENTRATE INTRAVENOUS at 11:08

## 2020-08-06 RX ADMIN — ACETAMINOPHEN 1000 MG: 10 INJECTION, SOLUTION INTRAVENOUS at 04:39

## 2020-08-06 RX ADMIN — ONDANSETRON 4 MG: 2 INJECTION INTRAMUSCULAR; INTRAVENOUS at 08:54

## 2020-08-06 RX ADMIN — ACETAMINOPHEN 1000 MG: 10 INJECTION, SOLUTION INTRAVENOUS at 00:27

## 2020-08-06 RX ADMIN — PAROXETINE HYDROCHLORIDE 20 MG: 20 TABLET, FILM COATED ORAL at 04:39

## 2020-08-06 RX ADMIN — POTASSIUM CHLORIDE: 2 INJECTION, SOLUTION, CONCENTRATE INTRAVENOUS at 04:40

## 2020-08-06 RX ADMIN — ENOXAPARIN SODIUM 40 MG: 40 INJECTION SUBCUTANEOUS at 04:39

## 2020-08-06 RX ADMIN — PREGABALIN 75 MG: 75 CAPSULE ORAL at 11:09

## 2020-08-06 RX ADMIN — FAMOTIDINE 20 MG: 10 INJECTION, SOLUTION INTRAVENOUS at 04:39

## 2020-08-06 ASSESSMENT — ENCOUNTER SYMPTOMS
DIARRHEA: 0
VOMITING: 0
HEARTBURN: 0
SHORTNESS OF BREATH: 0
ABDOMINAL PAIN: 1
FEVER: 0
PALPITATIONS: 0
COUGH: 0
NAUSEA: 0
CHILLS: 0

## 2020-08-06 ASSESSMENT — COPD QUESTIONNAIRES
HAVE YOU SMOKED AT LEAST 100 CIGARETTES IN YOUR ENTIRE LIFE: NO/DON'T KNOW
COPD SCREENING SCORE: 3
DO YOU EVER COUGH UP ANY MUCUS OR PHLEGM?: YES, A FEW DAYS A WEEK OR MONTH
DURING THE PAST 4 WEEKS HOW MUCH DID YOU FEEL SHORT OF BREATH: SOME OF THE TIME

## 2020-08-06 NOTE — CARE PLAN
Problem: Pain Management  Goal: Pain level will decrease to patient's comfort goal  Outcome: PROGRESSING AS EXPECTED   Pain managed with PRN oxycodone, dilaudid and scheduled tylenol  Problem: Safety  Goal: Will remain free from injury  Outcome: PROGRESSING AS EXPECTED  Goal: Will remain free from falls  Outcome: PROGRESSING AS EXPECTED   Pt uses call light appropriately

## 2020-08-06 NOTE — PROGRESS NOTES
Report received from Felice in PACU  Pt arrived to floor via transport on bed.    Pt is A&O x4  10L O2 per ERAS protocol then RA  Pain reported medicated per MAR  Nausea denies  Gastric Clear Liquid Diet   Surgical lap sites x4 with bandaids  Voided in PACU before coming up  Last BM PTA   Up SBA  SCD's on  Bed in lowest position and locked.  Pt resting comfortably now.  Review plan of care with patient  Call light within reach  Hourly rounds in place  All needs met at this time

## 2020-08-06 NOTE — PROGRESS NOTES
Two RN skin check completed with Lucinda  PIV in left wrist  x4 lap sites to abdomen with band aids old drainage   All bony prominences in tact.   No other areas of compromised skin integrity noted.

## 2020-08-06 NOTE — CARE PLAN
Problem: Bowel/Gastric:  Goal: Normal bowel function is maintained or improved  Outcome: PROGRESSING AS EXPECTED  Goal: Will not experience complications related to bowel motility  Outcome: PROGRESSING AS EXPECTED     Problem: Discharge Barriers/Planning  Goal: Patient's continuum of care needs will be met  Outcome: PROGRESSING AS EXPECTED

## 2020-08-06 NOTE — PROGRESS NOTES
Discharging Patient home per physician order.  Discharged with friend.  Demonstrated understanding of discharge instructions, follow up appointments, home medications, prescriptions, home care for surgical wound, and nursing care instructions for post-op care following gastric by surgery and incisional care.  Ambulating without assistance, voiding without difficulty, pain well controlled, tolerating oral medications, oxygen saturation greater than 90% , tolerating diet.   Educational handouts MD specific given and discussed.  Verbalized understanding of discharge instructions and educational handouts.  All questions answered.  Belongings with patient at time of discharge.

## 2020-08-06 NOTE — PROGRESS NOTES
"Patient resting comfortably this am, pain well controlled on PO medication. Lap sites with bandaids scant old drainage. -Flatus, -BM, denies nausea. Plan to discharge home after 1400 if meets criteria. Ambulating independently. /60   Pulse (!) 45   Temp 36.6 °C (97.8 °F) (Temporal)   Resp 16   Ht 1.549 m (5' 1\")   Wt 121.1 kg (266 lb 15.6 oz)   LMP 03/02/2016 Comment: hcg sent with labs  SpO2 97%   BMI 50.44 kg/m²     "

## 2020-08-06 NOTE — DIETARY
NUTRITION SERVICES: BMI - Pt with BMI >40 (=Body mass index is 50.44 kg/m².), morbid obesity. Weight loss counseling not appropriate in acute care setting. Pt s/p revision of Nael-en-Y gastric bypass anticipate F/u planned with MD/RD post D/c.     RECOMMEND - Referral to outpatient nutrition services for weight management, or F/u with MD/RD after D/C.

## 2020-08-06 NOTE — DISCHARGE INSTRUCTIONS
Discharge Instructions    Discharged to home by car with relative. Discharged via wheelchair, hospital escort: Yes.  Special equipment needed: Not Applicable    Be sure to schedule a follow-up appointment with your primary care doctor or any specialists as instructed.     Discharge Plan:     Bariatric Discharge instructions:     1. DIET: Follow the diet progression detailed in your post-op booklet.  Progressing as instructed will make for a smooth transition after surgery and prevent any pain associated with eating foods before your stomach is ready or eating too much.  Water and hydration is much more important than food intake the first week or two after surgery.  Drink enough water to keep your urine pale yellow.  Increase water intake if your urine is a darker yellow or if have burning with urination.  Nausea and vomiting once or twice after you leave the hospital is normal.  Sip fluids continually and stay hydrated.     2.  SUPPLEMENTS: Start your supplements 1 week after surgery.  You may need to cut some supplements in half initially.  Follow the guidelines from your supplement handout from your pre-op class.     3. ACTIVITIES: After discharge from the hospital, you may resume full routine activities. However, there should be no heavy lifting (greater than 15 pounds) and no strenuous activities until after your follow-up visit. Otherwise, routine activities of daily living are acceptable.  More movement and walking after surgery the better.     4. DRIVING: You may drive whenever you are off pain medications and are able to perform the activities needed to drive, i.e. turning, bending, twisting, etc.     5. BATHING AND WOUND CARE: You may get the wound wet 2 days after surgery. You may shower, but do not submerge in a bath for at least a week. Dressings may come off after 48 hours. Please leave the steri-strips in place, they will fall off over 5-7 days. You may notice some clear or slightly bloody drainage  from your wounds and there may be some mild redness or bruising around your incision sites.  This is normal.     6. BOWEL FUNCTION: Constipation is common after an operation, especially with pain medications. The combination of pain medication and decreased activity level can cause constipation in otherwise normal patients. If you feel this is occurring, take a laxative (Milk of Magnesia, Miralax, etc.) until the problem has resolved.  Diarrhea the first few days after surgery can also be normal.  You may notice that it is dark in color or see blood, which is also normal and should subside in the first week post-op.     7. PAIN MEDICATION: You have been given a prescription for pain medication preoperatively.  Please take these as directed. It is important to remember not to take medications on an empty stomach as this may cause nausea.  For minimal discomfort you may use liquid Tylenol 650 mg every 4 hours.  DO NOT exceed 4,000 mg of Tylenol in 24 hours.  DO NOT use non steroidal anti-inflamatory medication such as: Asprin, Ibuprofen, Advil, Motrin, Aleve, or steroids.  These medication can cause ulcers after weight loss surgery.     8.CALL IF YOU HAVE: (1) Fevers to more than 101.5 F, (2) leg pain or swelling (3) Drainage or fluid from incision that may be foul smelling, increased tenderness or soreness at the wound or the wound edges are no longer together, redness or swelling at the incision site. (4) Night Sweats (5) Shaking, Chills (6) Persistent Nausea or Vomiting for over 24 hours.  Use your anti nausea medication as prescribed. (7) Call 911 if sudden onset of chest pain or shortness of breath that does not improve with 5-10 min of rest.     9. APPOINTMENT: Contact our office at [phone] for a follow-up appointment in 1-2 weeks following your procedure.  Our office hours are Monday-Friday, 8am-5pm.  Please try to call during these hours when we are better able to assist you.     If you have any  additional questions, please do not hesitate to call the office and speak to either myself or the physician on call.     Office address:   [facility].   [address]   [address]   [place] [address]        Diet Plan: Discussed  Activity Level: Discussed  Confirmed Follow up Appointment: Patient to Call and Schedule Appointment  Confirmed Symptoms Management: Discussed  Medication Reconciliation Updated: Yes        Special Instructions: None    · Is patient discharged on Warfarin / Coumadin?   No     Depression / Suicide Risk    As you are discharged from this [facility] facility, it is important to learn how to keep safe from harming yourself.    Recognize the warning signs:  · Abrupt changes in personality, positive or negative- including increase in energy   · Giving away possessions  · Change in eating patterns- significant weight changes-  positive or negative  · Change in sleeping patterns- unable to sleep or sleeping all the time   · Unwillingness or inability to communicate  · Depression  · Unusual sadness, discouragement and loneliness  · Talk of wanting to die  · Neglect of personal appearance   · Rebelliousness- reckless behavior  · Withdrawal from people/activities they love  · Confusion- inability to concentrate     If you or a loved one observes any of these behaviors or has concerns about self-harm, here's what you can do:  · Talk about it- your feelings and reasons for harming yourself  · Remove any means that you might use to hurt yourself (examples: pills, rope, extension cords, firearm)  · Get professional help from the community (Mental Health, Substance Abuse, psychological counseling)  · Do not be alone:Call your Safe Contact- someone whom you trust who will be there for you.  · Call your local CRISIS HOTLINE 839-6758 or 921-317-5654  · Call your local Children's Mobile Crisis Response Team [place] (954) 219-6062 or www.[employer]  · Call the toll free National  Suicide Prevention Hotlines   · National Suicide Prevention Lifeline 129-938-DFFB (9267)  · National Hope Line Network 800-SUICIDE (187-2205)

## 2020-08-06 NOTE — OP REPORT
DATE OF SERVICE:  08/05/2020    PREOPERATIVE DIAGNOSES:  1.  Morbid obesity with medical sequelae.  2.  Previous history of Nael-en-Y gastric bypass.   3.  Non-alchoholic fatty liver disease.    POSTOPERATIVE DIAGNOSES:  1.  Morbid obesity with medical sequelae.  2.  Previous history of Nael-en-Y gastric bypass.  3.  Intraabdominal adhesions.  4.  Non-alchoholic fatty liver disease.    OPERATIONS PERFORMED:  1.  Laparoscopic adhesiolysis.  2.  Revision of Nael-en-Y gastric bypass with distalization/revision of   enteroenterostomy.  3.  Left lobe liver biopsy.    SURGEON:  [facility] MD    ASSISTANT:  Elena Renee PA-C    ANESTHESIOLOGIST:  Tyrone Muro MD    INDICATIONS FOR PROCEDURE:  The patient is a 54-year-old female with a history   of morbid obesity, previous Nael-en-Y gastric bypass and more recent weight   regain and worsening medical sequelae of obesity.  She comes today for   revision of the Nael-en-Y gastric bypass.    DETAILS OF PROCEDURE:  After an extensive informed consent discussion process,   patient was brought to the operating room, she was placed in a supine   position on the operating table.  After induction of general anesthesia and   placement of an endotracheal tube, the abdomen was prepped and draped in the   usual sterile fashion.  After administration of intravenous antibiotics, a   local anesthetic mixture of bupivacaine with epinephrine was used to   infiltrate the skin and subcutaneous tissues.  A bladeless optical entry 5 mm   trocar was carefully introduced and the laparoscope was introduced.    Pneumoperitoneum was established safely.    Three additional bladeless trocars were carefully placed under direct vision.    Careful examination demonstrated some interloop adhesions and these were   carefully lysed sharply without enterotomies.  The liver exhibited a fatty   liver disease.  A Nikolay-Cut biopsy was obtained from the left lateral segment   and sent for pathology.  The  site was touched up with cautery and exhibited   excellent hemostasis.    The Nael limb measured approximately 100 cm and the biliopancreatic limb   measured approximately 30 cm.  The [place] stapler was used to divide the Nael   limb as it entered the enteroenterostomy.  A portion of the mesentery was   divided with the [place] vascular stapler to give us some mobility on the   vascularity.    We then carefully measured and added an additional 70 cm to make the   pancreaticoduodenal limb a full 100 cm.  At this position, the bowel was   approximated to the Nael limb with interrupted 2-0 Polysorb sutures.  The   enterotomies were made and the enteroenterostomy was created with the [place]   60 stapler.  The defect was closed with the [place]ons stapler with care taken to   avoid luminal narrowing.  The mesenteric defect was closed with handsewn 2-0   Polysorb suture using the EndoStitch.  A full 3 meters of common channel small   bowel was present.  Final reinspections were performed.  This demonstrated   excellent result with excellent hemostasis and nice tension-free,   well-perfused anatomy.  The pneumoperitoneum was then allowed to escape.  The   ports were removed under direct vision.  The port sites were irrigated and   closed with Vicryl sutures.  Steri-Strips and sterile dressings were applied.    Needle, sponge and instrument counts correct.    ESTIMATED BLOOD LOSS:  Minimal.    COMPLICATIONS:  None.       ____________________________________     MD BLAKE HECK / SHEILA    DD:  08/05/2020 14:27:06  DT:  08/05/2020 17:47:33    D#:  [id number]  Job#:  [id number]

## 2020-10-01 ENCOUNTER — HOSPITAL ENCOUNTER (OUTPATIENT)
Facility: MEDICAL CENTER | Age: 55
End: 2020-10-01
Attending: PHYSICIAN ASSISTANT
Payer: MEDICARE

## 2020-10-01 PROCEDURE — 84443 ASSAY THYROID STIM HORMONE: CPT

## 2020-10-02 LAB — TSH SERPL DL<=0.005 MIU/L-ACNC: 5.29 UIU/ML (ref 0.38–5.33)

## 2020-10-13 ENCOUNTER — HOSPITAL ENCOUNTER (OUTPATIENT)
Facility: MEDICAL CENTER | Age: 55
End: 2020-10-13
Attending: PHYSICIAN ASSISTANT
Payer: MEDICARE

## 2020-10-13 PROCEDURE — 82274 ASSAY TEST FOR BLOOD FECAL: CPT

## 2020-10-21 LAB
AMBIGUOUS SPECIMEN AMBIS: NORMAL
HEMOCCULT STL QL IA: NEGATIVE

## 2021-03-15 DIAGNOSIS — Z23 NEED FOR VACCINATION: ICD-10-CM

## 2021-04-19 ENCOUNTER — TELEPHONE (OUTPATIENT)
Dept: SCHEDULING | Facility: IMAGING CENTER | Age: 56
End: 2021-04-19

## 2021-05-11 ENCOUNTER — TELEPHONE (OUTPATIENT)
Dept: MEDICAL GROUP | Facility: PHYSICIAN GROUP | Age: 56
End: 2021-05-11

## 2021-05-11 NOTE — TELEPHONE ENCOUNTER
Left message for patient to call back regarding pre-visit planning. Please transfer call to [phone].

## 2021-05-20 SDOH — ECONOMIC STABILITY: HOUSING INSECURITY
IN THE LAST 12 MONTHS, WAS THERE A TIME WHEN YOU DID NOT HAVE A STEADY PLACE TO SLEEP OR SLEPT IN A SHELTER (INCLUDING NOW)?: NO

## 2021-05-20 SDOH — ECONOMIC STABILITY: INCOME INSECURITY: IN THE LAST 12 MONTHS, WAS THERE A TIME WHEN YOU WERE NOT ABLE TO PAY THE MORTGAGE OR RENT ON TIME?: NO

## 2021-05-20 SDOH — HEALTH STABILITY: PHYSICAL HEALTH: ON AVERAGE, HOW MANY DAYS PER WEEK DO YOU ENGAGE IN MODERATE TO STRENUOUS EXERCISE (LIKE A BRISK WALK)?: 1 DAY

## 2021-05-20 SDOH — ECONOMIC STABILITY: TRANSPORTATION INSECURITY
IN THE PAST 12 MONTHS, HAS LACK OF RELIABLE TRANSPORTATION KEPT YOU FROM MEDICAL APPOINTMENTS, MEETINGS, WORK OR FROM GETTING THINGS NEEDED FOR DAILY LIVING?: NO

## 2021-05-20 SDOH — HEALTH STABILITY: PHYSICAL HEALTH: ON AVERAGE, HOW MANY MINUTES DO YOU ENGAGE IN EXERCISE AT THIS LEVEL?: 20 MINUTES

## 2021-05-20 SDOH — ECONOMIC STABILITY: HOUSING INSECURITY: IN THE LAST 12 MONTHS, HOW MANY PLACES HAVE YOU LIVED?: 2

## 2021-05-20 SDOH — ECONOMIC STABILITY: TRANSPORTATION INSECURITY
IN THE PAST 12 MONTHS, HAS THE LACK OF TRANSPORTATION KEPT YOU FROM MEDICAL APPOINTMENTS OR FROM GETTING MEDICATIONS?: NO

## 2021-05-20 SDOH — HEALTH STABILITY: MENTAL HEALTH
STRESS IS WHEN SOMEONE FEELS TENSE, NERVOUS, ANXIOUS, OR CAN'T SLEEP AT NIGHT BECAUSE THEIR MIND IS TROUBLED. HOW STRESSED ARE YOU?: NOT AT ALL

## 2021-05-20 ASSESSMENT — SOCIAL DETERMINANTS OF HEALTH (SDOH)
HOW OFTEN DO YOU HAVE A DRINK CONTAINING ALCOHOL: NEVER
HOW OFTEN DO YOU HAVE SIX OR MORE DRINKS ON ONE OCCASION: NEVER
HOW OFTEN DO YOU ATTENT MEETINGS OF THE CLUB OR ORGANIZATION YOU BELONG TO?: NEVER
DO YOU BELONG TO ANY CLUBS OR ORGANIZATIONS SUCH AS CHURCH GROUPS UNIONS, FRATERNAL OR ATHLETIC GROUPS, OR SCHOOL GROUPS?: NO
HOW OFTEN DO YOU GET TOGETHER WITH FRIENDS OR RELATIVES?: ONCE A WEEK
IN A TYPICAL WEEK, HOW MANY TIMES DO YOU TALK ON THE PHONE WITH FAMILY, FRIENDS, OR NEIGHBORS?: ONCE A WEEK
WITHIN THE PAST 12 MONTHS, YOU WORRIED THAT YOUR FOOD WOULD RUN OUT BEFORE YOU GOT THE MONEY TO BUY MORE: NEVER TRUE
HOW HARD IS IT FOR YOU TO PAY FOR THE VERY BASICS LIKE FOOD, HOUSING, MEDICAL CARE, AND HEATING?: NOT HARD AT ALL
WITHIN THE PAST 12 MONTHS, THE FOOD YOU BOUGHT JUST DIDN'T LAST AND YOU DIDN'T HAVE MONEY TO GET MORE: NEVER TRUE
HOW OFTEN DO YOU ATTEND CHURCH OR RELIGIOUS SERVICES?: 1 TO 4 TIMES PER YEAR

## 2021-05-21 ENCOUNTER — OFFICE VISIT (OUTPATIENT)
Dept: MEDICAL GROUP | Facility: PHYSICIAN GROUP | Age: 56
End: 2021-05-21
Payer: MEDICARE

## 2021-05-21 VITALS
BODY MASS INDEX: 49.73 KG/M2 | WEIGHT: 263.4 LBS | HEART RATE: 74 BPM | TEMPERATURE: 97.6 F | HEIGHT: 61 IN | OXYGEN SATURATION: 95 % | SYSTOLIC BLOOD PRESSURE: 130 MMHG | DIASTOLIC BLOOD PRESSURE: 70 MMHG

## 2021-05-21 DIAGNOSIS — R53.82 CHRONIC FATIGUE: ICD-10-CM

## 2021-05-21 DIAGNOSIS — Z23 NEED FOR VACCINATION: ICD-10-CM

## 2021-05-21 DIAGNOSIS — F10.21 ALCOHOL DEPENDENCE IN REMISSION (HCC): ICD-10-CM

## 2021-05-21 DIAGNOSIS — F32.A DEPRESSION, UNSPECIFIED DEPRESSION TYPE: ICD-10-CM

## 2021-05-21 DIAGNOSIS — F41.9 ANXIETY: ICD-10-CM

## 2021-05-21 DIAGNOSIS — G89.29 OTHER CHRONIC PAIN: ICD-10-CM

## 2021-05-21 DIAGNOSIS — I70.0 ATHEROSCLEROSIS OF AORTA (HCC): ICD-10-CM

## 2021-05-21 DIAGNOSIS — E66.01 MORBID OBESITY (HCC): ICD-10-CM

## 2021-05-21 DIAGNOSIS — E03.9 HYPOTHYROIDISM, UNSPECIFIED TYPE: ICD-10-CM

## 2021-05-21 DIAGNOSIS — Z98.84 PERSONAL HISTORY OF GASTRIC BYPASS: ICD-10-CM

## 2021-05-21 DIAGNOSIS — N64.4 PAINFUL LUMPY LEFT BREAST: ICD-10-CM

## 2021-05-21 DIAGNOSIS — Z86.2 HISTORY OF MICROCYTIC HYPOCHROMIC ANEMIA: ICD-10-CM

## 2021-05-21 DIAGNOSIS — N63.20 PAINFUL LUMPY LEFT BREAST: ICD-10-CM

## 2021-05-21 PROBLEM — Z90.79 H/O TOTAL HYSTERECTOMY WITH BILATERAL SALPINGO-OOPHORECTOMY (BSO): Status: ACTIVE | Noted: 2021-05-21

## 2021-05-21 PROBLEM — Z90.710 H/O TOTAL HYSTERECTOMY WITH BILATERAL SALPINGO-OOPHORECTOMY (BSO): Status: ACTIVE | Noted: 2021-05-21

## 2021-05-21 PROBLEM — T81.31XA DEHISCENCE OF EXTERNAL SURGICAL WOUND: Status: RESOLVED | Noted: 2017-08-23 | Resolved: 2021-05-21

## 2021-05-21 PROBLEM — Z90.722 H/O TOTAL HYSTERECTOMY WITH BILATERAL SALPINGO-OOPHORECTOMY (BSO): Status: ACTIVE | Noted: 2021-05-21

## 2021-05-21 PROBLEM — L03.90 CELLULITIS: Status: RESOLVED | Noted: 2017-08-23 | Resolved: 2021-05-21

## 2021-05-21 PROBLEM — D50.9 MICROCYTIC ANEMIA: Status: RESOLVED | Noted: 2019-08-29 | Resolved: 2021-05-21

## 2021-05-21 PROBLEM — Z96.641 HISTORY OF TOTAL RIGHT HIP REPLACEMENT: Status: ACTIVE | Noted: 2021-05-21

## 2021-05-21 PROBLEM — R07.9 CHEST PAIN IN ADULT: Status: RESOLVED | Noted: 2019-08-29 | Resolved: 2021-05-21

## 2021-05-21 PROCEDURE — 99214 OFFICE O/P EST MOD 30 MIN: CPT | Performed by: STUDENT IN AN ORGANIZED HEALTH CARE EDUCATION/TRAINING PROGRAM

## 2021-05-21 RX ORDER — BUPRENORPHINE 15 UG/H
PATCH TRANSDERMAL
COMMUNITY
Start: 2021-05-01 | End: 2021-10-22

## 2021-05-21 RX ORDER — OMEPRAZOLE 20 MG/1
CAPSULE, DELAYED RELEASE ORAL
COMMUNITY
Start: 2021-03-25 | End: 2022-03-10 | Stop reason: SDUPTHER

## 2021-05-21 RX ORDER — AMOXICILLIN 500 MG/1
CAPSULE ORAL
COMMUNITY
Start: 2021-03-02 | End: 2024-02-15

## 2021-05-21 RX ORDER — PAROXETINE 10 MG/1
TABLET, FILM COATED ORAL
COMMUNITY
Start: 2019-11-01 | End: 2021-05-21

## 2021-05-21 RX ORDER — CETIRIZINE HYDROCHLORIDE 10 MG/1
CAPSULE, LIQUID FILLED ORAL
COMMUNITY

## 2021-05-21 ASSESSMENT — FIBROSIS 4 INDEX: FIB4 SCORE: 0.54

## 2021-05-21 ASSESSMENT — PATIENT HEALTH QUESTIONNAIRE - PHQ9: CLINICAL INTERPRETATION OF PHQ2 SCORE: 0

## 2021-05-21 NOTE — LETTER
[employer]  Erinn Ritchie D.O.  [address]  [place] [address]  Fax: [phone]   Authorization for Release/Disclosure of   Protected Health Information   Name: TASH COLLIER : 1965 SSN: [id number]   Address: [facility] [place] [address] Phone:    [phone] (home) [phone] (work)   I authorize the entity listed below to release/disclose the PHI below to:   [facility]/Erinn Ritchie D.O. and Erinn Ritchie D.O.   Provider or Entity Name:                                               [facility]   Address   City, State, [facility]   Phone:      Fax:     Reason for request: continuity of care   Information to be released:    [  ] LAST COLONOSCOPY,  including any PATH REPORT and follow-up  [  ] LAST FIT/COLOGUARD RESULT [  ] LAST DEXA  [  ] LAST MAMMOGRAM  [  ] LAST PAP  [  ] LAST LABS [  ] RETINA EXAM REPORT  [  ] IMMUNIZATION RECORDS  [x ] Release all info      [  ] Check here and initial the line next to each item to release ALL health information INCLUDING  _____ Care and treatment for drug and / or alcohol abuse  _____ HIV testing, infection status, or AIDS  _____ Genetic Testing    DATES OF SERVICE OR TIME PERIOD TO BE DISCLOSED: _____________  I understand and acknowledge that:  * This Authorization may be revoked at any time by you in writing, except if your health information has already been used or disclosed.  * Your health information that will be used or disclosed as a result of you signing this authorization could be re-disclosed by the recipient. If this occurs, your re-disclosed health information may no longer be protected by State or Federal laws.  * You may refuse to sign this Authorization. Your refusal will not affect your ability to obtain treatment.  * This Authorization becomes effective upon signing and will  on (date) __________.      If no date is indicated, this Authorization will  one (1) year from the signature date.       Name: Smita Faith    Signature:   Date:     5/21/2021       PLEASE FAX REQUESTED RECORDS BACK TO: [phone]

## 2021-05-21 NOTE — PROGRESS NOTES
Subjective:     CC:  Establish care, left breast lump x 6 weeks    HISTORY OF THE PRESENT ILLNESS: Patient is a 55 y.o. female. This pleasant patient is here today to establish care and discuss new left breast lump.    Patient reports left breast mass recently (last 6 weeks, unsure if growing), notes while laying on side and no skin changes.  Denies nipple discharge.  The area is slightly tender which has improved by her leaving it alone.  Reports a history prior biopsy of the right breast which was benign.  Denies any family history of breast cancer.  No lymphadenopathy.    Had a revision of her gastric bypass last year. Reports prior right upper abdominal/chest pain that resolved after her revision.  Admits to chronic fatigue, worse in the past year. Stopped liquid iron after (didn't take it every day due to SE).     Chronic pain: Taking Lyrica and buprenorphine for pain, not recently working. Followed by pain management ([facility]), has an appointment Monday.    Mental health: Followed by [facility].  Previously given preliminary diagnosis of bipolar which she says she is not, just suffers from depression/anxiety/PTSD.  Has been off lamictal for year and continues just on Paxil. Sees a therapist at [facility], doing well with her depression and was followed by psychiatry prior and now just getting refills because she had not needed any medication adjustments.    Current Outpatient Medications Ordered in Epic   Medication Sig Dispense Refill   • amoxicillin (AMOXIL) 500 MG Cap      • Buprenorphine 15 MCG/HR PATCH WEEKLY      • omeprazole (PRILOSEC) 20 MG delayed-release capsule      • Cetirizine HCl (ZYRTEC ALLERGY) 10 MG Cap Take  by mouth.     • PARoxetine (PAXIL) 20 MG Tab Take 20 mg by mouth every morning.     • acetaminophen (TYLENOL) 500 MG Tab Take 500 mg by mouth as needed. Indications: Pain     • pregabalin (LYRICA) 75 MG Cap Take 100 mg by mouth 3 times a day.     • fluticasone (FLONASE) 50  "MCG/ACT nasal spray Spray 1 Spray in nose every day.     • levothyroxine (SYNTHROID) 75 MCG Tab Take 75 mcg by mouth Every morning on an empty stomach.     • ondansetron (ZOFRAN ODT) 4 MG TABLET DISPERSIBLE Take 4 mg by mouth every 8 hours as needed for Nausea.     • albuterol 108 (90 BASE) MCG/ACT Aero Soln inhalation aerosol Inhale 2 Puffs by mouth every 6 hours as needed for Shortness of Breath.     • Multiple Vitamins-Minerals (MULTIVITAMIN & MINERAL PO) Take 1 Tab by mouth every day.     • Calcium 2180-3166 MG-UNIT CHEW Take 1 Each by mouth every day.       No current [facility]-ordered facility-administered medications on file.       Health Maintenance: Reviewed with patient.    ROS:   Gen: no fevers/chills, no changes in weight  Pulm: no sob, no cough  CV: no chest pain, no palpitations  GI: no nausea/vomiting, no diarrhea/constipation, no abdominal pain  MSk: chronic joint pain  Skin: no rash  Neuro: chronic leg weakness, unchanged, gait issues and uses cane      Objective:     Exam: /70 (BP Location: Right arm, Patient Position: Sitting, BP Cuff Size: Adult)   Pulse 74   Temp 36.4 °C (97.6 °F) (Temporal)   Ht 1.549 m (5' 1\")   Wt 119 kg (263 lb 6.4 oz)   SpO2 95%  Body mass index is 49.77 kg/m².    General: Well developed, well nourished in no acute distress.  Head: Normocephalic and atraumatic.  Eyes: Conjunctivae and extraocular motions are normal. Pupils are equal, round. No scleral icterus.   Ears:  External ears unremarkable. Tympanic membranes clear and intact.  Mouth/Throat: Wearing mask  Neck: Supple. Thyroid is not enlarged.  Pulmonary: Clear to ausculation.  Normal effort. No rales, ronchi, or wheezing.  Cardiovascular: Regular rate and rhythm without murmur.  Neurologic: Slow, ataxic gait, uses cane  Lymph: No cervical or supraclavicular lymph nodes are palpable  Skin: Warm and dry.  No obvious lesions. Noted prior surgical scars.  Psych: Normal mood and affect. Alert and oriented x3. " Judgment and insight is normal.  Breast:  Exam conducted in both upright and supine position. Bilaterally symmetrical, no nipple discharge, no skin changes or dimpling are  noted.  Right breast are free of palpable pathology. Both breast were lumpy diffusely consistent with fibrous breast tissue. Left breast: Periaerolar ttp (inferior) with area of breast consistent with other areas of palpable fibrous breast tissue, however this area was less mobile than others. No axillary or supraclavicular lymphadenopathy. Chaperone: Maribell Leong MA    Labs: Reviewed from 8/6/2020, 10/1/2020, 10/13/2020. 6/26/2020, 11/4/2019.  Imaging: Reviewed CTA chest from 8/29/2019    Assessment & Plan:   55 y.o. female with the following -    1. Painful lumpy left breast  This is an acute condition of unknown determined significance.  Patient does not have any history or family history of breast cancer, reports a benign biopsy of the right breast prior. Given findings of above ordered diagnostic mammography and left breast ultrasound to evaluate further.  Would consider general surgery/MRI, but expect benign findings in this patient with fibrous breast tissue.  - MA DIAGNOSTIC MAMMO LEFT W/CAD; Future  - US-BREAST LIMITED-LEFT; Future    2. Chronic fatigue  This is a chronic issue, worse in the past year.  Potentially multifactorial given chronic pain and depression.  At risk for anemia with a history of prior and for other deficiencies so will obtain labs as below.  - CBC WITH DIFFERENTIAL; Future  - VITAMIN B12; Future  - TSH WITH REFLEX TO FT4; Future  - Comp Metabolic Panel; Future  - FERRITIN; Future  - IRON/TOTAL IRON BIND; Future    3. Hypothyroidism, unspecified type  This is a chronic condition, TSH within normal range October of last year.  Given complaint of fatigue will repeat.  - TSH WITH REFLEX TO FT4; Future    4. History of microcytic hypochromic anemia  History of anemia prior, potentially related to intake/absorption  issues given history of gastric bypass.  Stopped her iron last year.  In the setting of fatigue as above, will evaluate for iron deficiency as well as anemia.  - FERRITIN; Future  - IRON/TOTAL IRON BIND; Future  - CBC WITH DIFFERENTIAL; Future    5. Personal history of gastric bypass  This is a chronic condition, stable.  Resolved upper abdominal pain/chest pain after revision last year.  At risk for nutritional deficiencies, will screen with labs as below.  - CBC WITH DIFFERENTIAL; Future  - VITAMIN B12; Future  - FOLATE; Future  - VITAMIN D,25 HYDROXY; Future  - Comp Metabolic Panel; Future  - FERRITIN; Future  - IRON/TOTAL IRON BIND; Future    6. Morbid obesity (HCC)  This is a chronic condition, stable.  Patient is limited in her mobility and needs to be careful with diet to avoid nutritional deficiencies.  - Comp Metabolic Panel; Future    7. Depression, unspecified depression type  8. Anxiety  9. Alcohol dependence in remission (HCC)  This is a chronic condition, stable with PHQ 2-0 today.  Continues to be followed by therapist at [facility].  Previously on Lamictal when she was given a preliminary diagnosis of bipolar disorder, but reports to have depression, anxiety and PTSD instead.  Continue Paxil 20 mg daily and continue to follow-up at [facility].    10. Other chronic pain  This is a chronic condition, worsening.  Patient has follow-up with pain management on Monday.  Continue Lyrica and buprenorphine as directed by pain management.    11. Need for vaccination  Reviewed the need for Shingrix vaccine, however I suggested receiving the COVID-19 vaccine first.    12. Atherosclerosis of aorta (HCC)  This is a chronic condition, noted on CTA August 2019.  Patient has no history of diabetes with normal A1c in 2019 and great cholesterol prior with ASCVD risk of less than 2%.  Had a normal nuclear stress test in 8/2019.  No indication for statin at this time.  Will obtain updated lipids and fasting glucose to  trend.  - Lipid Profile; Future  - Comp Metabolic Panel; Future      Return in about 6 months (around 11/21/2021) for sooner if needed/pending labs.    Please note that this dictation was created using voice recognition software. I have made every reasonable attempt to correct obvious errors, but I expect that there are errors of grammar and possibly content that I did not discover before finalizing the note.

## 2021-05-24 ENCOUNTER — HOSPITAL ENCOUNTER (OUTPATIENT)
Dept: RADIOLOGY | Facility: MEDICAL CENTER | Age: 56
End: 2021-05-24
Attending: NURSE PRACTITIONER
Payer: MEDICARE

## 2021-05-24 DIAGNOSIS — M25.559 PAIN IN JOINT INVOLVING PELVIC REGION AND THIGH, UNSPECIFIED LATERALITY: ICD-10-CM

## 2021-05-24 DIAGNOSIS — M54.16 LUMBAR RADICULOPATHY: ICD-10-CM

## 2021-05-24 PROCEDURE — 73502 X-RAY EXAM HIP UNI 2-3 VIEWS: CPT | Mod: LT

## 2021-05-24 PROCEDURE — 72110 X-RAY EXAM L-2 SPINE 4/>VWS: CPT

## 2021-05-26 ENCOUNTER — HOSPITAL ENCOUNTER (OUTPATIENT)
Dept: LAB | Facility: MEDICAL CENTER | Age: 56
End: 2021-05-26
Attending: STUDENT IN AN ORGANIZED HEALTH CARE EDUCATION/TRAINING PROGRAM
Payer: MEDICARE

## 2021-05-26 DIAGNOSIS — R53.82 CHRONIC FATIGUE: ICD-10-CM

## 2021-05-26 DIAGNOSIS — I70.0 ATHEROSCLEROSIS OF AORTA (HCC): ICD-10-CM

## 2021-05-26 DIAGNOSIS — E03.9 HYPOTHYROIDISM, UNSPECIFIED TYPE: ICD-10-CM

## 2021-05-26 DIAGNOSIS — Z98.84 PERSONAL HISTORY OF GASTRIC BYPASS: ICD-10-CM

## 2021-05-26 DIAGNOSIS — E66.01 MORBID OBESITY (HCC): ICD-10-CM

## 2021-05-26 DIAGNOSIS — Z86.2 HISTORY OF MICROCYTIC HYPOCHROMIC ANEMIA: ICD-10-CM

## 2021-05-26 LAB
ALBUMIN SERPL BCP-MCNC: 3.9 G/DL (ref 3.2–4.9)
ALBUMIN/GLOB SERPL: 1.1 G/DL
ALP SERPL-CCNC: 133 U/L (ref 30–99)
ALT SERPL-CCNC: 20 U/L (ref 2–50)
ANION GAP SERPL CALC-SCNC: 10 MMOL/L (ref 7–16)
AST SERPL-CCNC: 13 U/L (ref 12–45)
BASOPHILS # BLD AUTO: 1.1 % (ref 0–1.8)
BASOPHILS # BLD: 0.05 K/UL (ref 0–0.12)
BILIRUB SERPL-MCNC: 0.3 MG/DL (ref 0.1–1.5)
BUN SERPL-MCNC: 15 MG/DL (ref 8–22)
CALCIUM SERPL-MCNC: 9 MG/DL (ref 8.5–10.5)
CHLORIDE SERPL-SCNC: 106 MMOL/L (ref 96–112)
CHOLEST SERPL-MCNC: 177 MG/DL (ref 100–199)
CO2 SERPL-SCNC: 24 MMOL/L (ref 20–33)
CREAT SERPL-MCNC: 0.48 MG/DL (ref 0.5–1.4)
EOSINOPHIL # BLD AUTO: 0.08 K/UL (ref 0–0.51)
EOSINOPHIL NFR BLD: 1.7 % (ref 0–6.9)
ERYTHROCYTE [DISTWIDTH] IN BLOOD BY AUTOMATED COUNT: 46.2 FL (ref 35.9–50)
FERRITIN SERPL-MCNC: 9.2 NG/ML (ref 10–291)
FOLATE SERPL-MCNC: 15.1 NG/ML
GLOBULIN SER CALC-MCNC: 3.5 G/DL (ref 1.9–3.5)
GLUCOSE SERPL-MCNC: 89 MG/DL (ref 65–99)
HCT VFR BLD AUTO: 43.8 % (ref 37–47)
HDLC SERPL-MCNC: 58 MG/DL
HGB BLD-MCNC: 13.6 G/DL (ref 12–16)
IMM GRANULOCYTES # BLD AUTO: 0.01 K/UL (ref 0–0.11)
IMM GRANULOCYTES NFR BLD AUTO: 0.2 % (ref 0–0.9)
IRON SATN MFR SERPL: 16 % (ref 15–55)
IRON SERPL-MCNC: 61 UG/DL (ref 40–170)
LDLC SERPL CALC-MCNC: 101 MG/DL
LYMPHOCYTES # BLD AUTO: 1.92 K/UL (ref 1–4.8)
LYMPHOCYTES NFR BLD: 40.5 % (ref 22–41)
MCH RBC QN AUTO: 27.6 PG (ref 27–33)
MCHC RBC AUTO-ENTMCNC: 31.1 G/DL (ref 33.6–35)
MCV RBC AUTO: 88.8 FL (ref 81.4–97.8)
MONOCYTES # BLD AUTO: 0.44 K/UL (ref 0–0.85)
MONOCYTES NFR BLD AUTO: 9.3 % (ref 0–13.4)
NEUTROPHILS # BLD AUTO: 2.24 K/UL (ref 2–7.15)
NEUTROPHILS NFR BLD: 47.2 % (ref 44–72)
NRBC # BLD AUTO: 0 K/UL
NRBC BLD-RTO: 0 /100 WBC
PLATELET # BLD AUTO: 354 K/UL (ref 164–446)
PMV BLD AUTO: 10.5 FL (ref 9–12.9)
POTASSIUM SERPL-SCNC: 4.1 MMOL/L (ref 3.6–5.5)
PROT SERPL-MCNC: 7.4 G/DL (ref 6–8.2)
RBC # BLD AUTO: 4.93 M/UL (ref 4.2–5.4)
SODIUM SERPL-SCNC: 140 MMOL/L (ref 135–145)
TIBC SERPL-MCNC: 391 UG/DL (ref 250–450)
TRIGL SERPL-MCNC: 88 MG/DL (ref 0–149)
TSH SERPL DL<=0.005 MIU/L-ACNC: 4.03 UIU/ML (ref 0.38–5.33)
UIBC SERPL-MCNC: 330 UG/DL (ref 110–370)
VIT B12 SERPL-MCNC: 461 PG/ML (ref 211–911)
WBC # BLD AUTO: 4.7 K/UL (ref 4.8–10.8)

## 2021-05-26 PROCEDURE — 80053 COMPREHEN METABOLIC PANEL: CPT

## 2021-05-26 PROCEDURE — 80061 LIPID PANEL: CPT

## 2021-05-26 PROCEDURE — 82746 ASSAY OF FOLIC ACID SERUM: CPT

## 2021-05-26 PROCEDURE — 83540 ASSAY OF IRON: CPT

## 2021-05-26 PROCEDURE — 84443 ASSAY THYROID STIM HORMONE: CPT

## 2021-05-26 PROCEDURE — 36415 COLL VENOUS BLD VENIPUNCTURE: CPT

## 2021-05-26 PROCEDURE — 85025 COMPLETE CBC W/AUTO DIFF WBC: CPT

## 2021-05-26 PROCEDURE — 82607 VITAMIN B-12: CPT

## 2021-05-26 PROCEDURE — 82306 VITAMIN D 25 HYDROXY: CPT

## 2021-05-26 PROCEDURE — 83550 IRON BINDING TEST: CPT

## 2021-05-26 PROCEDURE — 82728 ASSAY OF FERRITIN: CPT

## 2021-05-27 LAB — 25(OH)D3 SERPL-MCNC: 23 NG/ML (ref 30–80)

## 2021-06-02 ENCOUNTER — HOSPITAL ENCOUNTER (OUTPATIENT)
Dept: RADIOLOGY | Facility: MEDICAL CENTER | Age: 56
End: 2021-06-02
Attending: STUDENT IN AN ORGANIZED HEALTH CARE EDUCATION/TRAINING PROGRAM
Payer: MEDICARE

## 2021-06-02 DIAGNOSIS — N63.20 PAINFUL LUMPY LEFT BREAST: ICD-10-CM

## 2021-06-02 DIAGNOSIS — N64.4 PAINFUL LUMPY LEFT BREAST: ICD-10-CM

## 2021-06-02 PROCEDURE — G0279 TOMOSYNTHESIS, MAMMO: HCPCS

## 2021-06-02 PROCEDURE — 76642 ULTRASOUND BREAST LIMITED: CPT | Mod: LT

## 2021-06-03 DIAGNOSIS — G89.29 OTHER CHRONIC PAIN: ICD-10-CM

## 2021-08-03 ENCOUNTER — TELEMEDICINE (OUTPATIENT)
Dept: TELEHEALTH | Facility: TELEMEDICINE | Age: 56
End: 2021-08-03
Payer: MEDICARE

## 2021-08-03 DIAGNOSIS — J06.9 VIRAL URI WITH COUGH: ICD-10-CM

## 2021-08-03 PROCEDURE — 99213 OFFICE O/P EST LOW 20 MIN: CPT | Mod: 95 | Performed by: NURSE PRACTITIONER

## 2021-08-03 RX ORDER — PREGABALIN 100 MG/1
100 CAPSULE ORAL 3 TIMES DAILY
COMMUNITY
Start: 2021-05-24

## 2021-08-03 ASSESSMENT — ENCOUNTER SYMPTOMS
SHORTNESS OF BREATH: 1
CHILLS: 0
SORE THROAT: 0
HEMOPTYSIS: 0
COUGH: 1
HEADACHES: 1
SINUS PAIN: 0
PALPITATIONS: 0
SPUTUM PRODUCTION: 0
ORTHOPNEA: 0
FEVER: 1
WHEEZING: 0

## 2021-08-03 NOTE — PROGRESS NOTES
Subjective:      Smita Faith is a 55 y.o. female who presents with No chief complaint on file.            Smiat presents for a virtual visit in [place].  Identification was verified.  Patient was informed that encounter would be conducted over [employer], a secure, encrypted network and consent was obtained.  She reports a 4 day history of nasal congestion, cough, Shortness of breath, loss of sense of taste and smell, headache, and fever up to 101.6  She notes mild congestion.  She reports she has been vaccinated for Covid-19.  She denies any nausea, vomiting, or diarrhea.  She is taking OTC tylenol with some short term symptom relief.  She denies any asthma or chronic lung disease.        Review of Systems   Constitutional: Positive for fever and malaise/fatigue. Negative for chills.   HENT: Positive for congestion. Negative for ear pain, sinus pain and sore throat.    Respiratory: Positive for cough and shortness of breath. Negative for hemoptysis, sputum production and wheezing.    Cardiovascular: Negative for chest pain, palpitations, orthopnea and leg swelling.   Neurological: Positive for headaches.     Medications, Allergies, and current problem list reviewed today in Epic     Objective:     Last Menstrual Period  (LMP Unknown)      Physical Exam  Constitutional:       General: She is not in acute distress.     Appearance: Normal appearance. She is not ill-appearing, toxic-appearing or diaphoretic.      Comments: Afebrile at this time.   HENT:      Nose: Nose normal.      Mouth/Throat:      Mouth: Mucous membranes are moist.      Comments: Lips pink. Phonation normal.  Eyes:      General: No scleral icterus.        Right eye: No discharge.         Left eye: No discharge.      Conjunctiva/sclera: Conjunctivae normal.   Pulmonary:      Effort: Pulmonary effort is normal.      Comments: Occasional dry cough during encounter.  Respiratory rate 16.  Skin:     Coloration: Skin is not jaundiced.   Neurological:       General: No focal deficit present.      Mental Status: She is alert and oriented to person, place, and time.   Psychiatric:         Mood and Affect: Mood normal.                        Assessment/Plan:        1. Viral URI with cough       Discussed exam findings with Smita.  High suspicion of covid-19.  Differential reviewed.  She declines covid testing.  She state she does not work outside the home.  Symptomatic management with OTC cold medicine and tylenol prn.  Isolation guidelines reviewed.  Maintain adequate po hydration.  Follow up in 1 week if symptoms persist, sooner if worse. ED precautions reviewed.  She verbalized understanding of and agreed with plan of care.

## 2021-09-07 DIAGNOSIS — F32.A DEPRESSION, UNSPECIFIED DEPRESSION TYPE: ICD-10-CM

## 2021-09-07 DIAGNOSIS — F41.9 ANXIETY: ICD-10-CM

## 2021-09-07 RX ORDER — PAROXETINE HYDROCHLORIDE 20 MG/1
20 TABLET, FILM COATED ORAL EVERY MORNING
Qty: 90 TABLET | Refills: 3 | Status: SHIPPED | OUTPATIENT
Start: 2021-09-07 | End: 2022-08-30

## 2021-09-08 ENCOUNTER — HOSPITAL ENCOUNTER (OUTPATIENT)
Facility: MEDICAL CENTER | Age: 56
End: 2021-09-08
Attending: STUDENT IN AN ORGANIZED HEALTH CARE EDUCATION/TRAINING PROGRAM
Payer: MEDICARE

## 2021-09-08 ENCOUNTER — OFFICE VISIT (OUTPATIENT)
Dept: MEDICAL GROUP | Facility: PHYSICIAN GROUP | Age: 56
End: 2021-09-08
Payer: MEDICARE

## 2021-09-08 VITALS
OXYGEN SATURATION: 94 % | HEIGHT: 61 IN | DIASTOLIC BLOOD PRESSURE: 82 MMHG | SYSTOLIC BLOOD PRESSURE: 148 MMHG | BODY MASS INDEX: 49.13 KG/M2 | TEMPERATURE: 97.7 F | HEART RATE: 96 BPM

## 2021-09-08 DIAGNOSIS — E55.9 VITAMIN D DEFICIENCY: ICD-10-CM

## 2021-09-08 DIAGNOSIS — R53.83 FATIGUE, UNSPECIFIED TYPE: ICD-10-CM

## 2021-09-08 DIAGNOSIS — R79.0 LOW SERUM FERRITIN LEVEL: ICD-10-CM

## 2021-09-08 DIAGNOSIS — R52 GENERALIZED BODY ACHES: ICD-10-CM

## 2021-09-08 DIAGNOSIS — R43.9 DISTURBANCE OF SMELL AND TASTE: ICD-10-CM

## 2021-09-08 DIAGNOSIS — R03.0 ELEVATED BLOOD PRESSURE READING: ICD-10-CM

## 2021-09-08 PROCEDURE — U0005 INFEC AGEN DETEC AMPLI PROBE: HCPCS

## 2021-09-08 PROCEDURE — 99214 OFFICE O/P EST MOD 30 MIN: CPT | Performed by: STUDENT IN AN ORGANIZED HEALTH CARE EDUCATION/TRAINING PROGRAM

## 2021-09-08 PROCEDURE — U0003 INFECTIOUS AGENT DETECTION BY NUCLEIC ACID (DNA OR RNA); SEVERE ACUTE RESPIRATORY SYNDROME CORONAVIRUS 2 (SARS-COV-2) (CORONAVIRUS DISEASE [COVID-19]), AMPLIFIED PROBE TECHNIQUE, MAKING USE OF HIGH THROUGHPUT TECHNOLOGIES AS DESCRIBED BY CMS-2020-01-R: HCPCS

## 2021-09-08 RX ORDER — METHOCARBAMOL 500 MG/1
250 TABLET, FILM COATED ORAL 3 TIMES DAILY
COMMUNITY
Start: 2021-09-03 | End: 2022-04-20

## 2021-09-08 RX ORDER — METHOCARBAMOL 500 MG/1
TABLET, FILM COATED ORAL
COMMUNITY
Start: 2021-09-03 | End: 2021-09-08

## 2021-09-08 RX ORDER — CHOLECALCIFEROL (VITAMIN D3) 1250 MCG
CAPSULE ORAL
COMMUNITY
Start: 2021-08-20

## 2021-09-08 NOTE — PROGRESS NOTES
Subjective:     CC: on going COVID symptoms since early August    HPI:   Smita Mejia presents today with some ongoing Covid symptoms since early August.    Patient states that in early August she had multiple close contacts with confirmed COVID-19.  She had a urgent care televisit to discuss her symptoms on August 3 and declined testing at that time as she was feeling so poorly.  At the time she had cough, fatigue, body aches, headache, diarrhea and loss of taste and smell.  She reports that the cough is resolved and she has no chest pain or trouble breathing today.  Headache and diarrhea resolved as well.  No congestion, throat slightly scratchy but not sore.  She had a few days where she felt slightly better but reports significant fatigue even with doing small tasks like going to the grocery store as well as body/joint pain worse than prior.  Still has issues with taste and smell and also reporting brain fog.  She is followed by pain management in will come in next week for possible injection, she has been doing telehealth visits and started methocarbamol with help somewhat..  She had fever prior but has not had it in some time.  She is concerned with how long this is taking to improve. She is not immunized to COVID-19.    Current Outpatient Medications Ordered in Epic   Medication Sig Dispense Refill   • Cholecalciferol (VITAMIN D3) 1.25 MG (62013 UT) Cap      • diclofenac sodium (VOLTAREN) 1 % Gel      • methocarbamol (ROBAXIN) 500 MG Tab      • PARoxetine (PAXIL) 20 MG Tab Take 1 Tablet by mouth every morning. 90 Tablet 3   • pregabalin (LYRICA) 100 MG Cap      • Buprenorphine 15 MCG/HR PATCH WEEKLY      • omeprazole (PRILOSEC) 20 MG delayed-release capsule      • Cetirizine HCl (ZYRTEC ALLERGY) 10 MG Cap Take  by mouth.     • acetaminophen (TYLENOL) 500 MG Tab Take 500 mg by mouth as needed. Indications: Pain     • fluticasone (FLONASE) 50 MCG/ACT nasal spray Spray 1 Spray in nose every day.     •  "levothyroxine (SYNTHROID) 75 MCG Tab Take 75 mcg by mouth Every morning on an empty stomach.     • ondansetron (ZOFRAN ODT) 4 MG TABLET DISPERSIBLE Take 4 mg by mouth every 8 hours as needed for Nausea.     • albuterol 108 (90 BASE) MCG/ACT Aero Soln inhalation aerosol Inhale 2 Puffs by mouth every 6 hours as needed for Shortness of Breath.     • Multiple Vitamins-Minerals (MULTIVITAMIN & MINERAL PO) Take 1 Tab by mouth every day.     • Calcium 7234-7957 MG-UNIT CHEW Take 1 Each by mouth every day.     • amoxicillin (AMOXIL) 500 MG Cap        No current Epic-ordered facility-administered medications on file.     ROS:  See HPI    Objective:     Exam:  /82 (BP Location: Left arm, Patient Position: Sitting, BP Cuff Size: Adult)   Pulse 96   Temp 36.5 °C (97.7 °F) (Temporal)   Ht 1.549 m (5' 1\")   LMP  (LMP Unknown)   SpO2 94%   BMI 49.13 kg/m²  Body mass index is 49.13 kg/m².    Physical Exam:  Constitutional: Well-developed and well-nourished. No acute distress.   Skin: Skin is warm and dry. No rash noted.  Head: Atraumatic without lesions.  Eyes: Conjunctivae and extraocular motions are normal. Pupils are equal, round. No scleral icterus.   Ears:  External ears unremarkable. Tympanic membranes clear and intact.  Nose: Nares patent. No discharge.  Mouth/Throat: Oropharynx is clear and moist. Posterior pharynx without erythema or exudates.  Neck: Supple, trachea midline. Normal range of motion. No thyromegaly present. No lymphadenopathy--cervical or supraclavicular.  Cardiovascular: Regular rate and rhythm, S1 and S2 without murmur, rubs, or gallops.  Lungs: Normal inspiratory effort, CTA bilaterally, no wheezes/rhonchi/rales  Abdomen: Soft, non tender, and without distention. Active bowel sounds. No rebound, guarding, masses or HSM.  Extremities: No cyanosis, clubbing, erythema, nor edema.  Musculoskeletal: No muscular tenderness to palpation.  Neurological: Alert and oriented x 3. No gross/focal " deficits.  Psychiatric:  Behavior, mood, and affect are appropriate.    Assessment & Plan:     55 y.o. female with the following -     1. Generalized body aches  - SARS-CoV-2, PCR (In-House); Future  - TSH WITH REFLEX TO FT4; Future  - CREATINE KINASE; Future  - Comp Metabolic Panel; Future  2. Fatigue, unspecified type  Body aches/joint pain as well as fatigue are acute on chronic complaints after what I suspect was COVID-19 infection given symptoms as above in addition to close contact exposure.  Given her past history we will obtain labs after we ensure that it is safe for her to go to the lab, await negative Covid PCR test prior.  Discussed that these are common symptoms that can be ongoing after Covid infection, but typically they improve over time.  - SARS-CoV-2, PCR (In-House); Future  - CBC WITH DIFFERENTIAL; Future  - TSH WITH REFLEX TO FT4; Future  - Comp Metabolic Panel; Future  - VITAMIN B12; Future  - FERRITIN; Future  - IRON/TOTAL IRON BIND; Future  - FOLATE; Future    3. Disturbance of smell and taste  - SARS-CoV-2, PCR (In-House); Future    4. Elevated blood pressure reading  Blood pressure slightly high today, will trend at next visit.  Prior normal and not on any medication.  Has an appointment with pain management in person next week so trend at that time.    5. Vitamin D deficiency  - VITAMIN D,25 HYDROXY; Future    6. Low serum ferritin level  - FERRITIN; Future  - IRON/TOTAL IRON BIND; Future    Return if symptoms worsen or fail to improve.    Please note that this dictation was created using voice recognition software. I have made every reasonable attempt to correct obvious errors, but I expect that there are errors of grammar and possibly content that I did not discover before finalizing the note.

## 2021-09-09 DIAGNOSIS — R53.83 FATIGUE, UNSPECIFIED TYPE: ICD-10-CM

## 2021-09-09 DIAGNOSIS — R52 GENERALIZED BODY ACHES: ICD-10-CM

## 2021-09-09 DIAGNOSIS — R43.9 DISTURBANCE OF SMELL AND TASTE: ICD-10-CM

## 2021-09-09 LAB — COVID ORDER STATUS COVID19: NORMAL

## 2021-09-10 LAB
SARS-COV-2 RNA RESP QL NAA+PROBE: NOTDETECTED
SPECIMEN SOURCE: NORMAL

## 2021-09-15 ENCOUNTER — HOSPITAL ENCOUNTER (OUTPATIENT)
Dept: LAB | Facility: MEDICAL CENTER | Age: 56
End: 2021-09-15
Attending: STUDENT IN AN ORGANIZED HEALTH CARE EDUCATION/TRAINING PROGRAM
Payer: MEDICARE

## 2021-09-15 DIAGNOSIS — R53.83 FATIGUE, UNSPECIFIED TYPE: ICD-10-CM

## 2021-09-15 DIAGNOSIS — R79.0 LOW SERUM FERRITIN LEVEL: ICD-10-CM

## 2021-09-15 DIAGNOSIS — R52 GENERALIZED BODY ACHES: ICD-10-CM

## 2021-09-15 DIAGNOSIS — E55.9 VITAMIN D DEFICIENCY: ICD-10-CM

## 2021-09-15 LAB
25(OH)D3 SERPL-MCNC: 26 NG/ML (ref 30–100)
ALBUMIN SERPL BCP-MCNC: 3.9 G/DL (ref 3.2–4.9)
ALBUMIN/GLOB SERPL: 1.1 G/DL
ALP SERPL-CCNC: 129 U/L (ref 30–99)
ALT SERPL-CCNC: 15 U/L (ref 2–50)
ANION GAP SERPL CALC-SCNC: 10 MMOL/L (ref 7–16)
AST SERPL-CCNC: 18 U/L (ref 12–45)
BASOPHILS # BLD AUTO: 0.9 % (ref 0–1.8)
BASOPHILS # BLD: 0.05 K/UL (ref 0–0.12)
BILIRUB SERPL-MCNC: 0.4 MG/DL (ref 0.1–1.5)
BUN SERPL-MCNC: 16 MG/DL (ref 8–22)
CALCIUM SERPL-MCNC: 9.3 MG/DL (ref 8.5–10.5)
CHLORIDE SERPL-SCNC: 105 MMOL/L (ref 96–112)
CK SERPL-CCNC: 56 U/L (ref 0–154)
CO2 SERPL-SCNC: 25 MMOL/L (ref 20–33)
CREAT SERPL-MCNC: 0.55 MG/DL (ref 0.5–1.4)
EOSINOPHIL # BLD AUTO: 0.09 K/UL (ref 0–0.51)
EOSINOPHIL NFR BLD: 1.7 % (ref 0–6.9)
ERYTHROCYTE [DISTWIDTH] IN BLOOD BY AUTOMATED COUNT: 49 FL (ref 35.9–50)
FASTING STATUS PATIENT QL REPORTED: NORMAL
FERRITIN SERPL-MCNC: 12.2 NG/ML (ref 10–291)
FOLATE SERPL-MCNC: 14.4 NG/ML
GLOBULIN SER CALC-MCNC: 3.4 G/DL (ref 1.9–3.5)
GLUCOSE SERPL-MCNC: 93 MG/DL (ref 65–99)
HCT VFR BLD AUTO: 41.8 % (ref 37–47)
HGB BLD-MCNC: 13.3 G/DL (ref 12–16)
IMM GRANULOCYTES # BLD AUTO: 0.01 K/UL (ref 0–0.11)
IMM GRANULOCYTES NFR BLD AUTO: 0.2 % (ref 0–0.9)
IRON SATN MFR SERPL: 13 % (ref 15–55)
IRON SERPL-MCNC: 58 UG/DL (ref 40–170)
LYMPHOCYTES # BLD AUTO: 2.53 K/UL (ref 1–4.8)
LYMPHOCYTES NFR BLD: 46.5 % (ref 22–41)
MCH RBC QN AUTO: 28.8 PG (ref 27–33)
MCHC RBC AUTO-ENTMCNC: 31.8 G/DL (ref 33.6–35)
MCV RBC AUTO: 90.5 FL (ref 81.4–97.8)
MONOCYTES # BLD AUTO: 0.58 K/UL (ref 0–0.85)
MONOCYTES NFR BLD AUTO: 10.7 % (ref 0–13.4)
NEUTROPHILS # BLD AUTO: 2.18 K/UL (ref 2–7.15)
NEUTROPHILS NFR BLD: 40 % (ref 44–72)
NRBC # BLD AUTO: 0 K/UL
NRBC BLD-RTO: 0 /100 WBC
PLATELET # BLD AUTO: 362 K/UL (ref 164–446)
PMV BLD AUTO: 10.2 FL (ref 9–12.9)
POTASSIUM SERPL-SCNC: 4.2 MMOL/L (ref 3.6–5.5)
PROT SERPL-MCNC: 7.3 G/DL (ref 6–8.2)
RBC # BLD AUTO: 4.62 M/UL (ref 4.2–5.4)
SODIUM SERPL-SCNC: 140 MMOL/L (ref 135–145)
TIBC SERPL-MCNC: 435 UG/DL (ref 250–450)
TSH SERPL DL<=0.005 MIU/L-ACNC: 5.3 UIU/ML (ref 0.38–5.33)
UIBC SERPL-MCNC: 377 UG/DL (ref 110–370)
VIT B12 SERPL-MCNC: 534 PG/ML (ref 211–911)
WBC # BLD AUTO: 5.4 K/UL (ref 4.8–10.8)

## 2021-09-15 PROCEDURE — 82728 ASSAY OF FERRITIN: CPT

## 2021-09-15 PROCEDURE — 82550 ASSAY OF CK (CPK): CPT

## 2021-09-15 PROCEDURE — 80053 COMPREHEN METABOLIC PANEL: CPT

## 2021-09-15 PROCEDURE — 84443 ASSAY THYROID STIM HORMONE: CPT

## 2021-09-15 PROCEDURE — 82607 VITAMIN B-12: CPT

## 2021-09-15 PROCEDURE — 85025 COMPLETE CBC W/AUTO DIFF WBC: CPT

## 2021-09-15 PROCEDURE — 82306 VITAMIN D 25 HYDROXY: CPT

## 2021-09-15 PROCEDURE — 36415 COLL VENOUS BLD VENIPUNCTURE: CPT

## 2021-09-15 PROCEDURE — 83550 IRON BINDING TEST: CPT

## 2021-09-15 PROCEDURE — 83540 ASSAY OF IRON: CPT

## 2021-09-15 PROCEDURE — 82746 ASSAY OF FOLIC ACID SERUM: CPT

## 2021-09-22 ENCOUNTER — OFFICE VISIT (OUTPATIENT)
Dept: MEDICAL GROUP | Facility: PHYSICIAN GROUP | Age: 56
End: 2021-09-22
Payer: MEDICARE

## 2021-09-22 ENCOUNTER — HOSPITAL ENCOUNTER (OUTPATIENT)
Dept: LAB | Facility: MEDICAL CENTER | Age: 56
End: 2021-09-22
Attending: STUDENT IN AN ORGANIZED HEALTH CARE EDUCATION/TRAINING PROGRAM
Payer: MEDICARE

## 2021-09-22 VITALS
SYSTOLIC BLOOD PRESSURE: 130 MMHG | DIASTOLIC BLOOD PRESSURE: 70 MMHG | OXYGEN SATURATION: 96 % | TEMPERATURE: 98.2 F | BODY MASS INDEX: 49.32 KG/M2 | WEIGHT: 261.2 LBS | HEART RATE: 74 BPM | HEIGHT: 61 IN

## 2021-09-22 DIAGNOSIS — E61.1 IRON DEFICIENCY: ICD-10-CM

## 2021-09-22 DIAGNOSIS — R74.8 ELEVATED ALKALINE PHOSPHATASE LEVEL: ICD-10-CM

## 2021-09-22 DIAGNOSIS — R53.82 CHRONIC FATIGUE: ICD-10-CM

## 2021-09-22 DIAGNOSIS — Z12.11 ENCOUNTER FOR SCREENING FOR MALIGNANT NEOPLASM OF COLON: ICD-10-CM

## 2021-09-22 DIAGNOSIS — Z98.84 PERSONAL HISTORY OF GASTRIC BYPASS: ICD-10-CM

## 2021-09-22 DIAGNOSIS — E55.9 VITAMIN D DEFICIENCY: ICD-10-CM

## 2021-09-22 DIAGNOSIS — L56.8 PHOTODERMATITIS DUE TO SUN: ICD-10-CM

## 2021-09-22 DIAGNOSIS — G89.29 OTHER CHRONIC PAIN: ICD-10-CM

## 2021-09-22 PROCEDURE — 99214 OFFICE O/P EST MOD 30 MIN: CPT | Performed by: STUDENT IN AN ORGANIZED HEALTH CARE EDUCATION/TRAINING PROGRAM

## 2021-09-22 PROCEDURE — 36415 COLL VENOUS BLD VENIPUNCTURE: CPT

## 2021-09-22 PROCEDURE — 84080 ASSAY ALKALINE PHOSPHATASES: CPT

## 2021-09-22 PROCEDURE — 86038 ANTINUCLEAR ANTIBODIES: CPT

## 2021-09-22 PROCEDURE — 84075 ASSAY ALKALINE PHOSPHATASE: CPT

## 2021-09-22 RX ORDER — CLOBETASOL PROPIONATE 0.5 MG/G
1 CREAM TOPICAL 2 TIMES DAILY
Qty: 15 G | Refills: 0 | Status: SHIPPED | OUTPATIENT
Start: 2021-09-22 | End: 2024-02-15

## 2021-09-22 ASSESSMENT — FIBROSIS 4 INDEX: FIB4 SCORE: 0.71

## 2021-09-22 NOTE — PROGRESS NOTES
Subjective:     CC: Follow-up and discuss labs    HPI:   Smita Mejia presents today to follow-up on her recent labs.    Patient states that today has been a better day with improved energy and no body aches.  Still has abnormal sense of taste and smell but this is variable.    Vitamin D deficiency  Chronic. Starting taking 5000IU D3 recently (1.5m ago).    Other chronic pain  Chronic. Added methocarbamol recently and seems to help. Planning for an CSI in her low back and massage as well. If not improved then MRI.    Chronic fatigue  Chronic. Better today, but pain better as well. Admits to insomnia related to pain at times.     She states that she stopped her oral iron for some time, has some liquid iron at home that she can restart.  She denies any abdominal pain or blood in stool.    Her other concern is reaction with rash to the son.  She states that she gets a very pruritic papular rash in areas of sun exposure despite sunscreen.  Wearing sun protective clothing helps but not able to do that always due to the temperature.  Uses over-the-counter hydrocortisone with some effect.    Current Outpatient Medications Ordered in Epic   Medication Sig Dispense Refill   • clobetasol (TEMOVATE) 0.05 % Cream Apply 1 Application topically 2 times a day. 15 g 0   • Cholecalciferol (VITAMIN D3) 1.25 MG (68202 UT) Cap      • diclofenac sodium (VOLTAREN) 1 % Gel      • methocarbamol (ROBAXIN) 500 MG Tab 750 mg 3 times a day.     • PARoxetine (PAXIL) 20 MG Tab Take 1 Tablet by mouth every morning. 90 Tablet 3   • pregabalin (LYRICA) 100 MG Cap      • amoxicillin (AMOXIL) 500 MG Cap      • Buprenorphine 15 MCG/HR PATCH WEEKLY      • omeprazole (PRILOSEC) 20 MG delayed-release capsule      • Cetirizine HCl (ZYRTEC ALLERGY) 10 MG Cap Take  by mouth.     • acetaminophen (TYLENOL) 500 MG Tab Take 500 mg by mouth as needed. Indications: Pain     • fluticasone (FLONASE) 50 MCG/ACT nasal spray Spray 1 Spray in nose every day.     •  "levothyroxine (SYNTHROID) 75 MCG Tab Take 75 mcg by mouth Every morning on an empty stomach.     • ondansetron (ZOFRAN ODT) 4 MG TABLET DISPERSIBLE Take 4 mg by mouth every 8 hours as needed for Nausea.     • albuterol 108 (90 BASE) MCG/ACT Aero Soln inhalation aerosol Inhale 2 Puffs by mouth every 6 hours as needed for Shortness of Breath.     • Multiple Vitamins-Minerals (MULTIVITAMIN & MINERAL PO) Take 1 Tab by mouth every day.     • Calcium 7384-1228 MG-UNIT CHEW Take 1 Each by mouth every day.       No current [facility]-ordered facility-administered medications on file.     ROS:  Gen: no fevers/chills, no changes in weight  Eyes: no changes in vision  ENT: no sore throat  Pulm: no sob, no cough  CV: no chest pain, no palpitations  GI: no nausea/vomiting, no diarrhea constipation, no blood in the stool/  Neuro: no headaches  Heme/Lymph: no easy bruising    Objective:     Exam:  /70   Pulse 74   Temp 36.8 °C (98.2 °F) (Temporal)   Ht 1.549 m (5' 1\")   Wt 118 kg (261 lb 3.2 oz)   LMP  (LMP Unknown)   SpO2 96%   BMI 49.35 kg/m²  Body mass index is 49.35 kg/m².    Physical Exam:  Constitutional: Well-developed and well-nourished. No acute distress.   Skin: Skin is warm and dry.  Few scattered skin toned small papules on right posterior forearm.  Head: Atraumatic without lesions.  Eyes: Conjunctivae and extraocular motions are normal. Pupils are equal, round. No scleral icterus.   Mouth/Throat: Wearing mask  Neck: Supple, trachea midline.  Cardiovascular: Regular rate and rhythm, S1 and S2 without murmur, rubs, or gallops.  Lungs: Normal inspiratory effort, CTA bilaterally, no wheezes/rhonchi/rales  Abdomen: Soft, non tender, and without distention. No rebound, guarding, masses or HSM.  Extremities: No cyanosis, clubbing, erythema, nor edema.  Neurological: Alert and oriented x 3. No gross/focal deficits.  Psychiatric:  Behavior, mood, and affect are appropriate.    Labs: Reviewed from 9/15/2021    Assessment " & Plan:     55 y.o. female with the following -     1. Chronic fatigue  2. Other chronic pain  These are chronic conditions, fatigue likely multifactorial.  Reviewed her most recent labs.  Chronic pain and resultant insomnia likely contributing.    3. Vitamin D deficiency  This is chronic in addition slightly improved with supplementation.  We will also trend with next labs in 3 months.  Continue current dose of supplement.    4. Iron deficiency  5. Personal history of gastric bypass  This is a chronic condition, improved from prior as far as ferritin but decreased saturation from March without anemia.  I recommend she continue iron supplement given her history of gastric bypass likely reducing her absorption.  Will have screening for colon cancer as below.  Plan to repeat labs in 3 months.    6. Elevated alkaline phosphatase level  This is a chronic condition, will evaluate further with isoenzymes.  CT scan abdomen pelvis from 2018 showed no fatty liver disease.  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  Will evaluate further with REBEKAH and B3 as below.  Trial of clobetasol when sun exposure cannot be avoided, limit duration as to not cause side effects such as pigment changes/thinning of the skin with chronic use.  Referred to dermatology.  - VITAMIN B3 NIACIN; Future  - ANTI-NUCLEAR ANTIBODY SERUM; Future  - REFERRAL TO DERMATOLOGY  - clobetasol (TEMOVATE) 0.05 % Cream; Apply 1 Application topically 2 times a day.  Dispense: 15 g; Refill: 0    8. Encounter for screening for malignant neoplasm of colon  Not symptomatic, normal prior.  Due for repeat in October.  - OCCULT BLOOD FECES IMMUNOASSAY; Future    I spent a total of 30 minutes with record review, exam, communication with the patient, and documentation of this encounter.    Return in about 3 months (around 12/22/2021), or if symptoms worsen or fail to improve.    Please note that this dictation  was created using voice recognition software. I have made every reasonable attempt to correct obvious errors, but I expect that there are errors of grammar and possibly content that I did not discover before finalizing the note.

## 2021-09-24 LAB — NUCLEAR IGG SER QL IA: NORMAL

## 2021-09-25 LAB
ALP BONE SERPL-CCNC: 69 U/L (ref 0–55)
ALP ISOS SERPL HS-CCNC: 0 U/L
ALP LIVER SERPL-CCNC: 74 U/L (ref 0–94)
ALP SERPL-CCNC: 143 U/L (ref 40–120)

## 2021-09-27 ENCOUNTER — HOSPITAL ENCOUNTER (OUTPATIENT)
Facility: MEDICAL CENTER | Age: 56
End: 2021-09-27
Attending: STUDENT IN AN ORGANIZED HEALTH CARE EDUCATION/TRAINING PROGRAM
Payer: MEDICARE

## 2021-09-27 ENCOUNTER — PATIENT MESSAGE (OUTPATIENT)
Dept: HEALTH INFORMATION MANAGEMENT | Facility: OTHER | Age: 56
End: 2021-09-27

## 2021-09-27 PROCEDURE — 82274 ASSAY TEST FOR BLOOD FECAL: CPT

## 2021-09-29 DIAGNOSIS — Z12.11 ENCOUNTER FOR SCREENING FOR MALIGNANT NEOPLASM OF COLON: ICD-10-CM

## 2021-10-01 LAB — IMM ASSAY OCC BLD FITOB: NEGATIVE

## 2021-10-13 DIAGNOSIS — Z13.820 ENCOUNTER FOR SCREENING FOR OSTEOPOROSIS: ICD-10-CM

## 2021-10-13 DIAGNOSIS — R74.8 ELEVATED ALKALINE PHOSPHATASE LEVEL: ICD-10-CM

## 2021-10-13 DIAGNOSIS — E21.3 HYPERPARATHYROIDISM (HCC): ICD-10-CM

## 2021-10-13 DIAGNOSIS — E55.9 VITAMIN D DEFICIENCY: ICD-10-CM

## 2021-10-13 DIAGNOSIS — E61.1 IRON DEFICIENCY: ICD-10-CM

## 2021-10-14 NOTE — PROGRESS NOTES
Elevated PTH in the past, likely due to gastric bypass history.  Given persistently elevated alkaline phosphatase with elevated bone fractions with history of vitamin D deficiency and at risk for osteoporosis/osteopenia from gastric bypass will obtain DEXA scan.    Plan to repeat labs to trend around December once she has been on iron for 3 months.    Will update patient with plan via [employer].

## 2021-10-15 DIAGNOSIS — E61.1 IRON DEFICIENCY: ICD-10-CM

## 2021-10-18 ENCOUNTER — APPOINTMENT (OUTPATIENT)
Dept: RADIOLOGY | Facility: MEDICAL CENTER | Age: 56
End: 2021-10-18
Attending: EMERGENCY MEDICINE
Payer: MEDICARE

## 2021-10-18 ENCOUNTER — HOSPITAL ENCOUNTER (EMERGENCY)
Facility: MEDICAL CENTER | Age: 56
End: 2021-10-18
Attending: EMERGENCY MEDICINE
Payer: MEDICARE

## 2021-10-18 VITALS
HEART RATE: 66 BPM | DIASTOLIC BLOOD PRESSURE: 78 MMHG | RESPIRATION RATE: 18 BRPM | BODY MASS INDEX: 49.35 KG/M2 | HEIGHT: 61 IN | TEMPERATURE: 98 F | OXYGEN SATURATION: 95 % | SYSTOLIC BLOOD PRESSURE: 139 MMHG

## 2021-10-18 DIAGNOSIS — S09.90XA CLOSED HEAD INJURY, INITIAL ENCOUNTER: ICD-10-CM

## 2021-10-18 DIAGNOSIS — S46.912A LEFT SHOULDER STRAIN, INITIAL ENCOUNTER: ICD-10-CM

## 2021-10-18 PROCEDURE — 73030 X-RAY EXAM OF SHOULDER: CPT | Mod: LT

## 2021-10-18 PROCEDURE — 99283 EMERGENCY DEPT VISIT LOW MDM: CPT

## 2021-10-18 PROCEDURE — 70450 CT HEAD/BRAIN W/O DYE: CPT | Mod: MF

## 2021-10-19 NOTE — ED PROVIDER NOTES
"ED Provider Note    CHIEF COMPLAINT  Chief Complaint   Patient presents with   • Closed Head Injury     Fell off bed landing on head last night. Today has bad headache and dizzy  L shoulder pain also       HPI  Smita Faith is a 55 y.o. female with history of arthritis, chronic back pain, and bipolar disorder who presents complaining of hitting her head after falling out of bed.    Patient states she was asleep and rolled out of bed, landing on her left side and striking her head. She laid there for some time and does not recall for how long. Since then she has had a generalized headache and left shoulder pain. She denies nausea, vomiting, visual changes, dizziness, midline neck pain, weakness, numbness.      ALLERGIES  Allergies   Allergen Reactions   • Ciprofloxacin Anaphylaxis     SOB   • Other Food Anaphylaxis     pj oliva   • Codeine Nausea     Upset stomach    • Erythromycin Vomiting   • Nsaids      Does not take due to gastric bypass   • Sulfa Drugs Nausea     \"abdominal pain\"       CURRENT MEDICATIONS  Prilosec, Lyrica, Paxil, Robaxin, Voltaren, Synthroid, iron    PAST MEDICAL HISTORY   has a past medical history of Alcoholism ([facility]), Anemia, Anesthesia, Anxiety disorder, Arthritis, ASTHMA, Backpain (07/29/2020), Bipolar 2 disorder ([facility]), Bronchitis, Carpal tunnel syndrome, Cellulitis (8/23/2017), Chest pain in adult (8/29/2019), Dehiscence of external surgical wound (8/23/2017), Depression, Environmental and seasonal allergies, Microcytic anemia (8/29/2019), MRSA (methicillin resistant staph aureus) culture positive (08/2017), Pelvic mass (4/7/2016), Pneumonia (1995), and Unspecified disorder of thyroid (6932-9191).    SURGICAL HISTORY   has a past surgical history that includes gastric bypass laparoscopic (2006); tubal ligation; removal of tonsils,<13 y/o; cholecystostomy,percut (1985); breast reduction (2010); hysterectomy robotic xi (N/A, 4/7/2016); hip arth anterior total (Right, 6/21/2018); " "chondroplasty (Left, 2019); knee arthroscopy (Left, 2019); medial meniscectomy (Left, 2019); lap,stomach,other,w/o tube (2020); lap,biliary tract,unlisted (2020); lumbar fusion anterior (11/10/2010); cervical disk and fusion anterior (2017); abdominal hysterectomy total; gastric bypass laparoscopic; arthroplasty; breast biopsy; oophorectomy (Bilateral); and salpingectomy (Bilateral).    SOCIAL HISTORY  Social History     Tobacco Use   • Smoking status: Former Smoker     Packs/day: 0.10     Years: 4.00     Pack years: 0.40     Types: Cigarettes     Quit date: 2007     Years since quittin.8   • Smokeless tobacco: Never Used   Vaping Use   • Vaping Use: Never used   Substance and Sexual Activity   • Alcohol use: No   • Drug use: Yes     Types: Oral     Comment: Marijuana edibles, and vaping once per week for break through pain   • Sexual activity: Not Currently     Partners: Male     Here with her friend/significant other    REVIEW OF SYSTEMS  Patient admits to head trauma and left shoulder discomfort with movement  Patient denies vomiting, nausea, midline neck pain, weakness, numbness, anticoagulation therapy    PHYSICAL EXAM  VITAL SIGNS: /80   Pulse 70   Temp 36.7 °C (98.1 °F) (Temporal)   Resp 16   Ht 1.549 m (5' 1\")   LMP  (LMP Unknown)   SpO2 93%   BMI 49.35 kg/m²     General:  WD female with elevated BMI, nontoxic appearing in NAD; A+Ox3; V/S as above   Skin: warm and dry; good color; no rash  HEENT: NCAT; EOMs intact; PERRL; no scleral icterus   Neck: No midline tenderness or step-offs  Cardiovascular: Regular heart rate and rhythm.  No murmurs, rubs, or gallops  Lungs: Clear to auscultation with good air movement bilaterally.  No wheezes, rhonchi, or rales.   Extremities: No bony point tenderness over the humerus or clavicle; no e/o trauma; no pedal edema; radial pulse 2+;  strength 5 out of 5  Neurologic: CNs III-XII grossly intact; speech clear; distal " sensation intact  Psychiatric: Appropriate affect, normal mood    IMAGING  CT-HEAD W/O   Final Result      No acute intracranial abnormality.      DX-SHOULDER 2+ LEFT   Final Result      1.  No acute osseous abnormality.   2.  Left acromioclavicular degenerative changes.          MEDICAL RECORD  I have reviewed patient's medical record and pertinent results are listed below.      COURSE & MEDICAL DECISION MAKING  I have reviewed any medical record information, laboratory studies and radiographic results as noted.    Smita Faith is a 55 y.o. female who presents complaining of head injury and left shoulder pain following a fall out of bed.    Patient is alert and oriented and is neurovascular intact in the left upper extremity. C-spine is clinically cleared.    Appropriate PPE was worn at all times while interacting with the patient.    CT head and left shoulder show no acute pathology.    Pt and significant other who was at the bedside advised of the results. Patient will be discharged and is amenable to this plan. Return precautions were discussed.    FINAL IMPRESSION  1. Closed head injury, initial encounter     2. Left shoulder strain, initial encounter       Electronically signed by: Luisana Foy M.D., 10/18/2021 5:27 PM

## 2021-10-19 NOTE — ED NOTES
Pt given d/c paperwork, pt verbalized understanding all information given. Pt ambulated out of the ER w/o difficulty w/ family

## 2021-10-19 NOTE — DISCHARGE INSTRUCTIONS
Apply ice and/or heat, Lidoderm patch which can be purchased at any pharmacy as Salonpas    Continue taking your medications as prescribed    Follow-up with your primary care doctor and [facility] for possible further imaging such as MRI.    Return to the ER for worsening symptoms such as numbness, vomiting, worsening headache, or other concerns.

## 2021-10-22 ENCOUNTER — OFFICE VISIT (OUTPATIENT)
Dept: MEDICAL GROUP | Facility: PHYSICIAN GROUP | Age: 56
End: 2021-10-22
Payer: MEDICARE

## 2021-10-22 VITALS
RESPIRATION RATE: 16 BRPM | BODY MASS INDEX: 49.65 KG/M2 | WEIGHT: 263 LBS | OXYGEN SATURATION: 97 % | SYSTOLIC BLOOD PRESSURE: 118 MMHG | HEART RATE: 77 BPM | HEIGHT: 61 IN | DIASTOLIC BLOOD PRESSURE: 70 MMHG | TEMPERATURE: 98.5 F

## 2021-10-22 DIAGNOSIS — G44.319 ACUTE POST-TRAUMATIC HEADACHE, NOT INTRACTABLE: ICD-10-CM

## 2021-10-22 DIAGNOSIS — F07.81 POST CONCUSSION SYNDROME: ICD-10-CM

## 2021-10-22 PROCEDURE — 99213 OFFICE O/P EST LOW 20 MIN: CPT | Performed by: STUDENT IN AN ORGANIZED HEALTH CARE EDUCATION/TRAINING PROGRAM

## 2021-10-22 RX ORDER — HYDROCODONE BITARTRATE AND ACETAMINOPHEN 5; 325 MG/1; MG/1
0.5 TABLET ORAL EVERY 12 HOURS PRN
COMMUNITY
Start: 2021-10-21 | End: 2024-02-22

## 2021-10-22 ASSESSMENT — FIBROSIS 4 INDEX: FIB4 SCORE: 0.71

## 2021-10-22 NOTE — PROGRESS NOTES
Subjective:     CC: ER follow up    HPI:   Smita Mejia presents today to f/u from the ED.    ED note and imaging reviewed from 10/18/2021. Patient states she has improved, but continues with some post concussion symptoms: trouble concentrating, headache (apex of head, tight/aching 4/10), mild photophobia and mild dizziness/nausea. This has improved. Denies vomiting. States that since having COVID she still struggles with intermittent fatigue. States her pain management provider gave her norco, but its not helpful for her headache.    Current Outpatient Medications Ordered in Epic   Medication Sig Dispense Refill   • HYDROcodone-acetaminophen (NORCO) 5-325 MG Tab per tablet Take 0.5 Tablets by mouth every 12 hours as needed.     • ferrous sulfate (FEOSOL) 220 (44 Fe) MG/5ML Elixir Take 5 mL by mouth every day. 473 mL 1   • clobetasol (TEMOVATE) 0.05 % Cream Apply 1 Application topically 2 times a day. 15 g 0   • Cholecalciferol (VITAMIN D3) 1.25 MG (46556 UT) Cap      • diclofenac sodium (VOLTAREN) 1 % Gel      • methocarbamol (ROBAXIN) 500 MG Tab 250 mg 3 times a day.     • PARoxetine (PAXIL) 20 MG Tab Take 1 Tablet by mouth every morning. 90 Tablet 3   • pregabalin (LYRICA) 100 MG Cap Take 100 mg by mouth in the morning, at noon, and at bedtime.     • amoxicillin (AMOXIL) 500 MG Cap      • omeprazole (PRILOSEC) 20 MG delayed-release capsule      • Cetirizine HCl (ZYRTEC ALLERGY) 10 MG Cap Take  by mouth.     • acetaminophen (TYLENOL) 500 MG Tab Take 500 mg by mouth as needed. Indications: Pain     • fluticasone (FLONASE) 50 MCG/ACT nasal spray Spray 1 Spray in nose every day.     • levothyroxine (SYNTHROID) 75 MCG Tab Take 75 mcg by mouth Every morning on an empty stomach.     • ondansetron (ZOFRAN ODT) 4 MG TABLET DISPERSIBLE Take 4 mg by mouth every 8 hours as needed for Nausea.     • albuterol 108 (90 BASE) MCG/ACT Aero Soln inhalation aerosol Inhale 2 Puffs by mouth every 6 hours as needed for Shortness of  "Breath.     • Multiple Vitamins-Minerals (MULTIVITAMIN & MINERAL PO) Take 1 Tab by mouth every day.     • Calcium 7381-9167 MG-UNIT CHEW Take 1 Each by mouth every day.       No current [facility]-ordered facility-administered medications on file.     ROS:  Gen: no fevers/chills, no changes in weight  ENT: no sore throat  Pulm: no sob, no cough  CV: no chest pain, no palpitations  GI: no nausea/vomiting, no diarrhea  Skin: no rash  Heme/Lymph: no easy bruising    Objective:     Exam:  /70 (BP Location: Left arm, Patient Position: Sitting, BP Cuff Size: Large adult)   Pulse 77   Temp 36.9 °C (98.5 °F) (Temporal)   Resp 16   Ht 1.549 m (5' 1\")   Wt 119 kg (263 lb)   LMP  (LMP Unknown)   SpO2 97%   BMI 49.69 kg/m²  Body mass index is 49.69 kg/m².    Physical Exam:  Constitutional: Well-developed and well-nourished. No acute distress.   Skin: Skin is warm and dry. No rash noted.  Head: Atraumatic without lesions.  Eyes: Conjunctivae and extraocular motions are normal. Pupils are equal, round, and reactive to light. No scleral icterus.   Ears:  External ears unremarkable. Tympanic membranes clear and intact.  Nose: Nares patent. No discharge.  Mouth/Throat: Oropharynx is clear and moist. Posterior pharynx without erythema or exudates.  Neck: Supple, trachea midline.  Cardiovascular: Regular rate and rhythm, S1 and S2 without murmur, rubs, or gallops.  Lungs: Normal inspiratory effort, CTA bilaterally, no wheezes/rhonchi/rales  Extremities: No cyanosis, clubbing, erythema, nor edema.  Neurological: Alert and oriented x 3. CN II-XII intact. Gait stable from prior.  Psychiatric:  Behavior, mood, and affect are appropriate.    Assessment & Plan:     55 y.o. female with the following -     1. Post concussion syndrome  2. Acute post-traumatic headache, not intractable  Acute, improving with persistent headache. Avoiding NSAIDs given history of bariatric surgery. Reviewed some options for post traumatic headache " prevention, most would interact with current medication so she declines for now. Would consider metoprolol if headache persists, patient will let me know. Discussed return precautions.     I spent a total of 23 minutes with record review, exam, communication with the patient, and documentation of this encounter.    Return if symptoms worsen or fail to improve.    Please note that this dictation was created using voice recognition software. I have made every reasonable attempt to correct obvious errors, but I expect that there are errors of grammar and possibly content that I did not discover before finalizing the note.

## 2021-11-06 ENCOUNTER — APPOINTMENT (OUTPATIENT)
Dept: RADIOLOGY | Facility: MEDICAL CENTER | Age: 56
End: 2021-11-06
Attending: NURSE PRACTITIONER
Payer: MEDICARE

## 2021-11-18 ENCOUNTER — HOSPITAL ENCOUNTER (OUTPATIENT)
Dept: RADIOLOGY | Facility: MEDICAL CENTER | Age: 56
End: 2021-11-18
Attending: STUDENT IN AN ORGANIZED HEALTH CARE EDUCATION/TRAINING PROGRAM
Payer: MEDICARE

## 2021-11-18 DIAGNOSIS — R74.8 ELEVATED ALKALINE PHOSPHATASE LEVEL: ICD-10-CM

## 2021-11-18 DIAGNOSIS — E21.3 HYPERPARATHYROIDISM (HCC): ICD-10-CM

## 2021-11-18 DIAGNOSIS — Z13.820 ENCOUNTER FOR SCREENING FOR OSTEOPOROSIS: ICD-10-CM

## 2021-11-18 DIAGNOSIS — E55.9 VITAMIN D DEFICIENCY: ICD-10-CM

## 2021-11-18 PROCEDURE — 77080 DXA BONE DENSITY AXIAL: CPT

## 2021-11-23 ENCOUNTER — HOSPITAL ENCOUNTER (OUTPATIENT)
Dept: LAB | Facility: MEDICAL CENTER | Age: 56
End: 2021-11-23
Attending: STUDENT IN AN ORGANIZED HEALTH CARE EDUCATION/TRAINING PROGRAM
Payer: MEDICARE

## 2021-11-23 ENCOUNTER — HOSPITAL ENCOUNTER (OUTPATIENT)
Dept: RADIOLOGY | Facility: MEDICAL CENTER | Age: 56
End: 2021-11-23
Attending: NURSE PRACTITIONER
Payer: MEDICARE

## 2021-11-23 DIAGNOSIS — M47.817 LUMBOSACRAL SPONDYLOSIS WITHOUT MYELOPATHY: ICD-10-CM

## 2021-11-23 DIAGNOSIS — M25.552 LEFT HIP PAIN: ICD-10-CM

## 2021-11-23 DIAGNOSIS — E21.3 HYPERPARATHYROIDISM (HCC): ICD-10-CM

## 2021-11-23 DIAGNOSIS — E55.9 VITAMIN D DEFICIENCY: ICD-10-CM

## 2021-11-23 DIAGNOSIS — E61.1 IRON DEFICIENCY: ICD-10-CM

## 2021-11-23 DIAGNOSIS — R74.8 ELEVATED ALKALINE PHOSPHATASE LEVEL: ICD-10-CM

## 2021-11-23 LAB
BASOPHILS # BLD AUTO: 0.8 % (ref 0–1.8)
BASOPHILS # BLD: 0.04 K/UL (ref 0–0.12)
EOSINOPHIL # BLD AUTO: 0.09 K/UL (ref 0–0.51)
EOSINOPHIL NFR BLD: 1.8 % (ref 0–6.9)
ERYTHROCYTE [DISTWIDTH] IN BLOOD BY AUTOMATED COUNT: 49.1 FL (ref 35.9–50)
FERRITIN SERPL-MCNC: 14.1 NG/ML (ref 10–291)
HCT VFR BLD AUTO: 43 % (ref 37–47)
HGB BLD-MCNC: 14.2 G/DL (ref 12–16)
IMM GRANULOCYTES # BLD AUTO: 0.01 K/UL (ref 0–0.11)
IMM GRANULOCYTES NFR BLD AUTO: 0.2 % (ref 0–0.9)
IRON SATN MFR SERPL: 18 % (ref 15–55)
IRON SERPL-MCNC: 72 UG/DL (ref 40–170)
LYMPHOCYTES # BLD AUTO: 1.7 K/UL (ref 1–4.8)
LYMPHOCYTES NFR BLD: 33.7 % (ref 22–41)
MCH RBC QN AUTO: 30.1 PG (ref 27–33)
MCHC RBC AUTO-ENTMCNC: 33 G/DL (ref 33.6–35)
MCV RBC AUTO: 91.1 FL (ref 81.4–97.8)
MONOCYTES # BLD AUTO: 0.47 K/UL (ref 0–0.85)
MONOCYTES NFR BLD AUTO: 9.3 % (ref 0–13.4)
NEUTROPHILS # BLD AUTO: 2.74 K/UL (ref 2–7.15)
NEUTROPHILS NFR BLD: 54.2 % (ref 44–72)
NRBC # BLD AUTO: 0 K/UL
NRBC BLD-RTO: 0 /100 WBC
PLATELET # BLD AUTO: 362 K/UL (ref 164–446)
PMV BLD AUTO: 9.7 FL (ref 9–12.9)
PTH-INTACT SERPL-MCNC: 77.3 PG/ML (ref 14–72)
RBC # BLD AUTO: 4.72 M/UL (ref 4.2–5.4)
TIBC SERPL-MCNC: 398 UG/DL (ref 250–450)
UIBC SERPL-MCNC: 326 UG/DL (ref 110–370)
WBC # BLD AUTO: 5.1 K/UL (ref 4.8–10.8)

## 2021-11-23 PROCEDURE — 72148 MRI LUMBAR SPINE W/O DYE: CPT | Mod: MH

## 2021-11-23 PROCEDURE — 83540 ASSAY OF IRON: CPT

## 2021-11-23 PROCEDURE — 83970 ASSAY OF PARATHORMONE: CPT

## 2021-11-23 PROCEDURE — 85025 COMPLETE CBC W/AUTO DIFF WBC: CPT

## 2021-11-23 PROCEDURE — 73721 MRI JNT OF LWR EXTRE W/O DYE: CPT | Mod: LT,MH

## 2021-11-23 PROCEDURE — 83550 IRON BINDING TEST: CPT

## 2021-11-23 PROCEDURE — 82728 ASSAY OF FERRITIN: CPT

## 2021-11-23 PROCEDURE — 82306 VITAMIN D 25 HYDROXY: CPT

## 2021-11-23 PROCEDURE — 36415 COLL VENOUS BLD VENIPUNCTURE: CPT

## 2021-11-26 LAB — 25(OH)D3 SERPL-MCNC: 22 NG/ML (ref 30–80)

## 2021-12-15 ENCOUNTER — TELEPHONE (OUTPATIENT)
Dept: MEDICAL GROUP | Facility: PHYSICIAN GROUP | Age: 56
End: 2021-12-15

## 2021-12-15 NOTE — TELEPHONE ENCOUNTER
ESTABLISHED PATIENT PRE-VISIT PLANNING     Patient was NOT contacted to complete PVP.     Note: Patient will not be contacted if there is no indication to call.     1.  Reviewed notes from the last few office visits within the medical group: Yes    2.  If any orders were placed at last visit or intended to be done for this visit (i.e. 6 mos follow-up), do we have Results/Consult Notes?         •  Labs - Labs ordered, completed on 11/23/21 and results are in chart.  Note: If patient appointment is for lab review and patient did not complete labs, check with provider if OK to reschedule patient until labs completed.       •  Imaging - Imaging was not ordered at last office visit.       •  Referrals - No referrals were ordered at last office visit.    3. Is this appointment scheduled as a Hospital Follow-Up? No    4.  Immunizations were updated in Epic using Reconcile Outside Information activity? Yes    5.  Patient is due for the following Health Maintenance Topics:   Health Maintenance Due   Topic Date Due   • Annual Wellness Visit  Never done   • COVID-19 Vaccine (1) Never done   • IMM ZOSTER VACCINES (1 of 2) Never done       6.  AHA (Pulse8) form printed for Provider? Yes

## 2022-01-03 ENCOUNTER — OFFICE VISIT (OUTPATIENT)
Dept: DERMATOLOGY | Facility: IMAGING CENTER | Age: 57
End: 2022-01-03
Payer: MEDICARE

## 2022-01-03 VITALS — TEMPERATURE: 97.3 F | HEIGHT: 61 IN | WEIGHT: 275 LBS | BODY MASS INDEX: 51.92 KG/M2

## 2022-01-03 DIAGNOSIS — L56.8 PHOTODERMATITIS DUE TO SUN: ICD-10-CM

## 2022-01-03 PROCEDURE — 99203 OFFICE O/P NEW LOW 30 MIN: CPT | Performed by: NURSE PRACTITIONER

## 2022-01-03 ASSESSMENT — FIBROSIS 4 INDEX: FIB4 SCORE: 0.72

## 2022-01-03 NOTE — PROGRESS NOTES
"DERMATOLOGY NOTE  NEW VISIT     Chief complaint: Establish Care (Photodermatiits)     HPI: Rashes on areas not protected with sunscreen. Mostly on arms when not protected  No rashes present today  Onset:  Entire life, childhood  Symptoms: Inflammation, redness and dry spots, itchy  Aggravating factors: sun exposure  Alleviating factors: sunscreen  New creams/topicals: no  New medications (up to last 6 months): no  New travel: no  Other exposures: no  Treatments: sun screen, otc hydrocortisone, and benadryl        Allergies   Allergen Reactions   • Ciprofloxacin Anaphylaxis     SOB   • Other Food Anaphylaxis     pj pj   • Codeine Nausea     Upset stomach    • Erythromycin Vomiting   • Nsaids      Does not take due to gastric bypass   • Sulfa Drugs Nausea     \"abdominal pain\"        MEDICATIONS:  Medications relevant to specialty reviewed.     REVIEW OF SYSTEMS:   Positive for skin (see HPI)  Negative for fevers and chills       EXAM:  Temp 36.3 °C (97.3 °F)   Ht 1.549 m (5' 1\")   Wt 125 kg (275 lb)   LMP  (LMP Unknown)   BMI 51.96 kg/m²   Constitutional: Well-developed, well-nourished, and in no distress.     A focused skin exam was performed including the affected areas of the b/l arms and face around mask. Notable findings on exam today listed below and/or in assessment/plan.     No visible rash present to exposed skin    IMPRESSION / PLAN:    1. 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  Discussed increasing use of second generation anti-histamine when anticipating more sun exposure  Encouraged to use clobetasol as previously prescribed by PCP for outbreak of rash  Sun protection with high UVA and UVB with zinc oxide  Sun protective clothing  rtc for rash to consider biopsy     Please note that this dictation was created using voice recognition software. I have made every reasonable attempt to correct obvious errors, but I expect that " there are errors of grammar and possibly content that I did not discover before finalizing the note.      Return to clinic in: Return for PRN. and as needed for any new or changing skin lesions.

## 2022-01-19 ENCOUNTER — PATIENT MESSAGE (OUTPATIENT)
Dept: HEALTH INFORMATION MANAGEMENT | Facility: OTHER | Age: 57
End: 2022-01-19
Payer: MEDICARE

## 2022-03-04 ENCOUNTER — TELEPHONE (OUTPATIENT)
Dept: HEALTH INFORMATION MANAGEMENT | Facility: OTHER | Age: 57
End: 2022-03-04
Payer: MEDICARE

## 2022-03-04 DIAGNOSIS — E03.9 HYPOTHYROIDISM, UNSPECIFIED TYPE: ICD-10-CM

## 2022-03-04 RX ORDER — LEVOTHYROXINE SODIUM 0.07 MG/1
75 TABLET ORAL
Qty: 90 TABLET | Refills: 2 | Status: SHIPPED | OUTPATIENT
Start: 2022-03-04 | End: 2022-11-29

## 2022-03-04 NOTE — TELEPHONE ENCOUNTER
Pt responding to [facility] msg- called to     schedule a COMPREHENSIVE HEALTH ASSESSMENT.   Schedule for next week.

## 2022-03-10 DIAGNOSIS — R11.0 NAUSEA: ICD-10-CM

## 2022-03-10 DIAGNOSIS — Z98.84 PERSONAL HISTORY OF GASTRIC BYPASS: ICD-10-CM

## 2022-03-10 RX ORDER — ONDANSETRON 4 MG/1
4 TABLET, ORALLY DISINTEGRATING ORAL EVERY 8 HOURS PRN
Qty: 10 TABLET | Refills: 0 | Status: SHIPPED | OUTPATIENT
Start: 2022-03-10

## 2022-03-10 RX ORDER — OMEPRAZOLE 20 MG/1
20 CAPSULE, DELAYED RELEASE ORAL DAILY
Qty: 90 CAPSULE | Refills: 0 | Status: SHIPPED | OUTPATIENT
Start: 2022-03-10 | End: 2022-04-20 | Stop reason: SDUPTHER

## 2022-04-08 PROBLEM — I73.9 PERIPHERAL ARTERIAL DISEASE (HCC): Status: ACTIVE | Noted: 2022-04-08

## 2022-04-08 PROBLEM — E78.5 DYSLIPIDEMIA: Status: ACTIVE | Noted: 2022-04-08

## 2022-04-08 PROBLEM — E66.01 MORBID OBESITY WITH BODY MASS INDEX (BMI) OF 50.0 TO 59.9 IN ADULT (HCC): Status: ACTIVE | Noted: 2022-04-08

## 2022-04-08 PROBLEM — F11.20 OPIOID TYPE DEPENDENCE, CONTINUOUS (HCC): Status: ACTIVE | Noted: 2022-04-08

## 2022-04-08 PROBLEM — F33.40 RECURRENT MAJOR DEPRESSIVE DISORDER, IN REMISSION (HCC): Status: ACTIVE | Noted: 2022-04-08

## 2022-04-08 PROBLEM — F32.A DEPRESSION: Status: RESOLVED | Noted: 2021-05-21 | Resolved: 2022-04-08

## 2022-04-08 PROBLEM — M51.36 DEGENERATIVE DISC DISEASE, LUMBAR: Status: ACTIVE | Noted: 2022-04-08

## 2022-04-08 PROBLEM — R73.9 HYPERGLYCEMIA: Status: ACTIVE | Noted: 2022-04-08

## 2022-04-08 PROBLEM — F13.20 SEDATIVE, HYPNOTIC, OR ANXIOLYTIC DEPENDENCE (HCC): Status: ACTIVE | Noted: 2022-04-08

## 2022-04-20 ENCOUNTER — OFFICE VISIT (OUTPATIENT)
Dept: MEDICAL GROUP | Facility: PHYSICIAN GROUP | Age: 57
End: 2022-04-20
Payer: MEDICARE

## 2022-04-20 ENCOUNTER — HOSPITAL ENCOUNTER (OUTPATIENT)
Facility: MEDICAL CENTER | Age: 57
End: 2022-04-20
Attending: STUDENT IN AN ORGANIZED HEALTH CARE EDUCATION/TRAINING PROGRAM
Payer: MEDICARE

## 2022-04-20 VITALS
BODY MASS INDEX: 52.11 KG/M2 | SYSTOLIC BLOOD PRESSURE: 126 MMHG | HEIGHT: 61 IN | DIASTOLIC BLOOD PRESSURE: 72 MMHG | OXYGEN SATURATION: 97 % | WEIGHT: 276 LBS | TEMPERATURE: 98.3 F | HEART RATE: 74 BPM

## 2022-04-20 DIAGNOSIS — M85.852 OSTEOPENIA OF NECK OF LEFT FEMUR: ICD-10-CM

## 2022-04-20 DIAGNOSIS — Z98.84 PERSONAL HISTORY OF GASTRIC BYPASS: ICD-10-CM

## 2022-04-20 DIAGNOSIS — R41.3 MEMORY CHANGES: ICD-10-CM

## 2022-04-20 DIAGNOSIS — I70.0 ATHEROSCLEROSIS OF AORTA (HCC): ICD-10-CM

## 2022-04-20 DIAGNOSIS — R32 URINARY INCONTINENCE, UNSPECIFIED TYPE: ICD-10-CM

## 2022-04-20 DIAGNOSIS — Z00.00 ENCOUNTER FOR ANNUAL GENERAL MEDICAL EXAMINATION WITHOUT ABNORMAL FINDINGS IN ADULT: ICD-10-CM

## 2022-04-20 DIAGNOSIS — E55.9 VITAMIN D DEFICIENCY: ICD-10-CM

## 2022-04-20 DIAGNOSIS — E21.3 HYPERPARATHYROIDISM (HCC): ICD-10-CM

## 2022-04-20 DIAGNOSIS — E78.00 ELEVATED LDL CHOLESTEROL LEVEL: ICD-10-CM

## 2022-04-20 DIAGNOSIS — Z86.39 HISTORY OF IRON DEFICIENCY: ICD-10-CM

## 2022-04-20 DIAGNOSIS — R74.8 ELEVATED ALKALINE PHOSPHATASE LEVEL: ICD-10-CM

## 2022-04-20 PROBLEM — E61.1 IRON DEFICIENCY: Status: RESOLVED | Noted: 2021-09-22 | Resolved: 2022-04-20

## 2022-04-20 PROBLEM — R41.89 BRAIN FOG: Status: ACTIVE | Noted: 2022-04-20

## 2022-04-20 PROBLEM — R73.9 HYPERGLYCEMIA: Status: RESOLVED | Noted: 2022-04-08 | Resolved: 2022-04-20

## 2022-04-20 PROCEDURE — 81001 URINALYSIS AUTO W/SCOPE: CPT

## 2022-04-20 PROCEDURE — 99214 OFFICE O/P EST MOD 30 MIN: CPT | Performed by: STUDENT IN AN ORGANIZED HEALTH CARE EDUCATION/TRAINING PROGRAM

## 2022-04-20 RX ORDER — OMEPRAZOLE 20 MG/1
20 CAPSULE, DELAYED RELEASE ORAL DAILY
Qty: 90 CAPSULE | Refills: 3 | Status: SHIPPED | OUTPATIENT
Start: 2022-04-20

## 2022-04-20 RX ORDER — METHOCARBAMOL 750 MG/1
TABLET, FILM COATED ORAL
COMMUNITY
Start: 2022-04-06

## 2022-04-20 ASSESSMENT — FIBROSIS 4 INDEX: FIB4 SCORE: 0.72

## 2022-04-20 ASSESSMENT — PATIENT HEALTH QUESTIONNAIRE - PHQ9
9. THOUGHTS THAT YOU WOULD BE BETTER OFF DEAD, OR OF HURTING YOURSELF: NOT AT ALL
6. FEELING BAD ABOUT YOURSELF - OR THAT YOU ARE A FAILURE OR HAVE LET YOURSELF OR YOUR FAMILY DOWN: NOT AL ALL
7. TROUBLE CONCENTRATING ON THINGS, SUCH AS READING THE NEWSPAPER OR WATCHING TELEVISION: NOT AT ALL
5. POOR APPETITE OR OVEREATING: NOT AT ALL
3. TROUBLE FALLING OR STAYING ASLEEP OR SLEEPING TOO MUCH: NOT AT ALL
SUM OF ALL RESPONSES TO PHQ9 QUESTIONS 1 AND 2: 0
1. LITTLE INTEREST OR PLEASURE IN DOING THINGS: NOT AT ALL
SUM OF ALL RESPONSES TO PHQ QUESTIONS 1-9: 0
4. FEELING TIRED OR HAVING LITTLE ENERGY: NOT AT ALL
8. MOVING OR SPEAKING SO SLOWLY THAT OTHER PEOPLE COULD HAVE NOTICED. OR THE OPPOSITE, BEING SO FIGETY OR RESTLESS THAT YOU HAVE BEEN MOVING AROUND A LOT MORE THAN USUAL: NOT AT ALL
2. FEELING DOWN, DEPRESSED, IRRITABLE, OR HOPELESS: NOT AT ALL

## 2022-04-20 NOTE — PROGRESS NOTES
Subjective:     Chief Complaint   Patient presents with   • Annual Exam     HPI:   Smita Faith is a 56 y.o. female who presents for annual exam. She is feeling well and denies any complaints.    Ob-Gyn/ History:    /Para:   Last Pap Smear: , negative cotest. No history of abnormal pap smears.  Gyn Surgery: Hysterectomy, bilateral oophorectomy/salpingectomy.  Not currently sexually active.  No significant bloating/fluid retention, pelvic pain. No vaginal discharge  Post-menopausal bleeding: Denies, but reports some dryness.    Urinary incontinence  Chronic. Reports stress incontinence (laugh etc).  Overflow incontinence if bladder too full. Denies urinary frequency, some urgency but only with full bladder typically. No hematuria or dysuria. Denies saddle anesthesia. Has not been worsening.    Health Maintenance  Osteoporosis Screen/ DEXA: 2021, repeat in 2yrs  Cholesterol Screenin2021  Diabetes Screenin2021  Aspirin Use: The 10-year ASCVD risk score ([place] POST Jr., et al., 2013) is: 1.8%  Diet: Improved recently, more fruits/veggies and less cookies etc.  Exercise: Improved recently, making an effort.  Substance Abuse: No ETOH use, uses marijuana edibles.     Cancer screening  Colorectal Cancer Screening: FIT due    Lung Cancer Screening: vero PERERA   Cervical Cancer Screening: s/p hysterectomy  Breast Cancer Screening: due 2022    Infectious disease screening/Immunizations  --HIV Screening: negative   --Hepatitis C Screening: negative 2018  --Immunizations:    Influenza: recommend annually   Tetanus: UTD   Shingles: due, recommended   Pneumococcal : at 65   Other immunizations: Discussed COVID booster    She  has a past medical history of Alcoholism (HCC), Anemia, Anesthesia, Anxiety disorder, Arthritis, ASTHMA, Backpain (2020), Bipolar 2 disorder (HCC), Bronchitis, Carpal tunnel syndrome, Cellulitis (2017), Chest pain in adult (2019), Dehiscence of external  surgical wound (8/23/2017), Depression, Environmental and seasonal allergies, Hyperglycemia (4/8/2022), Iron deficiency (9/22/2021), Microcytic anemia (8/29/2019), MRSA (methicillin resistant staph aureus) culture positive (08/2017), Pelvic mass (4/7/2016), Pneumonia (1995), and Unspecified disorder of thyroid (9130-8057).  She  has a past surgical history that includes gastric bypass laparoscopic (2006); tubal ligation; pr removal of tonsils,<11 y/o; pr cholecystostomy,percut (1985); pr breast reduction (2010); hysterectomy robotic xi (N/A, 4/7/2016); hip arth anterior total (Right, 6/21/2018); chondroplasty (Left, 6/13/2019); knee arthroscopy (Left, 6/13/2019); meniscectomy, knee, medial (Left, 6/13/2019); pr lap,stomach,other,w/o tube (8/5/2020); pr lap,biliary tract,unlisted (8/5/2020); lumbar fusion anterior (11/10/2010); cervical disk and fusion anterior (8/24/2017); gastric bypass laparoscopic; arthroplasty; breast biopsy; abdominal hysterectomy total; oophorectomy (Bilateral); and salpingectomy (Bilateral).    Family History   Problem Relation Age of Onset   • Breast Cancer Neg Hx      Social History     Socioeconomic History   • Marital status:      Spouse name: Not on file   • Number of children: 3   • Years of education: Not on file   • Highest education level: Bachelor's degree (e.g., BA, AB, BS)   Occupational History   • Not on file   Tobacco Use   • Smoking status: Former Smoker     Packs/day: 0.10     Years: 4.00     Pack years: 0.40     Types: Cigarettes     Quit date: 1/1/2007     Years since quitting: 15.3   • Smokeless tobacco: Never Used   Vaping Use   • Vaping Use: Never used   Substance and Sexual Activity   • Alcohol use: No   • Drug use: Yes     Types: Oral     Comment: Marijuana edibles, and vaping once per week for break through pain   • Sexual activity: Not Currently     Partners: Male   Other Topics Concern   • Not on file   Social History Narrative   • Not on file     Social  Determinants of Health     Financial Resource Strain: Low Risk    • Difficulty of Paying Living Expenses: Not hard at all   Food Insecurity: No Food Insecurity   • Worried About Running Out of Food in the Last Year: Never true   • Ran Out of Food in the Last Year: Never true   Transportation Needs: No Transportation Needs   • Lack of Transportation (Medical): No   • Lack of Transportation (Non-Medical): No   Physical Activity: Insufficiently Active   • Days of Exercise per Week: 1 day   • Minutes of Exercise per Session: 20 min   Stress: No Stress Concern Present   • Feeling of Stress : Not at all   Social Connections: Moderately Isolated   • Frequency of Communication with Friends and Family: Once a week   • Frequency of Social Gatherings with Friends and Family: Once a week   • Attends Yarsani Services: 1 to 4 times per year   • Active Member of Clubs or Organizations: No   • Attends Club or Organization Meetings: Never   • Marital Status:    Intimate Partner Violence: Not on file   Housing Stability: Low Risk    • Unable to Pay for Housing in the Last Year: No   • Number of Places Lived in the Last Year: 2   • Unstable Housing in the Last Year: No     Patient Active Problem List    Diagnosis Date Noted   • History of iron deficiency 04/20/2022   • Memory changes 04/20/2022   • Urinary incontinence 04/20/2022   • Osteopenia of neck of left femur 04/20/2022   • Elevated LDL cholesterol level 04/08/2022   • BMI 50.0-59.9, adult ([facility]) 04/08/2022   • Morbid obesity with body mass index (BMI) of 50.0 to 59.9 in adult ([facility]) 04/08/2022   • Peripheral arterial disease ([facility]) 04/08/2022   • Opioid type dependence, continuous ([facility]) 04/08/2022   • Recurrent major depressive disorder, in remission ([facility]) 04/08/2022   • Sedative, hypnotic, or anxiolytic dependence ([facility]) 04/08/2022   • Degenerative disc disease, lumbar 04/08/2022   • Hyperparathyroidism ([facility]) 10/13/2021   • Elevated alkaline phosphatase level 09/22/2021    • 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 09/22/2021   • Vitamin D deficiency 09/08/2021   • Atherosclerosis of aorta (HCC) 05/21/2021   • Personal history of gastric bypass 05/21/2021   • History of total right hip replacement 05/21/2021   • H/O total hysterectomy with bilateral salpingo-oophorectomy (BSO) 05/21/2021   • Alcohol dependence in remission (HCC) 05/21/2021   • Chronic fatigue 05/21/2021   • Anxiety 05/21/2021   • Other chronic pain 05/21/2021   • Status post cervical spinal fusion 08/03/2017   • Hypothyroid 08/03/2017     Current Outpatient Medications   Medication Sig Dispense Refill   • methocarbamol (ROBAXIN) 750 MG Tab      • omeprazole (PRILOSEC) 20 MG delayed-release capsule Take 1 Capsule by mouth every day. 90 Capsule 3   • ferrous sulfate (FEOSOL) 220 (44 Fe) MG/5ML Elixir Take 5 mL by mouth every day. 473 mL 1   • ondansetron (ZOFRAN ODT) 4 MG TABLET DISPERSIBLE Take 1 Tablet by mouth every 8 hours as needed for Nausea. 10 Tablet 0   • levothyroxine (SYNTHROID) 75 MCG Tab Take 1 Tablet by mouth every morning on an empty stomach. 90 Tablet 2   • HYDROcodone-acetaminophen (NORCO) 5-325 MG Tab per tablet Take 0.5 Tablets by mouth every 12 hours as needed.     • clobetasol (TEMOVATE) 0.05 % Cream Apply 1 Application topically 2 times a day. 15 g 0   • Cholecalciferol (VITAMIN D3) 1.25 MG (41007 UT) Cap      • diclofenac sodium (VOLTAREN) 1 % Gel      • PARoxetine (PAXIL) 20 MG Tab Take 1 Tablet by mouth every morning. 90 Tablet 3   • pregabalin (LYRICA) 100 MG Cap Take 100 mg by mouth in the morning, at noon, and at bedtime.     • amoxicillin (AMOXIL) 500 MG Cap      • Cetirizine HCl (ZYRTEC ALLERGY) 10 MG Cap Take  by mouth.     • acetaminophen (TYLENOL) 500 MG Tab Take 500 mg by mouth as needed. Indications: Pain     • fluticasone (FLONASE) 50 MCG/ACT nasal spray Spray 1 Spray in nose every day.     • albuterol 108 (90 BASE) MCG/ACT Aero Soln inhalation aerosol Inhale 2 Puffs by mouth every 6 hours as  "needed for Shortness of Breath.     • Multiple Vitamins-Minerals (MULTIVITAMIN & MINERAL PO) Take 1 Tab by mouth every day.     • Calcium 4265-1725 MG-UNIT CHEW Take 1 Each by mouth every day.       No current facility-administered medications for this visit.     Allergies   Allergen Reactions   • Ciprofloxacin Anaphylaxis     SOB   • Other Food Anaphylaxis     pj pj   • Codeine Nausea     Upset stomach    • Erythromycin Vomiting   • Nsaids      Does not take due to gastric bypass   • Sulfa Drugs Nausea     \"abdominal pain\"     Review of Systems  Constitutional: Negative for fever, chills. Reports chronic fatigue, slightly improved.  HENT: Negative for congestion.    Eyes: Negative for pain.    Respiratory: Negative for cough and shortness of breath.  Cardiovascular: Negative for leg swelling.   Gastrointestinal: Negative for vomiting, abdominal pain and diarrhea. Rare nausea with PPI.  Genitourinary: Negative for dysuria and hematuria.   Skin: Negative for rash.   Neurological: Negative for dizziness, focal weakness and headaches.   Endo/Heme/Allergies: Does not bleed easily.   Psychiatric/Behavioral: Negative for depression.  The patient is not nervous/anxious.      Objective:     /72 (BP Location: Left arm, Patient Position: Sitting, BP Cuff Size: Adult)   Pulse 74   Temp 36.8 °C (98.3 °F) (Temporal)   Ht 1.549 m (5' 1\")   Wt (!) 125 kg (276 lb)   LMP  (LMP Unknown)   SpO2 97%   BMI 52.15 kg/m²   Body mass index is 52.15 kg/m².  Wt Readings from Last 4 Encounters:   04/20/22 (!) 125 kg (276 lb)   04/08/22 (!) 126 kg (277 lb)   01/03/22 125 kg (275 lb)   10/22/21 119 kg (263 lb)     Physical Exam:  Constitutional: Well-developed and well-nourished. Not diaphoretic. No distress.   Skin: Skin is warm and dry. No rash noted.  Head: Atraumatic without lesions.  Eyes: Conjunctivae and extraocular motions are normal. Pupils are equal, round. No scleral icterus.   Mouth/Throat: Wearing mask.  Neck: " Supple, trachea midline. Normal range of motion. No thyromegaly present. No lymphadenopathy--cervical or supraclavicular.  Cardiovascular: Regular rate and rhythm, S1 and S2 without murmur, rubs, or gallops.  Lungs: Normal inspiratory effort, CTA bilaterally, no wheezes/rhonchi/rales.  Abdomen: Soft, non tender, and without distention. Active bowel sounds in all four quadrants. No rebound, guarding, masses or HSM.  Extremities: No cyanosis, clubbing, erythema, nor edema.   Neurological: Alert and oriented x 3. Gait stable.  Psychiatric:  Behavior, mood, and affect are appropriate.    Assessment and Plan:     1. Encounter for annual general medical examination without abnormal findings in adult  HCM: As above.  Immunizations reviewed and discussed with patient.  Age-appropriate anticipatory guidance discussed.    2. Atherosclerosis of aorta (HCC)  3. Elevated LDL cholesterol level  This is a chronic condition with LDL last year slightly improved from prior.  We reviewed her 10-year ASCVD risk which is well below threshold for statin.  We reviewed the findings of atherosclerosis of the aorta on her CT from 2019, minimal.  She did recently had a abnormal Quanta flow with 31 to 50% flow abnormality but denies any symptoms.  Given this we mutually decided to plan to repeat her lab work and she will continue with lifestyle changes in order to improve this.  Plan to discuss consideration of statin at follow-up.  The 10-year ASCVD risk score ([place] DC Jr., et al., 2013) is: 1.8%  - Comp Metabolic Panel; Future  - Lipid Profile; Future    4. BMI 50.0-59.9, adult (HCC)  Chronic condition, patient making some changes which have resulted in some weight loss.  Gave encouragement, will trend.  We may consider medication such as GLP-1 in the future if she is interested in pursuing further weight loss with struggling.    5. Vitamin D deficiency  Condition, continue supplementation.  Repeat with annual labs.  - VITAMIN D,25 HYDROXY;  Future    6. Osteopenia of neck of left femur  Repeat DEXA in 2yrs.    7. Elevated alkaline phosphatase level  Chronic, with elevated bone fractions suspected related to osteopenia as above.  Trend.  - Comp Metabolic Panel; Future    8. Hyperparathyroidism (HCC)  Chronically elevated PTH without renal disease or elevated calcium.  This may be related to her history of gastric bypass.  Will ensure vitamin D normalized, check PTH and phosphorus as well as calcium with next labs.  - Comp Metabolic Panel; Future  - VITAMIN D,25 HYDROXY; Future  - PTH INTACT (PTH ONLY); Future  - PHOSPHORUS; Future    9. Personal history of gastric bypass  Chronically using Prilosec to prevent nausea, refilled.  At risk for iron deficiency given her history, continue iron.  - omeprazole (PRILOSEC) 20 MG delayed-release capsule; Take 1 Capsule by mouth every day.  Dispense: 90 Capsule; Refill: 3  - ferrous sulfate (FEOSOL) 220 (44 Fe) MG/5ML Elixir; Take 5 mL by mouth every day.  Dispense: 473 mL; Refill: 1  - FOLATE; Future    10. History of iron deficiency  Continue iron, repeat labs as below.  - CBC WITH DIFFERENTIAL; Future  - IRON/TOTAL IRON BIND; Future  - FERRITIN; Future  - FOLATE; Future    11. Memory changes  Ongoing, patient reports that she only got 1 right out of the 3 immediate recall at recent geriatric specialty care appointment.  We will check labs as below, this may be multifactorial and wonders if related to COVID-19 which is possible.  We will plan on either MMSE or MoCA at f/u.   - Comp Metabolic Panel; Future  - Lipid Profile; Future  - CBC WITH DIFFERENTIAL; Future  - VITAMIN B12; Future  - VITAMIN B1; Future  - TSH WITH REFLEX TO FT4; Future    12. Urinary incontinence, unspecified type  This is a chronic condition, relatively unchanged.  Check urine analysis and referred to pelvic floor physical therapy.  Weight loss may improve this as well.  Recommend she start a bladder diary as well.  - URINALYSIS,CULTURE IF  INDICATED; Future  - Referral to Physical Therapy    Follow-up: Return in about 2 months (around 6/20/2022) for lab review.

## 2022-04-20 NOTE — PATIENT INSTRUCTIONS
Urinary Incontinence    Urinary incontinence refers to a condition in which a person is unable to control where and when to pass urine. A person with this condition will urinate when he or she does not mean to (involuntarily).  What are the causes?  This condition may be caused by:  · Medicines.  · Infections.  · Constipation.  · Overactive bladder muscles.  · Weak bladder muscles.  · Weak pelvic floor muscles. These muscles provide support for the bladder, intestine, and, in women, the uterus.  · Enlarged prostate in men. The prostate is a gland near the bladder. When it gets too big, it can pinch the urethra. With the urethra blocked, the bladder can weaken and lose the ability to empty properly.  · Surgery.  · Emotional factors, such as anxiety, stress, or post-traumatic stress disorder (PTSD).  · Pelvic organ prolapse. This happens in women when organs shift out of place and into the vagina. This shift can prevent the bladder and urethra from working properly.  What increases the risk?  The following factors may make you more likely to develop this condition:  · Older age.  · Obesity and physical inactivity.  · Pregnancy and childbirth.  · Menopause.  · Diseases that affect the nerves or spinal cord (neurological diseases).  · Long-term (chronic) coughing. This can increase pressure on the bladder and pelvic floor muscles.  What are the signs or symptoms?  Symptoms may vary depending on the type of urinary incontinence you have. They include:  · A sudden urge to urinate, but passing urine involuntarily before you can get to a bathroom (urge incontinence).  · Suddenly passing urine with any activity that forces urine to pass, such as coughing, laughing, exercise, or sneezing (stress incontinence).  · Needing to urinate often, but urinating only a small amount, or constantly dribbling urine (overflow incontinence).  · Urinating because you cannot get to the bathroom in time due to a physical disability, such as  arthritis or injury, or communication and thinking problems, such as Alzheimer disease (functional incontinence).  How is this diagnosed?  This condition may be diagnosed based on:  · Your medical history.  · A physical exam.  · Tests, such as:  ? Urine tests.  ? X-rays of your kidney and bladder.  ? Ultrasound.  ? CT scan.  ? Cystoscopy. In this procedure, a health care provider inserts a tube with a light and camera (cystoscope) through the urethra and into the bladder in order to check for problems.  ? Urodynamic testing. These tests assess how well the bladder, urethra, and sphincter can store and release urine. There are different types of urodynamic tests, and they vary depending on what the test is measuring.  To help diagnose your condition, your health care provider may recommend that you keep a log of when you urinate and how much you urinate.  How is this treated?  Treatment for this condition depends on the type of incontinence that you have and its cause. Treatment may include:  · Lifestyle changes, such as:  ? Quitting smoking.  ? Maintaining a healthy weight.  ? Staying active. Try to get 150 minutes of moderate-intensity exercise every week. Ask your health care provider which activities are safe for you.  ? Eating a healthy diet.  § Avoid high-fat foods, like fried foods.  § Avoid refined carbohydrates like white bread and white rice.  § Limit how much alcohol and caffeine you drink.  § Increase your fiber intake. Foods such as fresh fruits, vegetables, beans, and whole grains are healthy sources of fiber.  · Pelvic floor muscle exercises.  · Bladder training, such as lengthening the amount of time between bathroom breaks, or using the bathroom at regular intervals.  · Using techniques to suppress bladder urges. This can include distraction techniques or controlled breathing exercises.  · Medicines to relax the bladder muscles and prevent bladder spasms.  · Medicines to help slow or prevent the  growth of a man's prostate.  · Botox injections. These can help relax the bladder muscles.  · Using pulses of electricity to help change bladder reflexes (electrical nerve stimulation).  · For women, using a medical device to prevent urine leaks. This is a small, tampon-like, disposable device that is inserted into the urethra.  · Injecting collagen or carbon beads (bulking agents) into the urinary sphincter. These can help thicken tissue and close the bladder opening.  · Surgery.  Follow these instructions at home:  Lifestyle  · Limit alcohol and caffeine. These can fill your bladder quickly and irritate it.  · Keep yourself clean to help prevent odors and skin damage. Ask your doctor about special skin creams and cleansers that can protect the skin from urine.  · Consider wearing pads or adult diapers. Make sure to change them regularly, and always change them right after experiencing incontinence.  General instructions  · Take over-the-counter and prescription medicines only as told by your health care provider.  · Use the bathroom about every 3-4 hours, even if you do not feel the need to urinate. Try to empty your bladder completely every time. After urinating, wait a minute. Then try to urinate again.  · Make sure you are in a relaxed position while urinating.  · If your incontinence is caused by nerve problems, keep a log of the medicines you take and the times you go to the bathroom.  · Keep all follow-up visits as told by your health care provider. This is important.  Contact a health care provider if:  · You have pain that gets worse.  · Your incontinence gets worse.  Get help right away if:  · You have a fever or chills.  · You are unable to urinate.  · You have redness in your groin area or down your legs.  Summary  · Urinary incontinence refers to a condition in which a person is unable to control where and when to pass urine.  · This condition may be caused by medicines, infection, weak bladder  muscles, weak pelvic floor muscles, enlargement of the prostate (in men), or surgery.  · The following factors increase your risk for developing this condition: older age, obesity, pregnancy and childbirth, menopause, neurological diseases, and chronic coughing.  · There are several types of urinary incontinence. They include urge incontinence, stress incontinence, overflow incontinence, and functional incontinence.  · This condition is usually treated first with lifestyle and behavioral changes, such as quitting smoking, eating a healthier diet, and doing regular pelvic floor exercises. Other treatment options include medicines, bulking agents, medical devices, electrical nerve stimulation, or surgery.  This information is not intended to replace advice given to you by your health care provider. Make sure you discuss any questions you have with your health care provider.  Document Released: 01/25/2006 Document Revised: 12/28/2018 Document Reviewed: 03/29/2018  [employer] Patient Education © 2020 [employer]

## 2022-04-21 DIAGNOSIS — R32 URINARY INCONTINENCE, UNSPECIFIED TYPE: ICD-10-CM

## 2022-04-21 LAB
AMORPH CRY #/AREA URNS HPF: PRESENT /HPF
APPEARANCE UR: ABNORMAL
BILIRUB UR QL STRIP.AUTO: NEGATIVE
COLOR UR: YELLOW
EPI CELLS #/AREA URNS HPF: NORMAL /HPF
GLUCOSE UR STRIP.AUTO-MCNC: NEGATIVE MG/DL
KETONES UR STRIP.AUTO-MCNC: NEGATIVE MG/DL
LEUKOCYTE ESTERASE UR QL STRIP.AUTO: ABNORMAL
MICRO URNS: ABNORMAL
NITRITE UR QL STRIP.AUTO: NEGATIVE
PH UR STRIP.AUTO: 5.5 [PH] (ref 5–8)
PROT UR QL STRIP: NEGATIVE MG/DL
RBC UR QL AUTO: NEGATIVE
SP GR UR STRIP.AUTO: >=1.03
UROBILINOGEN UR STRIP.AUTO-MCNC: 0.2 MG/DL
WBC #/AREA URNS HPF: NORMAL /HPF

## 2022-07-20 ENCOUNTER — HOSPITAL ENCOUNTER (OUTPATIENT)
Dept: LAB | Facility: MEDICAL CENTER | Age: 57
End: 2022-07-20
Attending: STUDENT IN AN ORGANIZED HEALTH CARE EDUCATION/TRAINING PROGRAM
Payer: MEDICARE

## 2022-07-20 DIAGNOSIS — R41.3 MEMORY CHANGES: ICD-10-CM

## 2022-07-20 DIAGNOSIS — Z86.39 HISTORY OF IRON DEFICIENCY: ICD-10-CM

## 2022-07-20 DIAGNOSIS — E55.9 VITAMIN D DEFICIENCY: ICD-10-CM

## 2022-07-20 DIAGNOSIS — Z98.84 PERSONAL HISTORY OF GASTRIC BYPASS: ICD-10-CM

## 2022-07-20 DIAGNOSIS — E21.3 HYPERPARATHYROIDISM (HCC): ICD-10-CM

## 2022-07-20 DIAGNOSIS — I70.0 ATHEROSCLEROSIS OF AORTA (HCC): ICD-10-CM

## 2022-07-20 DIAGNOSIS — R74.8 ELEVATED ALKALINE PHOSPHATASE LEVEL: ICD-10-CM

## 2022-07-20 DIAGNOSIS — E78.00 ELEVATED LDL CHOLESTEROL LEVEL: ICD-10-CM

## 2022-07-20 LAB
25(OH)D3 SERPL-MCNC: 40 NG/ML (ref 30–100)
ALBUMIN SERPL BCP-MCNC: 4.1 G/DL (ref 3.2–4.9)
ALBUMIN/GLOB SERPL: 1.2 G/DL
ALP SERPL-CCNC: 114 U/L (ref 30–99)
ALT SERPL-CCNC: 13 U/L (ref 2–50)
ANION GAP SERPL CALC-SCNC: 13 MMOL/L (ref 7–16)
AST SERPL-CCNC: 17 U/L (ref 12–45)
BASOPHILS # BLD AUTO: 0.9 % (ref 0–1.8)
BASOPHILS # BLD: 0.05 K/UL (ref 0–0.12)
BILIRUB SERPL-MCNC: 0.3 MG/DL (ref 0.1–1.5)
BUN SERPL-MCNC: 13 MG/DL (ref 8–22)
CALCIUM SERPL-MCNC: 9.3 MG/DL (ref 8.5–10.5)
CHLORIDE SERPL-SCNC: 106 MMOL/L (ref 96–112)
CHOLEST SERPL-MCNC: 183 MG/DL (ref 100–199)
CO2 SERPL-SCNC: 22 MMOL/L (ref 20–33)
CREAT SERPL-MCNC: 0.68 MG/DL (ref 0.5–1.4)
EOSINOPHIL # BLD AUTO: 0.1 K/UL (ref 0–0.51)
EOSINOPHIL NFR BLD: 1.8 % (ref 0–6.9)
ERYTHROCYTE [DISTWIDTH] IN BLOOD BY AUTOMATED COUNT: 47.8 FL (ref 35.9–50)
FERRITIN SERPL-MCNC: 12.1 NG/ML (ref 10–291)
FOLATE SERPL-MCNC: 19.8 NG/ML
GFR SERPLBLD CREATININE-BSD FMLA CKD-EPI: 102 ML/MIN/1.73 M 2
GLOBULIN SER CALC-MCNC: 3.3 G/DL (ref 1.9–3.5)
GLUCOSE SERPL-MCNC: 90 MG/DL (ref 65–99)
HCT VFR BLD AUTO: 42.5 % (ref 37–47)
HDLC SERPL-MCNC: 70 MG/DL
HGB BLD-MCNC: 13.7 G/DL (ref 12–16)
IMM GRANULOCYTES # BLD AUTO: 0.01 K/UL (ref 0–0.11)
IMM GRANULOCYTES NFR BLD AUTO: 0.2 % (ref 0–0.9)
IRON SATN MFR SERPL: 13 % (ref 15–55)
IRON SERPL-MCNC: 50 UG/DL (ref 40–170)
LDLC SERPL CALC-MCNC: 101 MG/DL
LYMPHOCYTES # BLD AUTO: 1.8 K/UL (ref 1–4.8)
LYMPHOCYTES NFR BLD: 32 % (ref 22–41)
MCH RBC QN AUTO: 29.7 PG (ref 27–33)
MCHC RBC AUTO-ENTMCNC: 32.2 G/DL (ref 33.6–35)
MCV RBC AUTO: 92.2 FL (ref 81.4–97.8)
MONOCYTES # BLD AUTO: 0.57 K/UL (ref 0–0.85)
MONOCYTES NFR BLD AUTO: 10.1 % (ref 0–13.4)
NEUTROPHILS # BLD AUTO: 3.1 K/UL (ref 2–7.15)
NEUTROPHILS NFR BLD: 55 % (ref 44–72)
NRBC # BLD AUTO: 0 K/UL
NRBC BLD-RTO: 0 /100 WBC
PHOSPHATE SERPL-MCNC: 4.2 MG/DL (ref 2.5–4.5)
PLATELET # BLD AUTO: 385 K/UL (ref 164–446)
PMV BLD AUTO: 10.2 FL (ref 9–12.9)
POTASSIUM SERPL-SCNC: 4.1 MMOL/L (ref 3.6–5.5)
PROT SERPL-MCNC: 7.4 G/DL (ref 6–8.2)
PTH-INTACT SERPL-MCNC: 66.8 PG/ML (ref 14–72)
RBC # BLD AUTO: 4.61 M/UL (ref 4.2–5.4)
SODIUM SERPL-SCNC: 141 MMOL/L (ref 135–145)
TIBC SERPL-MCNC: 395 UG/DL (ref 250–450)
TRIGL SERPL-MCNC: 60 MG/DL (ref 0–149)
TSH SERPL DL<=0.005 MIU/L-ACNC: 4.12 UIU/ML (ref 0.38–5.33)
UIBC SERPL-MCNC: 345 UG/DL (ref 110–370)
VIT B12 SERPL-MCNC: 436 PG/ML (ref 211–911)
WBC # BLD AUTO: 5.6 K/UL (ref 4.8–10.8)

## 2022-07-20 PROCEDURE — 84443 ASSAY THYROID STIM HORMONE: CPT

## 2022-07-20 PROCEDURE — 82728 ASSAY OF FERRITIN: CPT

## 2022-07-20 PROCEDURE — 83540 ASSAY OF IRON: CPT

## 2022-07-20 PROCEDURE — 85025 COMPLETE CBC W/AUTO DIFF WBC: CPT

## 2022-07-20 PROCEDURE — 82746 ASSAY OF FOLIC ACID SERUM: CPT

## 2022-07-20 PROCEDURE — 80053 COMPREHEN METABOLIC PANEL: CPT

## 2022-07-20 PROCEDURE — 83550 IRON BINDING TEST: CPT

## 2022-07-20 PROCEDURE — 82306 VITAMIN D 25 HYDROXY: CPT

## 2022-07-20 PROCEDURE — 84100 ASSAY OF PHOSPHORUS: CPT

## 2022-07-20 PROCEDURE — 36415 COLL VENOUS BLD VENIPUNCTURE: CPT

## 2022-07-20 PROCEDURE — 82607 VITAMIN B-12: CPT

## 2022-07-20 PROCEDURE — 80061 LIPID PANEL: CPT

## 2022-07-20 PROCEDURE — 83970 ASSAY OF PARATHORMONE: CPT

## 2022-07-20 PROCEDURE — 84425 ASSAY OF VITAMIN B-1: CPT

## 2022-07-24 LAB — VIT B1 BLD-MCNC: 81 NMOL/L (ref 70–180)

## 2022-08-12 ENCOUNTER — TELEPHONE (OUTPATIENT)
Dept: MEDICAL GROUP | Facility: PHYSICIAN GROUP | Age: 57
End: 2022-08-12
Payer: MEDICARE

## 2022-08-12 NOTE — TELEPHONE ENCOUNTER
ESTABLISHED PATIENT PRE-VISIT PLANNING     Patient was NOT contacted to complete PVP.     Note: Patient will not be contacted if there is no indication to call.     1.  Reviewed notes from the last few office visits within the medical group: Yes    2.  If any orders were placed at last visit or intended to be done for this visit (i.e. 6 mos follow-up), do we have Results/Consult Notes?           Labs - Labs ordered, completed on 7/20/22 and results are in chart.  Note: If patient appointment is for lab review and patient did not complete labs, check with provider if OK to reschedule patient until labs completed.         Imaging - Imaging was not ordered at last office visit.         Referrals - No referrals were ordered at last office visit.    3. Is this appointment scheduled as a Hospital Follow-Up? No    4.  Immunizations were updated in Epic using Reconcile Outside Information activity? Yes    5.  Patient is due for the following Health Maintenance Topics:   Health Maintenance Due   Topic Date Due    IMM HEP B VACCINE (1 of 3 - 3-dose series) Never done    IMM ZOSTER VACCINES (1 of 2) Never done    COVID-19 Vaccine (2 - Booster for Sadie series) 07/22/2021    MAMMOGRAM  06/02/2022         6.  AHA (Pulse[employer]) form printed for Provider? Yes

## 2022-08-16 ENCOUNTER — OFFICE VISIT (OUTPATIENT)
Dept: MEDICAL GROUP | Facility: PHYSICIAN GROUP | Age: 57
End: 2022-08-16
Payer: MEDICARE

## 2022-08-16 VITALS
HEIGHT: 61 IN | BODY MASS INDEX: 50.98 KG/M2 | SYSTOLIC BLOOD PRESSURE: 110 MMHG | TEMPERATURE: 97 F | HEART RATE: 73 BPM | DIASTOLIC BLOOD PRESSURE: 68 MMHG | WEIGHT: 270 LBS

## 2022-08-16 DIAGNOSIS — R22.41 LOCALIZED SWELLING, MASS, OR LUMP OF RIGHT LOWER EXTREMITY: ICD-10-CM

## 2022-08-16 DIAGNOSIS — R74.8 ELEVATED ALKALINE PHOSPHATASE LEVEL: ICD-10-CM

## 2022-08-16 DIAGNOSIS — M79.604 BILATERAL LEG PAIN: ICD-10-CM

## 2022-08-16 DIAGNOSIS — E66.01 MORBID OBESITY WITH BODY MASS INDEX (BMI) OF 50.0 TO 59.9 IN ADULT (HCC): ICD-10-CM

## 2022-08-16 DIAGNOSIS — E61.1 IRON DEFICIENCY: ICD-10-CM

## 2022-08-16 DIAGNOSIS — Z98.84 PERSONAL HISTORY OF GASTRIC BYPASS: ICD-10-CM

## 2022-08-16 DIAGNOSIS — I70.0 ATHEROSCLEROSIS OF AORTA (HCC): ICD-10-CM

## 2022-08-16 DIAGNOSIS — Z12.31 ENCOUNTER FOR SCREENING MAMMOGRAM FOR BREAST CANCER: ICD-10-CM

## 2022-08-16 DIAGNOSIS — M79.605 BILATERAL LEG PAIN: ICD-10-CM

## 2022-08-16 DIAGNOSIS — E78.00 ELEVATED LDL CHOLESTEROL LEVEL: ICD-10-CM

## 2022-08-16 PROBLEM — R22.40 LOCALIZED SWELLING, MASS AND LUMP, LOWER LIMB: Status: ACTIVE | Noted: 2022-08-16

## 2022-08-16 PROCEDURE — 99214 OFFICE O/P EST MOD 30 MIN: CPT | Performed by: STUDENT IN AN ORGANIZED HEALTH CARE EDUCATION/TRAINING PROGRAM

## 2022-08-16 RX ORDER — CHLORHEXIDINE GLUCONATE ORAL RINSE 1.2 MG/ML
SOLUTION DENTAL
COMMUNITY
Start: 2022-06-01 | End: 2022-08-16

## 2022-08-16 ASSESSMENT — FIBROSIS 4 INDEX: FIB4 SCORE: 0.69

## 2022-08-16 NOTE — PROGRESS NOTES
Subjective:     Chief Complaint   Patient presents with    Lab Results     HPI:   Smita presents today to discuss labs.    Labs reviewed and questions answered. Admits to not taking iron daily, but does report she feels improved when taking. Has been working on improving diet and staying more active, even bought a floor pedal machine.     Has noted on the right ankle a lump/mass getting larger, but otherwise not symptomatic.     Current Outpatient Medications Ordered in Epic   Medication Sig Dispense Refill    methocarbamol (ROBAXIN) 750 MG Tab       omeprazole (PRILOSEC) 20 MG delayed-release capsule Take 1 Capsule by mouth every day. 90 Capsule 3    ferrous sulfate (FEOSOL) 220 (44 Fe) MG/5ML Elixir Take 5 mL by mouth every day. 473 mL 1    ondansetron (ZOFRAN ODT) 4 MG TABLET DISPERSIBLE Take 1 Tablet by mouth every 8 hours as needed for Nausea. 10 Tablet 0    levothyroxine (SYNTHROID) 75 MCG Tab Take 1 Tablet by mouth every morning on an empty stomach. 90 Tablet 2    HYDROcodone-acetaminophen (NORCO) 5-325 MG Tab per tablet Take 0.5 Tablets by mouth every 12 hours as needed.      clobetasol (TEMOVATE) 0.05 % Cream Apply 1 Application topically 2 times a day. 15 g 0    Cholecalciferol (VITAMIN D3) 1.25 MG (66201 UT) Cap       diclofenac sodium (VOLTAREN) 1 % Gel       PARoxetine (PAXIL) 20 MG Tab Take 1 Tablet by mouth every morning. (Patient taking differently: Take 10 mg by mouth every morning.) 90 Tablet 3    pregabalin (LYRICA) 100 MG Cap Take 100 mg by mouth in the morning, at noon, and at bedtime.      amoxicillin (AMOXIL) 500 MG Cap       Cetirizine HCl (ZYRTEC ALLERGY) 10 MG Cap Take  by mouth.      acetaminophen (TYLENOL) 500 MG Tab Take 500 mg by mouth as needed. Indications: Pain      fluticasone (FLONASE) 50 MCG/ACT nasal spray Spray 1 Spray in nose every day.      albuterol 108 (90 BASE) MCG/ACT Aero Soln inhalation aerosol Inhale 2 Puffs by mouth every 6 hours as needed for Shortness of Breath.       "Multiple Vitamins-Minerals (MULTIVITAMIN & MINERAL PO) Take 1 Tab by mouth every day.      Calcium 9128-2781 MG-UNIT CHEW Take 1 Each by mouth every day.       No current [facility]-ordered facility-administered medications on file.     ROS:  Gen: no fevers/chills, no changes in weight  Eyes: wears reading glasses, vision gradually more blurry-doesn't see optometry   ENT: no sore throat  Pulm: no sob, no cough  CV: no chest pain, no palpitations  GI: no vomiting, no diarrhea. No heartburn or blood in the stool.  MSk: chronic back and leg pain (l>R)  Skin: no rash  Neuro: no headaches, numbness/tingling-chronic and unchanged per patient  Heme/Lymph: no easy bruising    Objective:     Exam:  /68 (BP Location: Right arm, Patient Position: Sitting, BP Cuff Size: Adult)   Pulse 73   Temp 36.1 °C (97 °F) (Temporal)   Ht 1.549 m (5' 1\")   Wt 122 kg (270 lb)   LMP  (LMP Unknown)   BMI 51.02 kg/m²  Body mass index is 51.02 kg/m².    Physical Exam:  Constitutional: Well-developed and well-nourished. No acute distress.   Skin: Skin is warm and dry. No rash noted.  Head: Atraumatic without lesions.  Eyes: Conjunctivae and extraocular motions are normal. Pupils are equal, round. No scleral icterus.   Mouth/Throat: Wearing mask.  Neck: Supple, trachea midline.   Cardiovascular: Regular rate and rhythm, S1 and S2 without murmur, rubs, or gallops.  Lungs: Normal inspiratory effort, CTA bilaterally, no wheezes/rhonchi/rales  Extremities: No cyanosis, clubbing, erythema, nor edema. +1 dorsalis pedis pulses bilat, normal cap refill distally and warm. Mobile rubbery mass just inferior to right lateral malleolus, noted similar on left but not as large.  Neurological: Alert and oriented x 3. Uses cane to ambulate.  Psychiatric:  Behavior, mood, and affect are appropriate.    Labs: Reviewed from 7/20/2022  Imaging: CTA chest from prior    Assessment & Plan:     56 y.o. female with the following -     1. Iron deficiency  2. Personal " history of gastric bypass  Chronic without anemia. Denies GI losses, suspect related to poor absorption. Recommend to be better about iron daily, plan to trend in 3-6 months.    3. Elevated LDL cholesterol level  4. Atherosclerosis of aorta (HCC)  Chronic with improved HDL. LDL stable. Discussed indications for statin, finding of atherosclerosis prior (mild) and ASCVD risk reviewed. She prefers to evaluate leg complaint given finding of abnormal quanta flow 31-50% on the left prior and decide a plan from then. Agree, defer start of statin at this time.  The 10-year ASCVD risk score (Warren SINCERE Jr., et al., 2013) is: 1.2%    5. Bilateral leg pain  Chronic, suspect related to chronic pain pain without true claudication. Obtain UZMA.  - US-EXTREMITY ARTERY LOWER BILAT W/UZMA (COMBO); Future    6. Morbid obesity with body mass index (BMI) of 50.0 to 59.9 in adult (HCC)  Chronic, improved by 6lbs from last visit. Continue with lifestyle changes to improve. Offered referral to [facility] for chronic weight loss management which she will consider but declines at this time. Plan to follow up at future visit to discuss medications which may aid with weight loss.    7. Elevated alkaline phosphatase level  Improved with now and <130 with normal PTH/vitamin D. Trend with future labs.    8. Localized swelling, mass, or lump of right lower extremity  New per patient and growing. Possibly normal variation vs lipoma vs ganglion cyst. Evaluate with US.  - US-EXTREMITY NON VASCULAR UNILATERAL RIGHT; Future    9. Encounter for screening mammogram for breast cancer  - MA-SCREENING MAMMO BILAT W/TOMOSYNTHESIS W/CAD; Future    I spent a total of 36 minutes with record review, exam, communication with the patient, and documentation of this encounter.    Return in about 4 weeks (around 9/13/2022), or if symptoms worsen or fail to improve.    Please note that this dictation was created using voice recognition software. I have made every  reasonable attempt to correct obvious errors, but I expect that there are errors of grammar and possibly content that I did not discover before finalizing the note.

## 2022-08-27 DIAGNOSIS — F32.A DEPRESSION, UNSPECIFIED DEPRESSION TYPE: ICD-10-CM

## 2022-08-27 DIAGNOSIS — F41.9 ANXIETY: ICD-10-CM

## 2022-08-30 RX ORDER — PAROXETINE HYDROCHLORIDE 20 MG/1
20 TABLET, FILM COATED ORAL EVERY MORNING
Qty: 90 TABLET | Refills: 3 | Status: SHIPPED | OUTPATIENT
Start: 2022-08-30 | End: 2024-02-15

## 2022-09-30 ENCOUNTER — APPOINTMENT (OUTPATIENT)
Dept: RADIOLOGY | Facility: MEDICAL CENTER | Age: 57
End: 2022-09-30
Attending: STUDENT IN AN ORGANIZED HEALTH CARE EDUCATION/TRAINING PROGRAM
Payer: MEDICARE

## 2022-09-30 DIAGNOSIS — Z12.31 ENCOUNTER FOR SCREENING MAMMOGRAM FOR BREAST CANCER: ICD-10-CM

## 2022-09-30 PROCEDURE — 77063 BREAST TOMOSYNTHESIS BI: CPT

## 2022-10-12 ENCOUNTER — APPOINTMENT (OUTPATIENT)
Dept: RADIOLOGY | Facility: MEDICAL CENTER | Age: 57
End: 2022-10-12
Attending: STUDENT IN AN ORGANIZED HEALTH CARE EDUCATION/TRAINING PROGRAM
Payer: MEDICARE

## 2022-10-19 ENCOUNTER — HOSPITAL ENCOUNTER (OUTPATIENT)
Dept: RADIOLOGY | Facility: MEDICAL CENTER | Age: 57
End: 2022-10-19
Attending: STUDENT IN AN ORGANIZED HEALTH CARE EDUCATION/TRAINING PROGRAM
Payer: MEDICARE

## 2022-10-19 DIAGNOSIS — R92.8 ABNORMAL MAMMOGRAM: ICD-10-CM

## 2022-10-19 PROCEDURE — 76642 ULTRASOUND BREAST LIMITED: CPT | Mod: LT

## 2022-10-19 PROCEDURE — G0279 TOMOSYNTHESIS, MAMMO: HCPCS

## 2022-11-03 NOTE — OR NURSING
1340 received from pacu  Oral airway dc'd  Left leg dressing c/d/i  Dp2+  Foot warm  Elevated  Ice pack applied 1345 awake  Oral airway dc'd  resp spont medicated for 7/10 pain  1440 meets discharge criteria  1440 to stage 2  Up to chair with assist  Leg elevated  Ice pack applied 1510 meets discharge criteria   Protopic Counseling: Patient may experience a mild burning sensation during topical application. Protopic is not approved in children less than 2 years of age. There have been case reports of hematologic and skin malignancies in patients using topical calcineurin inhibitors although causality is questionable.

## 2022-11-04 ENCOUNTER — HOSPITAL ENCOUNTER (OUTPATIENT)
Dept: RADIOLOGY | Facility: MEDICAL CENTER | Age: 57
End: 2022-11-04
Attending: STUDENT IN AN ORGANIZED HEALTH CARE EDUCATION/TRAINING PROGRAM
Payer: MEDICARE

## 2022-11-04 DIAGNOSIS — M79.604 BILATERAL LEG PAIN: ICD-10-CM

## 2022-11-04 DIAGNOSIS — M79.605 BILATERAL LEG PAIN: ICD-10-CM

## 2022-11-04 PROCEDURE — 93922 UPR/L XTREMITY ART 2 LEVELS: CPT

## 2022-11-16 ENCOUNTER — OFFICE VISIT (OUTPATIENT)
Dept: MEDICAL GROUP | Facility: PHYSICIAN GROUP | Age: 57
End: 2022-11-16
Payer: MEDICARE

## 2022-11-16 VITALS
WEIGHT: 268 LBS | BODY MASS INDEX: 50.6 KG/M2 | DIASTOLIC BLOOD PRESSURE: 72 MMHG | RESPIRATION RATE: 20 BRPM | OXYGEN SATURATION: 96 % | SYSTOLIC BLOOD PRESSURE: 126 MMHG | HEART RATE: 81 BPM | HEIGHT: 61 IN | TEMPERATURE: 97.7 F

## 2022-11-16 DIAGNOSIS — E61.1 IRON DEFICIENCY: ICD-10-CM

## 2022-11-16 DIAGNOSIS — E66.01 MORBID OBESITY WITH BODY MASS INDEX (BMI) OF 50.0 TO 59.9 IN ADULT (HCC): ICD-10-CM

## 2022-11-16 DIAGNOSIS — F33.40 RECURRENT MAJOR DEPRESSIVE DISORDER, IN REMISSION (HCC): ICD-10-CM

## 2022-11-16 DIAGNOSIS — Z98.84 PERSONAL HISTORY OF GASTRIC BYPASS: ICD-10-CM

## 2022-11-16 DIAGNOSIS — Z12.11 ENCOUNTER FOR SCREENING FOR MALIGNANT NEOPLASM OF COLON: ICD-10-CM

## 2022-11-16 DIAGNOSIS — E78.00 ELEVATED LDL CHOLESTEROL LEVEL: ICD-10-CM

## 2022-11-16 DIAGNOSIS — E03.9 HYPOTHYROIDISM, UNSPECIFIED TYPE: ICD-10-CM

## 2022-11-16 DIAGNOSIS — E55.9 VITAMIN D DEFICIENCY: ICD-10-CM

## 2022-11-16 PROBLEM — E21.3 HYPERPARATHYROIDISM (HCC): Status: RESOLVED | Noted: 2021-10-13 | Resolved: 2022-11-16

## 2022-11-16 PROCEDURE — 99214 OFFICE O/P EST MOD 30 MIN: CPT | Performed by: STUDENT IN AN ORGANIZED HEALTH CARE EDUCATION/TRAINING PROGRAM

## 2022-11-16 ASSESSMENT — FIBROSIS 4 INDEX: FIB4 SCORE: 0.69

## 2022-11-16 NOTE — PROGRESS NOTES
Subjective:     Chief Complaint   Patient presents with    Follow-Up     4Wks      HPI:   Smita presents today for follow up.    Patient continues to be followed by pain management for chronic pain, had her appt with them today.  She has been doing some mindfulness classes with Precious Mclaughlin which has really improved her pain (rates 8/10 but experience 2/10). Reports this has helped her mood is well and she is actually hoping to discontinue Paxil, has tapered down to 10 mg daily and feeling well.  She has noted some headaches however better squeezing in nature.  Typically only occurs when she is a bit dehydrated once or twice a week for the last few weeks.    Patient wondering about options for weight loss because she knows that her excess weight contributes to her chronic pain.    Current Outpatient Medications Ordered in Epic   Medication Sig Dispense Refill    PARoxetine (PAXIL) 20 MG Tab TAKE 1 TABLET BY MOUTH EVERY MORNING (Patient taking differently: Take 0.5 Tablets by mouth every morning.) 90 Tablet 3    methocarbamol (ROBAXIN) 750 MG Tab       omeprazole (PRILOSEC) 20 MG delayed-release capsule Take 1 Capsule by mouth every day. 90 Capsule 3    ferrous sulfate (FEOSOL) 220 (44 Fe) MG/5ML Elixir Take 5 mL by mouth every day. 473 mL 1    ondansetron (ZOFRAN ODT) 4 MG TABLET DISPERSIBLE Take 1 Tablet by mouth every 8 hours as needed for Nausea. 10 Tablet 0    levothyroxine (SYNTHROID) 75 MCG Tab Take 1 Tablet by mouth every morning on an empty stomach. 90 Tablet 2    HYDROcodone-acetaminophen (NORCO) 5-325 MG Tab per tablet Take 0.5 Tablets by mouth every 12 hours as needed.      clobetasol (TEMOVATE) 0.05 % Cream Apply 1 Application topically 2 times a day. 15 g 0    Cholecalciferol (VITAMIN D3) 1.25 MG (59243 UT) Cap       diclofenac sodium (VOLTAREN) 1 % Gel       pregabalin (LYRICA) 100 MG Cap Take 1 Capsule by mouth in the morning, at noon, and at bedtime.      amoxicillin (AMOXIL) 500 MG Cap        "Cetirizine HCl (ZYRTEC ALLERGY) 10 MG Cap Take  by mouth.      acetaminophen (TYLENOL) 500 MG Tab Take 1 Tablet by mouth as needed. Indications: Pain      fluticasone (FLONASE) 50 MCG/ACT nasal spray Administer 1 Spray into affected nostril(S) every day.      albuterol 108 (90 BASE) MCG/ACT Aero Soln inhalation aerosol Inhale 2 Puffs every 6 hours as needed for Shortness of Breath.      Multiple Vitamins-Minerals (MULTIVITAMIN & MINERAL PO) Take 1 Tab by mouth every day.      Calcium 4444-4859 MG-UNIT CHEW Take 1 Each by mouth every day.       No current [facility]-ordered facility-administered medications on file.     ROS:  Gen: no fevers/chills, no changes in weight  Eyes: no changes in vision  ENT: no sore throat  Pulm: no sob, no cough  CV: no chest pain, no palpitations  GI: no nausea/vomiting, no diarrhea  : no dysuria  MSk: back and leg pain  Skin: no rash  Neuro: no severe headaches  Heme/Lymph: no easy bruising    Objective:     Exam:  /72 (BP Location: Left arm, Patient Position: Sitting, BP Cuff Size: Adult long)   Pulse 81   Temp 36.5 °C (97.7 °F) (Temporal)   Resp 20   Ht 1.549 m (5' 1\")   Wt 122 kg (268 lb)   LMP  (LMP Unknown)   SpO2 96%   BMI 50.64 kg/m²  Body mass index is 50.64 kg/m².    Physical Exam:  Constitutional: Obese. Well-developed and well-nourished. No acute distress.   Skin: Skin is warm and dry. No rash noted.  Head: Atraumatic without lesions.  Eyes: Conjunctivae and extraocular motions are normal. Pupils are equal, round. No scleral icterus.   Mouth/Throat: Wearing mask.  Neck: Supple, trachea midline.   Cardiovascular: Regular rate and rhythm, S1 and S2 without murmur, rubs, or gallops.  Lungs: Normal inspiratory effort, CTA bilaterally, no wheezes/rhonchi/rales  Abdomen: Soft, non tender, and without distention. Active bowel sounds. No rebound, guarding, masses or HSM.  Extremities: No cyanosis, clubbing, erythema, nor edema.  Neurological: Alert and oriented x 3. Slow " ataxic gait as prior.  Psychiatric:   Appearance: Clothing and grooming normal.  Mood: doing better  Behavior: Late to her scheduled appointment. No psychomotor abnormalities or impulsivity. Attention is good. Patient is pleasant and cooperative.   Eye contact: good   Affect: normal  Speech/thought content: Normal speech pattern. Normal insight and reasoning, no evidence of psychotic process.     Labs: Reviewed from 7/20/2022  Imaging: Reviewed from UZMA 11/4/2022, mammo and breast US 10/2022    Assessment & Plan:     56 y.o. female with the following -     1. Morbid obesity with body mass index (BMI) of 50.0 to 59.9 in adult ([facility])  This is a chronic condition.  Patient interested in referral to weight loss specialist given difficulty in losing weight despite lifestyle changes.  She is hoping to reduce her weight which may improve her chronic joint pain.  Referral faxed to [facility].     2. Personal history of gastric bypass  3. Iron deficiency  Acute on chronic without anemia.  Explained potential risk factors given her gastric bypass but also see discussion regarding colon cancer screening as below. Continue Ferrous sulfate.  - CBC WITH DIFFERENTIAL; Future  - FERRITIN; Future  - FOLATE; Future  - VITAMIN B12; Future    4. Encounter for screening for malignant neoplasm of colon  Patient denies any GI symptoms aside from occasional heartburn with blood in the stool.  We suspect her iron deficiency is related to poor absorption/inadequate intake however we discussed that this may be a sign of colon cancer.  Not anemic.  Patient elects for Cologuard screening rather than colonoscopy.  - COLOGUARD (FIT DNA)    5. Recurrent major depressive disorder, in remission ([facility])  Doing well, patient working toward discontinuing paxil.     6. Hypothyroidism, unspecified type  This is a chronic condition, TSH normal in July.  Annual labs ordered prior to follow-up appointment to complete, continue levothyroxine 75 mcg daily.  -  TSH WITH REFLEX TO FT4; Future    7. Elevated LDL cholesterol level  Trend with annual labs.  - Comp Metabolic Panel; Future  - Lipid Profile; Future    8. Vitamin D deficiency  Resolved on supplement, trend with annual labs.  - VITAMIN D,25 HYDROXY (DEFICIENCY); Future    Return in about 6 months (around 5/16/2023), or if symptoms worsen or fail to improve.    Please note that this dictation was created using voice recognition software. I have made every reasonable attempt to correct obvious errors, but I expect that there are errors of grammar and possibly content that I did not discover before finalizing the note.

## 2022-11-20 NOTE — PROGRESS NOTES
Surgical Progress Note    Author: Elena Renee P.A.-C. Date & Time created: 2020   9:34 AM     Interval Events:  POD#1 Lap Revision of RYGB. She is doing well this morning. Tolerating clears without nausea/vomiting. Admits to typical incisional abdominal pain, controlled with medication. Pt is ambulating and voiding.     Review of Systems   Constitutional: Negative for chills and fever.   Respiratory: Negative for cough and shortness of breath.    Cardiovascular: Negative for chest pain and palpitations.   Gastrointestinal: Positive for abdominal pain (incisional). Negative for diarrhea, heartburn, nausea and vomiting.   Genitourinary: Negative for dysuria.     Hemodynamics:  Temp (24hrs), Av.6 °C (97.8 °F), Min:36.1 °C (97 °F), Max:37.2 °C (98.9 °F)  Temperature: 36.6 °C (97.8 °F)  Pulse  Av.2  Min: 45  Max: 85   Blood Pressure: 101/60     Respiratory:    Respiration: 16, Pulse Oximetry: 97 %     Work Of Breathing / Effort: Mild;Shallow  RUL Breath Sounds: Diminished, RML Breath Sounds: Diminished, RLL Breath Sounds: Diminished, BRIAN Breath Sounds: Diminished, LLL Breath Sounds: Diminished  Neuro:  GCS       Fluids:    Intake/Output Summary (Last 24 hours) at 2020 0934  Last data filed at 2020 0500  Gross per 24 hour   Intake 2218 ml   Output 1235 ml   Net 983 ml     Weight: 121.1 kg (266 lb 15.6 oz)  Current Diet Order   Procedures   • Diet Order     Physical Exam  Constitutional:       Appearance: She is obese.   HENT:      Head: Normocephalic and atraumatic.      Mouth/Throat:      Pharynx: Oropharynx is clear.   Eyes:      Conjunctiva/sclera: Conjunctivae normal.   Neck:      Musculoskeletal: Normal range of motion.   Cardiovascular:      Rate and Rhythm: Normal rate and regular rhythm.   Pulmonary:      Effort: Pulmonary effort is normal.   Abdominal:      General: There is distension (mild).      Palpations: Abdomen is soft. There is no mass.      Tenderness: There is abdominal tenderness  (incisional). There is no guarding or rebound.   Musculoskeletal: Normal range of motion.   Skin:     General: Skin is warm and dry.      Findings: No erythema or rash.      Comments: Incisions with minimal serosanguinous ooze   Neurological:      Mental Status: She is alert and oriented to person, place, and time.   Psychiatric:         Mood and Affect: Mood normal.       Labs:  Recent Results (from the past 24 hour(s))   Histology Request    Collection Time: 08/05/20  3:19 PM   Result Value Ref Range    Pathology Request Sent to Histo    Comp Metabolic Panel (CMP)    Collection Time: 08/06/20  6:26 AM   Result Value Ref Range    Sodium 137 135 - 145 mmol/L    Potassium 4.4 3.6 - 5.5 mmol/L    Chloride 102 96 - 112 mmol/L    Co2 24 20 - 33 mmol/L    Anion Gap 11.0 7.0 - 16.0    Glucose 106 (H) 65 - 99 mg/dL    Bun 9 8 - 22 mg/dL    Creatinine 0.54 0.50 - 1.40 mg/dL    Calcium 9.0 8.5 - 10.5 mg/dL    AST(SGOT) 19 12 - 45 U/L    ALT(SGPT) 26 2 - 50 U/L    Alkaline Phosphatase 121 (H) 30 - 99 U/L    Total Bilirubin 0.3 0.1 - 1.5 mg/dL    Albumin 3.7 3.2 - 4.9 g/dL    Total Protein 6.7 6.0 - 8.2 g/dL    Globulin 3.0 1.9 - 3.5 g/dL    A-G Ratio 1.2 g/dL   ESTIMATED GFR    Collection Time: 08/06/20  6:26 AM   Result Value Ref Range    GFR If African American >60 >60 mL/min/1.73 m 2    GFR If Non African American >60 >60 mL/min/1.73 m 2   CBC without Differential (blood)    Collection Time: 08/06/20  6:28 AM   Result Value Ref Range    WBC 9.6 4.8 - 10.8 K/uL    RBC 4.50 4.20 - 5.40 M/uL    Hemoglobin 12.8 12.0 - 16.0 g/dL    Hematocrit 39.9 37.0 - 47.0 %    MCV 88.7 81.4 - 97.8 fL    MCH 28.4 27.0 - 33.0 pg    MCHC 32.1 (L) 33.6 - 35.0 g/dL    RDW 50.2 (H) 35.9 - 50.0 fL    Platelet Count 382 164 - 446 K/uL    MPV 9.5 9.0 - 12.9 fL     Medical Decision Making, by Problem:  There are no active hospital problems to display for this patient.    Plan:  Pt is alert and oriented, NAD. Breathing unlabored. Tolerating PO.   Incisions ok. VS stable. Labs reviewed.  Encouraged ambulation and incentive spirometry.  Wean O2. Pt may need to stay another night for nausea, pain control, hypoxia, will see how the day progresses. Otherwise okay for discharge later this afternoon. Pt also seen and examined by Dr. Rosenthal.      Quality Measures:  Quality-Core Measures   Reviewed items::  Labs reviewed  Gloria catheter::  No Gloria  DVT prophylaxis pharmacological::  Enoxaparin (Lovenox)  DVT prophylaxis - mechanical:  SCDs      Discussed patient condition with RN, Patient and Dr. Rosenthal   No

## 2022-11-27 DIAGNOSIS — E03.9 HYPOTHYROIDISM, UNSPECIFIED TYPE: ICD-10-CM

## 2022-11-28 NOTE — TELEPHONE ENCOUNTER
Received request via: Patient    Was the patient seen in the last year in this department? Yes    Does the patient have an active prescription (recently filled or refills available) for medication(s) requested? No    Does the patient have group home Plus and need 100 day supply (blood pressure, diabetes and cholesterol meds only)? Medication is not for cholesterol, blood pressure or diabetes and Patient does not have SCP

## 2022-11-29 RX ORDER — LEVOTHYROXINE SODIUM 0.07 MG/1
75 TABLET ORAL
Qty: 90 TABLET | Refills: 2 | Status: SHIPPED | OUTPATIENT
Start: 2022-11-29 | End: 2023-06-09

## 2022-12-14 DIAGNOSIS — M79.605 BILATERAL LEG PAIN: ICD-10-CM

## 2022-12-14 DIAGNOSIS — M51.36 DEGENERATIVE DISC DISEASE, LUMBAR: ICD-10-CM

## 2022-12-14 DIAGNOSIS — G89.29 OTHER CHRONIC PAIN: ICD-10-CM

## 2022-12-14 DIAGNOSIS — Z98.1 STATUS POST CERVICAL SPINAL FUSION: ICD-10-CM

## 2022-12-14 DIAGNOSIS — F11.20 OPIOID TYPE DEPENDENCE, CONTINUOUS (HCC): ICD-10-CM

## 2022-12-14 DIAGNOSIS — M79.604 BILATERAL LEG PAIN: ICD-10-CM

## 2022-12-30 ENCOUNTER — OFFICE VISIT (OUTPATIENT)
Dept: MEDICAL GROUP | Facility: MEDICAL CENTER | Age: 57
End: 2022-12-30
Payer: MEDICARE

## 2022-12-30 VITALS
HEART RATE: 98 BPM | SYSTOLIC BLOOD PRESSURE: 106 MMHG | TEMPERATURE: 97.5 F | DIASTOLIC BLOOD PRESSURE: 58 MMHG | OXYGEN SATURATION: 96 % | HEIGHT: 61 IN | BODY MASS INDEX: 49.95 KG/M2 | WEIGHT: 264.55 LBS

## 2022-12-30 DIAGNOSIS — R05.1 ACUTE COUGH: ICD-10-CM

## 2022-12-30 DIAGNOSIS — J01.90 ACUTE SINUSITIS, RECURRENCE NOT SPECIFIED, UNSPECIFIED LOCATION: ICD-10-CM

## 2022-12-30 PROCEDURE — 99213 OFFICE O/P EST LOW 20 MIN: CPT | Performed by: NURSE PRACTITIONER

## 2022-12-30 RX ORDER — AMOXICILLIN AND CLAVULANATE POTASSIUM 875; 125 MG/1; MG/1
1 TABLET, FILM COATED ORAL 2 TIMES DAILY
Qty: 20 TABLET | Refills: 0 | Status: SHIPPED | OUTPATIENT
Start: 2022-12-30 | End: 2024-02-15

## 2022-12-30 RX ORDER — FLUCONAZOLE 150 MG/1
150 TABLET ORAL DAILY
Qty: 2 TABLET | Refills: 0 | Status: SHIPPED | OUTPATIENT
Start: 2022-12-30 | End: 2024-02-15

## 2022-12-30 RX ORDER — ALBUTEROL SULFATE 90 UG/1
1-2 AEROSOL, METERED RESPIRATORY (INHALATION) EVERY 6 HOURS PRN
Qty: 8.5 G | Refills: 2 | Status: SHIPPED | OUTPATIENT
Start: 2022-12-30

## 2022-12-30 ASSESSMENT — FIBROSIS 4 INDEX: FIB4 SCORE: 0.69

## 2022-12-30 NOTE — PROGRESS NOTES
Chief Complaint   Patient presents with    Nasal Congestion     Dark green, cough sinus pressure, poss sinus inf, no fever sore throat or chills, x 1.5 wk     Subjective:     HPI:     Smita Faith is a 56 y.o. female   here to discuss the evaluation and management of:     Having nasal congestion, sinus pressure, nasal discharge-green in color.   Symptoms started 1.5 weeks ago but had thought she was improving however her symptoms progressed.  Cough. Some body aches. No fever. No sore throat  Tried some theraflu  No abx in the last three months.   No COPD  On allergy medications.   No home oxygen.     ROS: : see above      Current Outpatient Medications:     albuterol 108 (90 Base) MCG/ACT Aero Soln inhalation aerosol, Inhale 1-2 Puffs every 6 hours as needed for Shortness of Breath., Disp: 8.5 g, Rfl: 2    fluconazole (DIFLUCAN) 150 MG tablet, Take 1 Tablet by mouth every day., Disp: 2 Tablet, Rfl: 0    amoxicillin-clavulanate (AUGMENTIN) 875-125 MG Tab, Take 1 Tablet by mouth 2 times a day., Disp: 20 Tablet, Rfl: 0    levothyroxine (SYNTHROID) 75 MCG Tab, TAKE 1 TABLET BY MOUTH EVERY MORNING ON AN EMPTY STOMACH, Disp: 90 Tablet, Rfl: 2    PARoxetine (PAXIL) 20 MG Tab, TAKE 1 TABLET BY MOUTH EVERY MORNING (Patient taking differently: Take 0.5 Tablets by mouth every morning.), Disp: 90 Tablet, Rfl: 3    methocarbamol (ROBAXIN) 750 MG Tab, , Disp: , Rfl:     omeprazole (PRILOSEC) 20 MG delayed-release capsule, Take 1 Capsule by mouth every day., Disp: 90 Capsule, Rfl: 3    ferrous sulfate (FEOSOL) 220 (44 Fe) MG/5ML Elixir, Take 5 mL by mouth every day., Disp: 473 mL, Rfl: 1    ondansetron (ZOFRAN ODT) 4 MG TABLET DISPERSIBLE, Take 1 Tablet by mouth every 8 hours as needed for Nausea., Disp: 10 Tablet, Rfl: 0    HYDROcodone-acetaminophen (NORCO) 5-325 MG Tab per tablet, Take 0.5 Tablets by mouth every 12 hours as needed., Disp: , Rfl:     clobetasol (TEMOVATE) 0.05 % Cream, Apply 1 Application topically 2 times a  "day., Disp: 15 g, Rfl: 0    Cholecalciferol (VITAMIN D3) 1.25 MG (04251 UT) Cap, , Disp: , Rfl:     diclofenac sodium (VOLTAREN) 1 % Gel, , Disp: , Rfl:     pregabalin (LYRICA) 100 MG Cap, Take 1 Capsule by mouth in the morning, at noon, and at bedtime., Disp: , Rfl:     amoxicillin (AMOXIL) 500 MG Cap, , Disp: , Rfl:     Cetirizine HCl (ZYRTEC ALLERGY) 10 MG Cap, Take  by mouth., Disp: , Rfl:     acetaminophen (TYLENOL) 500 MG Tab, Take 1 Tablet by mouth as needed. Indications: Pain, Disp: , Rfl:     fluticasone (FLONASE) 50 MCG/ACT nasal spray, Administer 1 Spray into affected nostril(S) every day., Disp: , Rfl:     albuterol 108 (90 BASE) MCG/ACT Aero Soln inhalation aerosol, Inhale 2 Puffs every 6 hours as needed for Shortness of Breath., Disp: , Rfl:     Multiple Vitamins-Minerals (MULTIVITAMIN & MINERAL PO), Take 1 Tab by mouth every day., Disp: , Rfl:     Calcium 6050-4265 MG-UNIT CHEW, Take 1 Each by mouth every day., Disp: , Rfl:     Allergies   Allergen Reactions    Ciprofloxacin Anaphylaxis     SOB    Other Food Anaphylaxis     pj pj    Codeine Nausea     Upset stomach     Erythromycin Vomiting    Nsaids      Does not take due to gastric bypass    Sulfa Drugs Nausea     \"abdominal pain\"       Past Medical History:   Diagnosis Date    Alcoholism (HCC)     Anemia     Anesthesia     difficulty waking, nausea and vomiting    Anxiety disorder     Arthritis     osteo, back, hands, hip, neck    ASTHMA     States trigered by allergy reaction, PRN inhaler     Backpain 07/29/2020    Lumbar chronic/bilat hips/knees, 2/10    Bipolar 2 disorder ([facility])     Bronchitis     reports hx of in the 1990's, nothing recent    Carpal tunnel syndrome     bilateral    Cellulitis 8/23/2017    Chest pain in adult 8/29/2019    Dehiscence of external surgical wound 8/23/2017    Depression     Environmental and seasonal allergies     Hyperglycemia 4/8/2022    Hyperparathyroidism (HCC) 10/13/2021    Iron deficiency 9/22/2021    " Hasn't been taking liquid iron in 8-9 months.    Microcytic anemia 8/29/2019    MRSA (methicillin resistant staph aureus) culture positive 08/2017    post op cervical surgery, MRSA infection, resolved    Pelvic mass 4/7/2016    Pneumonia 1995    hx of in 1995, reports nothing recent    Unspecified disorder of thyroid 6340-8251     Past Surgical History:   Procedure Laterality Date    IN LAP,STOMACH,OTHER,W/O TUBE  8/5/2020    Procedure: REVISION, GASTRIC BYPASS, LAPAROSCOPIC- JASON EN Y WITH ENTERO-ILEOSTOMY;  Surgeon: Prince Rosenthal M.D.;  Location: [facility];  Service: General    IN LAP,BILIARY TRACT,UNLISTED  8/5/2020    Procedure: BIOPSY, LIVER, LAPAROSCOPIC;  Surgeon: Prince Rosenthal M.D.;  Location: [facility];  Service: General    CHONDROPLASTY Left 6/13/2019    Procedure: CHONDROPLASTY - AND IRVIN;  Surgeon: Jatin Munguia M.D.;  Location: [facility];  Service: Orthopedics    KNEE ARTHROSCOPY Left 6/13/2019    Procedure: ARTHROSCOPY, KNEE;  Surgeon: Jatin Munguia M.D.;  Location: [facility];  Service: Orthopedics    MENISCECTOMY, KNEE, MEDIAL Left 6/13/2019    Procedure: MENISCECTOMY, KNEE, MEDIAL - PARTIAL;  Surgeon: Jatin Munguia M.D.;  Location: [facility];  Service: Orthopedics    HIP ARTH ANTERIOR TOTAL Right 6/21/2018    Procedure: HIP ARTHROPLASTY ANTERIOR TOTAL;  Surgeon: Oral Ritchie M.D.;  Location: [facility];  Service: Orthopedics    CERVICAL DISK AND FUSION ANTERIOR  8/24/2017    Procedure: IRRIGATION AND DEBRIDEMENT ANTERIOR CERVICAL WOUND;  Surgeon: Cheryl Herrera M.D.;  Location: [facility];  Service:     HYSTERECTOMY ROBOTIC XI N/A 4/7/2016    Procedure: HYSTERECTOMY ROBOTIC XI ;  Surgeon: Song Goodson M.D.;  Location: [facility];  Service:     LUMBAR FUSION ANTERIOR  11/10/2010    Performed by CHERYL HERRERA at [facility]    IN BREAST REDUCTION  2010    GASTRIC BYPASS  "LAPAROSCOPIC  2006    VA CHOLECYSTOSTOMY,PERCUT  1985    ABDOMINAL HYSTERECTOMY TOTAL      ARTHROPLASTY      BREAST BIOPSY      Right- benign. Breast reduction    GASTRIC BYPASS LAPAROSCOPIC      OOPHORECTOMY Bilateral     VA REMOVAL OF TONSILS,<11 Y/O      SALPINGECTOMY Bilateral     TUBAL LIGATION       Family History   Problem Relation Age of Onset    Breast Cancer Neg Hx      Social History     Socioeconomic History    Marital status:      Spouse name: Not on file    Number of children: 3    Years of education: Not on file    Highest education level: Bachelor's degree (e.g., BA, AB, BS)   Occupational History    Not on file   Tobacco Use    Smoking status: Former     Packs/day: 0.10     Years: 4.00     Pack years: 0.40     Types: Cigarettes     Quit date: 2007     Years since quittin.0    Smokeless tobacco: Never   Vaping Use    Vaping Use: Never used   Substance and Sexual Activity    Alcohol use: No    Drug use: Yes     Types: Oral     Comment: Marijuana edibles, and vaping once per week for break through pain    Sexual activity: Not Currently     Partners: Male   Other Topics Concern    Not on file   Social History Narrative    Not on file     Social Determinants of Health     Financial Resource Strain: Not on file   Food Insecurity: Not on file   Transportation Needs: Not on file   Physical Activity: Not on file   Stress: Not on file   Social Connections: Not on file   Intimate Partner Violence: Not on file   Housing Stability: Not on file       Objective:     Vitals: /58 (BP Location: Right arm, Patient Position: Sitting, BP Cuff Size: Adult)   Pulse 98   Temp 36.4 °C (97.5 °F) (Temporal)   Ht 1.549 m (5' 1\")   Wt 120 kg (264 lb 8.8 oz)   LMP  (LMP Unknown)   SpO2 96%   BMI 49.99 kg/m²    General: Alert, pleasant, NAD  HEENT: Normocephalic.  Neck supple. +rhinorrhea, hypertrophic nasal turbinates  Respiratory: no distress, no audible wheezing, cough, tight  Skin: Warm, dry, no " rashes.  Extremities: No leg edema. No discoloration  Neurological: No tremors  Psych:  Affect/mood is normal, judgement is good, memory is intact, grooming is appropriate.    Assessment/Plan:     Smita was seen today for nasal congestion.    Diagnoses and all orders for this visit:    Acute sinusitis, recurrence not specified, unspecified location  Cough  Acute problem.  Symptoms not improving.  Start Augmentin.  Have sent precautionary prescription for Diflucan-if she develops a yeast infection after taking Augmentin.  Recommend gargle with salt water, nasal saline, Tylenol for discomfort.  Adequate hydration.  Follow-up if symptoms persist or worsen. Albuterol for cough.     -     albuterol 108 (90 Base) MCG/ACT Aero Soln inhalation aerosol; Inhale 1-2 Puffs every 6 hours as needed for Shortness of Breath.  -     fluconazole (DIFLUCAN) 150 MG tablet; Take 1 Tablet by mouth every day.  -     amoxicillin-clavulanate (AUGMENTIN) 875-125 MG Tab; Take 1 Tablet by mouth 2 times a day.    Return if symptoms worsen or fail to improve.          Nupur NUGENT

## 2023-06-02 ENCOUNTER — HOSPITAL ENCOUNTER (OUTPATIENT)
Dept: RADIOLOGY | Facility: MEDICAL CENTER | Age: 58
End: 2023-06-02
Attending: STUDENT IN AN ORGANIZED HEALTH CARE EDUCATION/TRAINING PROGRAM

## 2023-06-02 DIAGNOSIS — R22.41 MASS OF RIGHT ANKLE: ICD-10-CM

## 2024-02-15 PROBLEM — M16.12 OSTEOARTHRITIS OF LEFT HIP: Status: ACTIVE | Noted: 2024-02-15

## 2024-02-25 NOTE — CARE PLAN
Problem: Pain Management  Goal: Pain level will decrease to patient’s comfort goal  Medicated per mar, discussed weaning morphine with PA and pt, pt verbalizes understanding    Problem: Safety  Goal: Will remain free from injury  Outcome: PROGRESSING AS EXPECTED  Call light within reach, treaded socks on, hourly rounding          26-Feb-2024

## (undated) DEVICE — CANISTER SUCTION 3000ML MECHANICAL FILTER AUTO SHUTOFF MEDI-VAC NONSTERILE LF DISP  (40EA/CA)

## (undated) DEVICE — RESTRAINTS LIMB DISP. - (12/BX 4BX/CA)

## (undated) DEVICE — ELECTRODE DUAL RETURN W/ CORD - (50/PK)

## (undated) DEVICE — GOWN SURGEONS X-LARGE - DISP. (30/CA)

## (undated) DEVICE — DRAPE LOWER EXTREMETY - (6/CA)

## (undated) DEVICE — NEEDLE SAFETY 18 GA X 1 1/2 IN (100EA/BX)

## (undated) DEVICE — ENDOSTITCH LOAD UNIT 2-0 POLY (12EA/CA)

## (undated) DEVICE — GLOVE BIOGEL INDICATOR SZ 8.5 SURGICAL PF LTX - (50/BX 4BX/CA)

## (undated) DEVICE — SUCTION INSTRUMENT YANKAUER BULBOUS TIP W/O VENT (50EA/CA)

## (undated) DEVICE — ELECTRODE 850 FOAM ADHESIVE - HYDROGEL RADIOTRNSPRNT (50/PK)

## (undated) DEVICE — BANDAGE ELASTIC 6 IN X 5 YDS - LATEX FREE (10/BX)

## (undated) DEVICE — HEAD HOLDER JUNIOR/ADULT

## (undated) DEVICE — PROTECTOR ULNA NERVE - (36PR/CA)

## (undated) DEVICE — DRESSING XEROFORM 1X8 - (50/BX 4BX/CA)

## (undated) DEVICE — HANDPIECE THUNDERBEAT 5MM 35CM FRONT GRIP TYPE S (5EA/BX)

## (undated) DEVICE — TRAY SKIN SCRUB PVP WET (20EA/CA) PART #DYND70356 DISCONTINUED

## (undated) DEVICE — DRESSING NON ADHERENT 3 X 4 - STERILE (100/BX 12BX/CA)

## (undated) DEVICE — RELOAD WITH GRIPPING SURFACE TECHNOLOGY BLUE 60MM (12EA/BX)

## (undated) DEVICE — DRESSING TRANSPARENT FILM TEGADERM 4 X 4.75" (50EA/BX)"

## (undated) DEVICE — SENSOR SPO2 NEO LNCS ADHESIVE (20/BX) SEE USER NOTES

## (undated) DEVICE — APPLICATOR DUPLO SPRAYER (5EA/CA)

## (undated) DEVICE — MASK ANESTHESIA ADULT  - (100/CA)

## (undated) DEVICE — SUTURE GENERAL

## (undated) DEVICE — SYRINGE DISP. 60 CC LL - (30/BX, 12BX/CA)**WHEN THESE ARE GONE ORDER #500206**

## (undated) DEVICE — SUTURE 0 SILK TIES (36PK/BX)

## (undated) DEVICE — SLEEVE, VASO, REPROC, LARGE

## (undated) DEVICE — SODIUM CHL IRRIGATION 0.9% 1000ML (12EA/CA)

## (undated) DEVICE — CHLORAPREP 26 ML APPLICATOR - ORANGE TINT(25/CA)

## (undated) DEVICE — SET LEADWIRE 5 LEAD BEDSIDE DISPOSABLE ECG (1SET OF 5/EA)

## (undated) DEVICE — STERI STRIP COMPOUND BENZOIN - TINCTURE 0.6ML WITH APPLICATOR (40EA/BX)

## (undated) DEVICE — ELECTRODE 5MM LHK LAPSCP STERILE DISP- MEGADYNE  (5/CA)

## (undated) DEVICE — GLOVE SZ 7.5 LF PROTEXIS (50PR/BX)

## (undated) DEVICE — KIT ROOM DECONTAMINATION

## (undated) DEVICE — SYRINGE 30 ML LL (56/BX)

## (undated) DEVICE — PACK TOTAL HIP - (1/CA)

## (undated) DEVICE — LACTATED RINGERS INJ 1000 ML - (14EA/CA 60CA/PF)

## (undated) DEVICE — DRAPE C-ARM LARGE 41IN X 74 IN - (10/BX 2BX/CA)

## (undated) DEVICE — LACTATED RINGERS INJ. 500 ML - (24EA/CA)

## (undated) DEVICE — CANISTER SUCTION RIGID RED 1500CC (40EA/CA)

## (undated) DEVICE — GLOVE BIOGEL INDICATOR SZ 8 SURGICAL PF LTX - (50/BX 4BX/CA)

## (undated) DEVICE — SUTURE 1 VICRYL PLUS CTX - 8 X 18 INCH (12/BX)

## (undated) DEVICE — NEPTUNE 4 PORT MANIFOLD - (20/PK)

## (undated) DEVICE — TUBING CLEARLINK DUO-VENT - C-FLO (48EA/CA)

## (undated) DEVICE — TUBING C&T SET FLYING LEADS DRAIN TUBING (10EA/BX)

## (undated) DEVICE — DISPOSABLE WOUND VAC PICO 10 X 20 CM - WOUND CARE (3/CA)

## (undated) DEVICE — KIT EVACUATER 3 SPRING PVC LF 1/8 DRAIN SIZE (10EA/CA)"

## (undated) DEVICE — LENS/HOOD FOR SPACESUIT - (32/PK) PEEL AWAY FACE

## (undated) DEVICE — GUIDE TRACHE TUBE INTUBATING STYLET 5.0-10.0MM 14FR (20EA/PK)

## (undated) DEVICE — DERMABOND ADVANCED - (12EA/BX)

## (undated) DEVICE — LIGHT SOURCE MIS 12FT

## (undated) DEVICE — DRAPE SRG LG BCK TBL DISP - 10/CA

## (undated) DEVICE — SCISSORS 5MM CVD (6EA/BX)

## (undated) DEVICE — STAPLE 60MM WHTE 2.6MM - (12/BX) WAS PART #ECR60W

## (undated) DEVICE — SYSTEM CALIBARATION  GASTRECTOMY 40FR (5/BX)

## (undated) DEVICE — TROCAR 5X100 NON BLADED Z-TH - READ KII (6/BX)

## (undated) DEVICE — GLOVE BIOGEL SZ 7 SURGICAL PF LTX - (50PR/BX 4BX/CA)

## (undated) DEVICE — TROCAR Z THREAD12MM OPTICAL - NON BLADED (6/BX)

## (undated) DEVICE — BLADE 90X18X1.27MM SAW SAGITTAL

## (undated) DEVICE — GLOVE BIOGEL PI INDICATOR SZ 7.0 SURGICAL PF LF - (50/BX 4BX/CA)

## (undated) DEVICE — GLOVE BIOGEL SZ 8 SURGICAL PF LTX - (50PR/BX 4BX/CA)

## (undated) DEVICE — DRAPE U SPLIT IMP 54 X 76 - (24/CA)

## (undated) DEVICE — TUBE NG SALEM SUMP 16FR (50EA/CA)

## (undated) DEVICE — SODIUM CHL. IRRIGATION 0.9% 3000ML (4EA/CA 65CA/PF)

## (undated) DEVICE — SPONGE GAUZESTER 4 X 4 4PLY - (128PK/CA)

## (undated) DEVICE — SCREW DISTRACTION 14MM YELLOW - STERILE (10EA/BX) (5TX4=20)

## (undated) DEVICE — KIT ANESTHESIA W/CIRCUIT & 3/LT BAG W/FILTER (20EA/CA)

## (undated) DEVICE — GOWN WARMING STANDARD FLEX - (30/CA)

## (undated) DEVICE — GLOVE BIOGEL ECLIPSE PF LATEX SIZE 8.0  (50PR/BX)

## (undated) DEVICE — SUTURE 3-0 MONOCRYL PLUS PS-1 - 27 INCH (36/BX)

## (undated) DEVICE — NEEDLE 20 GA X 1 INCH - NON SAFTEY (100/BX)

## (undated) DEVICE — CLOSURE SKIN STRIP 1/2 X 4 IN - (STERI STRIP) (50/BX 4BX/CA)

## (undated) DEVICE — PEN SKIN MARKER W/RULER - (50EA/BX)

## (undated) DEVICE — SET EXTENSION WITH 2 PORTS (48EA/CA) ***PART #2C8610 IS A SUBSTITUTE*****

## (undated) DEVICE — HANDPIECE 10FT INTPLS SCT PLS IRRIGATION HAND CONTROL SET (6/PK)

## (undated) DEVICE — SYRINGE SAFETY 3 ML 18 GA X 1 1/2 BLUNT LL (100/BX 8BX/CA)

## (undated) DEVICE — SUTURE 4-0 VICRYL PLUSFS-1 - 27 INCH (36/BX)

## (undated) DEVICE — BOVIE BLADE COATED &INSULATED - 25/PK

## (undated) DEVICE — GLOVE, LITE (PAIR)

## (undated) DEVICE — PADDING CAST 6 IN X 4 YDS - SOF-ROLL (6RL/BG 6BG/CA)

## (undated) DEVICE — TUBING PATIENT W/CONNECTOR REDEUCE (1EA)

## (undated) DEVICE — SUTURE 3-0 VICRYL PLUS SH - 27 INCH (36/BX)

## (undated) DEVICE — GLOVE SZ 7 BIOGEL PI MICRO - PF LF (50PR/BX 4BX/CA)

## (undated) DEVICE — SET TUBING PNEUMOCLEAR HIGH FLOW SMOKE EVACUATION (10EA/BX)

## (undated) DEVICE — GLOVE BIOGEL INDICATOR SZ 7.5 SURGICAL PF LTX - (50PR/BX 4BX/CA)

## (undated) DEVICE — PACK KNEE ARTHROSCOPY SM OR - (2EA/CA)

## (undated) DEVICE — PERISTRIP 60 STAPLE LINE REINFORCEMENT (6EA/CA)

## (undated) DEVICE — STAPLE 60MM GRAY (12/BX)

## (undated) DEVICE — STAPLER POWERED 60MM (3EA/BX)

## (undated) DEVICE — DISSECT TOOL MIDAS REX

## (undated) DEVICE — MAT PATIENT POSITIONING PREVALON

## (undated) DEVICE — TOOL DISSECT MATCH HEAD

## (undated) DEVICE — HUMID-VENT HEAT AND MOISTURE EXCHANGE- (50/BX)

## (undated) DEVICE — TIP INTPLS HFLO ML ORFC BTRY - (12/CS)  FOR SURGILAV

## (undated) DEVICE — PACK NEURO - (2EA/CA)

## (undated) DEVICE — TOWELS CLOTH SURGICAL - (4/PK 20PK/CA)

## (undated) DEVICE — PACK GASTRIC BANDING OR - (1/CA)

## (undated) DEVICE — GOWN WARMING X-LARGE FLEX - (20/CA)

## (undated) DEVICE — TUBE E-T HI-LO CUFF 7.0MM (10EA/PK)

## (undated) DEVICE — SHEET PEDIATRIC LAPAROTOMY - (10/CA)

## (undated) DEVICE — SUTURE 3-0 ETHILON FS-1 - (36/BX) 30 INCH

## (undated) DEVICE — ENDOSTITCH10MM SUTURING DEVIC - (3/CA)

## (undated) DEVICE — BLADE SHAVER AGGRESSIVE PLUS 4.0MM ANGLED (5EA/BX)

## (undated) DEVICE — SUTURE 4-0 PROLENE PS-2 18 (36PK/BX)"

## (undated) DEVICE — KIT SURGIFLO W/OUT THROMBIN - (6EA/CA)

## (undated) DEVICE — SYRINGE SAFETY 10 ML 18 GA X 1 1/2 BLUNT LL (100/BX 4BX/CA)

## (undated) DEVICE — TUBING PUMP WITH CONNECTOR REDEUCE (1EA)

## (undated) DEVICE — DRAPE SURGICAL U 77X120 - (10/CA)

## (undated) DEVICE — DRAPE LARGE 3 QUARTER - (20/CA)

## (undated) DEVICE — TUBE CONNECTING SUCTION - CLEAR PLASTIC STERILE 72 IN (50EA/CA)

## (undated) DEVICE — GLOVE BIOGEL ECLIPSE PF LATEX SIZE 8.5 (50PR/BX)

## (undated) DEVICE — NEEDLE SPINAL NON-SAFETY 18 GA X 3 IN (25EA/BX)

## (undated) DEVICE — MIDAS LUBRICATOR DIFFUSER PACK (4EA/CA)

## (undated) DEVICE — CANNULA W/SEAL 5X100 Z-THRE - ADED KII (12/BX)

## (undated) DEVICE — NEEDLE NON SAFETY HYPO 22 GA X 1 1/2 IN (100/BX)

## (undated) DEVICE — IMPLANT FLEXIBLE DRILL 35MM (1EA)

## (undated) DEVICE — SLEEVE, VASO, THIGH, MED

## (undated) DEVICE — SUTURE 2-0 MONOCRYL PLUS UNDYED CT-1 1 X 36 (36EA/BX)"

## (undated) DEVICE — CANNULA W/SEAL12X100ZTHREAD - (12/BX)

## (undated) DEVICE — WATER IRRIGATION STERILE 1000ML (12EA/CA)

## (undated) DEVICE — MASK, LARYNGEAL AIRWAY #4